# Patient Record
Sex: FEMALE | Race: WHITE | Employment: OTHER | URBAN - METROPOLITAN AREA
[De-identification: names, ages, dates, MRNs, and addresses within clinical notes are randomized per-mention and may not be internally consistent; named-entity substitution may affect disease eponyms.]

---

## 2017-01-24 RX ORDER — LEVOTHYROXINE SODIUM 0.15 MG/1
TABLET ORAL
Qty: 30 TABLET | Refills: 5 | Status: SHIPPED | OUTPATIENT
Start: 2017-01-24 | End: 2018-02-12 | Stop reason: SDUPTHER

## 2017-11-13 RX ORDER — CLOBETASOL PROPIONATE 0.5 MG/G
CREAM TOPICAL
Qty: 1 TUBE | Refills: 0 | Status: SHIPPED | OUTPATIENT
Start: 2017-11-13 | End: 2018-01-19 | Stop reason: ALTCHOICE

## 2018-01-19 ENCOUNTER — OFFICE VISIT (OUTPATIENT)
Dept: FAMILY MEDICINE CLINIC | Age: 68
End: 2018-01-19

## 2018-01-19 VITALS
RESPIRATION RATE: 16 BRPM | WEIGHT: 133 LBS | DIASTOLIC BLOOD PRESSURE: 64 MMHG | TEMPERATURE: 98.1 F | SYSTOLIC BLOOD PRESSURE: 106 MMHG | HEIGHT: 62 IN | HEART RATE: 68 BPM | BODY MASS INDEX: 24.48 KG/M2

## 2018-01-19 DIAGNOSIS — K57.90 DIVERTICULOSIS OF INTESTINE WITHOUT BLEEDING, UNSPECIFIED INTESTINAL TRACT LOCATION: ICD-10-CM

## 2018-01-19 DIAGNOSIS — R73.9 HYPERGLYCEMIA: ICD-10-CM

## 2018-01-19 DIAGNOSIS — E53.8 B12 DEFICIENCY: ICD-10-CM

## 2018-01-19 DIAGNOSIS — R82.998 LEUKOCYTES IN URINE: ICD-10-CM

## 2018-01-19 DIAGNOSIS — E03.9 HYPOTHYROIDISM, UNSPECIFIED TYPE: ICD-10-CM

## 2018-01-19 DIAGNOSIS — R31.9 HEMATURIA, UNSPECIFIED TYPE: ICD-10-CM

## 2018-01-19 DIAGNOSIS — E78.2 MIXED HYPERLIPIDEMIA: ICD-10-CM

## 2018-01-19 DIAGNOSIS — N28.9 RENAL INSUFFICIENCY: ICD-10-CM

## 2018-01-19 DIAGNOSIS — R10.32 LLQ PAIN: ICD-10-CM

## 2018-01-19 DIAGNOSIS — K21.9 GASTROESOPHAGEAL REFLUX DISEASE WITHOUT ESOPHAGITIS: ICD-10-CM

## 2018-01-19 DIAGNOSIS — E78.2 MIXED HYPERLIPIDEMIA: Primary | ICD-10-CM

## 2018-01-19 DIAGNOSIS — Z23 NEED FOR PNEUMOCOCCAL VACCINATION: ICD-10-CM

## 2018-01-19 LAB
ALBUMIN SERPL-MCNC: 4.6 G/DL (ref 3.9–4.9)
ALP BLD-CCNC: 75 U/L (ref 40–130)
ALT SERPL-CCNC: 12 U/L (ref 0–33)
ANION GAP SERPL CALCULATED.3IONS-SCNC: 13 MEQ/L (ref 7–13)
AST SERPL-CCNC: 15 U/L (ref 0–35)
BILIRUB SERPL-MCNC: 0.5 MG/DL (ref 0–1.2)
BILIRUBIN, POC: ABNORMAL
BLOOD URINE, POC: ABNORMAL
BUN BLDV-MCNC: 20 MG/DL (ref 8–23)
CALCIUM SERPL-MCNC: 9.7 MG/DL (ref 8.6–10.2)
CHLORIDE BLD-SCNC: 100 MEQ/L (ref 98–107)
CHOLESTEROL, TOTAL: 235 MG/DL (ref 0–199)
CLARITY, POC: ABNORMAL
CO2: 26 MEQ/L (ref 22–29)
COLOR, POC: ABNORMAL
CREAT SERPL-MCNC: 0.56 MG/DL (ref 0.5–0.9)
GFR AFRICAN AMERICAN: >60
GFR NON-AFRICAN AMERICAN: >60
GLOBULIN: 2 G/DL (ref 2.3–3.5)
GLUCOSE BLD-MCNC: 91 MG/DL (ref 74–109)
GLUCOSE URINE, POC: ABNORMAL
HBA1C MFR BLD: 5.2 % (ref 4.8–5.9)
HCT VFR BLD CALC: 39.7 % (ref 37–47)
HDLC SERPL-MCNC: 70 MG/DL (ref 40–59)
HEMOGLOBIN: 12.7 G/DL (ref 12–16)
KETONES, POC: ABNORMAL
LDL CHOLESTEROL CALCULATED: 149 MG/DL (ref 0–129)
LEUKOCYTE EST, POC: ABNORMAL
MCH RBC QN AUTO: 29.2 PG (ref 27–31.3)
MCHC RBC AUTO-ENTMCNC: 31.9 % (ref 33–37)
MCV RBC AUTO: 91.5 FL (ref 82–100)
NITRITE, POC: ABNORMAL
PDW BLD-RTO: 14.2 % (ref 11.5–14.5)
PH, POC: 6
PLATELET # BLD: 164 K/UL (ref 130–400)
POTASSIUM SERPL-SCNC: 4.6 MEQ/L (ref 3.5–5.1)
PROTEIN, POC: ABNORMAL
RBC # BLD: 4.33 M/UL (ref 4.2–5.4)
SODIUM BLD-SCNC: 139 MEQ/L (ref 132–144)
SPECIFIC GRAVITY, POC: 1.03
T4 FREE: 1.42 NG/DL (ref 0.93–1.7)
TOTAL PROTEIN: 6.6 G/DL (ref 6.4–8.1)
TRIGL SERPL-MCNC: 80 MG/DL (ref 0–200)
TSH SERPL DL<=0.05 MIU/L-ACNC: 6.31 UIU/ML (ref 0.27–4.2)
UROBILINOGEN, POC: ABNORMAL
WBC # BLD: 6.7 K/UL (ref 4.8–10.8)

## 2018-01-19 PROCEDURE — 90732 PPSV23 VACC 2 YRS+ SUBQ/IM: CPT | Performed by: FAMILY MEDICINE

## 2018-01-19 PROCEDURE — 99214 OFFICE O/P EST MOD 30 MIN: CPT | Performed by: FAMILY MEDICINE

## 2018-01-19 PROCEDURE — 81003 URINALYSIS AUTO W/O SCOPE: CPT | Performed by: FAMILY MEDICINE

## 2018-01-19 PROCEDURE — G0009 ADMIN PNEUMOCOCCAL VACCINE: HCPCS | Performed by: FAMILY MEDICINE

## 2018-01-19 RX ORDER — PANTOPRAZOLE SODIUM 40 MG/1
40 TABLET, DELAYED RELEASE ORAL DAILY
Qty: 30 TABLET | Refills: 3 | Status: SHIPPED | OUTPATIENT
Start: 2018-01-19 | End: 2018-05-30 | Stop reason: SDUPTHER

## 2018-01-19 ASSESSMENT — PATIENT HEALTH QUESTIONNAIRE - PHQ9
2. FEELING DOWN, DEPRESSED OR HOPELESS: 1
SUM OF ALL RESPONSES TO PHQ9 QUESTIONS 1 & 2: 2
SUM OF ALL RESPONSES TO PHQ QUESTIONS 1-9: 2
1. LITTLE INTEREST OR PLEASURE IN DOING THINGS: 1

## 2018-01-19 NOTE — PROGRESS NOTES
Subjective  Lizarraga John, 75 y.o. female presents today with:  Chief Complaint   Patient presents with    Flank Pain     LLQ-has been going on x couple years--s/p CT, U/S-pt thinks it may be from when she had colon surgery years ago       Colon ca screening utd  Labs due  Hematuria-should get uro c/s  Also heartburn-had in past-did NTO tolerate omeprazole  Also bms qd but LLQ pain at times jimbo in am        Past Medical History:   Diagnosis Date    Diverticulosis of colon (without mention of hemorrhage) 02/25/2015    DR Zunilda Whitfield    Hyperlipidemia 2/3/2015    Hypothyroidism     Lichen sclerosus     Migraines 2/3/2015    Renal insufficiency 2/3/2015    Spondylosis of lumbar region without myelopathy or radiculopathy 5/24/2016     Past Surgical History:   Procedure Laterality Date    ABDOMINAL ADHESION SURGERY  1/7/16    DR. MERAZ    APPENDECTOMY  2012    CHOLECYSTECTOMY  2012    COLON SURGERY  2005    13\" of colon removed-NO CA    COLONOSCOPY  2011    polyps    COLONOSCOPY  02/25/2015    DR Zunilda Whitfield - DIVERTICULOSIS    KNEE SURGERY  2013    TUBAL LIGATION  1982     Social History     Social History    Marital status: Single     Spouse name: N/A    Number of children: N/A    Years of education: N/A     Occupational History    Not on file.      Social History Main Topics    Smoking status: Never Smoker    Smokeless tobacco: Never Used    Alcohol use No    Drug use: No    Sexual activity: Not Currently     Other Topics Concern    Not on file     Social History Narrative    No narrative on file     Family History   Problem Relation Age of Onset    Heart Disease Father     Stroke Father     Diabetes Father     Stomach Cancer Father     Other Mother      Bowel Obstruction     Allergies   Allergen Reactions    Ciprofloxacin Other (See Comments)     Pt not 100% sure, but thinks cipro is the antibiotic she is allergic to  Anxiety, confusion     Current Outpatient Prescriptions   Medication Final    Cortisol 07/11/2016 6.5  ug/dL Final    Comment: Reference Ranges:  AM Specimen 6.2-19.4 ug/dL                     PM Specimen 2.3-11.9 ug/dL       Health Maintenance   Topic Date Due    Hepatitis C screen  1950    TSH testing  02/16/2017    Flu vaccine (1) 09/01/2017    Zostavax vaccine  04/19/2018 (Originally 5/27/2009)    DTaP/Tdap/Td vaccine (1 - Tdap) 07/19/2018 (Originally 5/27/1968)    Breast cancer screen  02/26/2018    Cervical cancer screen  07/28/2019    Lipid screen  03/25/2020    Colon cancer screen colonoscopy  02/25/2025    DEXA (modify frequency per FRAX score)  Completed    Pneumococcal low/med risk  Completed       Results for POC orders placed in visit on 01/19/18   POCT Urinalysis No Micro (Auto)   Result Value Ref Range    Color, UA      Clarity, UA      Glucose, UA POC neg     Bilirubin, UA neg     Ketones, UA neg     Spec Grav, UA 1.030     Blood, UA POC 1+     pH, UA 6.0     Protein, UA POC +     Urobilinogen, UA neg     Leukocytes, UA 1+     Nitrite, UA neg          Objective    Vitals:    01/19/18 0821   BP: 106/64   Pulse: 68   Resp: 16   Temp: 98.1 °F (36.7 °C)   TempSrc: Oral   Weight: 133 lb (60.3 kg)   Height: 5' 2\" (1.575 m)       PHYSICAL EXAMINATION:        GENERAL:    The patient appears well nourished and well-developed,     Normal affect. Not appearing significantly anxious or depressed. No acute respiratory distress. Alert and oriented times 3. Skin:     No skin rashes. No concerning moles observed. Gait:    Normal gait. No ataxia. HEENT:  Normocephalic, atraumatic. Throat:  Pharynx is clear, no erythema/ edema or exudates   Ears:    TMs normal bilaterally. Canals and ears normal   Eyes:  Extraocular eye motions intact and pain free. Pupils reactive/equal    Sclerae and conjunctivae clear    NECK: No masses or adenopathy palpable. No carotid bruits heard. No asymmetry visible. No thyromegaly.     RESPIRATORY:

## 2018-01-21 LAB — URINE CULTURE, ROUTINE: NORMAL

## 2018-01-23 ENCOUNTER — OFFICE VISIT (OUTPATIENT)
Dept: GASTROENTEROLOGY | Age: 68
End: 2018-01-23
Payer: MEDICARE

## 2018-01-23 VITALS
HEART RATE: 80 BPM | DIASTOLIC BLOOD PRESSURE: 80 MMHG | HEIGHT: 62 IN | WEIGHT: 133 LBS | RESPIRATION RATE: 12 BRPM | BODY MASS INDEX: 24.48 KG/M2 | SYSTOLIC BLOOD PRESSURE: 122 MMHG

## 2018-01-23 DIAGNOSIS — R12 HEARTBURN: ICD-10-CM

## 2018-01-23 DIAGNOSIS — R13.19 OTHER DYSPHAGIA: ICD-10-CM

## 2018-01-23 DIAGNOSIS — R10.32 LLQ ABDOMINAL PAIN: Primary | ICD-10-CM

## 2018-01-23 PROCEDURE — 99213 OFFICE O/P EST LOW 20 MIN: CPT | Performed by: INTERNAL MEDICINE

## 2018-01-23 NOTE — PROGRESS NOTES
Marydis Car  75 y.o. female  Referred by: Ericka Walsh MD    Medicines, social history, past medical history, surgical history, and allergies have been reviewed and updated as needed. Chief Complaint   Patient presents with    Abdominal Pain     consult          HPI:   Patient is a 75year old WF with 2 GI problems. Patient has LLQ pain. I saw the patient for this problem and a referral was made to Dr. Chanel Parrish for possible adhesions. Laparoscopy was performed and there was no help. Patient also has reflux, heartburn and dysphagia for solids. ROS:    CONSTITUTIONAL:  Negative  EYES:  Negative  ENMT:  Negative  MUSCULOSKELETAL:  Negative  NEUROLOGICAL:  Negative  INTEGUMENTARY:  Negative  GASTROINTESTINAL:  See HPI  GENITOURINARY:  Negative  CARDIOVASCULAR:  Negative  RESPIRATORY:  Negative  HEM/LYMPH:  Negative  ENDOCRINE:  Negative  PSYCHIATRIC:  Negative  ALLERGIC/IMMUNO:  Negative  EXTREMITIES:  Negative  BREAST:  Negative        Physical Exam:  /80 (Site: Right Arm, Position: Sitting, Cuff Size: Medium Adult)   Pulse 80   Resp 12   Ht 5' 2\" (1.575 m)   Wt 133 lb (60.3 kg)   LMP  (LMP Unknown)   BMI 24.33 kg/m²   Constitutional:  Patient appears well nourished, well developed, and hydrated. Alert and oriented x3 and appropriate. Non icteric and not pale. Eyes:  Pupils equal and reactive. Oropharynx:  Unremarkable. Neck/Thyroid: Neck is supple. No palpable nodules. No carotid bruits. Thyroid is symmetrical, without thyromegaly, masses, or palpable nodules. Respiratory:  Normal to inspection. Lungs clear to auscultation and percussion. No wheezes rhonchi or rales. Cardiovascular:  Regular rate and rhythm. No murmurs, gallops, or rubs. Abdomen:  Soft, vague tenderness to the left of the umbilicus and non-distended. No organomegaly. No masses. No rebound or guarding. Normal bowel sounds. Integumentary:  The skin is unremarkable. No rashes. No suspicious lesions.  Warm and dry.  Neuro: Grossly intact. Cranial nerves, sensation and reflexes grossly intact. Musculoskeletal:  Unremarkable. Assessment:  1. LLQ abdominal pain     2. Heartburn     3. Other dysphagia         Plan:  Colonoscopy and EGD with dilation 1/26/18      Prep:  Daniella Sierra, transcribed the above note for Dr Jose Mina. Electronically signed by López Webb 1/23/18 3:32 PM        I , Jose Mina, have read and agree with the above documentation.   Electronically signed by Jose Mina M.D. 1/24/18 11:00 AM

## 2018-01-26 ENCOUNTER — HOSPITAL ENCOUNTER (OUTPATIENT)
Age: 68
Setting detail: OUTPATIENT SURGERY
Discharge: HOME | End: 2018-01-26
Attending: INTERNAL MEDICINE | Admitting: INTERNAL MEDICINE
Payer: MEDICARE

## 2018-01-26 ENCOUNTER — ANESTHESIA (OUTPATIENT)
Dept: ENDOSCOPY | Age: 68
End: 2018-01-26
Payer: MEDICARE

## 2018-01-26 ENCOUNTER — ANESTHESIA EVENT (OUTPATIENT)
Dept: ENDOSCOPY | Age: 68
End: 2018-01-26
Payer: MEDICARE

## 2018-01-26 VITALS
RESPIRATION RATE: 16 BRPM | HEIGHT: 62 IN | OXYGEN SATURATION: 95 % | SYSTOLIC BLOOD PRESSURE: 118 MMHG | WEIGHT: 133 LBS | HEART RATE: 98 BPM | BODY MASS INDEX: 24.48 KG/M2 | TEMPERATURE: 98.3 F | DIASTOLIC BLOOD PRESSURE: 77 MMHG

## 2018-01-26 VITALS
DIASTOLIC BLOOD PRESSURE: 58 MMHG | SYSTOLIC BLOOD PRESSURE: 103 MMHG | OXYGEN SATURATION: 99 % | RESPIRATION RATE: 13 BRPM

## 2018-01-26 PROCEDURE — 3700000000 HC ANESTHESIA ATTENDED CARE: Performed by: INTERNAL MEDICINE

## 2018-01-26 PROCEDURE — 6360000002 HC RX W HCPCS: Performed by: NURSE ANESTHETIST, CERTIFIED REGISTERED

## 2018-01-26 PROCEDURE — 3609027000 HC COLONOSCOPY: Performed by: INTERNAL MEDICINE

## 2018-01-26 PROCEDURE — 2500000003 HC RX 250 WO HCPCS: Performed by: NURSE ANESTHETIST, CERTIFIED REGISTERED

## 2018-01-26 PROCEDURE — 88342 IMHCHEM/IMCYTCHM 1ST ANTB: CPT

## 2018-01-26 PROCEDURE — 43239 EGD BIOPSY SINGLE/MULTIPLE: CPT | Performed by: INTERNAL MEDICINE

## 2018-01-26 PROCEDURE — 3609017100 HC EGD: Performed by: INTERNAL MEDICINE

## 2018-01-26 PROCEDURE — 3700000001 HC ADD 15 MINUTES (ANESTHESIA): Performed by: INTERNAL MEDICINE

## 2018-01-26 PROCEDURE — 43248 EGD GUIDE WIRE INSERTION: CPT | Performed by: INTERNAL MEDICINE

## 2018-01-26 PROCEDURE — 88305 TISSUE EXAM BY PATHOLOGIST: CPT

## 2018-01-26 PROCEDURE — 7100000010 HC PHASE II RECOVERY - FIRST 15 MIN: Performed by: INTERNAL MEDICINE

## 2018-01-26 PROCEDURE — 45380 COLONOSCOPY AND BIOPSY: CPT | Performed by: INTERNAL MEDICINE

## 2018-01-26 RX ORDER — SODIUM CHLORIDE 9 MG/ML
INJECTION, SOLUTION INTRAVENOUS CONTINUOUS
Status: DISCONTINUED | OUTPATIENT
Start: 2018-01-26 | End: 2018-01-26 | Stop reason: HOSPADM

## 2018-01-26 RX ORDER — PROPOFOL 10 MG/ML
INJECTION, EMULSION INTRAVENOUS PRN
Status: DISCONTINUED | OUTPATIENT
Start: 2018-01-26 | End: 2018-01-26 | Stop reason: SDUPTHER

## 2018-01-26 RX ORDER — DOCUSATE SODIUM 100 MG/1
100 CAPSULE, LIQUID FILLED ORAL 2 TIMES DAILY
COMMUNITY
End: 2022-10-31

## 2018-01-26 RX ORDER — ONDANSETRON 2 MG/ML
4 INJECTION INTRAMUSCULAR; INTRAVENOUS
Status: DISCONTINUED | OUTPATIENT
Start: 2018-01-26 | End: 2018-01-26 | Stop reason: HOSPADM

## 2018-01-26 RX ORDER — GLYCOPYRROLATE 0.2 MG/ML
INJECTION INTRAMUSCULAR; INTRAVENOUS PRN
Status: DISCONTINUED | OUTPATIENT
Start: 2018-01-26 | End: 2018-01-26 | Stop reason: SDUPTHER

## 2018-01-26 RX ADMIN — GLYCOPYRROLATE 0.4 MG: 0.2 INJECTION INTRAMUSCULAR; INTRAVENOUS at 09:59

## 2018-01-26 RX ADMIN — PROPOFOL 20 MG: 10 INJECTION, EMULSION INTRAVENOUS at 09:49

## 2018-01-26 RX ADMIN — PROPOFOL 20 MG: 10 INJECTION, EMULSION INTRAVENOUS at 09:47

## 2018-01-26 RX ADMIN — PROPOFOL 20 MG: 10 INJECTION, EMULSION INTRAVENOUS at 09:41

## 2018-01-26 RX ADMIN — PROPOFOL 20 MG: 10 INJECTION, EMULSION INTRAVENOUS at 09:55

## 2018-01-26 RX ADMIN — PROPOFOL 20 MG: 10 INJECTION, EMULSION INTRAVENOUS at 09:45

## 2018-01-26 RX ADMIN — PROPOFOL 20 MG: 10 INJECTION, EMULSION INTRAVENOUS at 09:53

## 2018-01-26 RX ADMIN — PROPOFOL 20 MG: 10 INJECTION, EMULSION INTRAVENOUS at 09:57

## 2018-01-26 RX ADMIN — PROPOFOL 20 MG: 10 INJECTION, EMULSION INTRAVENOUS at 09:59

## 2018-01-26 RX ADMIN — PROPOFOL 20 MG: 10 INJECTION, EMULSION INTRAVENOUS at 09:43

## 2018-01-26 RX ADMIN — PROPOFOL 100 MG: 10 INJECTION, EMULSION INTRAVENOUS at 09:39

## 2018-01-26 RX ADMIN — PROPOFOL 30 MG: 10 INJECTION, EMULSION INTRAVENOUS at 09:56

## 2018-01-26 RX ADMIN — PROPOFOL 20 MG: 10 INJECTION, EMULSION INTRAVENOUS at 10:01

## 2018-01-26 RX ADMIN — PROPOFOL 20 MG: 10 INJECTION, EMULSION INTRAVENOUS at 09:52

## 2018-01-26 RX ADMIN — PROPOFOL 20 MG: 10 INJECTION, EMULSION INTRAVENOUS at 09:51

## 2018-01-26 ASSESSMENT — PAIN - FUNCTIONAL ASSESSMENT: PAIN_FUNCTIONAL_ASSESSMENT: 0-10

## 2018-01-26 NOTE — ANESTHESIA PRE PROCEDURE
Department of Anesthesiology  Preprocedure Note       Name:  Kati Thacker   Age:  75 y.o.  :  1950                                          MRN:  [id number]         Date:  2018      Surgeon: Orion Fuller):  Donavon Packer MD    Procedure: Procedure(s):  EGD ESOPHAGOGASTRODUODENOSCOPY WITH DILATION  COLONOSCOPY    Medications prior to admission:   Prior to Admission medications    Medication Sig Start Date End Date Taking? Authorizing Provider   docusate sodium (COLACE) 100 MG capsule Take 100 mg by mouth 2 times daily   Yes Historical Provider, MD   pantoprazole (PROTONIX) 40 MG tablet Take 1 tablet by mouth daily 18  Yes Jaoo Falk MD   levothyroxine (SYNTHROID) 150 MCG tablet Take 1 tablet by mouth Daily. 17  Yes Joao Falk MD   Calcium Carbonate Antacid (TUMS PO) Take by mouth   Yes Historical Provider, MD   conjugated estrogens (PREMARIN) 0.625 MG/GM vaginal cream Place 0.5 g vaginally Twice a Week 16   Eugenio Rossi MD       Current medications:    Current Facility-Administered Medications   Medication Dose Route Frequency Provider Last Rate Last Dose    0.9 % sodium chloride infusion   Intravenous Continuous Donavon Packer MD           Allergies:     Allergies   Allergen Reactions    Ciprofloxacin Other (See Comments)     Pt not 100% sure, but thinks cipro is the antibiotic she is allergic to  Anxiety, confusion       Problem List:    Patient Active Problem List   Diagnosis Code    Hypothyroidism E03.9    Hyperlipidemia E78.5    Renal insufficiency N28.9    Migraines G43.909    Diverticulosis K57.90    Osteoporosis M81.0    Spondylosis of lumbar region without myelopathy or radiculopathy M47.816    Sacroiliac joint dysfunction of left side M53.3       Past Medical History:        Diagnosis Date    Diverticulosis of colon (without mention of hemorrhage) 2015    DR Gomez Back    Hyperlipidemia 2/3/2015    Hypothyroidism     Lichen sclerosus     Migraines 2/3/2015    Renal insufficiency 2/3/2015    Spondylosis of lumbar region without myelopathy or radiculopathy 5/24/2016       Past Surgical History:        Procedure Laterality Date    ABDOMINAL ADHESION SURGERY  1/7/16    DR. MERAZ    APPENDECTOMY  2012    CHOLECYSTECTOMY  2012    COLON SURGERY  2005    13\" of colon removed-NO CA    COLONOSCOPY  2011    polyps    COLONOSCOPY  02/25/2015    DR Aj Espinal - DIVERTICULOSIS    KNEE SURGERY  2013    TUBAL LIGATION  1982       Social History:    Social History   Substance Use Topics    Smoking status: Never Smoker    Smokeless tobacco: Never Used    Alcohol use No                                Counseling given: Not Answered      Vital Signs (Current):   Vitals:    01/26/18 0841   BP: (!) 129/50   Pulse: 71   Resp: 16   Temp: 36.8 °C (98.3 °F)   SpO2: 96%   Weight: 133 lb (60.3 kg)   Height: 5' 2\" (1.575 m)                                              BP Readings from Last 3 Encounters:   01/26/18 (!) 129/50   01/23/18 122/80   01/19/18 106/64       NPO Status: Time of last liquid consumption: 0430                        Time of last solid consumption: 1800                        Date of last liquid consumption: 01/26/18                        Date of last solid food consumption: 01/24/18    BMI:   Wt Readings from Last 3 Encounters:   01/26/18 133 lb (60.3 kg)   01/23/18 133 lb (60.3 kg)   01/19/18 133 lb (60.3 kg)     Body mass index is 24.33 kg/m².     CBC:   Lab Results   Component Value Date    WBC 6.7 01/19/2018    RBC 4.33 01/19/2018    HGB 12.7 01/19/2018    HCT 39.7 01/19/2018    MCV 91.5 01/19/2018    RDW 14.2 01/19/2018     01/19/2018       CMP:   Lab Results   Component Value Date     01/19/2018    K 4.6 01/19/2018     01/19/2018    CO2 26 01/19/2018    BUN 20 01/19/2018    CREATININE 0.56 01/19/2018    GFRAA >60.0 01/19/2018    LABGLOM >60.0 01/19/2018    GLUCOSE 91 01/19/2018    PROT 6.6 01/19/2018    CALCIUM

## 2018-01-26 NOTE — PROGRESS NOTES
Gastric biopsy obtained for point of care h-pylori test  Dilation performed using savory dilator #20

## 2018-02-06 ENCOUNTER — OFFICE VISIT (OUTPATIENT)
Dept: GASTROENTEROLOGY | Age: 68
End: 2018-02-06
Payer: MEDICARE

## 2018-02-06 ENCOUNTER — OFFICE VISIT (OUTPATIENT)
Dept: UROLOGY | Age: 68
End: 2018-02-06
Payer: MEDICARE

## 2018-02-06 VITALS
HEART RATE: 77 BPM | DIASTOLIC BLOOD PRESSURE: 70 MMHG | SYSTOLIC BLOOD PRESSURE: 112 MMHG | BODY MASS INDEX: 24.48 KG/M2 | WEIGHT: 133 LBS | HEIGHT: 62 IN

## 2018-02-06 VITALS
BODY MASS INDEX: 24.48 KG/M2 | RESPIRATION RATE: 12 BRPM | WEIGHT: 133 LBS | HEIGHT: 62 IN | DIASTOLIC BLOOD PRESSURE: 81 MMHG | SYSTOLIC BLOOD PRESSURE: 144 MMHG | HEART RATE: 79 BPM

## 2018-02-06 DIAGNOSIS — R10.32 LEFT LOWER QUADRANT PAIN: ICD-10-CM

## 2018-02-06 DIAGNOSIS — R13.10 DYSPHAGIA, UNSPECIFIED TYPE: Primary | ICD-10-CM

## 2018-02-06 DIAGNOSIS — R10.32 LLQ ABDOMINAL PAIN: ICD-10-CM

## 2018-02-06 DIAGNOSIS — R31.29 MICROSCOPIC HEMATURIA: Primary | ICD-10-CM

## 2018-02-06 LAB
BILIRUBIN, POC: NORMAL
BLOOD URINE, POC: NORMAL
CLARITY, POC: CLEAR
COLOR, POC: YELLOW
GLUCOSE URINE, POC: NORMAL
KETONES, POC: NORMAL
LEUKOCYTE EST, POC: NORMAL
NITRITE, POC: NORMAL
PH, POC: 5.5
PROTEIN, POC: NORMAL
SPECIFIC GRAVITY, POC: 1.03
UROBILINOGEN, POC: 0.2

## 2018-02-06 PROCEDURE — 99213 OFFICE O/P EST LOW 20 MIN: CPT | Performed by: INTERNAL MEDICINE

## 2018-02-06 PROCEDURE — 99204 OFFICE O/P NEW MOD 45 MIN: CPT | Performed by: UROLOGY

## 2018-02-06 PROCEDURE — 81003 URINALYSIS AUTO W/O SCOPE: CPT | Performed by: UROLOGY

## 2018-02-06 RX ORDER — OMEPRAZOLE 40 MG/1
40 CAPSULE, DELAYED RELEASE ORAL DAILY
COMMUNITY
End: 2018-02-06 | Stop reason: CLARIF

## 2018-02-06 ASSESSMENT — ENCOUNTER SYMPTOMS
ALLERGIC/IMMUNOLOGIC NEGATIVE: 1
RESPIRATORY NEGATIVE: 1
CONSTIPATION: 0
DIARRHEA: 0
ABDOMINAL DISTENTION: 0
BACK PAIN: 1
NAUSEA: 0
VOMITING: 0
ABDOMINAL PAIN: 1

## 2018-02-12 RX ORDER — CLOBETASOL PROPIONATE 0.5 MG/G
CREAM TOPICAL
Qty: 15 G | Refills: 3 | Status: SHIPPED | OUTPATIENT
Start: 2018-02-12 | End: 2018-02-27

## 2018-02-12 RX ORDER — LEVOTHYROXINE SODIUM 0.15 MG/1
TABLET ORAL
Qty: 30 TABLET | Refills: 5 | Status: SHIPPED | OUTPATIENT
Start: 2018-02-12 | End: 2018-07-19 | Stop reason: SDUPTHER

## 2018-02-12 RX ORDER — LEVOTHYROXINE SODIUM 0.15 MG/1
TABLET ORAL
Qty: 30 TABLET | Refills: 5 | Status: SHIPPED | OUTPATIENT
Start: 2018-02-12 | End: 2018-11-27 | Stop reason: SDUPTHER

## 2018-02-12 RX ORDER — CLOBETASOL PROPIONATE 0.5 MG/G
OINTMENT TOPICAL
Qty: 60 G | Refills: 3 | Status: SHIPPED | OUTPATIENT
Start: 2018-02-12 | End: 2019-07-17

## 2018-02-13 ENCOUNTER — HOSPITAL ENCOUNTER (OUTPATIENT)
Dept: CT IMAGING | Age: 68
Discharge: HOME | End: 2018-02-15
Payer: MEDICARE

## 2018-02-13 VITALS
SYSTOLIC BLOOD PRESSURE: 123 MMHG | DIASTOLIC BLOOD PRESSURE: 68 MMHG | HEART RATE: 80 BPM | WEIGHT: 133 LBS | RESPIRATION RATE: 16 BRPM | BODY MASS INDEX: 24.48 KG/M2 | HEIGHT: 62 IN

## 2018-02-13 DIAGNOSIS — R31.29 MICROSCOPIC HEMATURIA: ICD-10-CM

## 2018-02-13 PROCEDURE — 6360000004 HC RX CONTRAST MEDICATION: Performed by: UROLOGY

## 2018-02-13 PROCEDURE — 74178 CT ABD&PLV WO CNTR FLWD CNTR: CPT

## 2018-02-13 RX ADMIN — IOPAMIDOL 100 ML: 755 INJECTION, SOLUTION INTRAVENOUS at 14:37

## 2018-02-22 ENCOUNTER — PROCEDURE VISIT (OUTPATIENT)
Dept: UROLOGY | Age: 68
End: 2018-02-22
Payer: MEDICARE

## 2018-02-22 VITALS
WEIGHT: 133 LBS | HEIGHT: 62 IN | DIASTOLIC BLOOD PRESSURE: 64 MMHG | BODY MASS INDEX: 24.48 KG/M2 | SYSTOLIC BLOOD PRESSURE: 116 MMHG | HEART RATE: 84 BPM

## 2018-02-22 DIAGNOSIS — R31.29 MICROSCOPIC HEMATURIA: Primary | ICD-10-CM

## 2018-02-22 LAB
BILIRUBIN, POC: ABNORMAL
BLOOD URINE, POC: ABNORMAL
CLARITY, POC: CLEAR
COLOR, POC: YELLOW
GLUCOSE URINE, POC: ABNORMAL
KETONES, POC: ABNORMAL
LEUKOCYTE EST, POC: ABNORMAL
NITRITE, POC: ABNORMAL
PH, POC: 5.5
PROTEIN, POC: ABNORMAL
SPECIFIC GRAVITY, POC: >1.03
UROBILINOGEN, POC: 0.2

## 2018-02-22 PROCEDURE — 52000 CYSTOURETHROSCOPY: CPT | Performed by: UROLOGY

## 2018-02-22 PROCEDURE — 99212 OFFICE O/P EST SF 10 MIN: CPT | Performed by: UROLOGY

## 2018-02-22 PROCEDURE — 81003 URINALYSIS AUTO W/O SCOPE: CPT | Performed by: UROLOGY

## 2018-02-22 RX ORDER — SULFAMETHOXAZOLE AND TRIMETHOPRIM 800; 160 MG/1; MG/1
1 TABLET ORAL ONCE
Qty: 1 TABLET | Refills: 0 | COMMUNITY
Start: 2018-02-22 | End: 2018-02-22

## 2018-02-22 ASSESSMENT — ENCOUNTER SYMPTOMS: ABDOMINAL DISTENTION: 0

## 2018-02-22 NOTE — PROGRESS NOTES
Father    Omer Keller Father     Other Mother      Bowel Obstruction     Current Outpatient Prescriptions   Medication Sig Dispense Refill    levothyroxine (SYNTHROID) 150 MCG tablet Take 1 tablet by mouth Daily. 30 tablet 5    clobetasol (TEMOVATE) 0.05 % ointment Apply topically 2 times daily. 60 g 3    levothyroxine (SYNTHROID) 150 MCG tablet Take 1 tablet by mouth Daily. 30 tablet 5    clobetasol (TEMOVATE) 0.05 % cream Apply topically 2 times daily for 2 weeks and then PRN 15 g 3    docusate sodium (COLACE) 100 MG capsule Take 100 mg by mouth 2 times daily      pantoprazole (PROTONIX) 40 MG tablet Take 1 tablet by mouth daily 30 tablet 3    conjugated estrogens (PREMARIN) 0.625 MG/GM vaginal cream Place 0.5 g vaginally Twice a Week 3 Tube 3     No current facility-administered medications for this visit. Ciprofloxacin  reviewed      Review of Systems   Constitutional: Negative for fever and unexpected weight change. Gastrointestinal: Negative for abdominal distention. Genitourinary: Negative for dysuria, flank pain and frequency. Objective:   Physical Exam   Constitutional: She appears well-developed and well-nourished. Abdominal: Soft. She exhibits no distension. There is no tenderness. Neurological: She is alert. Psychiatric: She has a normal mood and affect.        2/22/2018  3:00 PM - Delio Montiel CMA (AAMA)     Component Results     Component Collected Lab   Color, UA 02/22/2018  3:00 PM Unknown   yellow    Clarity, UA 02/22/2018  3:00 PM Unknown   clear    Glucose, UA POC 02/22/2018  3:00 PM Unknown   neg    Bilirubin, UA 02/22/2018  3:00 PM Unknown   neg    Ketones, UA 02/22/2018  3:00 PM Unknown   neg    Spec Grav, UA 02/22/2018  3:00 PM Unknown   >1.030    Blood, UA POC 02/22/2018  3:00 PM Unknown   small    pH, UA 02/22/2018  3:00 PM Unknown   5.5    Protein, UA POC 02/22/2018  3:00 PM Unknown   neg    Urobilinogen, UA 02/22/2018  3:00 PM Unknown   0.2 mesenteric lymph nodes. Pelvic: No enlarged pelvic lymph nodes.        Ureters: Normal in course and caliber. No calcifications.        Bladder: No wall thickening.        Reproductive organs: No pelvic masses.        Abdominal Wall:  3 mm periumbilical abdominal wall defect containing fat.       Bones:  No bone lesions. Anterior osteophyte formation L1-L4. Mild anterior wedging of L1 since prior study of 2015. Disc space narrowing lower thoracic spine. No post operative changes.            Impression       Small hiatal hernia.       Fat-containing periumbilical hernia.       Probable hepatic cysts, stable in size and number since prior study.       Cholecystectomy.       Appendectomy.       Other findings discussed.           All CT scans at this facility use dose modulation, iterative reconstruction, and/or weight based dosing when appropriate to reduce radiation dose to as low as reasonably achievable.             Cystoscopy Procedure Note    Pre-operative Diagnosis: Microhematuria    Post-operative Diagnosis: Same plus benign findings    Surgeon: Fatmata Morillo     Assistants: Toni Camarena. HERMANN Murphy    Anesthesia: Local anesthesia topical 2% lidocaine gel    Procedure Details   The risks, benefits, complications, treatment options, and expected outcomes were discussed with the patient. The patient concurred with the proposed plan, giving informed consent. Cystoscopy was performed today under local anesthesia, using sterile technique. The patient was placed in the supine position, prepped and draped in the usual sterile fashion. A flexible cystoscope was used to inspect the entire bladder including retroflexion. Findings:  Urethra:normal  Bladder: Normal mucosa without bladder tumors, stones, clots or FB  Ureteral orifice(s) were normal . Ureteral orifice(s) were in the normal location. Specimens: None                 Complications:  None; patient tolerated the procedure well.

## 2018-02-27 ENCOUNTER — OFFICE VISIT (OUTPATIENT)
Dept: FAMILY MEDICINE CLINIC | Age: 68
End: 2018-02-27
Payer: MEDICARE

## 2018-02-27 VITALS
DIASTOLIC BLOOD PRESSURE: 64 MMHG | HEART RATE: 76 BPM | WEIGHT: 132 LBS | RESPIRATION RATE: 18 BRPM | BODY MASS INDEX: 24.29 KG/M2 | HEIGHT: 62 IN | SYSTOLIC BLOOD PRESSURE: 120 MMHG | TEMPERATURE: 98.7 F | OXYGEN SATURATION: 95 %

## 2018-02-27 DIAGNOSIS — K44.9 HIATAL HERNIA: ICD-10-CM

## 2018-02-27 DIAGNOSIS — J40 BRONCHITIS: ICD-10-CM

## 2018-02-27 DIAGNOSIS — R68.89 FLU-LIKE SYMPTOMS: Primary | ICD-10-CM

## 2018-02-27 LAB
INFLUENZA A ANTIBODY: NEGATIVE
INFLUENZA B ANTIBODY: NEGATIVE

## 2018-02-27 PROCEDURE — 99214 OFFICE O/P EST MOD 30 MIN: CPT | Performed by: FAMILY MEDICINE

## 2018-02-27 PROCEDURE — 87804 INFLUENZA ASSAY W/OPTIC: CPT | Performed by: FAMILY MEDICINE

## 2018-02-27 RX ORDER — DOXYCYCLINE HYCLATE 100 MG
100 TABLET ORAL 2 TIMES DAILY
Qty: 20 TABLET | Refills: 0 | Status: SHIPPED | OUTPATIENT
Start: 2018-02-27 | End: 2019-03-08 | Stop reason: SDUPTHER

## 2018-02-27 RX ORDER — ALBUTEROL SULFATE 90 UG/1
2 AEROSOL, METERED RESPIRATORY (INHALATION) EVERY 6 HOURS PRN
Qty: 1 INHALER | Refills: 1 | Status: SHIPPED | OUTPATIENT
Start: 2018-02-27 | End: 2018-02-28 | Stop reason: CLARIF

## 2018-02-27 NOTE — PROGRESS NOTES
POCT Influenza A/B   Result Value Ref Range    Influenza A Ab negative     Influenza B Ab negative          Objective    Vitals:    02/27/18 1548   BP: 120/64   Pulse: 76   Resp: 18   Temp: 98.7 °F (37.1 °C)   TempSrc: Oral   SpO2: 95%   Weight: 132 lb (59.9 kg)   Height: 5' 2\" (1.575 m)       PHYSICAL EXAMINATION:        GENERAL:    The patient appears well nourished and well-developed,     Normal affect. Not appearing significantly anxious or depressed. No acute respiratory distress. Alert and oriented times 3. Skin:     No skin rashes. No concerning moles observed. Gait:    Normal gait. No ataxia. HEENT:  Normocephalic, atraumatic. Throat:  Pharynx is clear, no erythema/ edema or exudates   Ears:    TMs normal bilaterally. Canals and ears normal   Eyes:  Extraocular eye motions intact and pain free. Pupils reactive/equal    Sclerae and conjunctivae clear    NECK: No masses or adenopathy palpable. No carotid bruits heard. No asymmetry visible. No thyromegaly. RESPIRATORY:   Exp wheezes and coarse bilat mostly exp     HEART: Regular rhythm without murmur, rub or gallop. ABDOMEN:  Soft, non tender. No masses, guarding or rebound. Normo active bowel sounds. EXTREMITIES:  No edema in any extremity. No cyanosis or clubbing. 2+ dorsalis pedis pulses bilaterally          Assessment & Plan   1. Flu-like symptoms  POCT Influenza A/B   2. Hiatal hernia     3. Bronchitis       Orders Placed This Encounter   Procedures    POCT Influenza A/B     No orders of the defined types were placed in this encounter. Medications Discontinued During This Encounter   Medication Reason    clobetasol (TEMOVATE) 0.05 % cream      No Follow-up on file.   When she wants motrin 800mg when having pain bc it helps a lot  duexis given The patient was advised that NSAID-type medications have three important potential side effects: gastrointestinal irritation including hemorrhage, myocardial injury including possible infarction, and kidney injury. She was asked to take the medication with food, avoid any other NSAIDs or steroids, and discontinue for any untowards effects. She should immediately stop the medication and proceed to the emergency department for chest pain, dark urine or difficulty with producing urine, vomiting, abdominal pain or black/bloody stools. The patient expresses understanding of these issues and all questions were answered.      Filipe Helms MD

## 2018-02-28 RX ORDER — ALBUTEROL SULFATE 90 UG/1
2 AEROSOL, METERED RESPIRATORY (INHALATION) EVERY 6 HOURS PRN
Qty: 1 INHALER | Refills: 3 | Status: SHIPPED | OUTPATIENT
Start: 2018-02-28 | End: 2018-08-16

## 2018-05-30 RX ORDER — PANTOPRAZOLE SODIUM 40 MG/1
40 TABLET, DELAYED RELEASE ORAL DAILY
Qty: 30 TABLET | Refills: 5 | Status: SHIPPED | OUTPATIENT
Start: 2018-05-30 | End: 2018-08-16

## 2018-07-19 ENCOUNTER — OFFICE VISIT (OUTPATIENT)
Dept: FAMILY MEDICINE CLINIC | Age: 68
End: 2018-07-19
Payer: MEDICARE

## 2018-07-19 VITALS
DIASTOLIC BLOOD PRESSURE: 82 MMHG | WEIGHT: 130 LBS | TEMPERATURE: 98.2 F | SYSTOLIC BLOOD PRESSURE: 136 MMHG | HEIGHT: 62 IN | BODY MASS INDEX: 23.92 KG/M2 | HEART RATE: 72 BPM | RESPIRATION RATE: 16 BRPM

## 2018-07-19 DIAGNOSIS — E03.9 HYPOTHYROIDISM, UNSPECIFIED TYPE: ICD-10-CM

## 2018-07-19 DIAGNOSIS — R31.29 MICROHEMATURIA: ICD-10-CM

## 2018-07-19 DIAGNOSIS — R10.9 LEFT SIDED ABDOMINAL PAIN: ICD-10-CM

## 2018-07-19 DIAGNOSIS — E78.2 MIXED HYPERLIPIDEMIA: Primary | ICD-10-CM

## 2018-07-19 DIAGNOSIS — Z12.31 ENCOUNTER FOR SCREENING MAMMOGRAM FOR BREAST CANCER: ICD-10-CM

## 2018-07-19 DIAGNOSIS — K57.30 DIVERTICULA OF COLON: ICD-10-CM

## 2018-07-19 LAB
BILIRUBIN, POC: ABNORMAL
BLOOD URINE, POC: ABNORMAL
CLARITY, POC: ABNORMAL
COLOR, POC: ABNORMAL
GLUCOSE URINE, POC: ABNORMAL
KETONES, POC: ABNORMAL
LEUKOCYTE EST, POC: ABNORMAL
NITRITE, POC: ABNORMAL
PH, POC: 6
PROTEIN, POC: ABNORMAL
SPECIFIC GRAVITY, POC: 1.03
UROBILINOGEN, POC: ABNORMAL

## 2018-07-19 PROCEDURE — 99214 OFFICE O/P EST MOD 30 MIN: CPT | Performed by: FAMILY MEDICINE

## 2018-07-19 PROCEDURE — 81003 URINALYSIS AUTO W/O SCOPE: CPT | Performed by: FAMILY MEDICINE

## 2018-07-21 LAB — URINE CULTURE, ROUTINE: NORMAL

## 2018-07-23 DIAGNOSIS — M47.816 SPONDYLOSIS OF LUMBAR REGION WITHOUT MYELOPATHY OR RADICULOPATHY: ICD-10-CM

## 2018-07-23 RX ORDER — TIZANIDINE 2 MG/1
2 TABLET ORAL EVERY EVENING
Qty: 30 TABLET | Refills: 0 | Status: SHIPPED | OUTPATIENT
Start: 2018-07-23 | End: 2018-08-28 | Stop reason: SDUPTHER

## 2018-08-16 ENCOUNTER — OFFICE VISIT (OUTPATIENT)
Dept: FAMILY MEDICINE CLINIC | Age: 68
End: 2018-08-16
Payer: MEDICARE

## 2018-08-16 VITALS
SYSTOLIC BLOOD PRESSURE: 126 MMHG | BODY MASS INDEX: 23.92 KG/M2 | HEART RATE: 76 BPM | TEMPERATURE: 97.9 F | HEIGHT: 62 IN | WEIGHT: 130 LBS | DIASTOLIC BLOOD PRESSURE: 56 MMHG | OXYGEN SATURATION: 97 % | RESPIRATION RATE: 16 BRPM

## 2018-08-16 DIAGNOSIS — R10.32 LEFT LOWER QUADRANT PAIN: ICD-10-CM

## 2018-08-16 DIAGNOSIS — R10.32 LEFT LOWER QUADRANT PAIN: Primary | ICD-10-CM

## 2018-08-16 LAB
BACTERIA: ABNORMAL /HPF
BILIRUBIN URINE: NEGATIVE
BLOOD, URINE: NEGATIVE
CLARITY: CLEAR
COLOR: YELLOW
GLUCOSE URINE: NEGATIVE MG/DL
HCT VFR BLD CALC: 40.7 % (ref 37–47)
HEMOGLOBIN: 13.8 G/DL (ref 12–16)
KETONES, URINE: NEGATIVE MG/DL
LACTIC ACID: 0.7 MMOL/L (ref 0.5–2.2)
LEUKOCYTE ESTERASE, URINE: ABNORMAL
MCH RBC QN AUTO: 30.1 PG (ref 27–31.3)
MCHC RBC AUTO-ENTMCNC: 33.9 % (ref 33–37)
MCV RBC AUTO: 88.7 FL (ref 82–100)
MUCUS: PRESENT
NITRITE, URINE: NEGATIVE
PDW BLD-RTO: 14 % (ref 11.5–14.5)
PH UA: 6.5 (ref 5–9)
PLATELET # BLD: 160 K/UL (ref 130–400)
PROTEIN UA: NEGATIVE MG/DL
RBC # BLD: 4.59 M/UL (ref 4.2–5.4)
RBC UA: ABNORMAL /HPF (ref 0–2)
SPECIFIC GRAVITY UA: 1.02 (ref 1–1.03)
UROBILINOGEN, URINE: 0.2 E.U./DL
WBC # BLD: 5.3 K/UL (ref 4.8–10.8)
WBC UA: ABNORMAL /HPF (ref 0–5)

## 2018-08-16 PROCEDURE — 99214 OFFICE O/P EST MOD 30 MIN: CPT | Performed by: INTERNAL MEDICINE

## 2018-08-16 RX ORDER — AMOXICILLIN AND CLAVULANATE POTASSIUM 875; 125 MG/1; MG/1
1 TABLET, FILM COATED ORAL 3 TIMES DAILY
Qty: 21 TABLET | Refills: 0 | Status: SHIPPED | OUTPATIENT
Start: 2018-08-16 | End: 2018-08-23

## 2018-08-16 ASSESSMENT — ENCOUNTER SYMPTOMS
BACK PAIN: 0
ABDOMINAL PAIN: 1
SHORTNESS OF BREATH: 0
EYE PAIN: 0

## 2018-08-16 NOTE — PROGRESS NOTES
Subjective:      Patient ID: Michel Coburn is a 70 y.o. female who presents today with:  Chief Complaint   Patient presents with    Second Opinion     abdominal pain - has an abdominal US scheduled for tomorrow    Abdominal Cramping     lower left abdominal pain/cramping (tizanidine helps) [id number]; worse first thing in the morning - pain causes her to double over    Nausea     dry heaves with foamy saliva       HPI  Here for second opinion regarding abdominal pain. She has had EGD and Colonoscopy in 2018. She was found to have a ulcer in January was it was so small it was though to be insignificant she was taken off the pantoprazole in July. Off of it she mentions she has more belching it, but the pain is not different. This morning she had the pain today but it hasn't been this bad for a month ago. Tizanidine helped this morning. Colonoscopy in January was suspicious for diverticulitis. Pain is llq, doesn't radiate. Past Medical History:   Diagnosis Date    Diverticulosis of colon (without mention of hemorrhage) 02/25/2015    DR Konrad Kruger    Hyperlipidemia 2/3/2015    Hypothyroidism     Lichen sclerosus     Migraines 2/3/2015    Renal insufficiency 2/3/2015    Spondylosis of lumbar region without myelopathy or radiculopathy 5/24/2016     Past Surgical History:   Procedure Laterality Date    ABDOMINAL ADHESION SURGERY  1/7/16    DR. MERAZ    APPENDECTOMY  2012    CHOLECYSTECTOMY  2012    COLON SURGERY  2005    13\" of colon removed-NO CA    COLONOSCOPY  2011    polyps    COLONOSCOPY  02/25/2015    DR Konrad Kruger - DIVERTICULOSIS    KNEE SURGERY  2013    GA COLONOSCOPY FLX DX W/COLLJ SPEC WHEN PFRMD N/A 1/26/2018    COLONOSCOPY performed by Sneha Art MD at [address] [address] ESOPHAGOGASTRODUODENOSCOPY TRANSORAL DIAGNOSTIC N/A 1/26/2018    EGD ESOPHAGOGASTRODUODENOSCOPY WITH DILATION performed by Sneha Art MD at [address] [place] [address]

## 2018-08-16 NOTE — PATIENT INSTRUCTIONS
Patient Education        Abdominal Pain: Care Instructions  Your Care Instructions    Abdominal pain has many possible causes. Some aren't serious and get better on their own in a few days. Others need more testing and treatment. If your pain continues or gets worse, you need to be rechecked and may need more tests to find out what is wrong. You may need surgery to correct the problem. Don't ignore new symptoms, such as fever, nausea and vomiting, urination problems, pain that gets worse, and dizziness. These may be signs of a more serious problem. Your doctor may have recommended a follow-up visit in the next 8 to 12 hours. If you are not getting better, you may need more tests or treatment. The doctor has checked you carefully, but problems can develop later. If you notice any problems or new symptoms, get medical treatment right away. Follow-up care is a key part of your treatment and safety. Be sure to make and go to all appointments, and call your doctor if you are having problems. It's also a good idea to know your test results and keep a list of the medicines you take. How can you care for yourself at home? · Rest until you feel better. · To prevent dehydration, drink plenty of fluids, enough so that your urine is light yellow or clear like water. Choose water and other caffeine-free clear liquids until you feel better. If you have kidney, heart, or liver disease and have to limit fluids, talk with your doctor before you increase the amount of fluids you drink. · If your stomach is upset, eat mild foods, such as rice, dry toast or crackers, bananas, and applesauce. Try eating several small meals instead of two or three large ones. · Wait until 48 hours after all symptoms have gone away before you have spicy foods, alcohol, and drinks that contain caffeine. · Do not eat foods that are high in fat. · Avoid anti-inflammatory medicines such as aspirin, ibuprofen (Advil, Motrin), and naproxen (Aleve). These can cause stomach upset. Talk to your doctor if you take daily aspirin for another health problem. When should you call for help? Call 911 anytime you think you may need emergency care. For example, call if:    · You passed out (lost consciousness).     · You pass maroon or very bloody stools.     · You vomit blood or what looks like coffee grounds.     · You have new, severe belly pain.    Call your doctor now or seek immediate medical care if:    · Your pain gets worse, especially if it becomes focused in one area of your belly.     · You have a new or higher fever.     · Your stools are black and look like tar, or they have streaks of blood.     · You have unexpected vaginal bleeding.     · You have symptoms of a urinary tract infection. These may include:  ¨ Pain when you urinate. ¨ Urinating more often than usual.  ¨ Blood in your urine.     · You are dizzy or lightheaded, or you feel like you may faint.    Watch closely for changes in your health, and be sure to contact your doctor if:    · You are not getting better after 1 day (24 hours). Where can you learn more? Go to https://[email or web address]. org and sign in to your [employer] account. Enter N428 in the Mobee box to learn more about \"Abdominal Pain: Care Instructions. \"     If you do not have an account, please click on the \"Sign Up Now\" link. Current as of: November 20, 2017  Content Version: 11.7  © 0059-4049 [employer]. Care instructions adapted under license by [facility] ([facility]). If you have questions about a medical condition or this instruction, always ask your healthcare professional. [place] [address] any warranty or liability for your use of this information.

## 2018-08-17 ENCOUNTER — HOSPITAL ENCOUNTER (OUTPATIENT)
Dept: ULTRASOUND IMAGING | Age: 68
Discharge: HOME | End: 2018-08-19
Payer: MEDICARE

## 2018-08-17 ENCOUNTER — HOSPITAL ENCOUNTER (OUTPATIENT)
Dept: WOMENS IMAGING | Age: 68
Discharge: HOME | End: 2018-08-19
Payer: MEDICARE

## 2018-08-17 DIAGNOSIS — R10.9 LEFT SIDED ABDOMINAL PAIN: ICD-10-CM

## 2018-08-17 DIAGNOSIS — Z12.31 ENCOUNTER FOR SCREENING MAMMOGRAM FOR BREAST CANCER: ICD-10-CM

## 2018-08-17 PROCEDURE — 77067 SCR MAMMO BI INCL CAD: CPT

## 2018-08-17 PROCEDURE — 76830 TRANSVAGINAL US NON-OB: CPT

## 2018-08-17 PROCEDURE — 76856 US EXAM PELVIC COMPLETE: CPT

## 2018-08-18 LAB — PROCALCITONIN: <0.07 NG/ML

## 2018-08-28 ENCOUNTER — TELEPHONE (OUTPATIENT)
Dept: FAMILY MEDICINE CLINIC | Age: 68
End: 2018-08-28

## 2018-08-28 DIAGNOSIS — M47.816 SPONDYLOSIS OF LUMBAR REGION WITHOUT MYELOPATHY OR RADICULOPATHY: ICD-10-CM

## 2018-08-28 RX ORDER — TIZANIDINE 2 MG/1
2 TABLET ORAL EVERY EVENING
Qty: 30 TABLET | Refills: 0 | Status: SHIPPED | OUTPATIENT
Start: 2018-08-28 | End: 2018-09-24 | Stop reason: SDUPTHER

## 2018-08-28 NOTE — TELEPHONE ENCOUNTER
Patient called wanting to know if you could prescribe Zanaflex again for pain on left side of stomach that radiates to back causing nausea and weakness. Only thing that helps patient. Please approve or deny.     Pharmacy Drug [facility].

## 2018-11-21 ENCOUNTER — OFFICE VISIT (OUTPATIENT)
Dept: FAMILY MEDICINE CLINIC | Age: 68
End: 2018-11-21
Payer: MEDICARE

## 2018-11-21 VITALS
WEIGHT: 128 LBS | RESPIRATION RATE: 16 BRPM | TEMPERATURE: 98 F | DIASTOLIC BLOOD PRESSURE: 62 MMHG | OXYGEN SATURATION: 99 % | SYSTOLIC BLOOD PRESSURE: 128 MMHG | HEIGHT: 62 IN | HEART RATE: 74 BPM | BODY MASS INDEX: 23.55 KG/M2

## 2018-11-21 DIAGNOSIS — E78.2 MIXED HYPERLIPIDEMIA: Primary | ICD-10-CM

## 2018-11-21 DIAGNOSIS — Z23 NEED FOR INFLUENZA VACCINATION: ICD-10-CM

## 2018-11-21 DIAGNOSIS — E78.2 MIXED HYPERLIPIDEMIA: ICD-10-CM

## 2018-11-21 DIAGNOSIS — K57.30 DIVERTICULA OF COLON: ICD-10-CM

## 2018-11-21 DIAGNOSIS — N28.9 RENAL INSUFFICIENCY: ICD-10-CM

## 2018-11-21 DIAGNOSIS — M47.816 SPONDYLOSIS OF LUMBAR REGION WITHOUT MYELOPATHY OR RADICULOPATHY: ICD-10-CM

## 2018-11-21 DIAGNOSIS — E03.9 HYPOTHYROIDISM, UNSPECIFIED TYPE: ICD-10-CM

## 2018-11-21 LAB
ALBUMIN SERPL-MCNC: 4.7 G/DL (ref 3.9–4.9)
ALP BLD-CCNC: 82 U/L (ref 40–130)
ALT SERPL-CCNC: 12 U/L (ref 0–33)
ANION GAP SERPL CALCULATED.3IONS-SCNC: 13 MEQ/L (ref 7–13)
AST SERPL-CCNC: 18 U/L (ref 0–35)
BILIRUB SERPL-MCNC: 0.6 MG/DL (ref 0–1.2)
BUN BLDV-MCNC: 20 MG/DL (ref 8–23)
CALCIUM SERPL-MCNC: 10.2 MG/DL (ref 8.6–10.2)
CHLORIDE BLD-SCNC: 101 MEQ/L (ref 98–107)
CHOLESTEROL, TOTAL: 262 MG/DL (ref 0–199)
CO2: 26 MEQ/L (ref 22–29)
CREAT SERPL-MCNC: 0.6 MG/DL (ref 0.5–0.9)
GFR AFRICAN AMERICAN: >60
GFR NON-AFRICAN AMERICAN: >60
GLOBULIN: 2.4 G/DL (ref 2.3–3.5)
GLUCOSE BLD-MCNC: 87 MG/DL (ref 74–109)
HCT VFR BLD CALC: 40.9 % (ref 37–47)
HDLC SERPL-MCNC: 76 MG/DL (ref 40–59)
HEMOGLOBIN: 14.1 G/DL (ref 12–16)
LDL CHOLESTEROL CALCULATED: 154 MG/DL (ref 0–129)
MCH RBC QN AUTO: 30.5 PG (ref 27–31.3)
MCHC RBC AUTO-ENTMCNC: 34.4 % (ref 33–37)
MCV RBC AUTO: 88.6 FL (ref 82–100)
PDW BLD-RTO: 12.8 % (ref 11.5–14.5)
PLATELET # BLD: 215 K/UL (ref 130–400)
POTASSIUM SERPL-SCNC: 3.8 MEQ/L (ref 3.5–5.1)
RBC # BLD: 4.61 M/UL (ref 4.2–5.4)
SODIUM BLD-SCNC: 140 MEQ/L (ref 132–144)
T4 FREE: 0.52 NG/DL (ref 0.93–1.7)
TOTAL PROTEIN: 7.1 G/DL (ref 6.4–8.1)
TRIGL SERPL-MCNC: 160 MG/DL (ref 0–200)
TSH SERPL DL<=0.05 MIU/L-ACNC: 54.93 UIU/ML (ref 0.27–4.2)
WBC # BLD: 8.7 K/UL (ref 4.8–10.8)

## 2018-11-21 PROCEDURE — 99214 OFFICE O/P EST MOD 30 MIN: CPT | Performed by: FAMILY MEDICINE

## 2018-11-21 PROCEDURE — G0008 ADMIN INFLUENZA VIRUS VAC: HCPCS | Performed by: FAMILY MEDICINE

## 2018-11-21 PROCEDURE — 90662 IIV NO PRSV INCREASED AG IM: CPT | Performed by: FAMILY MEDICINE

## 2018-11-21 RX ORDER — PAROXETINE 10 MG/1
10 TABLET, FILM COATED ORAL DAILY
Qty: 30 TABLET | Refills: 3 | Status: SHIPPED | OUTPATIENT
Start: 2018-11-21 | End: 2019-03-21 | Stop reason: SDUPTHER

## 2018-11-21 NOTE — PROGRESS NOTES
Ciprofloxacin Other (See Comments)     Pt not 100% sure, but thinks cipro is the antibiotic she is allergic to  Anxiety, confusion     Current Outpatient Prescriptions   Medication Sig Dispense Refill    levothyroxine (SYNTHROID) 150 MCG tablet Take 1 tablet by mouth Daily. 30 tablet 5    clobetasol (TEMOVATE) 0.05 % ointment Apply topically 2 times daily. 60 g 3    docusate sodium (COLACE) 100 MG capsule Take 100 mg by mouth 2 times daily       No current facility-administered medications for this visit. The patient denies any history of      seizures,             heart attack or KNOWN CAD        or stroke. No chest pain, shortness of breath, paroxysmal nocturnal dyspnea. No nausea, vomiting, diarrhea, hematochezia or melena. No paresthesias or headaches. No dysuria, frequency or hematuria. Last labs  No visits with results within 3 Month(s) from this visit. Latest known visit with results is:   Orders Only on 08/16/2018   Component Date Value Ref Range Status    Color, UA 08/16/2018 Yellow  Straw/Yellow Final    Clarity, UA 08/16/2018 Clear  Clear Final    Glucose, Ur 08/16/2018 Negative  Negative mg/dL Final    Bilirubin Urine 08/16/2018 Negative  Negative Final    Ketones, Urine 08/16/2018 Negative  Negative mg/dL Final    Specific Gravity, UA 08/16/2018 1.021  1.005 - 1.030 Final    Blood, Urine 08/16/2018 Negative  Negative Final    pH, UA 08/16/2018 6.5  5.0 - 9.0 Final    Protein, UA 08/16/2018 Negative  Negative mg/dL Final    Urobilinogen, Urine 08/16/2018 0.2  <2.0 E.U./dL Final    Nitrite, Urine 08/16/2018 Negative  Negative Final    Leukocyte Esterase, Urine 08/16/2018 TRACE* Negative Final    Lactic Acid 08/16/2018 0.7  0.5 - 2.2 mmol/L Final    Procalcitonin 08/16/2018 <0.07  <=0.07 ng/mL Final    Comment: INTERPRETIVE INFORMATION: Procalcitonin  Effective July 2, 2018, this test is performed by the [employer]  Procalcitonin assay.   A correction has been applied to optimize cutoff's established for the  [facility]  PCT sensitive Kryptor assay. Procalcitonin > 2.00 ng/mL:    Procalcitonin levels above 2.00 ng/mL on the first day    of ICU admission represent a high risk for progression    to severe sepsis and/or septic shock. Procalcitonin < 0.50 ng/mL:    Procalcitonin levels below 0.50 ng/mL on the first day    of ICU admission represent a low risk for progression    to severe sepsis and/or septic shock. If the procalcitonin measurement is performed shortly after the  systemic  infection process has started (usually less than 6 hours), these values  may  still be low. As various non-infectious conditions are known to induce  procalcitonin as well, procalcitonin levels between 0.5 ng/mL and 2.00  ng/mL  should be reviewed carefully to take into account the specific clinical                             background and condition(s) of the individual patient. Performed at: [facility] [address] [address]      WBC 08/16/2018 5.3  4.8 - 10.8 K/uL Final    RBC 08/16/2018 4.59  4.20 - 5.40 M/uL Final    Hemoglobin 08/16/2018 13.8  12.0 - 16.0 g/dL Final    Hematocrit 08/16/2018 40.7  37.0 - 47.0 % Final    MCV 08/16/2018 88.7  82.0 - 100.0 fL Final    MCH 08/16/2018 30.1  27.0 - 31.3 pg Final    MCHC 08/16/2018 33.9  33.0 - 37.0 % Final    RDW 08/16/2018 14.0  11.5 - 14.5 % Final    Platelets 24/32/9618 160  130 - 400 K/uL Final    Mucus, UA 08/16/2018 Present   Final    WBC, UA 08/16/2018 6-10* 0 - 5 /HPF Final    RBC, UA 08/16/2018 None seen  0 - 2 /HPF Final    Bacteria, UA 08/16/2018 Moderate  /HPF Final     Health Maintenance   Topic Date Due    Hepatitis C screen  1950    DTaP/Tdap/Td vaccine (1 - Tdap) 05/27/1968    Shingles Vaccine (1 of 2 - 2 Dose Series) 05/27/1999    Flu vaccine (1) 09/01/2018    TSH testing  01/19/2019    Cervical cancer screen  07/28/2019    Breast

## 2018-11-21 NOTE — PROGRESS NOTES
Vaccine Information Sheet, \"Influenza - Inactivated\"  given to Luke Both, or parent/legal guardian of  Luke Delgado and verbalized understanding. Patient responses:    Have you ever had a reaction to a flu vaccine? No  Are you able to eat eggs without adverse effects? Yes  Do you have any current illness? No  Have you ever had Guillian Aline Syndrome? No    Flu vaccine given per order. Please see immunization tab.

## 2018-11-27 RX ORDER — ATORVASTATIN CALCIUM 20 MG/1
20 TABLET, FILM COATED ORAL DAILY
Qty: 90 TABLET | Refills: 1 | Status: SHIPPED | OUTPATIENT
Start: 2018-11-27 | End: 2019-03-01 | Stop reason: ALTCHOICE

## 2018-11-27 RX ORDER — LEVOTHYROXINE SODIUM 175 UG/1
TABLET ORAL
Qty: 90 TABLET | Refills: 1 | Status: SHIPPED | OUTPATIENT
Start: 2018-11-27 | End: 2019-02-19

## 2019-02-04 ENCOUNTER — OFFICE VISIT (OUTPATIENT)
Dept: FAMILY MEDICINE CLINIC | Age: 69
End: 2019-02-04
Payer: MEDICARE

## 2019-02-04 VITALS
TEMPERATURE: 97.9 F | DIASTOLIC BLOOD PRESSURE: 76 MMHG | SYSTOLIC BLOOD PRESSURE: 134 MMHG | BODY MASS INDEX: 22.82 KG/M2 | WEIGHT: 124 LBS | HEART RATE: 84 BPM | RESPIRATION RATE: 16 BRPM | HEIGHT: 62 IN

## 2019-02-04 DIAGNOSIS — E78.2 MIXED HYPERLIPIDEMIA: ICD-10-CM

## 2019-02-04 DIAGNOSIS — N30.00 ACUTE CYSTITIS WITHOUT HEMATURIA: ICD-10-CM

## 2019-02-04 DIAGNOSIS — G89.29 CHRONIC ABDOMINAL PAIN: ICD-10-CM

## 2019-02-04 DIAGNOSIS — R10.9 CHRONIC ABDOMINAL PAIN: ICD-10-CM

## 2019-02-04 DIAGNOSIS — R10.32 LLQ ABDOMINAL PAIN: ICD-10-CM

## 2019-02-04 DIAGNOSIS — E03.9 HYPOTHYROIDISM, UNSPECIFIED TYPE: ICD-10-CM

## 2019-02-04 DIAGNOSIS — N30.00 ACUTE CYSTITIS WITHOUT HEMATURIA: Primary | ICD-10-CM

## 2019-02-04 DIAGNOSIS — N28.9 RENAL INSUFFICIENCY: ICD-10-CM

## 2019-02-04 PROCEDURE — 99214 OFFICE O/P EST MOD 30 MIN: CPT | Performed by: FAMILY MEDICINE

## 2019-02-04 PROCEDURE — 81003 URINALYSIS AUTO W/O SCOPE: CPT | Performed by: FAMILY MEDICINE

## 2019-02-04 PROCEDURE — 96372 THER/PROPH/DIAG INJ SC/IM: CPT | Performed by: FAMILY MEDICINE

## 2019-02-04 RX ORDER — CEFTRIAXONE 500 MG/1
500 INJECTION, POWDER, FOR SOLUTION INTRAMUSCULAR; INTRAVENOUS ONCE
Qty: 500 MG | Refills: 0
Start: 2019-02-04 | End: 2019-02-04 | Stop reason: CLARIF

## 2019-02-04 RX ORDER — CEFTRIAXONE 500 MG/1
500 INJECTION, POWDER, FOR SOLUTION INTRAMUSCULAR; INTRAVENOUS ONCE
Status: COMPLETED | OUTPATIENT
Start: 2019-02-04 | End: 2019-02-04

## 2019-02-04 RX ORDER — SULFAMETHOXAZOLE AND TRIMETHOPRIM 800; 160 MG/1; MG/1
1 TABLET ORAL 2 TIMES DAILY
Qty: 20 TABLET | Refills: 0 | Status: SHIPPED | OUTPATIENT
Start: 2019-02-04 | End: 2019-02-14

## 2019-02-04 RX ADMIN — CEFTRIAXONE 500 MG: 500 INJECTION, POWDER, FOR SOLUTION INTRAMUSCULAR; INTRAVENOUS at 17:29

## 2019-02-04 ASSESSMENT — PATIENT HEALTH QUESTIONNAIRE - PHQ9
SUM OF ALL RESPONSES TO PHQ QUESTIONS 1-9: 2
1. LITTLE INTEREST OR PLEASURE IN DOING THINGS: 0
SUM OF ALL RESPONSES TO PHQ9 QUESTIONS 1 & 2: 2
SUM OF ALL RESPONSES TO PHQ QUESTIONS 1-9: 2
2. FEELING DOWN, DEPRESSED OR HOPELESS: 2

## 2019-02-05 DIAGNOSIS — R10.9 CHRONIC ABDOMINAL PAIN: ICD-10-CM

## 2019-02-05 DIAGNOSIS — G89.29 CHRONIC ABDOMINAL PAIN: ICD-10-CM

## 2019-02-05 DIAGNOSIS — R10.32 LLQ ABDOMINAL PAIN: Primary | ICD-10-CM

## 2019-02-07 LAB
ORGANISM: ABNORMAL
URINE CULTURE, ROUTINE: ABNORMAL
URINE CULTURE, ROUTINE: ABNORMAL

## 2019-02-11 ENCOUNTER — HOSPITAL ENCOUNTER (OUTPATIENT)
Dept: CT IMAGING | Age: 69
Discharge: HOME | End: 2019-02-13
Payer: MEDICARE

## 2019-02-11 VITALS — RESPIRATION RATE: 16 BRPM

## 2019-02-11 DIAGNOSIS — R10.32 LLQ ABDOMINAL PAIN: ICD-10-CM

## 2019-02-11 DIAGNOSIS — R10.9 CHRONIC ABDOMINAL PAIN: ICD-10-CM

## 2019-02-11 DIAGNOSIS — G89.29 CHRONIC ABDOMINAL PAIN: ICD-10-CM

## 2019-02-11 PROCEDURE — 74176 CT ABD & PELVIS W/O CONTRAST: CPT

## 2019-02-11 PROCEDURE — 2500000003 HC RX 250 WO HCPCS: Performed by: FAMILY MEDICINE

## 2019-02-11 RX ADMIN — BARIUM SULFATE 450 ML: 20 SUSPENSION ORAL at 16:05

## 2019-02-18 ENCOUNTER — OFFICE VISIT (OUTPATIENT)
Dept: FAMILY MEDICINE CLINIC | Age: 69
End: 2019-02-18
Payer: MEDICARE

## 2019-02-18 VITALS — BODY MASS INDEX: 22.68 KG/M2 | HEIGHT: 62 IN

## 2019-02-18 DIAGNOSIS — E03.9 HYPOTHYROIDISM, UNSPECIFIED TYPE: ICD-10-CM

## 2019-02-18 DIAGNOSIS — K57.30 DIVERTICULOSIS OF LARGE INTESTINE WITHOUT DIVERTICULITIS: ICD-10-CM

## 2019-02-18 DIAGNOSIS — R10.32 ABDOMINAL PAIN, LEFT LOWER QUADRANT: ICD-10-CM

## 2019-02-18 DIAGNOSIS — N30.00 ACUTE CYSTITIS WITHOUT HEMATURIA: Primary | ICD-10-CM

## 2019-02-18 DIAGNOSIS — E03.9 HYPOTHYROIDISM, UNSPECIFIED TYPE: Primary | ICD-10-CM

## 2019-02-18 LAB
BILIRUBIN, POC: ABNORMAL
BLOOD URINE, POC: ABNORMAL
CLARITY, POC: ABNORMAL
COLOR, POC: ABNORMAL
GLUCOSE URINE, POC: ABNORMAL
KETONES, POC: ABNORMAL
LEUKOCYTE EST, POC: ABNORMAL
NITRITE, POC: ABNORMAL
PH, POC: 6
PROTEIN, POC: ABNORMAL
SPECIFIC GRAVITY, POC: 1.03
T4 FREE: 0.35 NG/DL (ref 0.84–1.68)
TSH SERPL DL<=0.05 MIU/L-ACNC: 82.74 UIU/ML (ref 0.44–3.86)
UROBILINOGEN, POC: ABNORMAL

## 2019-02-18 PROCEDURE — 99213 OFFICE O/P EST LOW 20 MIN: CPT | Performed by: FAMILY MEDICINE

## 2019-02-18 PROCEDURE — 81003 URINALYSIS AUTO W/O SCOPE: CPT | Performed by: FAMILY MEDICINE

## 2019-02-19 DIAGNOSIS — E03.9 HYPOTHYROIDISM, UNSPECIFIED TYPE: ICD-10-CM

## 2019-02-19 RX ORDER — LEVOTHYROXINE SODIUM 0.2 MG/1
200 TABLET ORAL DAILY
Qty: 90 TABLET | Refills: 1 | Status: SHIPPED | OUTPATIENT
Start: 2019-02-19 | End: 2019-05-10 | Stop reason: DRUGHIGH

## 2019-03-01 ENCOUNTER — OFFICE VISIT (OUTPATIENT)
Dept: CARDIOLOGY CLINIC | Age: 69
End: 2019-03-01
Payer: MEDICARE

## 2019-03-01 VITALS
BODY MASS INDEX: 23.52 KG/M2 | OXYGEN SATURATION: 98 % | HEART RATE: 68 BPM | HEIGHT: 62 IN | DIASTOLIC BLOOD PRESSURE: 82 MMHG | SYSTOLIC BLOOD PRESSURE: 124 MMHG | RESPIRATION RATE: 16 BRPM | WEIGHT: 127.8 LBS

## 2019-03-01 DIAGNOSIS — R06.09 DOE (DYSPNEA ON EXERTION): ICD-10-CM

## 2019-03-01 DIAGNOSIS — R10.32 ABDOMINAL PAIN, LEFT LOWER QUADRANT: Primary | ICD-10-CM

## 2019-03-01 DIAGNOSIS — R07.9 CHEST PAIN, UNSPECIFIED TYPE: ICD-10-CM

## 2019-03-01 PROCEDURE — 93000 ELECTROCARDIOGRAM COMPLETE: CPT | Performed by: INTERNAL MEDICINE

## 2019-03-01 PROCEDURE — 99204 OFFICE O/P NEW MOD 45 MIN: CPT | Performed by: INTERNAL MEDICINE

## 2019-03-01 ASSESSMENT — ENCOUNTER SYMPTOMS
SHORTNESS OF BREATH: 1
EYES NEGATIVE: 1
COUGH: 0
STRIDOR: 0
NAUSEA: 0
BLOOD IN STOOL: 0
CHEST TIGHTNESS: 0
WHEEZING: 0
ABDOMINAL PAIN: 1

## 2019-03-06 ENCOUNTER — OFFICE VISIT (OUTPATIENT)
Dept: UROLOGY | Age: 69
End: 2019-03-06
Payer: MEDICARE

## 2019-03-06 VITALS
HEIGHT: 62 IN | BODY MASS INDEX: 23.37 KG/M2 | DIASTOLIC BLOOD PRESSURE: 70 MMHG | SYSTOLIC BLOOD PRESSURE: 118 MMHG | HEART RATE: 81 BPM | WEIGHT: 127 LBS

## 2019-03-06 DIAGNOSIS — R31.29 MICROSCOPIC HEMATURIA: Primary | ICD-10-CM

## 2019-03-06 LAB
BILIRUBIN, POC: NORMAL
BLOOD URINE, POC: NORMAL
CLARITY, POC: CLEAR
COLOR, POC: YELLOW
GLUCOSE URINE, POC: NORMAL
KETONES, POC: NORMAL
LEUKOCYTE EST, POC: NORMAL
NITRITE, POC: NORMAL
PH, POC: 6.5
PROTEIN, POC: NORMAL
SPECIFIC GRAVITY, POC: 1.01
UROBILINOGEN, POC: 0.2

## 2019-03-06 PROCEDURE — 81003 URINALYSIS AUTO W/O SCOPE: CPT | Performed by: UROLOGY

## 2019-03-06 PROCEDURE — 99212 OFFICE O/P EST SF 10 MIN: CPT | Performed by: UROLOGY

## 2019-03-06 ASSESSMENT — ENCOUNTER SYMPTOMS: ABDOMINAL PAIN: 1

## 2019-03-08 ENCOUNTER — OFFICE VISIT (OUTPATIENT)
Dept: FAMILY MEDICINE CLINIC | Age: 69
End: 2019-03-08
Payer: MEDICARE

## 2019-03-08 VITALS
WEIGHT: 127.2 LBS | DIASTOLIC BLOOD PRESSURE: 72 MMHG | HEART RATE: 87 BPM | SYSTOLIC BLOOD PRESSURE: 128 MMHG | BODY MASS INDEX: 23.41 KG/M2 | RESPIRATION RATE: 16 BRPM | HEIGHT: 62 IN | TEMPERATURE: 98.6 F | OXYGEN SATURATION: 98 %

## 2019-03-08 DIAGNOSIS — R68.89 FLU-LIKE SYMPTOMS: ICD-10-CM

## 2019-03-08 DIAGNOSIS — J40 BRONCHITIS: ICD-10-CM

## 2019-03-08 DIAGNOSIS — J10.1 INFLUENZA A: Primary | ICD-10-CM

## 2019-03-08 LAB
INFLUENZA A ANTIBODY: ABNORMAL
INFLUENZA B ANTIBODY: ABNORMAL
S PYO AG THROAT QL: NORMAL

## 2019-03-08 PROCEDURE — 99213 OFFICE O/P EST LOW 20 MIN: CPT | Performed by: NURSE PRACTITIONER

## 2019-03-08 PROCEDURE — 87880 STREP A ASSAY W/OPTIC: CPT | Performed by: NURSE PRACTITIONER

## 2019-03-08 PROCEDURE — 87804 INFLUENZA ASSAY W/OPTIC: CPT | Performed by: NURSE PRACTITIONER

## 2019-03-08 RX ORDER — DOXYCYCLINE HYCLATE 100 MG
100 TABLET ORAL 2 TIMES DAILY
Qty: 20 TABLET | Refills: 0 | Status: SHIPPED | OUTPATIENT
Start: 2019-03-08 | End: 2019-03-18

## 2019-03-08 ASSESSMENT — ENCOUNTER SYMPTOMS
SHORTNESS OF BREATH: 0
SORE THROAT: 1
SINUS PRESSURE: 0
WHEEZING: 0
RHINORRHEA: 1
SINUS PAIN: 0
COUGH: 1

## 2019-03-09 DIAGNOSIS — R68.89 FLU-LIKE SYMPTOMS: ICD-10-CM

## 2019-03-11 LAB — THROAT CULTURE: NORMAL

## 2019-03-14 ENCOUNTER — OFFICE VISIT (OUTPATIENT)
Dept: GASTROENTEROLOGY | Age: 69
End: 2019-03-14
Payer: MEDICARE

## 2019-03-14 VITALS
DIASTOLIC BLOOD PRESSURE: 68 MMHG | HEART RATE: 79 BPM | SYSTOLIC BLOOD PRESSURE: 120 MMHG | TEMPERATURE: 97.8 F | OXYGEN SATURATION: 92 % | WEIGHT: 125 LBS | BODY MASS INDEX: 23 KG/M2 | HEIGHT: 62 IN

## 2019-03-14 DIAGNOSIS — R11.2 NAUSEA AND VOMITING, INTRACTABILITY OF VOMITING NOT SPECIFIED, UNSPECIFIED VOMITING TYPE: ICD-10-CM

## 2019-03-14 DIAGNOSIS — R10.32 LLQ ABDOMINAL PAIN: Primary | ICD-10-CM

## 2019-03-14 DIAGNOSIS — K59.00 CONSTIPATION, UNSPECIFIED CONSTIPATION TYPE: ICD-10-CM

## 2019-03-14 DIAGNOSIS — K66.0 INTRA-ABDOMINAL ADHESIONS: ICD-10-CM

## 2019-03-14 DIAGNOSIS — R12 HEARTBURN: ICD-10-CM

## 2019-03-14 PROCEDURE — 99204 OFFICE O/P NEW MOD 45 MIN: CPT | Performed by: INTERNAL MEDICINE

## 2019-03-14 RX ORDER — POLYETHYLENE GLYCOL 3350 17 G/17G
17 POWDER, FOR SOLUTION ORAL DAILY
Qty: 510 G | Refills: 3 | Status: SHIPPED | OUTPATIENT
Start: 2019-03-14 | End: 2019-04-13

## 2019-03-14 RX ORDER — OMEPRAZOLE 20 MG/1
20 CAPSULE, DELAYED RELEASE ORAL DAILY
Qty: 30 CAPSULE | Refills: 3 | Status: SHIPPED | OUTPATIENT
Start: 2019-03-14 | End: 2019-07-15 | Stop reason: SDUPTHER

## 2019-03-14 RX ORDER — DICYCLOMINE HYDROCHLORIDE 10 MG/1
10 CAPSULE ORAL
Qty: 120 CAPSULE | Refills: 3 | Status: SHIPPED | OUTPATIENT
Start: 2019-03-14 | End: 2019-05-14

## 2019-03-15 ENCOUNTER — TELEPHONE (OUTPATIENT)
Dept: GASTROENTEROLOGY | Age: 69
End: 2019-03-15

## 2019-03-19 ENCOUNTER — HOSPITAL ENCOUNTER (OUTPATIENT)
Dept: NON INVASIVE DIAGNOSTICS | Age: 69
Discharge: HOME | End: 2019-03-19
Payer: MEDICARE

## 2019-03-19 ENCOUNTER — HOSPITAL ENCOUNTER (OUTPATIENT)
Dept: NUCLEAR MEDICINE | Age: 69
Discharge: HOME | End: 2019-03-21
Payer: MEDICARE

## 2019-03-19 DIAGNOSIS — R06.09 DOE (DYSPNEA ON EXERTION): ICD-10-CM

## 2019-03-19 DIAGNOSIS — R07.9 CHEST PAIN, UNSPECIFIED TYPE: ICD-10-CM

## 2019-03-19 LAB
LV EF: 60 %
LVEF MODALITY: NORMAL

## 2019-03-19 PROCEDURE — 3430000000 HC RX DIAGNOSTIC RADIOPHARMACEUTICAL: Performed by: INTERNAL MEDICINE

## 2019-03-19 PROCEDURE — 93306 TTE W/DOPPLER COMPLETE: CPT

## 2019-03-19 PROCEDURE — 93017 CV STRESS TEST TRACING ONLY: CPT

## 2019-03-19 PROCEDURE — 93018 CV STRESS TEST I&R ONLY: CPT | Performed by: INTERNAL MEDICINE

## 2019-03-19 PROCEDURE — 2580000003 HC RX 258: Performed by: INTERNAL MEDICINE

## 2019-03-19 PROCEDURE — A9502 TC99M TETROFOSMIN: HCPCS | Performed by: INTERNAL MEDICINE

## 2019-03-19 PROCEDURE — 78452 HT MUSCLE IMAGE SPECT MULT: CPT

## 2019-03-19 RX ORDER — SODIUM CHLORIDE 0.9 % (FLUSH) 0.9 %
10 SYRINGE (ML) INJECTION PRN
Status: DISCONTINUED | OUTPATIENT
Start: 2019-03-19 | End: 2019-03-22 | Stop reason: HOSPADM

## 2019-03-19 RX ADMIN — Medication 10 ML: at 11:11

## 2019-03-19 RX ADMIN — TETROFOSMIN 11.3 MILLICURIE: 1.38 INJECTION, POWDER, LYOPHILIZED, FOR SOLUTION INTRAVENOUS at 09:25

## 2019-03-19 RX ADMIN — TETROFOSMIN 31.6 MILLICURIE: 1.38 INJECTION, POWDER, LYOPHILIZED, FOR SOLUTION INTRAVENOUS at 11:06

## 2019-03-19 RX ADMIN — Medication 10 ML: at 09:19

## 2019-03-21 RX ORDER — PAROXETINE 10 MG/1
10 TABLET, FILM COATED ORAL DAILY
Qty: 30 TABLET | Refills: 5 | Status: SHIPPED | OUTPATIENT
Start: 2019-03-21 | End: 2019-05-02 | Stop reason: SDUPTHER

## 2019-03-25 ENCOUNTER — TELEPHONE (OUTPATIENT)
Dept: FAMILY MEDICINE CLINIC | Age: 69
End: 2019-03-25

## 2019-03-28 ENCOUNTER — OFFICE VISIT (OUTPATIENT)
Dept: CARDIOLOGY CLINIC | Age: 69
End: 2019-03-28
Payer: MEDICARE

## 2019-03-28 VITALS
SYSTOLIC BLOOD PRESSURE: 118 MMHG | RESPIRATION RATE: 16 BRPM | OXYGEN SATURATION: 98 % | DIASTOLIC BLOOD PRESSURE: 80 MMHG | HEIGHT: 62 IN | BODY MASS INDEX: 23.34 KG/M2 | WEIGHT: 126.8 LBS | HEART RATE: 80 BPM

## 2019-03-28 DIAGNOSIS — K21.9 GASTROESOPHAGEAL REFLUX DISEASE WITHOUT ESOPHAGITIS: ICD-10-CM

## 2019-03-28 DIAGNOSIS — E78.2 MIXED HYPERLIPIDEMIA: Primary | ICD-10-CM

## 2019-03-28 PROCEDURE — 99213 OFFICE O/P EST LOW 20 MIN: CPT | Performed by: INTERNAL MEDICINE

## 2019-03-28 ASSESSMENT — ENCOUNTER SYMPTOMS
SHORTNESS OF BREATH: 0
WHEEZING: 0
STRIDOR: 0
BLOOD IN STOOL: 0
RESPIRATORY NEGATIVE: 1
CHEST TIGHTNESS: 0
NAUSEA: 0
COUGH: 0
GASTROINTESTINAL NEGATIVE: 1
EYES NEGATIVE: 1

## 2019-05-02 ENCOUNTER — OFFICE VISIT (OUTPATIENT)
Dept: FAMILY MEDICINE CLINIC | Age: 69
End: 2019-05-02
Payer: MEDICARE

## 2019-05-02 VITALS
HEIGHT: 62 IN | WEIGHT: 124 LBS | HEART RATE: 75 BPM | DIASTOLIC BLOOD PRESSURE: 64 MMHG | TEMPERATURE: 98.3 F | RESPIRATION RATE: 16 BRPM | OXYGEN SATURATION: 97 % | BODY MASS INDEX: 22.82 KG/M2 | SYSTOLIC BLOOD PRESSURE: 106 MMHG

## 2019-05-02 DIAGNOSIS — F41.9 ANXIETY AND DEPRESSION: Chronic | ICD-10-CM

## 2019-05-02 DIAGNOSIS — F32.A ANXIETY AND DEPRESSION: Chronic | ICD-10-CM

## 2019-05-02 DIAGNOSIS — E78.2 MIXED HYPERLIPIDEMIA: ICD-10-CM

## 2019-05-02 DIAGNOSIS — E03.9 HYPOTHYROIDISM, UNSPECIFIED TYPE: Primary | ICD-10-CM

## 2019-05-02 PROCEDURE — 99214 OFFICE O/P EST MOD 30 MIN: CPT | Performed by: FAMILY MEDICINE

## 2019-05-02 RX ORDER — PAROXETINE 10 MG/1
10 TABLET, FILM COATED ORAL DAILY
Qty: 90 TABLET | Refills: 1 | Status: SHIPPED | OUTPATIENT
Start: 2019-05-02 | End: 2019-08-19 | Stop reason: DRUGHIGH

## 2019-05-02 NOTE — PROGRESS NOTES
Subjective  Karlee Prasad, 70 y.o. female presents today with:  Chief Complaint   Patient presents with   Mark Westfall Doctor     Former patient of Dr. Bertrand Wan. Patient is here for a cholesterol check. Patient needs a refill of her Paxil has not been taking it since the end of March. HPI  This is a new patient to me. I have reviewed the past medical and surgical history, social history and family history provided. I have reviewed  medication, previous testing and working diagnoses. I have reviewed the allergies and health maintenance information and correlated it into my decision making for this patient for care, diagnostics, consultations and treatment for today's visit. Hypothyroidism - last TSH increased at 82.740. Had been 54.930 three months prior. States that she has profound nausea with levothyroxine when her LLQ pain is flaring. Has diverticulosis and has required partial colectomy for diverticulitis. Is under eval by Dr. Sam Zelaya who is attending to her LLQ and epigastric pain and nausea. Anxiety and depression - significant h/o anxiety and depression in the past; has been on Paxil since son's accident in 2013. Was not refilled by last doctor for unclear reasons. No thoughts of suicide. No ETOH or drugs. Sleep is interrupted.     No other questions and or concerns for today's visit      Review of Systems  +cold intolerance, low energy, constipation, dry skin; no fevers; no rashes; no chest pain or shortness of breath; recent normal stress test    Past Medical History:   Diagnosis Date    Anxiety and depression 5/2/2019    Diverticulosis of colon (without mention of hemorrhage) 02/25/2015    DR Vinny Griffin    Hyperlipidemia 2/3/2015    Hypothyroidism     Lichen sclerosus     Migraines 2/3/2015    Renal insufficiency 2/3/2015    Spondylosis of lumbar region without myelopathy or radiculopathy 5/24/2016     Past Surgical History:   Procedure Laterality Date    ABDOMINAL ADHESION SURGERY  1/7/16    DR. MERAZ    APPENDECTOMY  2012    CHOLECYSTECTOMY  2012    COLON SURGERY  2005    13\" of colon removed-NO CA    COLONOSCOPY  2011    polyps    COLONOSCOPY  02/25/2015    DR Beni Cerna - DIVERTICULOSIS    KNEE SURGERY  2013    [place] COLONOSCOPY FLX DX W/COLLJ SPEC WHEN PFRMD N/A 1/26/2018    COLONOSCOPY performed by Tian Mix MD at [facility] 61 ESOPHAGOGASTRODUODENOSCOPY TRANSORAL DIAGNOSTIC N/A 1/26/2018    EGD ESOPHAGOGASTRODUODENOSCOPY WITH DILATION performed by Tian Mix MD at [facility] 115     Social History     Socioeconomic History    Marital status: Single     Spouse name: Not on file    Number of children: Not on file    Years of education: Not on file    Highest education level: Not on file   Occupational History    Not on file   Social Needs    Financial resource strain: Not on file    Food insecurity:     Worry: Not on file     Inability: Not on file    Transportation needs:     Medical: Not on file     Non-medical: Not on file   Tobacco Use    Smoking status: Never Smoker    Smokeless tobacco: Never Used   Substance and Sexual Activity    Alcohol use: No     Alcohol/week: 0.0 oz    Drug use: No    Sexual activity: Not Currently   Lifestyle    Physical activity:     Days per week: Not on file     Minutes per session: Not on file    Stress: Not on file   Relationships    Social connections:     Talks on phone: Not on file     Gets together: Not on file     Attends [employer] service: Not on file     Active member of club or organization: Not on file     Attends meetings of clubs or organizations: Not on file     Relationship status: Not on file    Intimate partner violence:     Fear of current or ex partner: Not on file     Emotionally abused: Not on file     Physically abused: Not on file     Forced sexual activity: Not on file   Other Topics Concern    Not on file   Social History Narrative    Not on file     Family History   Problem Relation Age of Onset    Heart Disease Father     Stroke Father     Diabetes Father     Stomach Cancer Father     Other Mother         Bowel Obstruction     Allergies   Allergen Reactions    Bactrim [Sulfamethoxazole-Trimethoprim]      hallucinations    Ciprofloxacin Other (See Comments)     Pt not 100% sure, but thinks cipro is the antibiotic she is allergic to  Anxiety, confusion     Current Outpatient Medications   Medication Sig Dispense Refill    PARoxetine (PAXIL) 10 MG tablet Take 1 tablet by mouth daily 90 tablet 1    dicyclomine (BENTYL) 10 MG capsule Take 1 capsule by mouth 4 times daily (before meals and nightly) 120 capsule 3    omeprazole (PRILOSEC) 20 MG delayed release capsule Take 1 capsule by mouth Daily 30 capsule 3    aspirin 81 MG tablet Take 1 tablet by mouth daily With Food 30 tablet 3    levothyroxine (SYNTHROID) 200 MCG tablet Take 1 tablet by mouth daily 90 tablet 1    clobetasol (TEMOVATE) 0.05 % ointment Apply topically 2 times daily. 60 g 3    docusate sodium (COLACE) 100 MG capsule Take 100 mg by mouth 2 times daily       No current facility-administered medications for this visit. PMH, Surgical Hx, Family Hx, and Social Hxreviewed and updated. Health Maintenance reviewed. Objective    Vitals:    05/02/19 1307   BP: 106/64   Site: Left Upper Arm   Position: Sitting   Cuff Size: Medium Adult   Pulse: 75   Resp: 16   Temp: 98.3 °F (36.8 °C)   TempSrc: Temporal   SpO2: 97%   Weight: 124 lb (56.2 kg)   Height: 5' 2\" (1.575 m)        Physical Exam   Constitutional: She is oriented to person, place, and time. She appears well-developed and well-nourished. HENT:   Head: Normocephalic and atraumatic. Eyes: Conjunctivae are normal. No scleral icterus. Neck: Neck supple. Carotid bruit is not present. No thyromegaly present. Cardiovascular: Normal rate, regular rhythm, S1 normal, S2 normal and normal heart sounds.

## 2019-05-03 DIAGNOSIS — E03.9 HYPOTHYROIDISM, UNSPECIFIED TYPE: ICD-10-CM

## 2019-05-03 DIAGNOSIS — E78.2 MIXED HYPERLIPIDEMIA: ICD-10-CM

## 2019-05-03 LAB
CHOLESTEROL, FASTING: 172 MG/DL (ref 0–199)
HDLC SERPL-MCNC: 54 MG/DL (ref 40–59)
LDL CHOLESTEROL CALCULATED: 102 MG/DL (ref 0–129)
TRIGLYCERIDE, FASTING: 78 MG/DL (ref 0–150)
TSH SERPL DL<=0.05 MIU/L-ACNC: 0.01 UIU/ML (ref 0.44–3.86)

## 2019-05-10 DIAGNOSIS — E03.9 HYPOTHYROIDISM, UNSPECIFIED TYPE: Primary | ICD-10-CM

## 2019-05-10 RX ORDER — LEVOTHYROXINE SODIUM 175 UG/1
175 TABLET ORAL DAILY
Qty: 90 TABLET | Refills: 1 | Status: SHIPPED | OUTPATIENT
Start: 2019-05-10 | End: 2019-07-03 | Stop reason: DRUGHIGH

## 2019-05-14 ENCOUNTER — TELEPHONE (OUTPATIENT)
Dept: SURGERY | Age: 69
End: 2019-05-14

## 2019-05-14 ENCOUNTER — OFFICE VISIT (OUTPATIENT)
Dept: GASTROENTEROLOGY | Age: 69
End: 2019-05-14
Payer: MEDICARE

## 2019-05-14 VITALS
OXYGEN SATURATION: 97 % | HEIGHT: 63 IN | BODY MASS INDEX: 21.79 KG/M2 | WEIGHT: 123 LBS | HEART RATE: 71 BPM | TEMPERATURE: 97.9 F

## 2019-05-14 DIAGNOSIS — K57.30 DIVERTICULOSIS OF LARGE INTESTINE WITHOUT HEMORRHAGE: ICD-10-CM

## 2019-05-14 DIAGNOSIS — K21.9 GASTROESOPHAGEAL REFLUX DISEASE WITHOUT ESOPHAGITIS: ICD-10-CM

## 2019-05-14 DIAGNOSIS — R10.9 LEFT SIDED ABDOMINAL PAIN: Primary | ICD-10-CM

## 2019-05-14 DIAGNOSIS — K59.04 CHRONIC IDIOPATHIC CONSTIPATION: ICD-10-CM

## 2019-05-14 PROCEDURE — 99213 OFFICE O/P EST LOW 20 MIN: CPT | Performed by: NURSE PRACTITIONER

## 2019-05-14 RX ORDER — HYOSCYAMINE SULFATE 0.12 MG/1
125 TABLET SUBLINGUAL EVERY 4 HOURS PRN
Qty: 120 EACH | Refills: 3 | Status: SHIPPED
Start: 2019-05-14 | End: 2019-07-15

## 2019-05-14 NOTE — PROGRESS NOTES
Gastroenterology Clinic Follow up Visit    Cristo Abbott  [id number]  Chief Complaint   Patient presents with    Follow-up     Background:68 y.o. female last seen in GI clinic on 3/14/19 with chronic LLQ pain. Nausea associated with the pain. multiple bowel surgeries in the past. Patient given Bentyl for LLQ pain. She was prescribed PPI along with anti-reflux lifestyle to help with nausea. MiraLAX added to help with constipation. Interval change: Patient presents to the GI clinic with ongoing complaints of left side abdominal pain. The pain is intermittent and cramping in nature. Patient reports she has tried Bentyl, but it makes her nauseous. Patient reports she takes Benefiber daily, but does not drink much fluids throughout the day. She reports Colace daily and MiraLAX on occasion. Patient reports she does not make time to eat or drink much. She reports she is busy with yard work and caring for her son. Patient reports a daily bowel movement, but she strains. She denies diarrhea or bleeding. She reports since taking omeprazole her nausea and belching have subsided. She denies dysphagia, unintentional weight loss, or loss of appetite. She denies CP, SOB, or palpitations. Review of Systems   All other systems reviewed and are negative. Past medical history, past surgical history, medication list, social and familyhistory reviewed    Pulse 71, temperature 97.9 °F (36.6 °C), height 5' 3\" (1.6 m), weight 123 lb (55.8 kg), SpO2 97 %, not currently breastfeeding. Physical Exam   Constitutional: She is oriented to person, place, and time. She appears well-developed and well-nourished. No distress. HENT:   Head: Normocephalic and atraumatic. Eyes: Pupils are equal, round, and reactive to light. Conjunctivae and EOM are normal. No scleral icterus. Neck: Normal range of motion. Neck supple. Cardiovascular: Normal rate, regular rhythm and normal heart sounds. No murmur heard.   Pulmonary/Chest: lifestyle reiterated, multiple examples provided. Daily PPI 15-30 minutes before breakfast.  Daily bowel Regimen:  = Lifestyle modification including importance of adequate fluid intake through the day, regular exercise, good toilet hygiene discussed in detail  = Advised patient to increase fiber supplementation, aim for 15 -25 gms of fiber a day, OTC fiber supplementation may be considered if unable to meet requirements with diet, keep stools soft and regular, avoid straining . Patient advised to watch out for excessive bloating.  = Colace 1 to 2 tabs qhs  = ADD Miralax 17 gm/d to above regimen and titrate as needed. Return in about 8 weeks (around 7/9/2019). Doc Coma, APRN - CNP   [facility]    Please note this report has been partially produced using speech recognitionsoftware  and may cause contain errors related to that system including grammar, punctuation and spelling as well as words andphrases that may seem inappropriate. If there are questions or concerns please feel free to contact me to clarify.

## 2019-05-14 NOTE — PATIENT INSTRUCTIONS
DAILY BOWEL REGIMEN:  MiraLAX: daily 1 cap. If stool becomes too loose take half the dose. Fiber: 2-3 tsp daily with adequate fluid intake.     Colace: 1-2 caps daily

## 2019-06-05 ENCOUNTER — OFFICE VISIT (OUTPATIENT)
Dept: FAMILY MEDICINE CLINIC | Age: 69
End: 2019-06-05
Payer: MEDICARE

## 2019-06-05 VITALS
RESPIRATION RATE: 16 BRPM | TEMPERATURE: 98.1 F | HEIGHT: 63 IN | HEART RATE: 64 BPM | SYSTOLIC BLOOD PRESSURE: 122 MMHG | WEIGHT: 121 LBS | DIASTOLIC BLOOD PRESSURE: 78 MMHG | OXYGEN SATURATION: 98 % | BODY MASS INDEX: 21.44 KG/M2

## 2019-06-05 DIAGNOSIS — F32.A ANXIETY AND DEPRESSION: Primary | Chronic | ICD-10-CM

## 2019-06-05 DIAGNOSIS — F41.9 ANXIETY AND DEPRESSION: Primary | Chronic | ICD-10-CM

## 2019-06-05 DIAGNOSIS — R42 DIZZINESS: ICD-10-CM

## 2019-06-05 PROCEDURE — 99214 OFFICE O/P EST MOD 30 MIN: CPT | Performed by: FAMILY MEDICINE

## 2019-06-05 NOTE — PROGRESS NOTES
DIVERTICULOSIS    KNEE SURGERY  2013    [place] COLONOSCOPY FLX DX W/COLLJ SPEC WHEN PFRMD N/A 1/26/2018    COLONOSCOPY performed by Sim Jarquin MD at [facility] 61 ESOPHAGOGASTRODUODENOSCOPY TRANSORAL DIAGNOSTIC N/A 1/26/2018    EGD ESOPHAGOGASTRODUODENOSCOPY WITH DILATION performed by Sim Jarquin MD at [place] [address]     Social History     Socioeconomic History    Marital status: Single     Spouse name: Not on file    Number of children: Not on file    Years of education: Not on file    Highest education level: Not on file   Occupational History    Not on file   Social Needs    Financial resource strain: Not on file    Food insecurity:     Worry: Not on file     Inability: Not on file    Transportation needs:     Medical: Not on file     Non-medical: Not on file   Tobacco Use    Smoking status: Never Smoker    Smokeless tobacco: Never Used   Substance and Sexual Activity    Alcohol use: No     Alcohol/week: 0.0 oz    Drug use: No    Sexual activity: Not Currently   Lifestyle    Physical activity:     Days per week: Not on file     Minutes per session: Not on file    Stress: Not on file   Relationships    Social connections:     Talks on phone: Not on file     Gets together: Not on file     Attends Druze service: Not on file     Active member of club or organization: Not on file     Attends meetings of clubs or organizations: Not on file     Relationship status: Not on file    Intimate partner violence:     Fear of current or ex partner: Not on file     Emotionally abused: Not on file     Physically abused: Not on file     Forced sexual activity: Not on file   Other Topics Concern    Not on file   Social History Narrative    Not on file     Family History   Problem Relation Age of Onset    Heart Disease Father     Stroke Father     Diabetes Father     Stomach Cancer Father     Other Mother         Bowel Obstruction     Allergies Anxiety and depression    -  Primary    Continue Paxil and follow closely         Dizziness        Suspect related to Betyl. Patient will decrease use. Resent labs normal. Not orthostatic. Reviewed with the patient: all disease processes, current clinical status, medications, activities and diet.      Side effects, adverse effects of the medication prescribed today, as well as treatment plan/ rationale and result expectations have been discussed with the patient who expresses understanding and desires to proceed.     Close follow up to evaluate treatment results and for coordination of care. I have reviewed the patient's medical history in detail and updated the computerized patient record. More than 50% of the appointment was spent in face-to-face counseling, education and care coordination. No orders of the defined types were placed in this encounter. No orders of the defined types were placed in this encounter. There are no discontinued medications. Return in about 3 months (around 9/5/2019) for Mood Disorder, Thyroid. Controlled Substance Monitoring:    Acute and Chronic Pain Monitoring:   RX Monitoring 10/30/2018   Attestation The Prescription Monitoring Report for this patient was reviewed today.            Jovani Granados MD

## 2019-07-01 DIAGNOSIS — E03.9 HYPOTHYROIDISM, UNSPECIFIED TYPE: ICD-10-CM

## 2019-07-01 LAB — TSH SERPL DL<=0.05 MIU/L-ACNC: <0.01 UIU/ML (ref 0.44–3.86)

## 2019-07-01 NOTE — PROGRESS NOTES
Subjective  Amy Cisneros, 70 y.o. female presents today with:  Chief Complaint   Patient presents with    Hypertension    Hyperlipidemia    Flank Pain     left side x 2 years--pt states she's had labs, CT, scope and referral to uro--pt states it is affecting her mentally     Frustrated by pain IN LLQ   Present for years  Has seen many specialists no cause found  2 years of pain  Paroxysmal pain  sometimes severe      ? Diverticular  DJD/ddd  Offered GI eval again or GYN      Past Medical History:   Diagnosis Date    Diverticulosis of colon (without mention of hemorrhage) 02/25/2015    DR Daisy Albrecht    Hyperlipidemia 2/3/2015    Hypothyroidism     Lichen sclerosus     Migraines 2/3/2015    Renal insufficiency 2/3/2015    Spondylosis of lumbar region without myelopathy or radiculopathy 5/24/2016     Past Surgical History:   Procedure Laterality Date    ABDOMINAL ADHESION SURGERY  1/7/16    DR. MERAZ    APPENDECTOMY  2012    CHOLECYSTECTOMY  2012    COLON SURGERY  2005    13\" of colon removed-NO CA    COLONOSCOPY  2011    polyps    COLONOSCOPY  02/25/2015    DR Daisy Albrecht - DIVERTICULOSIS    KNEE SURGERY  2013    [place] COLONOSCOPY FLX DX W/COLLJ SPEC WHEN PFRMD N/A 1/26/2018    COLONOSCOPY performed by Saida Eaton MD at [facility] 61 ESOPHAGOGASTRODUODENOSCOPY TRANSORAL DIAGNOSTIC N/A 1/26/2018    EGD ESOPHAGOGASTRODUODENOSCOPY WITH DILATION performed by Saida Eaton MD at [address] [place] [address] History     Social History    Marital status: Single     Spouse name: N/A    Number of children: N/A    Years of education: N/A     Occupational History    Not on file.      Social History Main Topics    Smoking status: Never Smoker    Smokeless tobacco: Never Used    Alcohol use No    Drug use: No    Sexual activity: Not Currently     Other Topics Concern    Not on file     Social History Narrative    No narrative on file     Family History Problem Relation Age of Onset    Heart Disease Father     Stroke Father     Diabetes Father     Stomach Cancer Father     Other Mother         Bowel Obstruction     Allergies   Allergen Reactions    Ciprofloxacin Other (See Comments)     Pt not 100% sure, but thinks cipro is the antibiotic she is allergic to  Anxiety, confusion     Current Outpatient Prescriptions   Medication Sig Dispense Refill    pantoprazole (PROTONIX) 40 MG tablet Take 1 tablet by mouth daily 30 tablet 5    albuterol sulfate  (90 Base) MCG/ACT inhaler Inhale 2 puffs into the lungs every 6 hours as needed for Wheezing 1 Inhaler 3    levothyroxine (SYNTHROID) 150 MCG tablet Take 1 tablet by mouth Daily. 30 tablet 5    clobetasol (TEMOVATE) 0.05 % ointment Apply topically 2 times daily. 60 g 3    docusate sodium (COLACE) 100 MG capsule Take 100 mg by mouth 2 times daily      conjugated estrogens (PREMARIN) 0.625 MG/GM vaginal cream Place 0.5 g vaginally Twice a Week 3 Tube 3     No current facility-administered medications for this visit. The patient denies any history of      seizures,             heart attack or KNOWN CAD        or stroke. No chest pain, shortness of breath, paroxysmal nocturnal dyspnea. No nausea, vomiting, diarrhea, hematochezia or melena. No paresthesias or headaches. No dysuria, frequency or hematuria. Last labs  No visits with results within 3 Month(s) from this visit.    Latest known visit with results is:   Office Visit on 02/27/2018   Component Date Value Ref Range Status    Influenza A Ab 02/27/2018 negative   Final    Influenza B Ab 02/27/2018 negative   Final     Health Maintenance   Topic Date Due    Hepatitis C screen  1950    Shingles Vaccine (1 of 2 - 2 Dose Series) 05/27/1999    Breast cancer screen  02/26/2018    DTaP/Tdap/Td vaccine (1 - Tdap) 07/19/2018 (Originally 5/27/1968)    Flu vaccine (1) 09/01/2018    TSH testing  01/19/2019    Cervical 2.13

## 2019-07-03 DIAGNOSIS — E03.9 HYPOTHYROIDISM, UNSPECIFIED TYPE: Primary | ICD-10-CM

## 2019-07-03 RX ORDER — LEVOTHYROXINE SODIUM 137 UG/1
137 TABLET ORAL DAILY
Qty: 90 TABLET | Refills: 1 | Status: SHIPPED | OUTPATIENT
Start: 2019-07-03 | End: 2019-08-15

## 2019-07-15 ENCOUNTER — OFFICE VISIT (OUTPATIENT)
Dept: GASTROENTEROLOGY | Age: 69
End: 2019-07-15
Payer: MEDICARE

## 2019-07-15 VITALS
BODY MASS INDEX: 22.26 KG/M2 | TEMPERATURE: 97 F | WEIGHT: 121 LBS | OXYGEN SATURATION: 97 % | HEART RATE: 67 BPM | HEIGHT: 62 IN

## 2019-07-15 DIAGNOSIS — K57.30 DIVERTICULOSIS OF COLON: ICD-10-CM

## 2019-07-15 DIAGNOSIS — K59.04 CHRONIC IDIOPATHIC CONSTIPATION: ICD-10-CM

## 2019-07-15 DIAGNOSIS — K21.9 GASTROESOPHAGEAL REFLUX DISEASE WITHOUT ESOPHAGITIS: Primary | ICD-10-CM

## 2019-07-15 PROCEDURE — 99212 OFFICE O/P EST SF 10 MIN: CPT | Performed by: NURSE PRACTITIONER

## 2019-07-15 RX ORDER — OMEPRAZOLE 20 MG/1
20 CAPSULE, DELAYED RELEASE ORAL DAILY
Qty: 90 CAPSULE | Refills: 2 | Status: SHIPPED | OUTPATIENT
Start: 2019-07-15 | End: 2020-02-20 | Stop reason: ALTCHOICE

## 2019-07-15 RX ORDER — DICYCLOMINE HYDROCHLORIDE 10 MG/1
10 CAPSULE ORAL 4 TIMES DAILY
Refills: 3 | COMMUNITY
Start: 2019-06-21 | End: 2019-07-15 | Stop reason: SDUPTHER

## 2019-07-15 RX ORDER — DICYCLOMINE HYDROCHLORIDE 10 MG/1
10 CAPSULE ORAL
Qty: 120 CAPSULE | Refills: 3 | Status: SHIPPED | OUTPATIENT
Start: 2019-07-15 | End: 2020-02-20 | Stop reason: ALTCHOICE

## 2019-07-17 ENCOUNTER — OFFICE VISIT (OUTPATIENT)
Dept: FAMILY MEDICINE CLINIC | Age: 69
End: 2019-07-17
Payer: MEDICARE

## 2019-07-17 VITALS
OXYGEN SATURATION: 98 % | DIASTOLIC BLOOD PRESSURE: 70 MMHG | WEIGHT: 121 LBS | HEIGHT: 62 IN | RESPIRATION RATE: 16 BRPM | TEMPERATURE: 98.1 F | HEART RATE: 76 BPM | BODY MASS INDEX: 22.26 KG/M2 | SYSTOLIC BLOOD PRESSURE: 136 MMHG

## 2019-07-17 DIAGNOSIS — L24.7 IRRITANT CONTACT DERMATITIS DUE TO PLANTS, EXCEPT FOOD: Primary | ICD-10-CM

## 2019-07-17 PROCEDURE — 96372 THER/PROPH/DIAG INJ SC/IM: CPT | Performed by: FAMILY MEDICINE

## 2019-07-17 PROCEDURE — 99213 OFFICE O/P EST LOW 20 MIN: CPT | Performed by: FAMILY MEDICINE

## 2019-07-17 RX ORDER — FLUOCINONIDE 0.5 MG/G
OINTMENT TOPICAL
Qty: 60 G | Refills: 1 | Status: SHIPPED | OUTPATIENT
Start: 2019-07-17 | End: 2019-07-24

## 2019-07-17 RX ORDER — METHYLPREDNISOLONE ACETATE 80 MG/ML
80 INJECTION, SUSPENSION INTRA-ARTICULAR; INTRALESIONAL; INTRAMUSCULAR; SOFT TISSUE ONCE
Status: COMPLETED | OUTPATIENT
Start: 2019-07-17 | End: 2019-07-17

## 2019-07-17 RX ADMIN — METHYLPREDNISOLONE ACETATE 80 MG: 80 INJECTION, SUSPENSION INTRA-ARTICULAR; INTRALESIONAL; INTRAMUSCULAR; SOFT TISSUE at 12:07

## 2019-07-17 NOTE — PROGRESS NOTES
on file     Inability: Not on file    Transportation needs:     Medical: Not on file     Non-medical: Not on file   Tobacco Use    Smoking status: Never Smoker    Smokeless tobacco: Never Used   Substance and Sexual Activity    Alcohol use: No     Alcohol/week: 0.0 standard drinks    Drug use: No    Sexual activity: Not Currently   Lifestyle    Physical activity:     Days per week: Not on file     Minutes per session: Not on file    Stress: Not on file   Relationships    Social connections:     Talks on phone: Not on file     Gets together: Not on file     Attends Druze service: Not on file     Active member of club or organization: Not on file     Attends meetings of clubs or organizations: Not on file     Relationship status: Not on file    Intimate partner violence:     Fear of current or ex partner: Not on file     Emotionally abused: Not on file     Physically abused: Not on file     Forced sexual activity: Not on file   Other Topics Concern    Not on file   Social History Narrative    Not on file     Family History   Problem Relation Age of Onset    Heart Disease Father     Stroke Father     Diabetes Father     Stomach Cancer Father     Other Mother         Bowel Obstruction     Allergies   Allergen Reactions    Bactrim [Sulfamethoxazole-Trimethoprim]      hallucinations    Ciprofloxacin Other (See Comments)     Pt not 100% sure, but thinks cipro is the antibiotic she is allergic to  Anxiety, confusion     Current Outpatient Medications   Medication Sig Dispense Refill    fluocinonide (LIDEX) 0.05 % ointment Apply topically 2 times daily.  60 g 1    omeprazole (PRILOSEC) 20 MG delayed release capsule Take 1 capsule by mouth Daily 90 capsule 2    dicyclomine (BENTYL) 10 MG capsule Take 1 capsule by mouth 4 times daily (before meals and nightly) 120 capsule 3    levothyroxine (SYNTHROID) 137 MCG tablet Take 1 tablet by mouth daily 90 tablet 1    PARoxetine (PAXIL) 10 MG tablet Take 1 processes, current clinical status, medications, activities and diet.      Side effects, adverse effects of the medication prescribed today, as well as treatment plan/ rationale and result expectations have been discussed with the patient who expresses understanding and desires to proceed.     Close follow up to evaluate treatment results and for coordination of care. I have reviewed the patient's medical history in detail and updated the computerized patient record. More than 50% of the appointment was spent in face-to-face counseling, education and care coordination. No orders of the defined types were placed in this encounter. Orders Placed This Encounter   Medications    methylPREDNISolone acetate (DEPO-MEDROL) injection 80 mg    fluocinonide (LIDEX) 0.05 % ointment     Sig: Apply topically 2 times daily. Dispense:  60 g     Refill:  1     Medications Discontinued During This Encounter   Medication Reason    clobetasol (TEMOVATE) 0.05 % ointment LIST CLEANUP     Return for annual wellness. Controlled Substance Monitoring:    Acute and Chronic Pain Monitoring:   RX Monitoring 10/30/2018   Attestation The Prescription Monitoring Report for this patient was reviewed today.            Karol Gorman MD

## 2019-08-15 DIAGNOSIS — E03.9 HYPOTHYROIDISM, UNSPECIFIED TYPE: ICD-10-CM

## 2019-08-15 LAB — TSH SERPL DL<=0.05 MIU/L-ACNC: <0.01 UIU/ML (ref 0.44–3.86)

## 2019-08-15 RX ORDER — LEVOTHYROXINE SODIUM 0.1 MG/1
137 TABLET ORAL DAILY
Qty: 90 TABLET | Refills: 3 | Status: SHIPPED | OUTPATIENT
Start: 2019-08-15 | End: 2020-02-20

## 2019-08-19 ENCOUNTER — OFFICE VISIT (OUTPATIENT)
Dept: FAMILY MEDICINE CLINIC | Age: 69
End: 2019-08-19
Payer: MEDICARE

## 2019-08-19 VITALS
WEIGHT: 115.6 LBS | HEART RATE: 62 BPM | RESPIRATION RATE: 16 BRPM | TEMPERATURE: 97 F | SYSTOLIC BLOOD PRESSURE: 108 MMHG | HEIGHT: 62 IN | OXYGEN SATURATION: 99 % | BODY MASS INDEX: 21.27 KG/M2 | DIASTOLIC BLOOD PRESSURE: 62 MMHG

## 2019-08-19 DIAGNOSIS — F32.A ANXIETY AND DEPRESSION: Primary | Chronic | ICD-10-CM

## 2019-08-19 DIAGNOSIS — R01.1 HEART MURMUR: Chronic | ICD-10-CM

## 2019-08-19 DIAGNOSIS — Z12.39 SCREENING FOR BREAST CANCER: ICD-10-CM

## 2019-08-19 DIAGNOSIS — Z00.00 ROUTINE GENERAL MEDICAL EXAMINATION AT A HEALTH CARE FACILITY: ICD-10-CM

## 2019-08-19 DIAGNOSIS — F41.9 ANXIETY AND DEPRESSION: Primary | Chronic | ICD-10-CM

## 2019-08-19 DIAGNOSIS — N90.4 LICHEN SCLEROSUS ET ATROPHICUS OF THE VULVA: Chronic | ICD-10-CM

## 2019-08-19 PROCEDURE — G0438 PPPS, INITIAL VISIT: HCPCS | Performed by: FAMILY MEDICINE

## 2019-08-19 RX ORDER — CLOBETASOL PROPIONATE 0.5 MG/G
CREAM TOPICAL 2 TIMES DAILY
COMMUNITY
End: 2019-08-19 | Stop reason: SDUPTHER

## 2019-08-19 RX ORDER — CLOBETASOL PROPIONATE 0.5 MG/G
CREAM TOPICAL 2 TIMES DAILY
Qty: 60 G | Refills: 2 | Status: SHIPPED | OUTPATIENT
Start: 2019-08-19 | End: 2020-12-04 | Stop reason: SDUPTHER

## 2019-08-19 RX ORDER — PAROXETINE HYDROCHLORIDE 20 MG/1
20 TABLET, FILM COATED ORAL DAILY
Qty: 90 TABLET | Refills: 1 | Status: SHIPPED | OUTPATIENT
Start: 2019-08-19 | End: 2020-02-20

## 2019-08-19 ASSESSMENT — PATIENT HEALTH QUESTIONNAIRE - PHQ9
1. LITTLE INTEREST OR PLEASURE IN DOING THINGS: 0
2. FEELING DOWN, DEPRESSED OR HOPELESS: 0
SUM OF ALL RESPONSES TO PHQ QUESTIONS 1-9: 0
SUM OF ALL RESPONSES TO PHQ9 QUESTIONS 1 & 2: 0
SUM OF ALL RESPONSES TO PHQ QUESTIONS 1-9: 0

## 2019-08-19 NOTE — PROGRESS NOTES
[Sulfamethoxazole-Trimethoprim]      hallucinations    Ciprofloxacin Other (See Comments)     Pt not 100% sure, but thinks cipro is the antibiotic she is allergic to  Anxiety, confusion     Prior to Visit Medications    Medication Sig Taking? Authorizing Provider   clobetasol (TEMOVATE) 0.05 % cream Apply topically 2 times daily Apply topically 2 times daily. Yes Av Whitley MD   PARoxetine (PAXIL) 20 MG tablet Take 1 tablet by mouth daily Yes Av Whitley MD   levothyroxine (SYNTHROID) 100 MCG tablet Take 1.5 tablets by mouth daily Yes Av Whitley MD   omeprazole (PRILOSEC) 20 MG delayed release capsule Take 1 capsule by mouth Daily Yes Hermann Friday, APRN - CNP   dicyclomine (BENTYL) 10 MG capsule Take 1 capsule by mouth 4 times daily (before meals and nightly) Yes [place] Friday, APRN - CNP   aspirin 81 MG tablet Take 1 tablet by mouth daily With Food Yes Leslee Thrasher MD   docusate sodium (COLACE) 100 MG capsule Take 100 mg by mouth 2 times daily Yes Historical Provider, MD     Past Medical History:   Diagnosis Date    Anxiety and depression 5/2/2019    Diverticulosis of colon (without mention of hemorrhage) 02/25/2015    DR Tim Gonzales    Hyperlipidemia 2/3/2015    Hypothyroidism     Lichen sclerosus     Migraines 2/3/2015    Renal insufficiency 2/3/2015    Spondylosis of lumbar region without myelopathy or radiculopathy 5/24/2016     Past Surgical History:   Procedure Laterality Date    ABDOMINAL ADHESION SURGERY  1/7/16    DR. MERAZ    APPENDECTOMY  2012    CHOLECYSTECTOMY  2012    COLON SURGERY  2005    13\" of colon removed-NO CA    COLONOSCOPY  2011    polyps    COLONOSCOPY  02/25/2015    DR Tim Gonzales - DIVERTICULOSIS    KNEE SURGERY  2013    [place] COLONOSCOPY FLX DX W/COLLJ SPEC WHEN PFRMD N/A 1/26/2018    COLONOSCOPY performed by Fuad Laurent MD at . [facility] ESOPHAGOGASTRODUODENOSCOPY TRANSORAL DIAGNOSTIC Visit (AWV)  05/27/2012    Cervical cancer screen  07/28/2019    DTaP/Tdap/Td vaccine (1 - Tdap) 05/02/2020 (Originally 5/27/1969)    Shingles Vaccine (1 of 2) 05/02/2020 (Originally 5/27/1999)    Hepatitis C screen  06/05/2020 (Originally 1950)    Flu vaccine (1) 09/01/2019    TSH testing  08/15/2020    Breast cancer screen  08/17/2020    Colon cancer screen colonoscopy  01/26/2023    Lipid screen  05/03/2024    DEXA (modify frequency per FRAX score)  Completed    Pneumococcal 65+ years Vaccine  Completed     Recommendations for Preventive Services Due: see orders and patient instructions/AVS.  . Recommended screening schedule for the next 5-10 years is provided to the patient in written form: see Patient Gopal Goldsmith was seen today for medicare awv. Diagnoses and all orders for this visit:    Anxiety and depression  -     PARoxetine (PAXIL) 20 MG tablet; Take 1 tablet by mouth daily    Lichen sclerosus et atrophicus of the vulva    Screening for breast cancer  -     JANUARY DIGITAL SCREEN W OR WO CAD BILATERAL; Future    Heart murmur    Routine general medical examination at a health care facility    Other orders  -     clobetasol (TEMOVATE) 0.05 % cream; Apply topically 2 times daily Apply topically 2 times daily.

## 2019-10-28 ENCOUNTER — HOSPITAL ENCOUNTER (OUTPATIENT)
Dept: WOMENS IMAGING | Age: 69
Discharge: HOME | End: 2019-10-30
Payer: MEDICARE

## 2019-10-28 DIAGNOSIS — Z12.39 SCREENING FOR BREAST CANCER: ICD-10-CM

## 2019-10-28 PROCEDURE — 77063 BREAST TOMOSYNTHESIS BI: CPT

## 2019-11-05 ENCOUNTER — TELEPHONE (OUTPATIENT)
Dept: FAMILY MEDICINE CLINIC | Age: 69
End: 2019-11-05

## 2019-11-19 ENCOUNTER — OFFICE VISIT (OUTPATIENT)
Dept: FAMILY MEDICINE CLINIC | Age: 69
End: 2019-11-19
Payer: MEDICARE

## 2019-11-19 VITALS
WEIGHT: 120 LBS | BODY MASS INDEX: 22.66 KG/M2 | OXYGEN SATURATION: 99 % | HEIGHT: 61 IN | TEMPERATURE: 98 F | HEART RATE: 68 BPM | RESPIRATION RATE: 16 BRPM

## 2019-11-19 DIAGNOSIS — K64.5 THROMBOSED EXTERNAL HEMORRHOID: Primary | ICD-10-CM

## 2019-11-19 DIAGNOSIS — Z23 NEED FOR VACCINATION: ICD-10-CM

## 2019-11-19 PROCEDURE — 46320 REMOVAL OF HEMORRHOID CLOT: CPT | Performed by: FAMILY MEDICINE

## 2019-11-19 PROCEDURE — 90653 IIV ADJUVANT VACCINE IM: CPT | Performed by: FAMILY MEDICINE

## 2019-11-19 PROCEDURE — G0008 ADMIN INFLUENZA VIRUS VAC: HCPCS | Performed by: FAMILY MEDICINE

## 2020-02-11 DIAGNOSIS — E03.9 HYPOTHYROIDISM, UNSPECIFIED TYPE: ICD-10-CM

## 2020-02-11 LAB — TSH SERPL DL<=0.05 MIU/L-ACNC: 0.08 UIU/ML (ref 0.44–3.86)

## 2020-02-20 ENCOUNTER — OFFICE VISIT (OUTPATIENT)
Dept: FAMILY MEDICINE CLINIC | Age: 70
End: 2020-02-20
Payer: MEDICARE

## 2020-02-20 VITALS
TEMPERATURE: 97.5 F | HEART RATE: 64 BPM | BODY MASS INDEX: 23.22 KG/M2 | RESPIRATION RATE: 16 BRPM | HEIGHT: 61 IN | OXYGEN SATURATION: 97 % | DIASTOLIC BLOOD PRESSURE: 66 MMHG | WEIGHT: 123 LBS | SYSTOLIC BLOOD PRESSURE: 122 MMHG

## 2020-02-20 PROCEDURE — 99214 OFFICE O/P EST MOD 30 MIN: CPT | Performed by: FAMILY MEDICINE

## 2020-02-20 RX ORDER — LEVOTHYROXINE SODIUM 0.07 MG/1
75 TABLET ORAL DAILY
Qty: 90 TABLET | Refills: 4 | Status: SHIPPED | OUTPATIENT
Start: 2020-02-20 | End: 2021-02-25 | Stop reason: DRUGHIGH

## 2020-02-20 RX ORDER — PAROXETINE 10 MG/1
10 TABLET, FILM COATED ORAL DAILY
Qty: 90 TABLET | Refills: 2 | Status: SHIPPED | OUTPATIENT
Start: 2020-02-20 | End: 2021-02-25 | Stop reason: SDUPTHER

## 2020-02-20 NOTE — PROGRESS NOTES
Subjective  Jenn Sandoval, 78 y.o. female presents today with:  Chief Complaint   Patient presents with    6 Month Follow-Up     Patient present for her 6 months follow up for anxiety and depression.  Results     Asking to discuss the results of her last labs.  Nausea     Sunday and monday with nausea and headache, head fog and fatigue. By Tuesday she was fine. This is the second time since January.  Mass     on righe upper back, now has burning and itching. HPI    Had a headache Sunday into Monday. Her whole head, neck and shoulders hurt and she felt like she was in a fog. The night before she awakened feeling nauseated and vomited once a small amount of acid. This is the third time this has happened. The HA lasted all day and she was extremely fatigued. Has bumped her forehead a few times - occurred a few months ago. No syncope. Every now and then has light-headedness for a few months. Occurs when she is walking. Lasts for seconds. Vision feels foggy and gray during headaches. No vision changes outside of HAs. Has h/o migraines and this is not the same. None in 4-5 years. No incontinence of urine. Patient is being treated for depression and anxiety  and has been compliant with meds which do not cause side effects. Mood is improved. No suicidal ideation. Sleep is normal.    Hypothyroidism. TSH is low and patient has been taking 100 mcg not 150 mcg as was documented. No excessive perspiration, weight loss, heart racing or palpitations. No other questions and or concerns for today's visit      Review of Systems No fevers, chills, sweats. Has cold intolerance. No unintended weight loss. No abdominal pain, nausea, vomiting,  constipation, bloody stools, black tarry stools. Occasional dark stools. No rashes. No swollen glands. Had diarrhea for 3 days, none today.       Past Medical History:   Diagnosis Date    Anxiety and depression 5/2/2019    Diverticulosis of colon (without mention of hemorrhage) 02/25/2015    DR Tori Eddy    Hyperlipidemia 2/3/2015    Hypothyroidism     Lichen sclerosus     Migraines 2/3/2015    Renal insufficiency 2/3/2015    Spondylosis of lumbar region without myelopathy or radiculopathy 5/24/2016     Past Surgical History:   Procedure Laterality Date    ABDOMINAL ADHESION SURGERY  1/7/16    DR. MERAZ    APPENDECTOMY  2012    CHOLECYSTECTOMY  2012    COLON SURGERY  2005    13\" of colon removed-NO CA    COLONOSCOPY  2011    polyps    COLONOSCOPY  02/25/2015    DR Tori Eddy - DIVERTICULOSIS    KNEE SURGERY  2013    [place] COLONOSCOPY FLX DX W/COLLJ SPEC WHEN PFRMD N/A 1/26/2018    COLONOSCOPY performed by Gracie Lopez MD at [place] [id number] ESOPHAGOGASTRODUODENOSCOPY TRANSORAL DIAGNOSTIC N/A 1/26/2018    EGD ESOPHAGOGASTRODUODENOSCOPY WITH DILATION performed by Gracie Lopez MD at [place] [id number]     Social History     Socioeconomic History    Marital status: Single     Spouse name: Not on file    Number of children: Not on file    Years of education: Not on file    Highest education level: Not on file   Occupational History    Not on file   Social Needs    Financial resource strain: Not on file    Food insecurity:     Worry: Not on file     Inability: Not on file    Transportation needs:     Medical: Not on file     Non-medical: Not on file   Tobacco Use    Smoking status: Never Smoker    Smokeless tobacco: Never Used   Substance and Sexual Activity    Alcohol use: No     Alcohol/week: 0.0 standard drinks    Drug use: No    Sexual activity: Not Currently   Lifestyle    Physical activity:     Days per week: Not on file     Minutes per session: Not on file    Stress: Not on file   Relationships    Social connections:     Talks on phone: Not on file     Gets together: Not on file     Attends Adventist service: Not on file     Active member of club or organization: Not on file     Attends meetings of clubs or organizations: Not on file     Relationship status: Not on file    Intimate partner violence:     Fear of current or ex partner: Not on file     Emotionally abused: Not on file     Physically abused: Not on file     Forced sexual activity: Not on file   Other Topics Concern    Not on file   Social History Narrative    Not on file     Family History   Problem Relation Age of Onset    Heart Disease Father     Stroke Father     Diabetes Father     Stomach Cancer Father     Other Mother         Bowel Obstruction     Allergies   Allergen Reactions    Bactrim [Sulfamethoxazole-Trimethoprim]      hallucinations    Ciprofloxacin Other (See Comments)     Pt not 100% sure, but thinks cipro is the antibiotic she is allergic to  Anxiety, confusion     Current Outpatient Medications   Medication Sig Dispense Refill    PARoxetine (PAXIL) 10 MG tablet Take 1 tablet by mouth daily 90 tablet 2    levothyroxine (SYNTHROID) 75 MCG tablet Take 1 tablet by mouth daily 90 tablet 4    docusate sodium (COLACE) 100 MG capsule Take 100 mg by mouth 2 times daily      clobetasol (TEMOVATE) 0.05 % cream Apply topically 2 times daily Apply topically 2 times daily. 60 g 2     No current facility-administered medications for this visit. PMH, Surgical Hx, Family Hx, and Social Hxreviewed and updated. Health Maintenance reviewed. Objective    Vitals:    02/20/20 1303   BP: 122/66   Site: Right Upper Arm   Position: Sitting   Cuff Size: Medium Adult   Pulse: 64   Resp: 16   Temp: 97.5 °F (36.4 °C)   TempSrc: Temporal   SpO2: 97%   Weight: 123 lb (55.8 kg)   Height: 5' 1\" (1.549 m)        Physical Exam  Constitutional:       Appearance: She is well-developed. HENT:      Head: Normocephalic and atraumatic. Eyes:      Extraocular Movements:      Right eye: Normal extraocular motion and no nystagmus. Left eye: Normal extraocular motion and no nystagmus.       Conjunctiva/sclera: Conjunctivae normal. Future Order    MRA HEAD WO CONTRAST [14276 Custom]   - Future Order         Other dysphagia        Stable and well-controlled without PPI. Reviewed with the patient: all disease processes, current clinical status, medications, activities and diet.      Side effects, adverse effects of the medication prescribed today, as well as treatment plan/ rationale and result expectations have been discussed with the patient who expresses understanding and desires to proceed.     Close follow up to evaluate treatment results and for coordination of care. I have reviewed the patient's medical history in detail and updated the computerized patient record. More than 50% of the appointment was spent in face-to-face counseling, education and care coordination. Please note this report has been partially produced using speech recognition software and may contain mistakes related to that system including errors in grammar, punctuation and spelling as well as words and phrases that may seem inappropriate. If there are questions or concerns, please feel free to contact me to clarify.     Orders Placed This Encounter   Procedures    MRI BRAIN W WO CONTRAST     Standing Status:   Future     Standing Expiration Date:   2/20/2021     Order Specific Question:   Reason for exam:     Answer:   HA, N/V, dizziness, vision changes    MRA HEAD WO CONTRAST     Standing Status:   Future     Standing Expiration Date:   2/20/2021     Order Specific Question:   Reason for exam:     Answer:   headache, nausea, vomiting, dizziness, vision changes    TSH Without Reflex     Standing Status:   Future     Standing Expiration Date:   2/20/2021    T4, Free     Standing Status:   Future     Standing Expiration Date:   2/20/2021     Orders Placed This Encounter   Medications    PARoxetine (PAXIL) 10 MG tablet     Sig: Take 1 tablet by mouth daily     Dispense:  90 tablet     Refill:  2    levothyroxine (SYNTHROID) 75 MCG tablet Sig: Take 1 tablet by mouth daily     Dispense:  90 tablet     Refill:  4     Medications Discontinued During This Encounter   Medication Reason    omeprazole (PRILOSEC) 20 MG delayed release capsule Therapy completed    aspirin 81 MG tablet Therapy completed    dicyclomine (BENTYL) 10 MG capsule Therapy completed    PARoxetine (PAXIL) 20 MG tablet     levothyroxine (SYNTHROID) 100 MCG tablet      Return in about 2 weeks (around 3/5/2020) for headaches. Controlled Substance Monitoring:    Acute and Chronic Pain Monitoring:   RX Monitoring 10/30/2018   Attestation The Prescription Monitoring Report for this patient was reviewed today.            Byron Zaldivar MD

## 2020-03-04 ENCOUNTER — HOSPITAL ENCOUNTER (OUTPATIENT)
Dept: MRI IMAGING | Age: 70
Discharge: HOME | End: 2020-03-06
Payer: MEDICARE

## 2020-03-04 PROCEDURE — 70551 MRI BRAIN STEM W/O DYE: CPT

## 2020-03-04 PROCEDURE — 70544 MR ANGIOGRAPHY HEAD W/O DYE: CPT

## 2020-03-09 ENCOUNTER — OFFICE VISIT (OUTPATIENT)
Dept: FAMILY MEDICINE CLINIC | Age: 70
End: 2020-03-09
Payer: MEDICARE

## 2020-03-09 VITALS
WEIGHT: 132 LBS | SYSTOLIC BLOOD PRESSURE: 112 MMHG | HEIGHT: 61 IN | HEART RATE: 62 BPM | DIASTOLIC BLOOD PRESSURE: 70 MMHG | BODY MASS INDEX: 24.92 KG/M2 | RESPIRATION RATE: 16 BRPM | OXYGEN SATURATION: 98 % | TEMPERATURE: 97.9 F

## 2020-03-09 PROBLEM — I67.9 SMALL VESSEL DISEASE, CEREBROVASCULAR: Chronic | Status: ACTIVE | Noted: 2020-03-09

## 2020-03-09 PROBLEM — R51.9 NEW ONSET HEADACHE: Status: ACTIVE | Noted: 2020-03-09

## 2020-03-09 PROCEDURE — 99214 OFFICE O/P EST MOD 30 MIN: CPT | Performed by: FAMILY MEDICINE

## 2020-03-09 RX ORDER — ATORVASTATIN CALCIUM 20 MG/1
20 TABLET, FILM COATED ORAL DAILY
Qty: 90 TABLET | Refills: 4 | Status: SHIPPED | OUTPATIENT
Start: 2020-03-09 | End: 2021-02-25 | Stop reason: SDUPTHER

## 2020-03-09 RX ORDER — ASPIRIN 81 MG/1
81 TABLET ORAL DAILY
Qty: 90 TABLET | Refills: 4 | Status: SHIPPED | OUTPATIENT
Start: 2020-03-09 | End: 2021-02-25 | Stop reason: SDUPTHER

## 2020-03-09 NOTE — PROGRESS NOTES
Subjective  Luly Lara, 78 y.o. female presents today with:  Chief Complaint   Patient presents with    Headache     Patient is here for the results of her MRI of head. HPI    02/20/2020: Had a headache Sunday into Monday. Her whole head, neck and shoulders hurt and she felt like she was in a fog. The night before she awakened feeling nauseated and vomited once a small amount of acid. This is the third time this has happened. The HA lasted all day and she was extremely fatigued. Has bumped her forehead a few times - occurred a few months ago. No syncope. Every now and then has light-headedness for a few months. Occurs when she is walking. Lasts for seconds. Vision feels foggy and gray during headaches. No vision changes outside of HAs. Has h/o migraines and this is not the same. None in 4-5 years. No incontinence of urine.      Patient is being treated for depression and anxiety  and has been compliant with meds which do not cause side effects. Mood is improved. No suicidal ideation. Sleep is normal.     Hypothyroidism. TSH is low and patient has been taking 100 mcg not 150 mcg as was documented. No excessive perspiration, weight loss, heart racing or palpitations. 03/09/2020: Had another episode over the weekend. It incapacitated her. When she has the symptoms she has blurry vision, dizziness and is unsure of her footing. Also having bad dreams about dead people. These headaches do not feel like her migraines. Has h/o  hospitalization and has been on meds for depression, anxiety and BPD. But she feels like those problems are relatively well-controlled. Strokes to run in the family. Feels that anxiety and depression are significantly improved on paroxetine 10 mg daily. She continues to have very disrupted sleep with 3-5 awakenings over 6 hours. Awakenings are due to disrupted sleep of disabled son.     No other questions and or concerns for today's visit      Review of Systems No fevers, chills, sweats. No unintended weight loss. No abdominal pain, nausea, vomiting, diarrhea, constipation, stools. No rashes. No swollen glands. 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 MERAZ    APPENDECTOMY  2012    CHOLECYSTECTOMY  2012    COLON SURGERY  2005    13\" of colon removed-NO CA    COLONOSCOPY  2011    polyps    COLONOSCOPY  02/25/2015    DR Clements Prior - DIVERTICULOSIS    KNEE SURGERY  2013    [place] COLONOSCOPY FLX DX W/COLLJ SPEC WHEN PFRMD N/A 1/26/2018    COLONOSCOPY performed by Kay Newton MD at [facility] ESOPHAGOGASTRODUODENOSCOPY TRANSORAL DIAGNOSTIC N/A 1/26/2018    EGD ESOPHAGOGASTRODUODENOSCOPY WITH DILATION performed by Kay Newton MD at [facility]     Social History     Socioeconomic History    Marital status: Single     Spouse name: Not on file    Number of children: Not on file    Years of education: Not on file    Highest education level: Not on file   Occupational History    Not on file   Social Needs    Financial resource strain: Not on file    Food insecurity     Worry: Not on file     Inability: Not on file    Transportation needs     Medical: Not on file     Non-medical: Not on file   Tobacco Use    Smoking status: Never Smoker    Smokeless tobacco: Never Used   Substance and Sexual Activity    Alcohol use: No     Alcohol/week: 0.0 standard drinks    Drug use: No    Sexual activity: Not Currently   Lifestyle    Physical activity     Days per the lateral ventricles on susceptibility weighted images with no pathologic mineralization. Minimal diffuse cerebral atrophy predominantly over the convexities. No ventricular dilatation. No mass effect or extra-axial collections. Orbits, pituitary gland and internal auditory canals are unremarkable for the noncontrast technique. No Chiari malformation. Mild bilateral predominantly anterior ethmoid sinus mucosal thickening. No paranasal sinus air-fluid levels to indicate acute sinusitis. Mastoid air cells are unremarkable. Impression: Essentially unremarkable MRI of the brain except for several nonspecific white matter hyperintensities most likely degenerative, or alternatively small vessel ischemic or demyelinating. MAGNETIC RESONANCE ANGIOGRAM OF THE [place] OF [place]. HISTORY: See above    COMPARISON: No prior magnetic resonance angiography available for correlation. TECHNIQUE: Time-of-flight magnetic resonance angiography of the [place] of [place] is obtained. Source images are viewed in multiple planes. Maximum intensity projection magnetic resonance angiogram is created. FINDINGS:    Distal vertebral and basilar arteries are patent. The distal left vertebral artery is smaller than the right on a congenital basis. No significant stenosis. Superior cerebellar arteries are patent. Petrous internal carotid arteries and carotid siphons are unremarkable with no sign of significant narrowing. Anterior, middle and posterior cerebral artery portions included on the exam are unremarkable. The right posterior cerebral artery has a predominantly fetal origin with a small communication to the basilar. The left posterior communicating arises from   the basilar with a very tiny communication to the carotid. No significant narrowing in these vessels. No sign of aneurysms. IMPRESSION:    Unremarkable magnetic resonance angiogram of the [place].   MRA HEAD WO CONTRAST  Narrative: MRI of are viewed in multiple planes. Maximum intensity projection magnetic resonance angiogram is created. FINDINGS:    Distal vertebral and basilar arteries are patent. The distal left vertebral artery is smaller than the right on a congenital basis. No significant stenosis. Superior cerebellar arteries are patent. Petrous internal carotid arteries and carotid siphons are unremarkable with no sign of significant narrowing. Anterior, middle and posterior cerebral artery portions included on the exam are unremarkable. The right posterior cerebral artery has a predominantly fetal origin with a small communication to the basilar. The left posterior communicating arises from   the basilar with a very tiny communication to the carotid. No significant narrowing in these vessels. No sign of aneurysms. IMPRESSION:    Unremarkable magnetic resonance angiogram of the [place] of [place]. Assessment & Plan   Visit Diagnoses and Associated Orders     Small vessel disease, cerebrovascular    -  Primary    Possible cause of headaches. Start Lipitor (patient has been told she needs to be on it previously). Aspirin 81 mg daily. atorvastatin (LIPITOR) 20 MG tablet [63052]      aspirin EC 81 MG EC tablet [20014]      Radha Moon MD, Neurology, [facility] Custom]      Hepatic Function Panel [65215 Custom]   - Future Order         New onset headache        Unclear etiology. Refer to Dr. Kiah Muñiz for further evaluation.     Rafael Moon MD, Neurology, [place] [YDS09 Custom]           Anxiety and depression        Stable and fairly well-controlled on Paxil                  Reviewed with the patient: all disease processes, current clinical status, medications, activities and diet.      Side effects, adverse effects of the medication prescribed today, as well as treatment plan/ rationale and result expectations have been discussed with the patient who expresses understanding and desires to proceed.     Close follow up to evaluate treatment results and for coordination of care. I have reviewed the patient's medical history in detail and updated the computerized patient record. More than 50% of the appointment was spent in face-to-face counseling, education and care coordination. Please note this report has been partially produced using speech recognition software and may contain mistakes related to that system including errors in grammar, punctuation and spelling as well as words and phrases that may seem inappropriate. If there are questions or concerns, please feel free to contact me to clarify. Orders Placed This Encounter   Procedures    Hepatic Function Panel     Standing Status:   Future     Standing Expiration Date:   3/9/2021   [facility] - Stefanie Varela MD, Neurology, [place]     Referral Priority:   Routine     Referral Type:   Eval and Treat     Referral Reason:   Specialty Services Required     Referred to Provider:   Corin Guillen MD     Requested Specialty:   Neurology     Number of Visits Requested:   1     Orders Placed This Encounter   Medications    atorvastatin (LIPITOR) 20 MG tablet     Sig: Take 1 tablet by mouth daily     Dispense:  90 tablet     Refill:  4    aspirin EC 81 MG EC tablet     Sig: Take 1 tablet by mouth daily     Dispense:  90 tablet     Refill:  4     There are no discontinued medications. Return in about 6 weeks (around 4/20/2020) for headaches, Mood Disorder. Controlled Substance Monitoring:    Acute and Chronic Pain Monitoring:   RX Monitoring 10/30/2018   Attestation The Prescription Monitoring Report for this patient was reviewed today.            America Simon MD

## 2020-03-11 DIAGNOSIS — E03.9 HYPOTHYROIDISM, UNSPECIFIED TYPE: ICD-10-CM

## 2020-03-11 DIAGNOSIS — I67.9 SMALL VESSEL DISEASE, CEREBROVASCULAR: Chronic | ICD-10-CM

## 2020-03-11 LAB
ALBUMIN SERPL-MCNC: 4.2 G/DL (ref 3.5–4.6)
ALP BLD-CCNC: 72 U/L (ref 40–130)
ALT SERPL-CCNC: 12 U/L (ref 0–33)
AST SERPL-CCNC: 16 U/L (ref 0–35)
BILIRUB SERPL-MCNC: 0.5 MG/DL (ref 0.2–0.7)
BILIRUBIN DIRECT: <0.2 MG/DL (ref 0–0.4)
BILIRUBIN, INDIRECT: NORMAL MG/DL (ref 0–0.6)
T4 FREE: 1.34 NG/DL (ref 0.84–1.68)
TOTAL PROTEIN: 6.7 G/DL (ref 6.3–8)
TSH SERPL DL<=0.05 MIU/L-ACNC: 2.69 UIU/ML (ref 0.44–3.86)

## 2020-04-20 ENCOUNTER — VIRTUAL VISIT (OUTPATIENT)
Dept: FAMILY MEDICINE CLINIC | Age: 70
End: 2020-04-20
Payer: MEDICARE

## 2020-04-20 PROCEDURE — 99441 PR PHYS/QHP TELEPHONE EVALUATION 5-10 MIN: CPT | Performed by: FAMILY MEDICINE

## 2020-04-20 NOTE — PROGRESS NOTES
this office for worsening conditions or problems, and seek emergency medical treatment and/or call 911 if deemed necessary. Services were provided through a telephonic synchronous discussion virtually to substitute for in-person clinic visit. Patient and provider were located at their individual homes. --Gaby Thomas MD on 4/20/2020 at 2:20 PM    An electronic signature was used to authenticate this note.

## 2020-05-29 ENCOUNTER — VIRTUAL VISIT (OUTPATIENT)
Dept: CARDIOLOGY CLINIC | Age: 70
End: 2020-05-29
Payer: MEDICARE

## 2020-05-29 PROBLEM — R06.02 SOB (SHORTNESS OF BREATH): Status: ACTIVE | Noted: 2019-03-01

## 2020-05-29 PROCEDURE — 99442 PR PHYS/QHP TELEPHONE EVALUATION 11-20 MIN: CPT | Performed by: INTERNAL MEDICINE

## 2020-05-29 ASSESSMENT — ENCOUNTER SYMPTOMS
BLOOD IN STOOL: 0
STRIDOR: 0
NAUSEA: 0
EYES NEGATIVE: 1
RESPIRATORY NEGATIVE: 1
SHORTNESS OF BREATH: 0
CHEST TIGHTNESS: 0
WHEEZING: 0
COUGH: 0
GASTROINTESTINAL NEGATIVE: 1

## 2020-05-29 NOTE — PROGRESS NOTES
Stroke Father     Diabetes Father     Stomach Cancer Father     Other Mother         Bowel Obstruction       Social History     Socioeconomic History    Marital status: Single     Spouse name: Not on file    Number of children: Not on file    Years of education: Not on file    Highest education level: Not on file   Occupational History    Not on file   Social Needs    Financial resource strain: Not on file    Food insecurity     Worry: Not on file     Inability: Not on file    Transportation needs     Medical: Not on file     Non-medical: Not on file   Tobacco Use    Smoking status: Never Smoker    Smokeless tobacco: Never Used   Substance and Sexual Activity    Alcohol use: No     Alcohol/week: 0.0 standard drinks    Drug use: No    Sexual activity: Not Currently   Lifestyle    Physical activity     Days per week: Not on file     Minutes per session: Not on file    Stress: Not on file   Relationships    Social connections     Talks on phone: Not on file     Gets together: Not on file     Attends Hindu service: Not on file     Active member of club or organization: Not on file     Attends meetings of clubs or organizations: Not on file     Relationship status: Not on file    Intimate partner violence     Fear of current or ex partner: Not on file     Emotionally abused: Not on file     Physically abused: Not on file     Forced sexual activity: Not on file   Other Topics Concern    Not on file   Social History Narrative    Not on file       Allergies   Allergen Reactions    Bactrim [Sulfamethoxazole-Trimethoprim]      hallucinations    Ciprofloxacin Other (See Comments)     Pt not 100% sure, but thinks cipro is the antibiotic she is allergic to  Anxiety, confusion       Current Outpatient Medications   Medication Sig Dispense Refill    atorvastatin (LIPITOR) 20 MG tablet Take 1 tablet by mouth daily 90 tablet 4    aspirin EC 81 MG EC tablet Take 1 tablet by mouth daily 90 tablet 4    Exercise and Walk Daily    Return in about 1 month (around 6/29/2020) for Cardiovascular care. .      Electronically signed by Roselia Moreira MD on 5/29/2020 at 12:45 PM

## 2020-06-01 ENCOUNTER — OFFICE VISIT (OUTPATIENT)
Dept: NEUROLOGY | Age: 70
End: 2020-06-01
Payer: MEDICARE

## 2020-06-01 VITALS — DIASTOLIC BLOOD PRESSURE: 78 MMHG | HEART RATE: 61 BPM | SYSTOLIC BLOOD PRESSURE: 138 MMHG

## 2020-06-01 PROBLEM — M54.2 CERVICALGIA: Status: ACTIVE | Noted: 2020-06-01

## 2020-06-01 PROCEDURE — 99214 OFFICE O/P EST MOD 30 MIN: CPT | Performed by: PSYCHIATRY & NEUROLOGY

## 2020-06-01 RX ORDER — OMEPRAZOLE 20 MG/1
20 CAPSULE, DELAYED RELEASE ORAL DAILY
COMMUNITY
End: 2020-12-04 | Stop reason: SDUPTHER

## 2020-06-01 RX ORDER — SUMATRIPTAN 50 MG/1
50 TABLET, FILM COATED ORAL
Qty: 9 TABLET | Refills: 3 | Status: SHIPPED | OUTPATIENT
Start: 2020-06-01 | End: 2021-08-09 | Stop reason: SDUPTHER

## 2020-06-01 RX ORDER — DICYCLOMINE HYDROCHLORIDE 10 MG/1
10 CAPSULE ORAL
COMMUNITY
End: 2021-05-10 | Stop reason: SDUPTHER

## 2020-06-01 ASSESSMENT — ENCOUNTER SYMPTOMS
VOMITING: 0
COLOR CHANGE: 0
NAUSEA: 0
PHOTOPHOBIA: 0
CHOKING: 0
SHORTNESS OF BREATH: 0
BACK PAIN: 0
TROUBLE SWALLOWING: 0

## 2020-06-01 NOTE — PROGRESS NOTES
Subjective:      Patient ID: Sujey Osborne is a 69 y.o. female who presents today for:  Chief Complaint   Patient presents with    New Patient     Patients PCP reffered her. Patient stated that she can get them about 1-2 times and then she states that there are times that she can go a few weeks without them. At times she will wake up during the night with them as well. She states that she does get nausea at times but not often. She states taht she did get migraines in the past but what she is getting now are not like what she had in the past she feels like they are caused from stress. HPI 69-year-old right-handed L was referred here for an abnormal MRI and headaches. Had migraine headaches after birth of her child and then she was doing better. She has cluster migraines which come for 2-3 a week for a few weeks and then she does not have any. She actually does not get a bad headache but she gets very tired and has nausea but no photophobia phonophobia. She is under considerable stress as well. MRI of the brain and MRI reviewed personally and she does not have any findings on this. Next further patient does not have cardiac disease no history of chest pain or shortness of breath. She has some minor degree of hypercholesterolemia and she is not on any blood pressure medications either. She is cognitively intact. She has no history of head injury or trauma. The headaches appear to be mostly posterior in the occipital area when they occur.   This does not awaken her in the middle of the night    Past Medical History:   Diagnosis Date    Anxiety and depression 5/2/2019    Anxiety and depression     Diverticulosis of colon (without mention of hemorrhage) 02/25/2015    DR Mariaelena Abbott    Hyperlipidemia 2/3/2015    Hypothyroidism     Lichen sclerosus     Migraines 2/3/2015    Renal insufficiency 2/3/2015    Spondylosis of lumbar region without myelopathy or radiculopathy 5/24/2016     Past Surgical History:   Procedure Laterality Date    ABDOMINAL ADHESION SURGERY  1/7/16    DR. MERAZ    APPENDECTOMY  2012    CHOLECYSTECTOMY  2012    COLON SURGERY  2005    13\" of colon removed-NO CA    COLONOSCOPY  2011    polyps    COLONOSCOPY  02/25/2015    DR Tiffanie Desai - DIVERTICULOSIS    KNEE SURGERY  2013    LA COLONOSCOPY FLX DX W/COLLJ SPEC WHEN PFRMD N/A 1/26/2018    COLONOSCOPY performed by Rakel Conti MD at . [facility] 61 ESOPHAGOGASTRODUODENOSCOPY TRANSORAL DIAGNOSTIC N/A 1/26/2018    EGD ESOPHAGOGASTRODUODENOSCOPY WITH DILATION performed by Rakel Conti MD at [address] History     Socioeconomic History    Marital status: Single     Spouse name: Not on file    Number of children: Not on file    Years of education: Not on file    Highest education level: Not on file   Occupational History    Not on file   Social Needs    Financial resource strain: Not on file    Food insecurity     Worry: Not on file     Inability: Not on file    Transportation needs     Medical: Not on file     Non-medical: Not on file   Tobacco Use    Smoking status: Never Smoker    Smokeless tobacco: Never Used   Substance and Sexual Activity    Alcohol use: No     Alcohol/week: 0.0 standard drinks    Drug use: No    Sexual activity: Not Currently   Lifestyle    Physical activity     Days per week: Not on file     Minutes per session: Not on file    Stress: Not on file   Relationships    Social connections     Talks on phone: Not on file     Gets together: Not on file     Attends U[employer] service: Not on file     Active member of club or organization: Not on file     Attends meetings of clubs or organizations: Not on file     Relationship status: Not on file    Intimate partner violence     Fear of current or ex partner: Not on file     Emotionally abused: Not on file     Physically abused: Not on file     Forced sexual activity: Not on file Skin: Negative for color change. Allergic/Immunologic: Negative for food allergies. Neurological: Negative for dizziness, tremors, seizures, syncope, facial asymmetry, speech difficulty, weakness, light-headedness, numbness and headaches. Psychiatric/Behavioral: Negative for behavioral problems, confusion, hallucinations and sleep disturbance. Objective:   /78 (Site: Right Upper Arm, Position: Sitting, Cuff Size: Medium Adult)   Pulse 61   LMP  (LMP Unknown)     Physical Exam  Vitals signs reviewed. Eyes:      Pupils: Pupils are equal, round, and reactive to light. Neck:      Musculoskeletal: Normal range of motion. Cardiovascular:      Rate and Rhythm: Normal rate and regular rhythm. Heart sounds: No murmur. Pulmonary:      Effort: Pulmonary effort is normal.      Breath sounds: Normal breath sounds. Abdominal:      General: Bowel sounds are normal.   Musculoskeletal: Normal range of motion. Skin:     General: Skin is warm. Neurological:      Mental Status: She is alert and oriented to person, place, and time. Cranial Nerves: No cranial nerve deficit. Sensory: No sensory deficit. Motor: No abnormal muscle tone. Coordination: Coordination normal.      Deep Tendon Reflexes: Reflexes are normal and symmetric. Babinski sign absent on the right side. Babinski sign absent on the left side. Psychiatric:         Mood and Affect: Mood normal.         Mra Head Wo Contrast    Result Date: 3/4/2020  MRI of the brain without contrast. HISTORY: Headaches with blurred vision and dizziness, and occasional nausea and vomiting COMPARISON: No prior imaging of the brain available for correlation. TECHNIQUE: Sagittal T1 and FLAIR. Axial T1, FLAIR, T2, susceptibility weighted, and diffusion-weighted images. Coronal FLAIR and T2. FINDINGS: There are bilateral occasional tiny foci of predominantly juxtacortical white matter hyperintensity on the long TR sequences.  These are nonspecific and are likely degenerative, versus small vessel ischemic or demyelinating. They do not meet the criteria of primary demyelinating disease. Specifically there is no significant periventricular white matter component parallel to the long axis of the ventricles or involvement of the callosal septal interface. No infratentorial hyperintensities. No restricted diffusion to indicate acute infarct. Physiologic calcifications in the choroid of the lateral ventricles on susceptibility weighted images with no pathologic mineralization. Minimal diffuse cerebral atrophy predominantly over the convexities. No ventricular dilatation. No mass effect or extra-axial collections. Orbits, pituitary gland and internal auditory canals are unremarkable for the noncontrast technique. No Chiari malformation. Mild bilateral predominantly anterior ethmoid sinus mucosal thickening. No paranasal sinus air-fluid levels to indicate acute sinusitis. Mastoid air cells are unremarkable. Essentially unremarkable MRI of the brain except for several nonspecific white matter hyperintensities most likely degenerative, or alternatively small vessel ischemic or demyelinating. MAGNETIC RESONANCE ANGIOGRAM OF THE [place]. HISTORY: See above COMPARISON: No prior magnetic resonance angiography available for correlation. TECHNIQUE: Time-of-flight magnetic resonance angiography of the [place] is obtained. Source images are viewed in multiple planes. Maximum intensity projection magnetic resonance angiogram is created. FINDINGS: Distal vertebral and basilar arteries are patent. The distal left vertebral artery is smaller than the right on a congenital basis. No significant stenosis. Superior cerebellar arteries are patent. Petrous internal carotid arteries and carotid siphons are unremarkable with no sign of significant narrowing. Anterior, middle and posterior cerebral artery portions included on the exam are unremarkable.  The matter hyperintensities most likely degenerative, or alternatively small vessel ischemic or demyelinating. MAGNETIC RESONANCE ANGIOGRAM OF THE [place]. HISTORY: See above COMPARISON: No prior magnetic resonance angiography available for correlation. TECHNIQUE: Time-of-flight magnetic resonance angiography of the [place] is obtained. Source images are viewed in multiple planes. Maximum intensity projection magnetic resonance angiogram is created. FINDINGS: Distal vertebral and basilar arteries are patent. The distal left vertebral artery is smaller than the right on a congenital basis. No significant stenosis. Superior cerebellar arteries are patent. [place] internal carotid arteries and carotid siphons are unremarkable with no sign of significant narrowing. Anterior, middle and posterior cerebral artery portions included on the exam are unremarkable. The right posterior cerebral artery has a predominantly fetal origin with a small communication to the basilar. The left posterior communicating arises from the basilar with a very tiny communication to the carotid. No significant narrowing in these vessels. No sign of aneurysms. IMPRESSION: Unremarkable magnetic resonance angiogram of the [place].        Lab Results   Component Value Date    WBC 8.7 11/21/2018    RBC 4.61 11/21/2018    HGB 14.1 11/21/2018    HCT 40.9 11/21/2018    MCV 88.6 11/21/2018    MCH 30.5 11/21/2018    MCHC 34.4 11/21/2018    RDW 12.8 11/21/2018     11/21/2018     Lab Results   Component Value Date     11/21/2018    K 3.8 11/21/2018     11/21/2018    CO2 26 11/21/2018    BUN 20 11/21/2018    CREATININE 0.60 11/21/2018    GFRAA >60.0 11/21/2018    LABGLOM >60.0 11/21/2018    GLUCOSE 87 11/21/2018    PROT 6.7 03/11/2020    LABALBU 4.2 03/11/2020    CALCIUM 10.2 11/21/2018    BILITOT 0.5 03/11/2020    ALKPHOS 72 03/11/2020    AST 16 03/11/2020    ALT 12 03/11/2020     No results found for: PROTIME,

## 2020-08-25 ENCOUNTER — OFFICE VISIT (OUTPATIENT)
Dept: FAMILY MEDICINE CLINIC | Age: 70
End: 2020-08-25
Payer: MEDICARE

## 2020-08-25 VITALS
TEMPERATURE: 98.3 F | WEIGHT: 134 LBS | DIASTOLIC BLOOD PRESSURE: 68 MMHG | BODY MASS INDEX: 25.3 KG/M2 | SYSTOLIC BLOOD PRESSURE: 122 MMHG | HEIGHT: 61 IN | OXYGEN SATURATION: 98 % | RESPIRATION RATE: 16 BRPM | HEART RATE: 58 BPM

## 2020-08-25 PROCEDURE — G0439 PPPS, SUBSEQ VISIT: HCPCS | Performed by: FAMILY MEDICINE

## 2020-08-25 PROCEDURE — 99214 OFFICE O/P EST MOD 30 MIN: CPT | Performed by: FAMILY MEDICINE

## 2020-08-25 RX ORDER — ZOSTER VACCINE RECOMBINANT, ADJUVANTED 50 MCG/0.5
0.5 KIT INTRAMUSCULAR SEE ADMIN INSTRUCTIONS
Qty: 0.5 ML | Refills: 0 | Status: SHIPPED | OUTPATIENT
Start: 2020-08-25 | End: 2021-02-21

## 2020-08-25 ASSESSMENT — PATIENT HEALTH QUESTIONNAIRE - PHQ9
SUM OF ALL RESPONSES TO PHQ QUESTIONS 1-9: 0
SUM OF ALL RESPONSES TO PHQ QUESTIONS 1-9: 0

## 2020-08-25 ASSESSMENT — LIFESTYLE VARIABLES: HOW OFTEN DO YOU HAVE A DRINK CONTAINING ALCOHOL: 0

## 2020-08-30 NOTE — PROGRESS NOTES
Subjective  Erinn Huffman, 69 y.o. female presents today with:  Chief Complaint   Patient presents with    Thyroid Problem     follow up. no concerns today           HPI  Hypothyroidism. Compliant with levothyroxine which is taken correctly. No diarrhea, constipation, palpitations, dry skin, depression, difficulty sleeping or fatigue. No weight gain or loss. Patient is here for hyperlipidemia. Is compliant with medications and has no apparent side effects from them. GERD. Well-controlled with PPI, caffeine restriction, diet restriction. No weight loss. No abdominal pain. No bloody or black tarry stools. Patient is being treated for depression andanxiety and has been compliant with meds which do not cause side effects. Mood is improved. No suicidal ideation. Sleep is always frequently interrupted due to care requirements of disabled adult son. No other questions and or concerns for today's visit      Review of Systems      Past Medical History:   Diagnosis Date    Anxiety and depression 5/2/2019    Anxiety and depression     Diverticulosis of colon (without mention of hemorrhage) 02/25/2015    DR Shanel Laguerre    Hyperlipidemia 2/3/2015    Hypothyroidism     Lichen sclerosus     Migraines 2/3/2015    Renal insufficiency 2/3/2015    Spondylosis of lumbar region without myelopathy or radiculopathy 5/24/2016     Past Surgical History:   Procedure Laterality Date    ABDOMINAL ADHESION SURGERY  1/7/16    DR. MERAZ    APPENDECTOMY  2012    CATARACT REMOVAL Right     CHOLECYSTECTOMY  2012    COLON SURGERY  2005    13\" of colon removed-NO CA    COLONOSCOPY  2011    polyps    COLONOSCOPY  02/25/2015    DR Shanel Laguerre - DIVERTICULOSIS    KNEE SURGERY  2013    DC COLONOSCOPY FLX DX W/COLLJ SPEC WHEN PFRMD N/A 1/26/2018    COLONOSCOPY performed by Zaria Perkins MD at . [facility] ESOPHAGOGASTRODUODENOSCOPY TRANSORAL DIAGNOSTIC N/A 1/26/2018    EGD ESOPHAGOGASTRODUODENOSCOPY WITH DILATION performed by Flor Hilton MD at [place] [id number]     Social History     Socioeconomic History    Marital status: Single     Spouse name: Not on file    Number of children: Not on file    Years of education: Not on file    Highest education level: Not on file   Occupational History    Not on file   Social Needs    Financial resource strain: Not on file    Food insecurity     Worry: Not on file     Inability: Not on file    Transportation needs     Medical: Not on file     Non-medical: Not on file   Tobacco Use    Smoking status: Never Smoker    Smokeless tobacco: Never Used   Substance and Sexual Activity    Alcohol use: No     Alcohol/week: 0.0 standard drinks    Drug use: No    Sexual activity: Not Currently   Lifestyle    Physical activity     Days per week: Not on file     Minutes per session: Not on file    Stress: Not on file   Relationships    Social connections     Talks on phone: Not on file     Gets together: Not on file     Attends Rastafari service: Not on file     Active member of club or organization: Not on file     Attends meetings of clubs or organizations: Not on file     Relationship status: Not on file    Intimate partner violence     Fear of current or ex partner: Not on file     Emotionally abused: Not on file     Physically abused: Not on file     Forced sexual activity: Not on file   Other Topics Concern    Not on file   Social History Narrative    Not on file     Family History   Problem Relation Age of Onset    Heart Disease Father     Stroke Father     Diabetes Father     Stomach Cancer Father     Other Mother         Bowel Obstruction     Allergies   Allergen Reactions    Bactrim [Sulfamethoxazole-Trimethoprim]      hallucinations    Ciprofloxacin Other (See Comments)     Pt not 100% sure, but thinks cipro is the antibiotic she is allergic to  Anxiety, confusion     Current Outpatient Medications   Medication Sig Dispense Refill    zoster recombinant adjuvanted vaccine (SHINGRIX) 50 MCG/0.5ML SUSR injection Inject 0.5 mLs into the muscle See Admin Instructions 1 dose now and repeat in 2-6 months 0.5 mL 0    dicyclomine (BENTYL) 10 MG capsule Take 10 mg by mouth 4 times daily (before meals and nightly)      omeprazole (PRILOSEC) 20 MG delayed release capsule Take 20 mg by mouth daily      SUMAtriptan (IMITREX) 50 MG tablet Take 1 tablet by mouth once as needed for Migraine 9 tablet 3    atorvastatin (LIPITOR) 20 MG tablet Take 1 tablet by mouth daily 90 tablet 4    aspirin EC 81 MG EC tablet Take 1 tablet by mouth daily 90 tablet 4    PARoxetine (PAXIL) 10 MG tablet Take 1 tablet by mouth daily 90 tablet 2    levothyroxine (SYNTHROID) 75 MCG tablet Take 1 tablet by mouth daily 90 tablet 4    clobetasol (TEMOVATE) 0.05 % cream Apply topically 2 times daily Apply topically 2 times daily. (Patient not taking: Reported on 6/1/2020) 60 g 2    docusate sodium (COLACE) 100 MG capsule Take 100 mg by mouth 2 times daily       No current facility-administered medications for this visit. PMH, Surgical Hx, Family Hx, and Social Hxreviewed and updated. Health Maintenance reviewed. Objective    Vitals:    08/25/20 1150   BP: 122/68   Site: Right Upper Arm   Position: Sitting   Cuff Size: Medium Adult   Pulse: 58   Resp: 16   Temp: 98.3 °F (36.8 °C)   TempSrc: Tympanic   SpO2: 98%   Weight: 134 lb (60.8 kg)   Height: 5' 1\" (1.549 m)        Physical Exam  Constitutional:       Appearance: She is well-developed. HENT:      Head: Normocephalic and atraumatic. Eyes:      General: No scleral icterus. Conjunctiva/sclera: Conjunctivae normal.   Neck:      Musculoskeletal: Neck supple. Thyroid: No thyromegaly. Vascular: No carotid bruit. Cardiovascular:      Rate and Rhythm: Normal rate and regular rhythm.       Heart sounds: Normal heart sounds, S1 normal and S2 normal.   Pulmonary:      Effort: Pulmonary effort is normal.      Breath sounds: Normal breath sounds. No wheezing or rales. Abdominal:      General: Bowel sounds are normal. There is no distension. Palpations: Abdomen is soft. There is no mass. Tenderness: There is no abdominal tenderness. Musculoskeletal:      Right lower leg: No edema. Left lower leg: No edema. Lymphadenopathy:      Cervical: No cervical adenopathy. Skin:     General: Skin is warm and dry. Neurological:      Mental Status: She is alert and oriented to person, place, and time. Psychiatric:         Mood and Affect: Mood normal.         Behavior: Behavior normal.         Thought Content: Thought content normal.         Judgment: Judgment normal.           Lab Results   Component Value Date    LABA1C 5.2 01/19/2018    LABA1C 5.3 11/23/2015    LABA1C 5.5 10/07/2014     Lab Results   Component Value Date    CREATININE 0.60 11/21/2018     Lab Results   Component Value Date    ALT 12 03/11/2020    AST 16 03/11/2020     Lab Results   Component Value Date    CHOL 262 (H) 11/21/2018    TRIG 160 11/21/2018    HDL 54 05/03/2019    LDLCALC 102 05/03/2019        Assessment & Plan   Visit Diagnoses and Associated Orders     Hypothyroidism, unspecified type    -  Primary    Stable and well-controlled on current meds. TSH Without Reflex [[id number] Custom]   - Future Order         Mixed hyperlipidemia        Stable and well-controlled on current meds. Heartburn        Stable and well-controlled on current meds. Anxiety and depression        Stable and well-controlled on current meds.          Need for shingles vaccine        zoster recombinant adjuvanted vaccine [facility]) 50 MCG/0.5ML SUSR injection [[id number]]                   Reviewed with the patient: all disease processes, current clinical status, medications, activities and diet.      Side effects, adverse effects of the medication prescribed today, as well as treatment plan/ rationale and result expectations have been discussed with the patient who expresses understanding and desires to proceed.     Close follow up to evaluate treatment results and for coordination of care. I have reviewed the patient's medical history in detail and updated the computerized patient record. More than 50% of the appointment was spent in face-to-face counseling, education and care coordination. Orders Placed This Encounter   Procedures    TSH Without Reflex     Standing Status:   Future     Standing Expiration Date:   8/25/2021     Orders Placed This Encounter   Medications    zoster recombinant adjuvanted vaccine [facility]) 50 MCG/0.5ML SUSR injection     Sig: Inject 0.5 mLs into the muscle See Admin Instructions 1 dose now and repeat in 2-6 months     Dispense:  0.5 mL     Refill:  0     There are no discontinued medications. Return in about 6 months (around 2/25/2021) for cc. Controlled Substance Monitoring:    Acute and Chronic Pain Monitoring:   RX Monitoring 10/30/2018   Attestation The Prescription Monitoring Report for this patient was reviewed today.            Abdirahman Campoverde MD

## 2020-08-30 NOTE — PROGRESS NOTES
100 MG capsule Take 100 mg by mouth 2 times daily  Historical Provider, MD       Past Medical History:   Diagnosis Date    Anxiety and depression 5/2/2019    Anxiety and depression     Diverticulosis of colon (without mention of hemorrhage) 02/25/2015    DR Chris Sandoval    Hyperlipidemia 2/3/2015    Hypothyroidism     Lichen sclerosus     Migraines 2/3/2015    Renal insufficiency 2/3/2015    Spondylosis of lumbar region without myelopathy or radiculopathy 5/24/2016       Past Surgical History:   Procedure Laterality Date    ABDOMINAL ADHESION SURGERY  1/7/16    DR. MERAZ    APPENDECTOMY  2012    CATARACT REMOVAL Right     CHOLECYSTECTOMY  2012    COLON SURGERY  2005    13\" of colon removed-NO CA    COLONOSCOPY  2011    polyps    COLONOSCOPY  02/25/2015    DR Chris Sandoval - DIVERTICULOSIS    KNEE SURGERY  2013    DE COLONOSCOPY FLX DX W/COLLJ SPEC WHEN PFRMD N/A 1/26/2018    COLONOSCOPY performed by Lisa Mars MD at [facility] 61 ESOPHAGOGASTRODUODENOSCOPY TRANSORAL DIAGNOSTIC N/A 1/26/2018    EGD ESOPHAGOGASTRODUODENOSCOPY WITH DILATION performed by Lisa Mars MD at [facility] 115       Family History   Problem Relation Age of Onset    Heart Disease Father     Stroke Father     Diabetes Father     Stomach Cancer Father     Other Mother         Bowel Obstruction       CareTeam (Including outside providers/suppliers regularly involved in providing care):   Patient Care Team:  Keri Gonzalez MD as PCP - General (Family Medicine)  Keri Gonzalez MD as PCP - REHABILITATION [facility] Empaneled Provider  Joey Quan MD (Gastroenterology)    Wt Readings from Last 3 Encounters:   08/25/20 134 lb (60.8 kg)   03/09/20 132 lb (59.9 kg)   03/05/20 136 lb (61.7 kg)     There were no vitals filed for this visit. There is no height or weight on file to calculate BMI.     Based upon direct observation of the patient, evaluation of cognition reveals recent and remote memory intact. Patient's complete Health Risk Assessment and screening values have been reviewed and are found in Flowsheets. The following problems were reviewed today and where indicated follow up appointments were made and/or referrals ordered. Positive Risk Factor Screenings with Interventions:     General Health:  General  In general, how would you say your health is?: Good  In the past 7 days, have you experienced any of the following? New or Increased Pain, New or Increased Fatigue, Loneliness, Social Isolation, Stress or Anger?: (!) New or Increased Fatigue  Do you get the social and emotional support that you need?: Yes  Do you have a Living Will?: Yes  General Health Risk Interventions:  · Pain issues: patient declines any further evaluation/treatment for this issue    Health Habits/Nutrition:  Health Habits/Nutrition  Do you exercise for at least 20 minutes 2-3 times per week?: Yes  Have you lost any weight without trying in the past 3 months?: No  Do you eat fewer than 2 meals per day?: No  Have you seen a dentist within the past year?: (!) No  There is no height or weight on file to calculate BMI.   Health Habits/Nutrition Interventions:  · Dental exam overdue:  patient encouraged to make appointment with his/her dentist    Hearing/Vision:  No exam data present  Hearing/Vision  Do you or your family notice any trouble with your hearing?: No  Do you have difficulty driving, watching TV, or doing any of your daily activities because of your eyesight?: (!) Yes  Have you had an eye exam within the past year?: Yes  Hearing/Vision Interventions:  · Vision concerns:  patient encouraged to make appointment with his/her eye specialist    Personalized Preventive Plan   Current Health Maintenance Status  Immunization History   Administered Date(s) Administered    Influenza Virus Vaccine 10/06/2015    Influenza, High Dose (Fluzone 65 yrs and older) 11/21/2018    Influenza, Triv,

## 2020-12-04 RX ORDER — OMEPRAZOLE 20 MG/1
20 CAPSULE, DELAYED RELEASE ORAL DAILY
Qty: 90 CAPSULE | Refills: 1 | Status: SHIPPED | OUTPATIENT
Start: 2020-12-04 | End: 2021-02-25 | Stop reason: SDUPTHER

## 2020-12-04 RX ORDER — CLOBETASOL PROPIONATE 0.5 MG/G
CREAM TOPICAL 2 TIMES DAILY
Qty: 60 G | Refills: 2 | Status: SHIPPED | OUTPATIENT
Start: 2020-12-04

## 2020-12-04 NOTE — TELEPHONE ENCOUNTER
Rx request   Requested Prescriptions     Pending Prescriptions Disp Refills    omeprazole (PRILOSEC) 20 MG delayed release capsule 30 capsule      Sig: Take 1 capsule by mouth daily    clobetasol (TEMOVATE) 0.05 % cream 60 g 2     Sig: Apply topically 2 times daily Apply topically 2 times daily.      LOV 8/25/2020  Next Visit Date:  Future Appointments   Date Time Provider Tony Motley   2/25/2021 11:30 AM Lisseth Correa MD [address] [place] [address]

## 2021-02-22 DIAGNOSIS — E03.9 HYPOTHYROIDISM, UNSPECIFIED TYPE: ICD-10-CM

## 2021-02-22 LAB — TSH SERPL DL<=0.05 MIU/L-ACNC: 4.76 UIU/ML (ref 0.44–3.86)

## 2021-02-25 ENCOUNTER — OFFICE VISIT (OUTPATIENT)
Dept: FAMILY MEDICINE CLINIC | Age: 71
End: 2021-02-25
Payer: MEDICARE

## 2021-02-25 VITALS
TEMPERATURE: 98.7 F | WEIGHT: 142.8 LBS | RESPIRATION RATE: 20 BRPM | SYSTOLIC BLOOD PRESSURE: 122 MMHG | DIASTOLIC BLOOD PRESSURE: 70 MMHG | OXYGEN SATURATION: 99 % | BODY MASS INDEX: 26.98 KG/M2 | HEART RATE: 64 BPM

## 2021-02-25 DIAGNOSIS — R12 HEARTBURN: ICD-10-CM

## 2021-02-25 DIAGNOSIS — F32.A ANXIETY AND DEPRESSION: Chronic | ICD-10-CM

## 2021-02-25 DIAGNOSIS — E78.2 MIXED HYPERLIPIDEMIA: ICD-10-CM

## 2021-02-25 DIAGNOSIS — E03.9 HYPOTHYROIDISM, UNSPECIFIED TYPE: Primary | ICD-10-CM

## 2021-02-25 DIAGNOSIS — F41.9 ANXIETY AND DEPRESSION: Chronic | ICD-10-CM

## 2021-02-25 DIAGNOSIS — K44.9 HIATAL HERNIA: ICD-10-CM

## 2021-02-25 DIAGNOSIS — I67.9 SMALL VESSEL DISEASE, CEREBROVASCULAR: Chronic | ICD-10-CM

## 2021-02-25 DIAGNOSIS — E03.9 HYPOTHYROIDISM, UNSPECIFIED TYPE: ICD-10-CM

## 2021-02-25 PROCEDURE — 99214 OFFICE O/P EST MOD 30 MIN: CPT | Performed by: FAMILY MEDICINE

## 2021-02-25 RX ORDER — ATORVASTATIN CALCIUM 20 MG/1
20 TABLET, FILM COATED ORAL DAILY
Qty: 90 TABLET | Refills: 4 | Status: CANCELLED | OUTPATIENT
Start: 2021-02-25

## 2021-02-25 RX ORDER — PAROXETINE 10 MG/1
10 TABLET, FILM COATED ORAL DAILY
Qty: 90 TABLET | Refills: 4 | Status: SHIPPED | OUTPATIENT
Start: 2021-02-25 | End: 2022-04-01 | Stop reason: SDUPTHER

## 2021-02-25 RX ORDER — OMEPRAZOLE 20 MG/1
20 CAPSULE, DELAYED RELEASE ORAL DAILY
Qty: 90 CAPSULE | Refills: 1 | Status: SHIPPED | OUTPATIENT
Start: 2021-02-25 | End: 2021-05-10 | Stop reason: SDUPTHER

## 2021-02-25 RX ORDER — ASPIRIN 81 MG/1
81 TABLET ORAL DAILY
Qty: 90 TABLET | Refills: 4 | Status: SHIPPED | OUTPATIENT
Start: 2021-02-25 | End: 2022-05-19 | Stop reason: SDUPTHER

## 2021-02-25 RX ORDER — LEVOTHYROXINE SODIUM 112 UG/1
112 TABLET ORAL DAILY
Qty: 90 TABLET | Refills: 4 | Status: SHIPPED | OUTPATIENT
Start: 2021-02-25 | End: 2021-04-26 | Stop reason: DRUGHIGH

## 2021-02-25 RX ORDER — CHOLECALCIFEROL (VITAMIN D3) 125 MCG
100 CAPSULE ORAL DAILY
Qty: 90 CAPSULE | Refills: 4 | Status: SHIPPED | OUTPATIENT
Start: 2021-02-25 | End: 2022-05-19 | Stop reason: SDUPTHER

## 2021-02-25 RX ORDER — LEVOTHYROXINE SODIUM 0.07 MG/1
75 TABLET ORAL DAILY
Qty: 90 TABLET | Refills: 4 | Status: CANCELLED | OUTPATIENT
Start: 2021-02-25

## 2021-02-25 RX ORDER — ATORVASTATIN CALCIUM 20 MG/1
20 TABLET, FILM COATED ORAL DAILY
Qty: 90 TABLET | Refills: 4 | Status: SHIPPED | OUTPATIENT
Start: 2021-02-25 | End: 2022-05-19 | Stop reason: SDUPTHER

## 2021-02-25 NOTE — PROGRESS NOTES
Subjective  Scott Machado, 69 y.o. female presents today with:  Chief Complaint   Patient presents with    Anxiety     anxiety/depression follow-up           HPI    Patient is being treated for depression/anxiety and has been compliant with meds which do not cause side effects. Mood is stable. No suicidal ideation. Sleep is normal.    CVA/microvascular cerebrovascular disease. No new deficits. No facial droop. No speech impairment. No unilateral weakness. Patient is here for hyperlipidemia. Is nonadherent with medications because she feels like she reacts to it but is unable to describe symptoms/side effects. When explained rationale for use the heart attack prevention, she agreed to retry. Hypothyroidism. Taking meds correctly. .  TSH elevated. GERD. Well-controlled with PPI, caffeine restriction, diet restriction. No weight loss. No abdominal pain. No bloody or black tarry stools. No other questions and or concerns for today's visit      Past Medical History:   Diagnosis Date    Anxiety and depression 5/2/2019    Anxiety and depression     Diverticulosis of colon (without mention of hemorrhage) 02/25/2015    DR Adi Mejia    Hyperlipidemia 2/3/2015    Hypothyroidism     Lichen sclerosus     Migraines 2/3/2015    Renal insufficiency 2/3/2015    Spondylosis of lumbar region without myelopathy or radiculopathy 5/24/2016     Past Surgical History:   Procedure Laterality Date    ABDOMINAL ADHESION SURGERY  1/7/16    DR. MERAZ    APPENDECTOMY  2012    CATARACT REMOVAL Right     CHOLECYSTECTOMY  2012    COLON SURGERY  2005    13\" of colon removed-NO CA    COLONOSCOPY  2011    polyps    COLONOSCOPY  02/25/2015    DR Adi Mejia - DIVERTICULOSIS    KNEE SURGERY  2013    TX COLONOSCOPY FLX DX W/COLLJ SPEC WHEN PFRMD N/A 1/26/2018    COLONOSCOPY performed by Lilia Valdivia MD at . [facility] [address] ESOPHAGOGASTRODUODENOSCOPY TRANSORAL DIAGNOSTIC N/A 1/26/2018 EGD ESOPHAGOGASTRODUODENOSCOPY WITH DILATION performed by Jonah Hernandez MD at [place] [id number]     Social History     Socioeconomic History    Marital status: Single     Spouse name: Not on file    Number of children: Not on file    Years of education: Not on file    Highest education level: Not on file   Occupational History    Not on file   Social Needs    Financial resource strain: Not on file    Food insecurity     Worry: Not on file     Inability: Not on file    Transportation needs     Medical: Not on file     Non-medical: Not on file   Tobacco Use    Smoking status: Never Smoker    Smokeless tobacco: Never Used   Substance and Sexual Activity    Alcohol use: No     Alcohol/week: 0.0 standard drinks    Drug use: No    Sexual activity: Not Currently   Lifestyle    Physical activity     Days per week: Not on file     Minutes per session: Not on file    Stress: Not on file   Relationships    Social connections     Talks on phone: Not on file     Gets together: Not on file     Attends Hinduism service: Not on file     Active member of club or organization: Not on file     Attends meetings of clubs or organizations: Not on file     Relationship status: Not on file    Intimate partner violence     Fear of current or ex partner: Not on file     Emotionally abused: Not on file     Physically abused: Not on file     Forced sexual activity: Not on file   Other Topics Concern    Not on file   Social History Narrative    Not on file     Family History   Problem Relation Age of Onset    Heart Disease Father     Stroke Father     Diabetes Father     Stomach Cancer Father     Other Mother         Bowel Obstruction     Allergies   Allergen Reactions    Bactrim [Sulfamethoxazole-Trimethoprim]      hallucinations    Ciprofloxacin Other (See Comments)     Pt not 100% sure, but thinks cipro is the antibiotic she is allergic to  Anxiety, confusion LABA1C 5.5 10/07/2014     Lab Results   Component Value Date    CREATININE 0.60 11/21/2018     Lab Results   Component Value Date    ALT 12 03/11/2020    AST 16 03/11/2020     Lab Results   Component Value Date    CHOL 262 (H) 11/21/2018    TRIG 160 11/21/2018    HDL 54 05/03/2019    LDLCALC 102 05/03/2019        Assessment & Plan   Visit Diagnoses and Associated Orders     Hypothyroidism, unspecified type    -  Primary    Increase levothyroxine to 112 mcg reassess in 2 months    TSH Without Reflex [[id number] Custom]   - Future Order    levothyroxine (SYNTHROID) 112 MCG tablet [[id number]]           Anxiety and depression        Stable and well-controlled on Paxil which she will take indefinitely    PARoxetine (PAXIL) 10 MG tablet [[id number]]           Mixed hyperlipidemia        Restart atorvastatin and add coenzyme Q 10 4 side effects medication. Reassess in 2 months    Lipid, Fasting [[id number] Custom]   - Future Order    Comprehensive Metabolic Panel [[id number] Custom]   - Future Order    atorvastatin (LIPITOR) 20 MG tablet [[id number]]      coenzyme Q-10 100 MG capsule [[id number]]           Small vessel disease, cerebrovascular        Re-start Lipitor (patient has been told she needs to be on it previously). Aspirin 81 mg daily. aspirin EC 81 MG EC tablet [[id number]]      atorvastatin (LIPITOR) 20 MG tablet [[id number]]      coenzyme Q-10 100 MG capsule [[id number]]           Anxiety and depression        Stable and well-controlled on Paxil 10 mg daily. PARoxetine (PAXIL) 10 MG tablet [[id number]]           Hypothyroidism, unspecified type        Change dose of levothyroxine to 75 mcg daily and recheck levels in 2 months. TSH Without Reflex [[id number] Custom]   - Future Order    levothyroxine (SYNTHROID) 112 MCG tablet [[id number]]           Heartburn        Continue omeprazole as needed. Given PPI permanently. omeprazole (PRILOSEC) 20 MG delayed release capsule [[id number]]           Hiatal hernia        See above. omeprazole (PRILOSEC) 20 MG delayed release capsule [[id number]]                   Reviewed with the patient: all disease processes, current clinical status, medications, activities and diet.      Side effects, adverse effects of the medication prescribed today, as well as treatment plan/ rationale and result expectations have been discussed with the patient who expresses understanding and desires to proceed.     Close follow up to evaluate treatment results and for coordination of care. I have reviewed the patient's medical history in detail and updated the computerized patient record. More than 50% of the appointment was spent in face-to-face counseling, education and care coordination. Please note this report has been partially produced using speech recognition software and may contain mistakes related to that system including errors in grammar, punctuation and spelling as well as words and phrases that may seem inappropriate. If there are questions or concerns, please feel free to contact me to clarify.     Orders Placed This Encounter   Procedures    Lipid, Fasting     Standing Status:   Future     Standing Expiration Date:   5/25/2021    Comprehensive Metabolic Panel     Standing Status:   Future     Standing Expiration Date:   5/25/2021    TSH Without Reflex     Standing Status:   Future     Standing Expiration Date:   6/25/2021     Orders Placed This Encounter   Medications    omeprazole (PRILOSEC) 20 MG delayed release capsule     Sig: Take 1 capsule by mouth daily     Dispense:  90 capsule     Refill:  1    aspirin EC 81 MG EC tablet     Sig: Take 1 tablet by mouth daily     Dispense:  90 tablet     Refill:  4    PARoxetine (PAXIL) 10 MG tablet     Sig: Take 1 tablet by mouth daily     Dispense:  90 tablet     Refill:  4    levothyroxine (SYNTHROID) 112 MCG tablet     Sig: Take 1 tablet by mouth daily     Dispense:  90 tablet     Refill:  4    atorvastatin (LIPITOR) 20 MG tablet Sig: Take 1 tablet by mouth daily     Dispense:  90 tablet     Refill:  4    coenzyme Q-10 100 MG capsule     Sig: Take 1 capsule by mouth daily     Dispense:  90 capsule     Refill:  4     Medications Discontinued During This Encounter   Medication Reason    levothyroxine (SYNTHROID) 75 MCG tablet DOSE ADJUSTMENT    PARoxetine (PAXIL) 10 MG tablet REORDER    atorvastatin (LIPITOR) 20 MG tablet REORDER    aspirin EC 81 MG EC tablet REORDER    omeprazole (PRILOSEC) 20 MG delayed release capsule REORDER     Return in about 2 months (around 4/25/2021) for HTN, HLD - VV. Controlled Substance Monitoring:    Acute and Chronic Pain Monitoring:   RX Monitoring 10/30/2018   Attestation The Prescription Monitoring Report for this patient was reviewed today.            Taty Noguera MD

## 2021-04-23 DIAGNOSIS — E03.9 HYPOTHYROIDISM, UNSPECIFIED TYPE: ICD-10-CM

## 2021-04-23 DIAGNOSIS — E78.2 MIXED HYPERLIPIDEMIA: ICD-10-CM

## 2021-04-23 LAB
ALBUMIN SERPL-MCNC: 4.5 G/DL (ref 3.5–4.6)
ALP BLD-CCNC: 77 U/L (ref 40–130)
ALT SERPL-CCNC: 10 U/L (ref 0–33)
ANION GAP SERPL CALCULATED.3IONS-SCNC: 8 MEQ/L (ref 9–15)
AST SERPL-CCNC: 13 U/L (ref 0–35)
BILIRUB SERPL-MCNC: 0.3 MG/DL (ref 0.2–0.7)
BUN BLDV-MCNC: 19 MG/DL (ref 8–23)
CALCIUM SERPL-MCNC: 9.3 MG/DL (ref 8.5–9.9)
CHLORIDE BLD-SCNC: 104 MEQ/L (ref 95–107)
CHOLESTEROL, FASTING: 210 MG/DL (ref 0–199)
CO2: 26 MEQ/L (ref 20–31)
CREAT SERPL-MCNC: 0.65 MG/DL (ref 0.5–0.9)
GFR AFRICAN AMERICAN: >60
GFR NON-AFRICAN AMERICAN: >60
GLOBULIN: 2.3 G/DL (ref 2.3–3.5)
GLUCOSE BLD-MCNC: 80 MG/DL (ref 70–99)
HDLC SERPL-MCNC: 59 MG/DL (ref 40–59)
LDL CHOLESTEROL CALCULATED: 123 MG/DL (ref 0–129)
POTASSIUM SERPL-SCNC: 4.5 MEQ/L (ref 3.4–4.9)
SODIUM BLD-SCNC: 138 MEQ/L (ref 135–144)
TOTAL PROTEIN: 6.8 G/DL (ref 6.3–8)
TRIGLYCERIDE, FASTING: 141 MG/DL (ref 0–150)
TSH SERPL DL<=0.05 MIU/L-ACNC: 0.38 UIU/ML (ref 0.44–3.86)

## 2021-04-26 ENCOUNTER — VIRTUAL VISIT (OUTPATIENT)
Dept: FAMILY MEDICINE CLINIC | Age: 71
End: 2021-04-26
Payer: MEDICARE

## 2021-04-26 DIAGNOSIS — K22.10 ULCER OF ESOPHAGUS WITHOUT BLEEDING: ICD-10-CM

## 2021-04-26 DIAGNOSIS — E53.8 B12 DEFICIENCY: ICD-10-CM

## 2021-04-26 DIAGNOSIS — R13.19 OTHER DYSPHAGIA: ICD-10-CM

## 2021-04-26 DIAGNOSIS — F41.9 ANXIETY AND DEPRESSION: Chronic | ICD-10-CM

## 2021-04-26 DIAGNOSIS — K92.1 BLACK STOOL: ICD-10-CM

## 2021-04-26 DIAGNOSIS — R26.89 BALANCE PROBLEM: ICD-10-CM

## 2021-04-26 DIAGNOSIS — E78.2 MIXED HYPERLIPIDEMIA: ICD-10-CM

## 2021-04-26 DIAGNOSIS — E03.9 ACQUIRED HYPOTHYROIDISM: Primary | ICD-10-CM

## 2021-04-26 DIAGNOSIS — F32.A ANXIETY AND DEPRESSION: Chronic | ICD-10-CM

## 2021-04-26 PROCEDURE — 99214 OFFICE O/P EST MOD 30 MIN: CPT | Performed by: FAMILY MEDICINE

## 2021-04-26 RX ORDER — LEVOTHYROXINE SODIUM 0.1 MG/1
100 TABLET ORAL DAILY
Qty: 90 TABLET | Refills: 4 | Status: SHIPPED | OUTPATIENT
Start: 2021-04-26 | End: 2021-12-16 | Stop reason: DRUGHIGH

## 2021-04-26 RX ORDER — ACETAMINOPHEN 160 MG
TABLET,DISINTEGRATING ORAL
Qty: 90 CAPSULE | Refills: 4 | Status: SHIPPED | OUTPATIENT
Start: 2021-04-26 | End: 2022-05-19 | Stop reason: SDUPTHER

## 2021-04-26 ASSESSMENT — PATIENT HEALTH QUESTIONNAIRE - PHQ9
2. FEELING DOWN, DEPRESSED OR HOPELESS: 0
SUM OF ALL RESPONSES TO PHQ QUESTIONS 1-9: 0
SUM OF ALL RESPONSES TO PHQ QUESTIONS 1-9: 0

## 2021-04-26 NOTE — PROGRESS NOTES
Trinity Griffith is a 69 y.o. female evaluated via telephone on 2021. Consent:  She and/or health care decision maker is aware that that she may receive a bill for this telephone service, depending on her insurance coverage, and has provided verbal consent to proceed: Yes      I affirm this is a Patient Initiated Episode with an Established Patient who has not had a related appointment within my department in the past 7 days or scheduled within the next 24 hours. Total Time:     Note: not billable if this call serves to triage the patient into an appointment for the relevant concern      Charlotte LAWLER     2021    TELEHEALTH EVALUATION -- Audio (During formerly [facility]-12 public health emergency)    Chief Complaint   Patient presents with    Hypothyroidism    Gastroesophageal Reflux       HPI     Trinity Griffith (:  1950) has requested an audio evaluation for the following concern(s):    Patient is here for hyperlipidemia. Is compliant with medications and has no apparent side effects from them. Hypothyroidism. Compliant with levothyroxine which is taken correctly. No diarrhea, constipation, palpitations, dry skin, depression, difficulty sleeping or fatigue. No weight gain or loss. TSH is low. Patient is being treated for depression and has been compliant with meds which do not cause side effects. Mood is improved. No suicidal ideation. Sleep is normal.    GERD/gastritis/esophagitis. Well-controlled with PPI, caffeine restriction, diet restriction. No weight loss. No abdominal pain. No bloody stools. However, when she was taking coenzyme Q 10 she noted several black stools. After stopping that supplement, the black stools resolved. She has no nausea, vomiting, diarrhea, constipation. Balance problem. She has been noticing for many months that she loses her balance fairly frequently. She has not fallen at all.   She feels like she loses balance when she is moving fast. She does not take vitamin D. She has been told she has vitamin B-12 deficiency in the past.  She never took medication for it because she did not want injections. Prior to Visit Medications    Medication Sig Taking? Authorizing Provider   levothyroxine (SYNTHROID) 100 MCG tablet Take 1 tablet by mouth daily Yes Tasha Tapia MD   Cholecalciferol (VITAMIN D3) 50 MCG (2000 UT) CAPS 1 po daily with meal Yes Tasha Tapia MD   cyanocobalamin ([employer] VITAMIN B12) 1000 MCG tablet Take 1 tablet by mouth daily Yes Tasha Tapia MD   omeprazole (PRILOSEC) 20 MG delayed release capsule Take 1 capsule by mouth daily  Tasha Tapia MD   aspirin EC 81 MG EC tablet Take 1 tablet by mouth daily  Tasha Tapia MD   PARoxetine (PAXIL) 10 MG tablet Take 1 tablet by mouth daily  Tasha Tapia MD   atorvastatin (LIPITOR) 20 MG tablet Take 1 tablet by mouth daily  Tasha Tapia MD   coenzyme Q-10 100 MG capsule Take 1 capsule by mouth daily  Tasha Tapia MD   clobetasol (TEMOVATE) 0.05 % cream Apply topically 2 times daily Apply topically 2 times daily. Tasha Tapia MD   dicyclomine (BENTYL) 10 MG capsule Take 10 mg by mouth 4 times daily (before meals and nightly)  Historical Provider, MD   SUMAtriptan (IMITREX) 50 MG tablet Take 1 tablet by mouth once as needed for Migraine  Елена Saleh MD   docusate sodium (COLACE) 100 MG capsule Take 100 mg by mouth 2 times daily  Historical Provider, MD             PHYSICAL EXAMINATION:      Patient appears to be alert and oriented to person, place, time, situation and is in no acute distress.   Respiratory effort appears normal. Mood appears stable and speech and thought are grossly normal.    Lab Results   Component Value Date    TSH 0.378 (L) 04/23/2021     Lipid Profile: No components found for: CHLPL  Lab Results   Component Value Date    TRIG 160 11/21/2018    TRIG 80 01/19/2018    TRIG 125 03/25/2015     Lab Results   Component Value Date    HDL 59 04/23/2021    HDL 54 05/03/2019    HDL 76 (H) 11/21/2018     Lab Results   Component Value Date    LDLCALC 123 04/23/2021    LDLCALC 102 05/03/2019    LDLCALC 154 (H) 11/21/2018     No results found for: LABVLDL  Hemoglobin A1C:   Lab Results   Component Value Date    LABA1C 5.2 01/19/2018       No results found. ASSESSMENT/PLAN:  Karlee Sawyer was seen today for hypothyroidism and gastroesophageal reflux. Diagnoses and all orders for this visit:    Acquired hypothyroidism  Comments:  Change levothyroxine to 100 mcg and recheck in 2 months. Orders:  -     levothyroxine (SYNTHROID) 100 MCG tablet; Take 1 tablet by mouth daily    B12 deficiency  Comments:  Check labs. Supplement with B12 1000 mcg daily. Orders:  -     Vitamin B12; Future  -     CBC With Auto Differential; Future  -     cyanocobalamin ([employer] VITAMIN B12) 1000 MCG tablet; Take 1 tablet by mouth daily    Balance problem  Comments:  Check vitamin D levels and supplement with vitamin D 2000 IU daily. Take with food. Orders:  -     Cholecalciferol (VITAMIN D3) 50 MCG (2000 UT) CAPS; 1 po daily with meal    Black stool  Comments:  Restart coenzyme Q 10. Reassess in 2 weeks. To ER for dizziness, abdominal pain, bloody stools. Orders:  -     cyanocobalamin ([employer] VITAMIN B12) 1000 MCG tablet; Take 1 tablet by mouth daily    Other dysphagia  Comments:  Resolved with PPI. Concerning, howlailae,r given black stools. Ulcer of esophagus without bleeding  Comments:  Pain is resolved. However black stools are concerning. Will reassess in 2 weeks. Consider endoscopy. Mixed hyperlipidemia  Comments:  Improving, continue statin. Restart co-Q10. Anxiety and depression  Comments:  Stable and well-controlled on paroxetine which she will continue lifelong.           Return in about 2 weeks (around 5/10/2021) for black stool Vv 2 weeks may OB on VV day; 6 months routine f/u OV please. Demetrius Calvin is a 69 y.o. female being evaluated by a Virtual Visit (telephonic visit) encounter to address concerns as mentioned above. A caregiver was present when appropriate. Due to this being a TeleHealth encounter (During MXPXI-63 public health emergency), evaluation of the following organ systems was limited: Vitals/Constitutional/EENT/Resp/CV/GI//MS/Neuro/Skin/Heme-Lymph-Imm. Pursuant to the emergency declaration under the [address] [place] [address], [address] [place] [address] authority and the [employer] and [employer] Act, this Virtual Visit was conducted with patient's (and/or legal guardian's) consent, to reduce the patient's risk of exposure to COVID-19 and provide necessary medical care. The patient (and/or legal guardian) has also been advised to contact this office for worsening conditions or problems, and seek emergency medical treatment and/or call 911 if deemed necessary. Services were provided through a telephonic synchronous discussion virtually to substitute for in-person clinic visit. Patient and provider were located at their individual homes. --Carlie Moser MD on 4/26/2021 at 3:18 PM    An electronic signature was used to authenticate this note.

## 2021-05-10 ENCOUNTER — VIRTUAL VISIT (OUTPATIENT)
Dept: FAMILY MEDICINE CLINIC | Age: 71
End: 2021-05-10
Payer: MEDICARE

## 2021-05-10 DIAGNOSIS — K92.1 BLACK STOOL: ICD-10-CM

## 2021-05-10 DIAGNOSIS — K44.9 HIATAL HERNIA: ICD-10-CM

## 2021-05-10 DIAGNOSIS — R12 HEARTBURN: ICD-10-CM

## 2021-05-10 DIAGNOSIS — R10.84 GENERALIZED ABDOMINAL PAIN: Primary | ICD-10-CM

## 2021-05-10 PROCEDURE — 99213 OFFICE O/P EST LOW 20 MIN: CPT | Performed by: FAMILY MEDICINE

## 2021-05-10 RX ORDER — DICYCLOMINE HYDROCHLORIDE 10 MG/1
10 CAPSULE ORAL
Qty: 120 CAPSULE | Refills: 5 | Status: SHIPPED | OUTPATIENT
Start: 2021-05-10

## 2021-05-10 RX ORDER — OMEPRAZOLE 20 MG/1
20 CAPSULE, DELAYED RELEASE ORAL DAILY
Qty: 90 CAPSULE | Refills: 1 | Status: SHIPPED | OUTPATIENT
Start: 2021-05-10 | End: 2022-01-05

## 2021-05-10 NOTE — PROGRESS NOTES
Piedad Almanza MD   coenzyme Q-10 100 MG capsule Take 1 capsule by mouth daily  Piedad Almanza MD   clobetasol (TEMOVATE) 0.05 % cream Apply topically 2 times daily Apply topically 2 times daily. Piedad Almanza MD   SUMAtriptan (IMITREX) 50 MG tablet Take 1 tablet by mouth once as needed for Migraine  Doll MD Mrecy   docusate sodium (COLACE) 100 MG capsule Take 100 mg by mouth 2 times daily  Historical Provider, MD             PHYSICAL EXAMINATION:    No flowsheet data found. Patient appears to be alert and oriented to person, place, time, situation and is in no acute distress. Respiratory effort appears normal. Mood appears stable and speech and thought are grossly normal.    Lab Results   Component Value Date    TSH 0.378 (L) 04/23/2021     Lipid Profile: No components found for: CHLPL  Lab Results   Component Value Date    TRIG 160 11/21/2018    TRIG 80 01/19/2018    TRIG 125 03/25/2015     Lab Results   Component Value Date    HDL 59 04/23/2021    HDL 54 05/03/2019    HDL 76 (H) 11/21/2018     Lab Results   Component Value Date    LDLCALC 123 04/23/2021    LDLCALC 102 05/03/2019    LDLCALC 154 (H) 11/21/2018     No results found for: LABVLDL  Hemoglobin A1C:   Lab Results   Component Value Date    LABA1C 5.2 01/19/2018       No results found. ASSESSMENT/PLAN:  Angel Boyer was seen today for melena. Diagnoses and all orders for this visit:    Generalized abdominal pain  Comments:  Resolved with Bentyl. Orders:  -     dicyclomine (BENTYL) 10 MG capsule; Take 1 capsule by mouth 4 times daily (before meals and nightly)    Heartburn  Comments:  Continue omeprazole as needed. Given PPI permanently. Orders:  -     omeprazole (PRILOSEC) 20 MG delayed release capsule; Take 1 capsule by mouth daily    Hiatal hernia  Comments:  See above. Orders:  -     omeprazole (PRILOSEC) 20 MG delayed release capsule;  Take 1 capsule by mouth daily    Black stool  Comments:  Has had no further black stools. Defers further work-up. Will notify our office if recurs. Return for Previously scheduled follow-up in October. Christian Bajwa is a 69 y.o. female being evaluated by a Virtual Visit (telephonic visit) encounter to address concerns as mentioned above. A caregiver was present when appropriate. Due to this being a TeleHealth encounter (During DABNF-60 public health emergency), evaluation of the following organ systems was limited: Vitals/Constitutional/EENT/Resp/CV/GI//MS/Neuro/Skin/Heme-Lymph-Imm. Pursuant to the emergency declaration under the [address] [place] [address], [address] [place] [address] authority and the [employer] and [employer] Act, this Virtual Visit was conducted with patient's (and/or legal guardian's) consent, to reduce the patient's risk of exposure to COVID-19 and provide necessary medical care. The patient (and/or legal guardian) has also been advised to contact this office for worsening conditions or problems, and seek emergency medical treatment and/or call 911 if deemed necessary. Services were provided through a telephonic synchronous discussion virtually to substitute for in-person clinic visit. Patient and provider were located at their individual homes. --Rhonda Mc MD on 5/10/2021 at 3:31 PM    An electronic signature was used to authenticate this note.

## 2021-08-09 NOTE — TELEPHONE ENCOUNTER
Patient requesting medication refill.  Please approve or deny this request.    Rx requested:  Requested Prescriptions     Pending Prescriptions Disp Refills    SUMAtriptan (IMITREX) 50 MG tablet 9 tablet 0     Sig: Take 1 tablet by mouth once as needed for Migraine         Last Office Visit:   6/1/2020      Next Visit Date:  Future Appointments   Date Time Provider Tony Motley   8/19/2021  3:00 PM Marc Lugo MD [place]   10/28/2021 11:30 AM Amber Giraldo MD [address] [place] [address]

## 2021-08-10 RX ORDER — SUMATRIPTAN 50 MG/1
50 TABLET, FILM COATED ORAL
Qty: 9 TABLET | Refills: 0 | Status: SHIPPED | OUTPATIENT
Start: 2021-08-10 | End: 2021-08-23 | Stop reason: SDUPTHER

## 2021-08-18 PROBLEM — G43.719 INTRACTABLE CHRONIC MIGRAINE WITHOUT AURA AND WITHOUT STATUS MIGRAINOSUS: Status: ACTIVE | Noted: 2021-08-18

## 2021-08-23 ENCOUNTER — OFFICE VISIT (OUTPATIENT)
Dept: NEUROLOGY | Age: 71
End: 2021-08-23
Payer: MEDICARE

## 2021-08-23 VITALS
BODY MASS INDEX: 26.06 KG/M2 | SYSTOLIC BLOOD PRESSURE: 122 MMHG | HEART RATE: 68 BPM | OXYGEN SATURATION: 95 % | DIASTOLIC BLOOD PRESSURE: 60 MMHG | WEIGHT: 137.9 LBS

## 2021-08-23 DIAGNOSIS — R51.9 NEW ONSET HEADACHE: ICD-10-CM

## 2021-08-23 DIAGNOSIS — G43.719 INTRACTABLE CHRONIC MIGRAINE WITHOUT AURA AND WITHOUT STATUS MIGRAINOSUS: Primary | ICD-10-CM

## 2021-08-23 PROCEDURE — 99213 OFFICE O/P EST LOW 20 MIN: CPT | Performed by: PSYCHIATRY & NEUROLOGY

## 2021-08-23 RX ORDER — SUMATRIPTAN 50 MG/1
50 TABLET, FILM COATED ORAL
Qty: 9 TABLET | Refills: 0 | Status: SHIPPED | OUTPATIENT
Start: 2021-08-23 | End: 2022-01-03 | Stop reason: SDUPTHER

## 2021-08-23 ASSESSMENT — ENCOUNTER SYMPTOMS
TROUBLE SWALLOWING: 0
COLOR CHANGE: 0
SHORTNESS OF BREATH: 0
PHOTOPHOBIA: 0
CHOKING: 0
BACK PAIN: 0
NAUSEA: 0
VOMITING: 0

## 2021-08-23 NOTE — PROGRESS NOTES
Subjective:      Patient ID: Brian Monroy is a 66 y.o. female who presents today for:  Chief Complaint   Patient presents with    Follow-up     Pt states that her migraines have been okay and that sumatriptan helps she states their brought on by stress but they been a lot better with the medication. She states that with the medication within 15-20 minutes their gone. HPI 66 a right-handed female with a history of intractable migraine headaches. We have not seen her for some time but she takes Imitrex for migraines. She did continue to suffer with this for many years still we saw her and she occasionally uses sumatriptan. She has no cardiac disease of concern for using triptan's. 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 Past Medical History:   Diagnosis Date    Anxiety and depression 5/2/2019    Anxiety and depression     Diverticulosis of colon (without mention of hemorrhage) 02/25/2015    DR Milady Lutz    Hyperlipidemia 2/3/2015    Hypothyroidism     Lichen sclerosus     Migraines 2/3/2015    Renal insufficiency 2/3/2015    Spondylosis of lumbar region without myelopathy or radiculopathy 5/24/2016     Past Surgical History:   Procedure Laterality Date    ABDOMINAL ADHESION SURGERY  1/7/16    DR. MERAZ    APPENDECTOMY  2012    CATARACT REMOVAL Right     CHOLECYSTECTOMY  2012    COLON SURGERY  2005    13\" of colon removed-NO CA    COLONOSCOPY  2011    polyps    COLONOSCOPY  02/25/2015    DR Milady Lutz - DIVERTICULOSIS    KNEE SURGERY  2013    [place] COLONOSCOPY FLX DX W/COLLJ SPEC WHEN PFRMD N/A 1/26/2018    COLONOSCOPY performed by Rico Sexton MD at . [address] ESOPHAGOGASTRODUODENOSCOPY TRANSORAL DIAGNOSTIC N/A 1/26/2018    EGD ESOPHAGOGASTRODUODENOSCOPY WITH DILATION performed by Rico Sexton MD at [address] [place] [address] History     Socioeconomic History    Marital status: Single     Spouse name: Not on file    Number of children: Not on file    Years of education: Not on file    Highest education level: Not on file   Occupational History    Not on file   Tobacco Use    Smoking status: Never Smoker    Smokeless tobacco: Never Used   Substance and Sexual Activity    Alcohol use: No     Alcohol/week: 0.0 standard drinks    Drug use: No    Sexual activity: Not Currently   Other Topics Concern    Not on file   Social History Narrative    Not on file     Social Determinants of Health     Financial Resource Strain:     Difficulty of Paying Living Expenses:    Food Insecurity:     Worried About Running Out of Food in the Last Year:     [address] in the Last Year:    Transportation Needs:     Lack of Transportation (Medical):      Lack of Transportation (Non-Medical):    Physical Activity:     Days of Exercise per Week:     Minutes of Exercise per Session:    Stress:     Feeling of Stress :    Social Connections:     Frequency of Communication with Friends and Family:     Frequency of Social Gatherings with Friends and Family:     Attends [employer] Services:     Active Member of Clubs or Organizations:     Attends Club or Organization Meetings:     Marital Status:    Intimate Partner Violence:     Fear of Current or Ex-Partner:     Emotionally Abused:     Physically Abused:     Sexually Abused:      Family History   Problem Relation Age of Onset    Heart Disease Father     Stroke Father     Diabetes Father     Stomach Cancer Father     Other Mother         Bowel Obstruction     Allergies   Allergen Reactions    Bactrim [Sulfamethoxazole-Trimethoprim]      hallucinations    Ciprofloxacin Other (See Comments)     Pt not 100% sure, but thinks cipro is the antibiotic she is allergic to  Anxiety, confusion       Current Outpatient Medications   Medication Sig Dispense Refill    dicyclomine (BENTYL) 10 MG capsule Take 1 capsule by mouth 4 times daily (before meals and nightly) 120 capsule 5    omeprazole (PRILOSEC) 20 MG delayed release capsule Take 1 capsule by mouth daily 90 capsule 1    levothyroxine (SYNTHROID) 100 MCG tablet Take 1 tablet by mouth daily 90 tablet 4    Cholecalciferol (VITAMIN D3) 50 MCG (2000 UT) CAPS 1 po daily with meal 90 capsule 4    cyanocobalamin ([employer]S VITAMIN B12) 1000 MCG tablet Take 1 tablet by mouth daily 30 tablet 3    aspirin EC 81 MG EC tablet Take 1 tablet by mouth daily 90 tablet 4    PARoxetine (PAXIL) 10 MG tablet Take 1 tablet by mouth daily 90 tablet 4    coenzyme Q-10 100 MG capsule Take 1 capsule by mouth daily 90 capsule 4    clobetasol (TEMOVATE) 0.05 % cream Apply topically 2 times daily Apply topically 2 times daily. 60 g 2    docusate sodium (COLACE) 100 MG capsule Take 100 mg by mouth 2 times daily      SUMAtriptan (IMITREX) 50 MG tablet Take 1 tablet by mouth once as needed for Migraine 9 tablet 0    atorvastatin (LIPITOR) 20 MG tablet Take 1 tablet by mouth daily (Patient not taking: Reported on 8/23/2021) 90 tablet 4     No current facility-administered medications for this visit. Review of Systems   Constitutional: Negative for fever. HENT: Negative for ear pain, tinnitus and trouble swallowing. Eyes: Negative for photophobia and visual disturbance. Respiratory: Negative for choking and shortness of breath. Cardiovascular: Negative for chest pain and palpitations. Gastrointestinal: Negative for nausea and vomiting. Musculoskeletal: Negative for back pain, gait problem, joint swelling, myalgias, neck pain and neck stiffness. Skin: Negative for color change. Allergic/Immunologic: Negative for food allergies. Neurological: Negative for dizziness, tremors, seizures, syncope, facial asymmetry, speech difficulty, weakness, light-headedness, numbness and headaches. Psychiatric/Behavioral: Negative for behavioral problems, confusion, hallucinations and sleep disturbance.        Objective:   /60 (Site: Left Upper Arm, Position: Sitting, Cuff Size: Medium Adult)   Pulse 68   Wt 137 lb 14.4 oz (62.6 kg)   LMP  (LMP Unknown)   SpO2 95%   BMI 26.06 kg/m²     Physical Exam  Vitals reviewed. Eyes:      Pupils: Pupils are equal, round, and reactive to light. Cardiovascular:      Rate and Rhythm: Normal rate and regular rhythm. Heart sounds: No murmur heard. Pulmonary:      Effort: Pulmonary effort is normal.      Breath sounds: Normal breath sounds. Abdominal:      General: Bowel sounds are normal.   Musculoskeletal:         General: Normal range of motion. Cervical back: Normal range of motion. Skin:     General: Skin is warm. Neurological:      Mental Status: She is alert and oriented to person, place, and time. Cranial Nerves: No cranial nerve deficit. Sensory: No sensory deficit. Motor: No abnormal muscle tone. Coordination: Coordination normal.      Deep Tendon Reflexes: Reflexes are normal and symmetric. Babinski sign absent on the right side. Babinski sign absent on the left side. Psychiatric:         Mood and Affect: Mood normal.         MRA HEAD WO CONTRAST    Result Date: 3/4/2020  MRI of the brain without contrast. HISTORY: Headaches with blurred vision and dizziness, and occasional nausea and vomiting COMPARISON: No prior imaging of the brain available for correlation. TECHNIQUE: Sagittal T1 and FLAIR. Axial T1, FLAIR, T2, susceptibility weighted, and diffusion-weighted images. Coronal FLAIR and T2. FINDINGS: There are bilateral occasional tiny foci of predominantly juxtacortical white matter hyperintensity on the long TR sequences. These are nonspecific and are likely degenerative, versus small vessel ischemic or demyelinating. They do not meet the criteria of primary demyelinating disease. Specifically there is no significant periventricular white matter component parallel to the long axis of the ventricles or involvement of the callosal septal interface. No infratentorial hyperintensities. No restricted diffusion to indicate acute infarct. Physiologic calcifications in the choroid of the lateral ventricles on susceptibility weighted images with no pathologic mineralization. Minimal diffuse cerebral atrophy predominantly over the convexities. No ventricular dilatation. No mass effect or extra-axial collections. Orbits, pituitary gland and internal auditory canals are unremarkable for the noncontrast technique. No Chiari malformation. Mild bilateral predominantly anterior ethmoid sinus mucosal thickening. No paranasal sinus air-fluid levels to indicate acute sinusitis. Mastoid air cells are unremarkable. Essentially unremarkable MRI of the brain except for several nonspecific white matter hyperintensities most likely degenerative, or alternatively small vessel ischemic or demyelinating. MAGNETIC RESONANCE ANGIOGRAM OF THE [place]. HISTORY: See above COMPARISON: No prior magnetic resonance angiography available for correlation. TECHNIQUE: Time-of-flight magnetic resonance angiography of the [place] of [place] is obtained. Source images are viewed in multiple planes. Maximum intensity projection magnetic resonance angiogram is created. FINDINGS: Distal vertebral and basilar arteries are patent. The distal left vertebral artery is smaller than the right on a congenital basis. No significant stenosis. Superior cerebellar arteries are patent. Petrous internal carotid arteries and carotid siphons are unremarkable with no sign of significant narrowing. Anterior, middle and posterior cerebral artery portions included on the exam are unremarkable. The right posterior cerebral artery has a predominantly fetal origin with a small communication to the basilar. The left posterior communicating arises from the basilar with a very tiny communication to the carotid. No significant narrowing in these vessels. No sign of aneurysms.  IMPRESSION: Unremarkable magnetic resonance angiogram of the [place] of [place]. MRI BRAIN WO CONTRAST    Result Date: 3/4/2020  MRI of the brain without contrast. HISTORY: Headaches with blurred vision and dizziness, and occasional nausea and vomiting COMPARISON: No prior imaging of the brain available for correlation. TECHNIQUE: Sagittal T1 and FLAIR. Axial T1, FLAIR, T2, susceptibility weighted, and diffusion-weighted images. Coronal FLAIR and T2. FINDINGS: There are bilateral occasional tiny foci of predominantly juxtacortical white matter hyperintensity on the long TR sequences. These are nonspecific and are likely degenerative, versus small vessel ischemic or demyelinating. They do not meet the criteria of primary demyelinating disease. Specifically there is no significant periventricular white matter component parallel to the long axis of the ventricles or involvement of the callosal septal interface. No infratentorial hyperintensities. No restricted diffusion to indicate acute infarct. Physiologic calcifications in the choroid of the lateral ventricles on susceptibility weighted images with no pathologic mineralization. Minimal diffuse cerebral atrophy predominantly over the convexities. No ventricular dilatation. No mass effect or extra-axial collections. Orbits, pituitary gland and internal auditory canals are unremarkable for the noncontrast technique. No Chiari malformation. Mild bilateral predominantly anterior ethmoid sinus mucosal thickening. No paranasal sinus air-fluid levels to indicate acute sinusitis. Mastoid air cells are unremarkable. Essentially unremarkable MRI of the brain except for several nonspecific white matter hyperintensities most likely degenerative, or alternatively small vessel ischemic or demyelinating. MAGNETIC RESONANCE ANGIOGRAM OF THE [place]. HISTORY: See above COMPARISON: No prior magnetic resonance angiography available for correlation.  TECHNIQUE: Time-of-flight magnetic resonance angiography of the [place] of [place] is obtained. Source images are viewed in multiple planes. Maximum intensity projection magnetic resonance angiogram is created. FINDINGS: Distal vertebral and basilar arteries are patent. The distal left vertebral artery is smaller than the right on a congenital basis. No significant stenosis. Superior cerebellar arteries are patent. Petrous internal carotid arteries and carotid siphons are unremarkable with no sign of significant narrowing. Anterior, middle and posterior cerebral artery portions included on the exam are unremarkable. The right posterior cerebral artery has a predominantly fetal origin with a small communication to the basilar. The left posterior communicating arises from the basilar with a very tiny communication to the carotid. No significant narrowing in these vessels. No sign of aneurysms. IMPRESSION: Unremarkable magnetic resonance angiogram of the [place].        Lab Results   Component Value Date    WBC 8.7 11/21/2018    RBC 4.61 11/21/2018    HGB 14.1 11/21/2018    HCT 40.9 11/21/2018    MCV 88.6 11/21/2018    MCH 30.5 11/21/2018    MCHC 34.4 11/21/2018    RDW 12.8 11/21/2018     11/21/2018     Lab Results   Component Value Date     04/23/2021    K 4.5 04/23/2021     04/23/2021    CO2 26 04/23/2021    BUN 19 04/23/2021    CREATININE 0.65 04/23/2021    GFRAA >60.0 04/23/2021    LABGLOM >60.0 04/23/2021    GLUCOSE 80 04/23/2021    PROT 6.8 04/23/2021    LABALBU 4.5 04/23/2021    CALCIUM 9.3 04/23/2021    BILITOT 0.3 04/23/2021    ALKPHOS 77 04/23/2021    AST 13 04/23/2021    ALT 10 04/23/2021     No results found for: PROTIME, INR  Lab Results   Component Value Date    TSH 0.378 04/23/2021    RLTODMFJ64 182 07/11/2016    FOLATE 14.1 07/11/2016     Lab Results   Component Value Date    TRIG 160 11/21/2018    HDL 59 04/23/2021    LDLCALC 123 04/23/2021     No results found for: Michele Hanson, LABBENZ, [address] LABMETH, OPIATESCREENURINE, [place], PPXUR, ETOH  No results found for: LITHIUM, DILFRTOT, VALPROATE    Assessment:       Diagnosis Orders   1. Intractable chronic migraine without aura and without status migrainosus     2. New onset headache       Migraine headaches which are responsive to sumatriptan. She has occasional migraine headaches which respond to this as are only right-sided. This have not change intensity. We see her on a yearly basis for follow-up of her medication as the pharmacy otherwise would not fill up her medication is not any cardiac disease or contraindications on sumatriptan for now she will keep an eye and if she has more headaches and we may require a preventive therapy. Plan:      No orders of the defined types were placed in this encounter. No orders of the defined types were placed in this encounter. No follow-ups on file.       Kai Lao MD

## 2021-09-24 ENCOUNTER — APPOINTMENT (OUTPATIENT)
Dept: GENERAL RADIOLOGY | Age: 71
End: 2021-09-24
Payer: MEDICARE

## 2021-09-24 ENCOUNTER — HOSPITAL ENCOUNTER (EMERGENCY)
Age: 71
Discharge: HOME | End: 2021-09-24
Attending: EMERGENCY MEDICINE
Payer: MEDICARE

## 2021-09-24 ENCOUNTER — OFFICE VISIT (OUTPATIENT)
Dept: FAMILY MEDICINE CLINIC | Age: 71
End: 2021-09-24
Payer: MEDICARE

## 2021-09-24 ENCOUNTER — APPOINTMENT (OUTPATIENT)
Dept: CT IMAGING | Age: 71
End: 2021-09-24
Payer: MEDICARE

## 2021-09-24 VITALS
OXYGEN SATURATION: 97 % | WEIGHT: 133 LBS | TEMPERATURE: 96.5 F | BODY MASS INDEX: 25.11 KG/M2 | SYSTOLIC BLOOD PRESSURE: 128 MMHG | DIASTOLIC BLOOD PRESSURE: 82 MMHG | RESPIRATION RATE: 17 BRPM | HEART RATE: 64 BPM | HEIGHT: 61 IN

## 2021-09-24 VITALS
OXYGEN SATURATION: 98 % | WEIGHT: 133 LBS | HEIGHT: 62 IN | RESPIRATION RATE: 16 BRPM | HEART RATE: 68 BPM | DIASTOLIC BLOOD PRESSURE: 59 MMHG | SYSTOLIC BLOOD PRESSURE: 137 MMHG | BODY MASS INDEX: 24.48 KG/M2 | TEMPERATURE: 97 F

## 2021-09-24 DIAGNOSIS — R10.32 LLQ PAIN: Primary | ICD-10-CM

## 2021-09-24 DIAGNOSIS — N10 ACUTE PYELONEPHRITIS: Primary | ICD-10-CM

## 2021-09-24 LAB
ALBUMIN SERPL-MCNC: 4.8 G/DL (ref 3.5–4.6)
ALP BLD-CCNC: 95 U/L (ref 40–130)
ALT SERPL-CCNC: 9 U/L (ref 0–33)
ANION GAP SERPL CALCULATED.3IONS-SCNC: 10 MEQ/L (ref 9–15)
AST SERPL-CCNC: 14 U/L (ref 0–35)
BACTERIA: NEGATIVE /HPF
BASOPHILS ABSOLUTE: 0.1 K/UL (ref 0–0.2)
BASOPHILS RELATIVE PERCENT: 0.7 %
BILIRUB SERPL-MCNC: 0.6 MG/DL (ref 0.2–0.7)
BILIRUBIN URINE: NEGATIVE
BLOOD, URINE: ABNORMAL
BUN BLDV-MCNC: 14 MG/DL (ref 8–23)
CALCIUM SERPL-MCNC: 9.7 MG/DL (ref 8.5–9.9)
CHLORIDE BLD-SCNC: 101 MEQ/L (ref 95–107)
CLARITY: ABNORMAL
CO2: 26 MEQ/L (ref 20–31)
COLOR: ABNORMAL
CREAT SERPL-MCNC: 0.53 MG/DL (ref 0.5–0.9)
EKG ATRIAL RATE: 53 BPM
EKG P AXIS: 30 DEGREES
EKG P-R INTERVAL: 134 MS
EKG Q-T INTERVAL: 404 MS
EKG QRS DURATION: 78 MS
EKG QTC CALCULATION (BAZETT): 379 MS
EKG R AXIS: 0 DEGREES
EKG T AXIS: 36 DEGREES
EKG VENTRICULAR RATE: 53 BPM
EOSINOPHILS ABSOLUTE: 0.1 K/UL (ref 0–0.7)
EOSINOPHILS RELATIVE PERCENT: 0.7 %
EPITHELIAL CELLS, UA: ABNORMAL /HPF (ref 0–5)
GFR AFRICAN AMERICAN: >60
GFR NON-AFRICAN AMERICAN: >60
GLOBULIN: 2.1 G/DL (ref 2.3–3.5)
GLUCOSE BLD-MCNC: 111 MG/DL (ref 70–99)
GLUCOSE URINE: NEGATIVE MG/DL
HCT VFR BLD CALC: 40.4 % (ref 37–47)
HEMOGLOBIN: 13.6 G/DL (ref 12–16)
HYALINE CASTS: ABNORMAL /HPF (ref 0–5)
KETONES, URINE: ABNORMAL MG/DL
LEUKOCYTE ESTERASE, URINE: ABNORMAL
LIPASE: 21 U/L (ref 12–95)
LYMPHOCYTES ABSOLUTE: 1.6 K/UL (ref 1–4.8)
LYMPHOCYTES RELATIVE PERCENT: 18.8 %
MCH RBC QN AUTO: 28.8 PG (ref 27–31.3)
MCHC RBC AUTO-ENTMCNC: 33.6 % (ref 33–37)
MCV RBC AUTO: 85.6 FL (ref 82–100)
MONOCYTES ABSOLUTE: 0.6 K/UL (ref 0.2–0.8)
MONOCYTES RELATIVE PERCENT: 7.6 %
NEUTROPHILS ABSOLUTE: 6 K/UL (ref 1.4–6.5)
NEUTROPHILS RELATIVE PERCENT: 72.2 %
NITRITE, URINE: NEGATIVE
PDW BLD-RTO: 14.5 % (ref 11.5–14.5)
PH UA: 5 (ref 5–9)
PLATELET # BLD: 190 K/UL (ref 130–400)
POTASSIUM SERPL-SCNC: 3.5 MEQ/L (ref 3.4–4.9)
PROTEIN UA: 30 MG/DL
RBC # BLD: 4.71 M/UL (ref 4.2–5.4)
RBC UA: ABNORMAL /HPF (ref 0–5)
SODIUM BLD-SCNC: 137 MEQ/L (ref 135–144)
SPECIFIC GRAVITY UA: 1.02 (ref 1–1.03)
TOTAL PROTEIN: 6.9 G/DL (ref 6.3–8)
TROPONIN: <0.01 NG/ML (ref 0–0.01)
URINE REFLEX TO CULTURE: YES
UROBILINOGEN, URINE: 0.2 E.U./DL
WBC # BLD: 8.4 K/UL (ref 4.8–10.8)
WBC UA: ABNORMAL /HPF (ref 0–5)

## 2021-09-24 PROCEDURE — 81001 URINALYSIS AUTO W/SCOPE: CPT

## 2021-09-24 PROCEDURE — 87086 URINE CULTURE/COLONY COUNT: CPT

## 2021-09-24 PROCEDURE — 84484 ASSAY OF TROPONIN QUANT: CPT

## 2021-09-24 PROCEDURE — 93010 ELECTROCARDIOGRAM REPORT: CPT | Performed by: INTERNAL MEDICINE

## 2021-09-24 PROCEDURE — 36415 COLL VENOUS BLD VENIPUNCTURE: CPT

## 2021-09-24 PROCEDURE — 6360000002 HC RX W HCPCS: Performed by: EMERGENCY MEDICINE

## 2021-09-24 PROCEDURE — 99284 EMERGENCY DEPT VISIT MOD MDM: CPT

## 2021-09-24 PROCEDURE — 99215 OFFICE O/P EST HI 40 MIN: CPT | Performed by: FAMILY MEDICINE

## 2021-09-24 PROCEDURE — 71045 X-RAY EXAM CHEST 1 VIEW: CPT

## 2021-09-24 PROCEDURE — 80053 COMPREHEN METABOLIC PANEL: CPT

## 2021-09-24 PROCEDURE — 83690 ASSAY OF LIPASE: CPT

## 2021-09-24 PROCEDURE — 74176 CT ABD & PELVIS W/O CONTRAST: CPT

## 2021-09-24 PROCEDURE — 85025 COMPLETE CBC W/AUTO DIFF WBC: CPT

## 2021-09-24 PROCEDURE — 2580000003 HC RX 258: Performed by: EMERGENCY MEDICINE

## 2021-09-24 PROCEDURE — 96365 THER/PROPH/DIAG IV INF INIT: CPT

## 2021-09-24 PROCEDURE — 96375 TX/PRO/DX INJ NEW DRUG ADDON: CPT

## 2021-09-24 PROCEDURE — 93005 ELECTROCARDIOGRAM TRACING: CPT | Performed by: EMERGENCY MEDICINE

## 2021-09-24 RX ORDER — IBUPROFEN 600 MG/1
600 TABLET ORAL EVERY 6 HOURS PRN
Qty: 120 TABLET | Refills: 0 | Status: SHIPPED | OUTPATIENT
Start: 2021-09-24

## 2021-09-24 RX ORDER — 0.9 % SODIUM CHLORIDE 0.9 %
1000 INTRAVENOUS SOLUTION INTRAVENOUS ONCE
Status: COMPLETED | OUTPATIENT
Start: 2021-09-24 | End: 2021-09-24

## 2021-09-24 RX ORDER — KETOROLAC TROMETHAMINE 30 MG/ML
30 INJECTION, SOLUTION INTRAMUSCULAR; INTRAVENOUS ONCE
Status: COMPLETED | OUTPATIENT
Start: 2021-09-24 | End: 2021-09-24

## 2021-09-24 RX ORDER — ONDANSETRON 2 MG/ML
4 INJECTION INTRAMUSCULAR; INTRAVENOUS ONCE
Status: COMPLETED | OUTPATIENT
Start: 2021-09-24 | End: 2021-09-24

## 2021-09-24 RX ORDER — ONDANSETRON 4 MG/1
4 TABLET, ORALLY DISINTEGRATING ORAL 3 TIMES DAILY PRN
Qty: 21 TABLET | Refills: 0 | Status: SHIPPED | OUTPATIENT
Start: 2021-09-24 | End: 2022-05-19

## 2021-09-24 RX ORDER — CEPHALEXIN 500 MG/1
500 CAPSULE ORAL 4 TIMES DAILY
Qty: 40 CAPSULE | Refills: 0 | Status: SHIPPED | OUTPATIENT
Start: 2021-09-24 | End: 2021-10-04

## 2021-09-24 RX ADMIN — ONDANSETRON 4 MG: 2 INJECTION INTRAMUSCULAR; INTRAVENOUS at 11:59

## 2021-09-24 RX ADMIN — KETOROLAC TROMETHAMINE 30 MG: 30 INJECTION, SOLUTION INTRAMUSCULAR at 11:59

## 2021-09-24 RX ADMIN — CEFTRIAXONE SODIUM 1000 MG: 1 INJECTION, POWDER, FOR SOLUTION INTRAMUSCULAR; INTRAVENOUS at 13:14

## 2021-09-24 RX ADMIN — SODIUM CHLORIDE 1000 ML: 9 INJECTION, SOLUTION INTRAVENOUS at 11:59

## 2021-09-24 SDOH — ECONOMIC STABILITY: TRANSPORTATION INSECURITY
IN THE PAST 12 MONTHS, HAS THE LACK OF TRANSPORTATION KEPT YOU FROM MEDICAL APPOINTMENTS OR FROM GETTING MEDICATIONS?: NO

## 2021-09-24 SDOH — ECONOMIC STABILITY: TRANSPORTATION INSECURITY
IN THE PAST 12 MONTHS, HAS LACK OF TRANSPORTATION KEPT YOU FROM MEETINGS, WORK, OR FROM GETTING THINGS NEEDED FOR DAILY LIVING?: NO

## 2021-09-24 SDOH — ECONOMIC STABILITY: FOOD INSECURITY: WITHIN THE PAST 12 MONTHS, THE FOOD YOU BOUGHT JUST DIDN'T LAST AND YOU DIDN'T HAVE MONEY TO GET MORE.: NEVER TRUE

## 2021-09-24 SDOH — ECONOMIC STABILITY: FOOD INSECURITY: WITHIN THE PAST 12 MONTHS, YOU WORRIED THAT YOUR FOOD WOULD RUN OUT BEFORE YOU GOT MONEY TO BUY MORE.: NEVER TRUE

## 2021-09-24 ASSESSMENT — PAIN DESCRIPTION - LOCATION: LOCATION: FLANK

## 2021-09-24 ASSESSMENT — SOCIAL DETERMINANTS OF HEALTH (SDOH): HOW HARD IS IT FOR YOU TO PAY FOR THE VERY BASICS LIKE FOOD, HOUSING, MEDICAL CARE, AND HEATING?: NOT HARD AT ALL

## 2021-09-24 ASSESSMENT — PATIENT HEALTH QUESTIONNAIRE - PHQ9
SUM OF ALL RESPONSES TO PHQ QUESTIONS 1-9: 0
1. LITTLE INTEREST OR PLEASURE IN DOING THINGS: 0
2. FEELING DOWN, DEPRESSED OR HOPELESS: 0
SUM OF ALL RESPONSES TO PHQ QUESTIONS 1-9: 0
SUM OF ALL RESPONSES TO PHQ QUESTIONS 1-9: 0
SUM OF ALL RESPONSES TO PHQ9 QUESTIONS 1 & 2: 0

## 2021-09-24 ASSESSMENT — ENCOUNTER SYMPTOMS
ABDOMINAL PAIN: 1
VOMITING: 1
NAUSEA: 1

## 2021-09-24 ASSESSMENT — PAIN SCALES - GENERAL
PAINLEVEL_OUTOF10: 8
PAINLEVEL_OUTOF10: 8

## 2021-09-24 ASSESSMENT — PAIN DESCRIPTION - ORIENTATION: ORIENTATION: LEFT

## 2021-09-24 ASSESSMENT — PAIN DESCRIPTION - PAIN TYPE: TYPE: ACUTE PAIN

## 2021-09-24 ASSESSMENT — PAIN DESCRIPTION - DESCRIPTORS: DESCRIPTORS: SPASM;CONSTANT

## 2021-09-24 NOTE — ED NOTES
Dr. June Davidson [facility] to assess patient.       Yair Dobson RN  09/24/21 [id number]

## 2021-09-24 NOTE — PROGRESS NOTES
Subjective  Chio Leak, 66 y.o. female presents today with:  Chief Complaint   Patient presents with    Abdominal Pain     leftside pain x 3 days ; constant pain     Emesis     can't keep anything down; possible dehyrdation            HPI    LLQ pain x 3 days. Constant, severe, aching pain radiating to back and significantly worse with movement and riding in care. No appetite, no po in days. Has emesis of all PO. Last BM was yesterday, green and narrow caliber. Now with icontinence of diarrhea. No blood in stools. Stools have been black intermittently. Feels cold and has a headache. No dizziness. Feels disoriented/can't focus/ has to process things longer. Has h/o diverticulitis. No other questions and or concerns for today's visit      Review of Systems  See above. Past Medical History:   Diagnosis Date    Anxiety and depression 5/2/2019    Anxiety and depression     Diverticulosis of colon (without mention of hemorrhage) 02/25/2015    DR Edson Ruffin    Hyperlipidemia 2/3/2015    Hypothyroidism     Lichen sclerosus     Migraines 2/3/2015    Renal insufficiency 2/3/2015    Spondylosis of lumbar region without myelopathy or radiculopathy 5/24/2016     Past Surgical History:   Procedure Laterality Date    ABDOMINAL ADHESION SURGERY  1/7/16    DR. MERAZ    APPENDECTOMY  2012    CATARACT REMOVAL Right     CHOLECYSTECTOMY  2012    COLON SURGERY  2005    13\" of colon removed-NO CA    COLONOSCOPY  2011    polyps    COLONOSCOPY  02/25/2015    DR Edson Ruffin - DIVERTICULOSIS    KNEE SURGERY  2013    [place] COLONOSCOPY FLX DX W/COLLJ SPEC WHEN PFRMD N/A 1/26/2018    [facility]ODENOSCOPY TRANSORAL DIAGNOSTIC N/A 1/26/2018    EGD ESOPHAGOGASTRODUODENOSCOPY WITH DILATION performed by Mansi Cheung MD at [address] History     Socioeconomic History    Marital status: Single     Spouse name: Not on file    Number of children: Not on file    Years of education: Not on file    Highest education level: Not on file   Occupational History    Not on file   Tobacco Use    Smoking status: Never Smoker    Smokeless tobacco: Never Used   Substance and Sexual Activity    Alcohol use: No     Alcohol/week: 0.0 standard drinks    Drug use: No    Sexual activity: Not Currently   Other Topics Concern    Not on file   Social History Narrative    Not on file     Social Determinants of Health     Financial Resource Strain: Low Risk     Difficulty of Paying Living Expenses: Not hard at all   Food Insecurity: No Food Insecurity    Worried About [address] [place] [address] in the Last Year: Never true    Madi of Food in the Last Year: Never true   Transportation Needs: No Transportation Needs    Lack of Transportation (Medical): No    Lack of Transportation (Non-Medical):  No   Physical Activity:     Days of Exercise per Week:     Minutes of Exercise per Session:    Stress:     Feeling of Stress :    Social Connections:     Frequency of Communication with Friends and Family:     Frequency of Social Gatherings with Friends and Family:     Attends Yazidi Services:     Active Member of Clubs or Organizations:     Attends Club or Organization Meetings:     Marital Status:    Intimate Partner Violence:     Fear of Current or Ex-Partner:     Emotionally Abused:     Physically Abused:     Sexually Abused:      Family History   Problem Relation Age of Onset    Heart Disease Father     Stroke Father     Diabetes Father     Stomach Cancer Father     Other Mother         Bowel Obstruction     Allergies   Allergen Reactions    Bactrim [Sulfamethoxazole-Trimethoprim]      hallucinations    Ciprofloxacin Other (See Comments)     Pt not 100% sure, but thinks cipro is the antibiotic she is allergic to  Anxiety, confusion     Current Outpatient Medications   Medication Sig Dispense Refill    SUMAtriptan (IMITREX) 50 MG tablet Take 1 tablet by mouth once as needed for Migraine 9 tablet 0    dicyclomine (BENTYL) 10 MG capsule Take 1 capsule by mouth 4 times daily (before meals and nightly) 120 capsule 5    omeprazole (PRILOSEC) 20 MG delayed release capsule Take 1 capsule by mouth daily 90 capsule 1    levothyroxine (SYNTHROID) 100 MCG tablet Take 1 tablet by mouth daily 90 tablet 4    Cholecalciferol (VITAMIN D3) 50 MCG (2000 UT) CAPS 1 po daily with meal 90 capsule 4    cyanocobalamin ([employer]S VITAMIN B12) 1000 MCG tablet Take 1 tablet by mouth daily 30 tablet 3    aspirin EC 81 MG EC tablet Take 1 tablet by mouth daily 90 tablet 4    PARoxetine (PAXIL) 10 MG tablet Take 1 tablet by mouth daily 90 tablet 4    coenzyme Q-10 100 MG capsule Take 1 capsule by mouth daily 90 capsule 4    clobetasol (TEMOVATE) 0.05 % cream Apply topically 2 times daily Apply topically 2 times daily. 60 g 2    docusate sodium (COLACE) 100 MG capsule Take 100 mg by mouth 2 times daily      atorvastatin (LIPITOR) 20 MG tablet Take 1 tablet by mouth daily (Patient not taking: Reported on 8/23/2021) 90 tablet 4     No current facility-administered medications for this visit. PMH, Surgical Hx, Family Hx, and Social Hxreviewed and updated. Health Maintenance reviewed. Objective    Vitals:    09/24/21 1051   BP: 128/82   Pulse: 64   Resp: 17   Temp: 96.5 °F (35.8 °C)   TempSrc: Temporal   SpO2: 97%   Weight: 133 lb (60.3 kg)   Height: 5' 1\" (1.549 m)        Physical Exam  Constitutional:       General: She is in acute distress (severe, doubled over). Appearance: She is well-developed. HENT:      Head: Normocephalic and atraumatic. Eyes:      General: No scleral icterus. Conjunctiva/sclera: Conjunctivae normal.   Cardiovascular:      Rate and Rhythm: Normal rate and regular rhythm. Heart sounds: Normal heart sounds.    Pulmonary:      Effort: Pulmonary effort is normal.      Breath sounds: Normal breath sounds. Abdominal:      General: Bowel sounds are normal. There is no distension. Palpations: Abdomen is soft. There is no mass. Tenderness: There is no abdominal tenderness. Skin:     General: Skin is warm and dry. Neurological:      Mental Status: She is alert and oriented to person, place, and time. Lab Results   Component Value Date    LABA1C 5.2 01/19/2018    LABA1C 5.3 11/23/2015    LABA1C 5.5 10/07/2014     Lab Results   Component Value Date    CREATININE 0.65 04/23/2021     Lab Results   Component Value Date    ALT 10 04/23/2021    AST 13 04/23/2021     Lab Results   Component Value Date    CHOL 262 (H) 11/21/2018    TRIG 160 11/21/2018    HDL 59 04/23/2021    LDLCALC 123 04/23/2021        Assessment & Plan   Visit Diagnoses and Associated Orders     LLQ pain    -  Primary    Suspect diverticulitis v. bowel obstruction. Disabled son who is confined to [address] is with her b/c no one could take care of him. Will take both to ER for E&M. Reviewed with the patient: all disease processes, current clinical status, medications, activities and diet.      Side effects, adverse effects of the medication prescribed today, as well as treatment plan/ rationale and result expectations have been discussed with the patient who expresses understanding and desires to proceed.     Close follow up to evaluate treatment results and for coordination of care. I have reviewed the patient's medical history in detail and updated the computerized patient record. More than 50% of the appointment was spent in face-to-face counseling, education and care coordination. No orders of the defined types were placed in this encounter. No orders of the defined types were placed in this encounter. There are no discontinued medications. Return in about 1 week (around 10/1/2021) for LLQ pain - OV.         Controlled Substance Monitoring:    Acute and Chronic Pain Monitoring:   RX Monitoring 10/30/2018   Attestation The Prescription Monitoring Report for this patient was reviewed today.            Esha Valente MD

## 2021-09-24 NOTE — ED PROVIDER NOTES
[address] [facility] ED  EMERGENCY MEDICINE     Pt Name: Asif Lockhart  MRN: [id number]  Floresitagfmalena 1950  Date of evaluation: 9/24/2021  PCP:    Maria Isabel Juárez MD  Provider: Keli Sinha, [facility]       Chief Complaint   Patient presents with    Flank Pain     x3 days       HISTORY OF PRESENT ILLNESS    HPI     69-year-old female with history of hypothyroidism, hyperlipidemia, anxiety and depression, presents to the emergency department with complaint of left flank pain that started about 3 days ago. Patient states that she has been moving and lifting heavy boxes. Denies any trauma to the area. Patient states that she has not been making as much urine as she normally has been over the past few days. Denies hematuria or dysuria. Denies any diarrhea or constipation. States that her stool does appear to be green and full of mucus. Denies any melena or hematochezia. States that she has been nauseous and vomited twice last night. Denies any chest pain or shortness of breath. Denies any fevers or chills. Has not been around any sick contacts at home. No history of kidney stones. Has not taken any analgesics before coming in today. Triage notes and Nursing notes were reviewed by myself. Any discrepancies are addressed above. PAST MEDICAL HISTORY     Past Medical History:   Diagnosis Date    Anxiety and depression 5/2/2019    Anxiety and depression     Diverticulosis of colon (without mention of hemorrhage) 02/25/2015    DR Beauchamp Marking    Hyperlipidemia 2/3/2015    Hypothyroidism     Lichen sclerosus     Migraines 2/3/2015    Renal insufficiency 2/3/2015    Spondylosis of lumbar region without myelopathy or radiculopathy 5/24/2016       SURGICAL HISTORY       Past Surgical History:   Procedure Laterality Date    ABDOMINAL ADHESION SURGERY  1/7/16    DR. MERAZ    APPENDECTOMY  2012    CATARACT REMOVAL Right     CHOLECYSTECTOMY  2012    COLON SURGERY 2005    13\" of colon removed-NO CA    COLONOSCOPY  2011    polyps    COLONOSCOPY  02/25/2015    DR Tory Cotto - DIVERTICULOSIS    KNEE SURGERY  2013    [place] COLONOSCOPY FLX DX W/COLLJ SPEC WHEN PFRMD N/A 1/26/2018    COLONOSCOPY performed by Phil Waldron MD at . [address] ESOPHAGOGASTRODUODENOSCOPY TRANSORAL DIAGNOSTIC N/A 1/26/2018    EGD ESOPHAGOGASTRODUODENOSCOPY WITH DILATION performed by Phil Waldron MD at [address] [place] [address]       Discharge Medication List as of 9/24/2021  1:14 PM      CONTINUE these medications which have NOT CHANGED    Details   SUMAtriptan (IMITREX) 50 MG tablet Take 1 tablet by mouth once as needed for Migraine, Disp-9 tablet, R-0Normal      dicyclomine (BENTYL) 10 MG capsule Take 1 capsule by mouth 4 times daily (before meals and nightly), Disp-120 capsule, R-5Normal      omeprazole (PRILOSEC) 20 MG delayed release capsule Take 1 capsule by mouth daily, Disp-90 capsule, R-1Normal      levothyroxine (SYNTHROID) 100 MCG tablet Take 1 tablet by mouth daily, Disp-90 tablet, R-4Normal      Cholecalciferol (VITAMIN D3) 50 MCG (2000 UT) CAPS 1 po daily with meal, Disp-90 capsule, R-4Normal      cyanocobalamin ([employer] VITAMIN B12) 1000 MCG tablet Take 1 tablet by mouth daily, Disp-30 tablet, R-3Normal      aspirin EC 81 MG EC tablet Take 1 tablet by mouth daily, Disp-90 tablet, R-4Normal      PARoxetine (PAXIL) 10 MG tablet Take 1 tablet by mouth daily, Disp-90 tablet, R-4Normal      atorvastatin (LIPITOR) 20 MG tablet Take 1 tablet by mouth daily, Disp-90 tablet, R-4Normal      coenzyme Q-10 100 MG capsule Take 1 capsule by mouth daily, Disp-90 capsule, R-4Normal      clobetasol (TEMOVATE) 0.05 % cream Apply topically 2 times daily Apply topically 2 times daily. , Topical, 2 TIMES DAILY Starting Fri 12/4/2020, Disp-60 g, R-2, Normal      docusate sodium (COLACE) 100 MG capsule Take 100 mg by mouth 2 times dailyHistorical Med ALLERGIES       Allergies   Allergen Reactions    Bactrim [Sulfamethoxazole-Trimethoprim]      hallucinations    Ciprofloxacin Other (See Comments)     Pt not 100% sure, but thinks cipro is the antibiotic she is allergic to  Anxiety, confusion       FAMILY HISTORY       Family History   Problem Relation Age of Onset    Heart Disease Father     Stroke Father     Diabetes Father     Stomach Cancer Father     Other Mother         Bowel Obstruction        SOCIAL HISTORY       Social History     Socioeconomic History    Marital status: Single     Spouse name: None    Number of children: None    Years of education: None    Highest education level: None   Occupational History    None   Tobacco Use    Smoking status: Never Smoker    Smokeless tobacco: Never Used   Substance and Sexual Activity    Alcohol use: No     Alcohol/week: 0.0 standard drinks    Drug use: No    Sexual activity: Not Currently   Other Topics Concern    None   Social History Narrative    None     Social Determinants of Health     Financial Resource Strain: Low Risk     Difficulty of Paying Living Expenses: Not hard at all   Food Insecurity: No Food Insecurity    Worried About Running Out of Food in the Last Year: Never true    Madi of Food in the Last Year: Never true   Transportation Needs: No Transportation Needs    Lack of Transportation (Medical): No    Lack of Transportation (Non-Medical):  No   Physical Activity:     Days of Exercise per Week:     Minutes of Exercise per Session:    Stress:     Feeling of Stress :    Social Connections:     Frequency of Communication with Friends and Family:     Frequency of Social Gatherings with Friends and Family:     Attends Gnosticism Services:     Active Member of Clubs or Organizations:     Attends Club or Organization Meetings:     Marital Status:    Intimate Partner Violence:     Fear of Current or Ex-Partner:     Emotionally Abused:     Physically Abused:  Sexually Abused:        REVIEW OF SYSTEMS     Review of Systems   Gastrointestinal: Positive for abdominal pain, nausea and vomiting. Genitourinary: Positive for flank pain. Except as noted above the remainder of the review of systems was reviewed and is negative. SCREENINGS                        PHYSICAL EXAM    (up to 7 for level 4, 8 or more for level 5)     ED Triage Vitals [09/24/21 1128]   BP Temp Temp Source Pulse Resp SpO2 Height Weight   132/66 97 °F (36.1 °C) Temporal 71 20 98 % 5' 2\" (1.575 m) 133 lb (60.3 kg)       Physical Exam  Constitutional:       General: She is not in acute distress. Appearance: Normal appearance. She is normal weight. She is not ill-appearing. HENT:      Head: Normocephalic and atraumatic. Right Ear: External ear normal.      Left Ear: External ear normal.      Nose: Nose normal. No congestion or rhinorrhea. Mouth/Throat:      Mouth: Mucous membranes are moist.      Pharynx: No oropharyngeal exudate or posterior oropharyngeal erythema. Eyes:      General:         Right eye: No discharge. Left eye: No discharge. Extraocular Movements: Extraocular movements intact. Pupils: Pupils are equal, round, and reactive to light. Cardiovascular:      Rate and Rhythm: Normal rate and regular rhythm. Pulses: Normal pulses. Pulmonary:      Effort: Pulmonary effort is normal.      Breath sounds: Normal breath sounds. Abdominal:      General: Abdomen is flat. Bowel sounds are normal. There is no distension. Tenderness: There is abdominal tenderness (Epigastric and right lower quadrant). There is no right CVA tenderness, left CVA tenderness, guarding or rebound. Musculoskeletal:         General: No swelling or tenderness. Normal range of motion. Cervical back: Normal range of motion and neck supple. Right lower leg: No edema. Left lower leg: No edema. Skin:     General: Skin is warm and dry.       Capillary Refill: Capillary refill takes less than 2 seconds. Neurological:      General: No focal deficit present. Mental Status: She is alert. Psychiatric:         Mood and Affect: Mood normal.           DIAGNOSTIC RESULTS     EKG:(none if blank)  All EKGs are interpreted by the Emergency Department Physician who either signs or Co-signs this chart in the absence of a cardiologist.    EKG performed at 11:53 AM shows sinus bradycardia with a heart of 53 bpm QTC of 379 ms. No significant ST-T wave abnormalities noted. RADIOLOGY: (none if blank)   I directly visualized the following images and reviewed the radiologist interpretations. Interpretation per the Radiologist below, if available at the time of this note:  XR CHEST PORTABLE   Final Result      NO EVIDENCE OF ACTIVE CARDIOPULMONARY DISEASE, BY PORTABLE CHEST RADIOGRAPHY. CT ABDOMEN PELVIS WO CONTRAST Additional Contrast? None   Final Result      No acute process in the abdomen/pelvis.            ==========                   LABS:  Labs Reviewed   COMPREHENSIVE METABOLIC PANEL - Abnormal; Notable for the following components:       Result Value    Glucose 111 (*)     Albumin 4.8 (*)     Globulin 2.1 (*)     All other components within normal limits   URINE RT REFLEX TO CULTURE - Abnormal; Notable for the following components:    Color, UA DARK YELLOW (*)     Clarity, UA CLOUDY (*)     Ketones, Urine TRACE (*)     Blood, Urine TRACE (*)     Protein, UA 30 (*)     Leukocyte Esterase, Urine MODERATE (*)     All other components within normal limits   MICROSCOPIC URINALYSIS - Abnormal; Notable for the following components:    WBC, UA  (*)     RBC, UA 10-20 (*)     All other components within normal limits   CULTURE, URINE   CBC WITH AUTO DIFFERENTIAL   LIPASE   TROPONIN       All other labs were within normal range or not returned as of this dictation.   Please note, any cultures that may have been sent were not resulted at the time of this patient visit.    EMERGENCY DEPARTMENT COURSE and Medical Decision Making:     Vitals:    Vitals:    09/24/21 1128 09/24/21 1200 09/24/21 1315   BP: 132/66 (!) 175/62 (!) 137/59   Pulse: 71 66 68   Resp: 20 16 16   Temp: 97 °F (36.1 °C)     TempSrc: Temporal     SpO2: 98% 98% 98%   Weight: 133 lb (60.3 kg)     Height: 5' 2\" (1.575 m)         PROCEDURES: (None if blank)  Procedures       MDM     Patient was found to have a urinary tract infection. She did have flank pain so clinically she does have pyelonephritis. Was given a dose of Rocephin while in the department. Will be discharged home with antibiotics to take home. Patient's lab work was otherwise unremarkable. Vitals stable throughout her stay. Will be discharged. Strict return precautions and follow up instructions were discussed with the patient with which the patient agrees    ED Medications administered this visit:    Medications   0.9 % sodium chloride bolus (0 mLs IntraVENous Stopped 9/24/21 1315)   ondansetron (ZOFRAN) injection 4 mg (4 mg IntraVENous Given 9/24/21 1159)   ketorolac (TORADOL) injection 30 mg (30 mg IntraVENous Given 9/24/21 1159)   cefTRIAXone (ROCEPHIN) 1000 mg IVPB in 50 mL D5W minibag (0 mg IntraVENous Stopped 9/24/21 1346)         FINAL IMPRESSION      1.  Acute pyelonephritis          DISPOSITION/PLAN   DISPOSITION Decision To Discharge 09/24/2021 01:46:57 PM      PATIENT REFERRED TO:  Jay Tolliver MD  [address] [facility], [address]  [address] [facility]  [phone]            DISCHARGE MEDICATIONS:  Discharge Medication List as of 9/24/2021  1:14 PM      START taking these medications    Details   cephALEXin (KEFLEX) 500 MG capsule Take 1 capsule by mouth 4 times daily for 10 days, Disp-40 capsule, R-0Print      ibuprofen (IBU) 600 MG tablet Take 1 tablet by mouth every 6 hours as needed for Pain, Disp-120 tablet, R-0Print      ondansetron (ZOFRAN-ODT) 4 MG disintegrating tablet Take 1 tablet by mouth 3 times daily as needed for Nausea or Vomiting, Disp-21 tablet, R-0Print                    Yajaira Buckley DO (electronically signed)  Attending Physician, Emergency Department         Yajaira Buckley,   09/24/21 4343

## 2021-09-26 LAB — URINE CULTURE, ROUTINE: NORMAL

## 2021-12-13 DIAGNOSIS — E53.8 B12 DEFICIENCY: ICD-10-CM

## 2021-12-13 LAB
BASOPHILS ABSOLUTE: 0.1 K/UL (ref 0–0.2)
BASOPHILS RELATIVE PERCENT: 1 %
EOSINOPHILS ABSOLUTE: 0.2 K/UL (ref 0–0.7)
EOSINOPHILS RELATIVE PERCENT: 3.5 %
HCT VFR BLD CALC: 40.8 % (ref 37–47)
HEMOGLOBIN: 13.4 G/DL (ref 12–16)
LYMPHOCYTES ABSOLUTE: 1.9 K/UL (ref 1–4.8)
LYMPHOCYTES RELATIVE PERCENT: 32.1 %
MCH RBC QN AUTO: 28.7 PG (ref 27–31.3)
MCHC RBC AUTO-ENTMCNC: 32.8 % (ref 33–37)
MCV RBC AUTO: 87.6 FL (ref 82–100)
MONOCYTES ABSOLUTE: 0.5 K/UL (ref 0.2–0.8)
MONOCYTES RELATIVE PERCENT: 7.8 %
NEUTROPHILS ABSOLUTE: 3.3 K/UL (ref 1.4–6.5)
NEUTROPHILS RELATIVE PERCENT: 55.6 %
PDW BLD-RTO: 13.4 % (ref 11.5–14.5)
PLATELET # BLD: 182 K/UL (ref 130–400)
RBC # BLD: 4.66 M/UL (ref 4.2–5.4)
WBC # BLD: 5.9 K/UL (ref 4.8–10.8)

## 2021-12-14 LAB — VITAMIN B-12: 976 PG/ML (ref 232–1245)

## 2021-12-16 ENCOUNTER — OFFICE VISIT (OUTPATIENT)
Dept: FAMILY MEDICINE CLINIC | Age: 71
End: 2021-12-16
Payer: MEDICARE

## 2021-12-16 VITALS
BODY MASS INDEX: 25.58 KG/M2 | HEART RATE: 68 BPM | RESPIRATION RATE: 16 BRPM | TEMPERATURE: 96.3 F | HEIGHT: 62 IN | SYSTOLIC BLOOD PRESSURE: 128 MMHG | WEIGHT: 139 LBS | OXYGEN SATURATION: 98 % | DIASTOLIC BLOOD PRESSURE: 78 MMHG

## 2021-12-16 DIAGNOSIS — Z12.31 ENCOUNTER FOR SCREENING MAMMOGRAM FOR BREAST CANCER: ICD-10-CM

## 2021-12-16 DIAGNOSIS — F41.9 ANXIETY AND DEPRESSION: ICD-10-CM

## 2021-12-16 DIAGNOSIS — E03.9 ACQUIRED HYPOTHYROIDISM: ICD-10-CM

## 2021-12-16 DIAGNOSIS — E03.9 ACQUIRED HYPOTHYROIDISM: Primary | ICD-10-CM

## 2021-12-16 DIAGNOSIS — F32.A ANXIETY AND DEPRESSION: ICD-10-CM

## 2021-12-16 DIAGNOSIS — E78.2 MIXED HYPERLIPIDEMIA: ICD-10-CM

## 2021-12-16 LAB — TSH SERPL DL<=0.05 MIU/L-ACNC: 0.35 UIU/ML (ref 0.44–3.86)

## 2021-12-16 PROCEDURE — 99214 OFFICE O/P EST MOD 30 MIN: CPT | Performed by: FAMILY MEDICINE

## 2021-12-16 RX ORDER — LEVOTHYROXINE SODIUM 0.07 MG/1
75 TABLET ORAL DAILY
Qty: 90 TABLET | Refills: 4 | Status: SHIPPED | OUTPATIENT
Start: 2021-12-16

## 2021-12-16 NOTE — PROGRESS NOTES
Subjective  Irvin Hallmark, 66 y.o. female presents today with:  Chief Complaint   Patient presents with    6 Month Follow-Up    Discuss Labs           HPI    Hypothyroidism. Compliant with levothyroxine which is taken correctly. No diarrhea, constipation, palpitations, dry skin, depression, difficulty sleeping or fatigue. No weight gain or loss. Patient is being treated for depression and has been compliant with meds which do not cause side effects. Mood is improved. No suicidal ideation. Sleep is normal.    Patient is here for hyperlipidemia. Is compliant with medications and has no apparent side effects from them. No other questions and or concerns for today's visit      Review of Systems      Past Medical History:   Diagnosis Date    Anxiety and depression 5/2/2019    Anxiety and depression     Diverticulosis of colon (without mention of hemorrhage) 02/25/2015    DR Guerline Arauz    Hyperlipidemia 2/3/2015    Hypothyroidism     Lichen sclerosus     Migraines 2/3/2015    Renal insufficiency 2/3/2015    Spondylosis of lumbar region without myelopathy or radiculopathy 5/24/2016     Past Surgical History:   Procedure Laterality Date    ABDOMINAL ADHESION SURGERY  1/7/16    DR. MERAZ    APPENDECTOMY  2012    CATARACT REMOVAL Right     CHOLECYSTECTOMY  2012    COLON SURGERY  2005    13\" of colon removed-NO CA    COLONOSCOPY  2011    polyps    COLONOSCOPY  02/25/2015    DR Guerline Arauz - DIVERTICULOSIS    KNEE SURGERY  2013    [place] COLONOSCOPY FLX DX W/COLLJ SPEC WHEN PFRMD N/A 1/26/2018    COLONOSCOPY performed by Awa Elizondo MD at . [facility] ESOPHAGOGASTRODUODENOSCOPY TRANSORAL DIAGNOSTIC N/A 1/26/2018    EGD ESOPHAGOGASTRODUODENOSCOPY WITH DILATION performed by Awa Elizondo MD at [address] [place] [address] History     Socioeconomic History    Marital status: Single     Spouse name: Not on file    Number of children: Not on file    Years of education: Not on file    Highest education level: Not on file   Occupational History    Not on file   Tobacco Use    Smoking status: Never Smoker    Smokeless tobacco: Never Used   Substance and Sexual Activity    Alcohol use: No     Alcohol/week: 0.0 standard drinks    Drug use: No    Sexual activity: Not Currently   Other Topics Concern    Not on file   Social History Narrative    Not on file     Social Determinants of Health     Financial Resource Strain: Low Risk     Difficulty of Paying Living Expenses: Not hard at all   Food Insecurity: No Food Insecurity    Worried About [facility] in the Last Year: Never true    Madi of Food in the Last Year: Never true   Transportation Needs: No Transportation Needs    Lack of Transportation (Medical): No    Lack of Transportation (Non-Medical):  No   Physical Activity:     Days of Exercise per Week: Not on file    Minutes of Exercise per Session: Not on file   Stress:     Feeling of Stress : Not on file   Social Connections:     Frequency of Communication with Friends and Family: Not on file    Frequency of Social Gatherings with Friends and Family: Not on file    Attends Zoroastrian Services: Not on file    Active Member of [address] [place] [address] or Organizations: Not on file    Attends Club or Organization Meetings: Not on file    Marital Status: Not on file   Intimate Partner Violence:     Fear of Current or Ex-Partner: Not on file    Emotionally Abused: Not on file    Physically Abused: Not on file    Sexually Abused: Not on file   Housing Stability:     Unable to Pay for Housing in the Last Year: Not on file    Number of [place] in the Last Year: Not on file    Unstable Housing in the Last Year: Not on file     Family History   Problem Relation Age of Onset    Heart Disease Father     Stroke Father     Diabetes Father     Stomach Cancer Father     Other Mother         Bowel Obstruction     Allergies   Allergen Reactions    Resp: 16   Temp: 96.3 °F (35.7 °C)   TempSrc: Temporal   SpO2: 98%   Weight: 139 lb (63 kg)   Height: 5' 2\" (1.575 m)        Physical Exam  Constitutional:       Appearance: She is well-developed. HENT:      Head: Normocephalic. Eyes:      Conjunctiva/sclera: Conjunctivae normal.   Pulmonary:      Effort: Pulmonary effort is normal.   Neurological:      Mental Status: She is alert and oriented to person, place, and time. Psychiatric:         Behavior: Behavior normal.         Thought Content: Thought content normal.         Judgment: Judgment normal.           Lab Results   Component Value Date    LABA1C 5.2 01/19/2018    LABA1C 5.3 11/23/2015    LABA1C 5.5 10/07/2014     Lab Results   Component Value Date    CREATININE 0.53 09/24/2021     Lab Results   Component Value Date    ALT 9 09/24/2021    AST 14 09/24/2021     Lab Results   Component Value Date    CHOL 262 (H) 11/21/2018    TRIG 160 11/21/2018    HDL 59 04/23/2021    [address] 123 04/23/2021        Assessment & Plan   Visit Diagnoses and Associated Orders     Acquired hypothyroidism    -  Primary         Mixed hyperlipidemia             Anxiety and depression             Encounter for screening mammogram for breast cancer        JANUARY DIGITAL SCREEN W OR WO CAD BILATERAL [[id number] Custom]   - Future Order         ORDERS WITHOUT AN ASSOCIATED DIAGNOSIS    TSH Without Reflex [[id number] Custom]   - Future Order          Acquired hypothyroidism, hyperlipidemia, anxiety and depression have been well controlled. Continue current medications. Continue routine monitoring.       Reviewed with the patient: all disease processes, current clinical status, medications, activities and diet.      Side effects, adverse effects of the medication prescribed today, as well as treatment plan/ rationale and result expectations have been discussed with the patient who expresses understanding and desires to proceed.     Close follow up to evaluate treatment results and for coordination of care. I have reviewed the patient's medical history in detail and updated the computerized patient record. More than 50% of the appointment was spent in face-to-face counseling, education and care coordination. Please note this report has been partially produced using speech recognition software and may contain mistakes related to that system including errors in grammar, punctuation and spelling as well as words and phrases that may seem inappropriate. If there are questions or concerns, please feel free to contact me to clarify. Orders Placed This Encounter   Procedures    JANUARY DIGITAL SCREEN W OR WO CAD BILATERAL     Standing Status:   Future     Standing Expiration Date:   2/16/2023     Order Specific Question:   Reason for exam:     Answer:   screen    TSH Without Reflex     Standing Status:   Future     Number of Occurrences:   1     Standing Expiration Date:   12/16/2022     No orders of the defined types were placed in this encounter. There are no discontinued medications. Return in about 6 months (around 6/16/2022) for f/u - OV. Controlled Substance Monitoring:    Acute and Chronic Pain Monitoring:   RX Monitoring 10/30/2018   Attestation The Prescription Monitoring Report for this patient was reviewed today.            Jorge Hodgson MD

## 2022-01-03 DIAGNOSIS — R12 HEARTBURN: ICD-10-CM

## 2022-01-03 DIAGNOSIS — K44.9 HIATAL HERNIA: ICD-10-CM

## 2022-01-03 RX ORDER — SUMATRIPTAN 50 MG/1
50 TABLET, FILM COATED ORAL
Qty: 9 TABLET | Refills: 0 | Status: SHIPPED | OUTPATIENT
Start: 2022-01-03 | End: 2022-10-31

## 2022-01-05 ENCOUNTER — HOSPITAL ENCOUNTER (OUTPATIENT)
Dept: WOMENS IMAGING | Age: 72
Discharge: HOME | End: 2022-01-07
Payer: MEDICARE

## 2022-01-05 DIAGNOSIS — Z12.31 ENCOUNTER FOR SCREENING MAMMOGRAM FOR BREAST CANCER: ICD-10-CM

## 2022-01-05 PROCEDURE — 77063 BREAST TOMOSYNTHESIS BI: CPT

## 2022-01-05 RX ORDER — OMEPRAZOLE 20 MG/1
20 CAPSULE, DELAYED RELEASE ORAL DAILY
Qty: 90 CAPSULE | Refills: 1 | Status: SHIPPED | OUTPATIENT
Start: 2022-01-05 | End: 2022-07-11

## 2022-01-05 NOTE — TELEPHONE ENCOUNTER
Pharmacy is  requesting medication refill.  Please approve or deny this request.    Rx requested:  Requested Prescriptions     Pending Prescriptions Disp Refills    omeprazole (PRILOSEC) 20 MG delayed release capsule [Pharmacy Med Name: omeprazole 20 mg capsule,delayed release] 90 capsule 1     Sig: Take 1 capsule by mouth daily         Last Office Visit:   12/16/2021      Next Visit Date:  Future Appointments   Date Time Provider Tony Motley   1/5/2022  1:40 PM CHARO MAMMOGRAM [facility]   6/16/2022 12:00 PM MD Charo Henning [facility] Charo   8/22/2022  1:15 PM Margarita Lindsey MD [facility]

## 2022-01-10 ENCOUNTER — OFFICE VISIT (OUTPATIENT)
Dept: FAMILY MEDICINE CLINIC | Age: 72
End: 2022-01-10
Payer: MEDICARE

## 2022-01-10 VITALS
TEMPERATURE: 96.7 F | HEART RATE: 66 BPM | BODY MASS INDEX: 25.76 KG/M2 | HEIGHT: 62 IN | OXYGEN SATURATION: 97 % | RESPIRATION RATE: 17 BRPM | DIASTOLIC BLOOD PRESSURE: 70 MMHG | WEIGHT: 140 LBS | SYSTOLIC BLOOD PRESSURE: 136 MMHG

## 2022-01-10 DIAGNOSIS — R10.32 ACUTE LEFT LOWER QUADRANT PAIN: Primary | ICD-10-CM

## 2022-01-10 DIAGNOSIS — K57.92 DIVERTICULITIS: ICD-10-CM

## 2022-01-10 PROCEDURE — 99214 OFFICE O/P EST MOD 30 MIN: CPT | Performed by: INTERNAL MEDICINE

## 2022-01-10 PROCEDURE — 81003 URINALYSIS AUTO W/O SCOPE: CPT | Performed by: INTERNAL MEDICINE

## 2022-01-10 RX ORDER — AMOXICILLIN AND CLAVULANATE POTASSIUM 875; 125 MG/1; MG/1
1 TABLET, FILM COATED ORAL 2 TIMES DAILY
Qty: 20 TABLET | Refills: 0 | Status: SHIPPED | OUTPATIENT
Start: 2022-01-10 | End: 2022-01-20

## 2022-01-10 ASSESSMENT — ENCOUNTER SYMPTOMS
NAUSEA: 1
ABDOMINAL PAIN: 1
VOMITING: 0
SHORTNESS OF BREATH: 0
DIARRHEA: 1
WHEEZING: 0
CHEST TIGHTNESS: 0
BLOOD IN STOOL: 0
COUGH: 0

## 2022-01-10 NOTE — PROGRESS NOTES
Subjective:      Patient ID: Berny Keyes is a 66 y.o. female who presents today for:  Chief Complaint   Patient presents with    Abdominal Pain     pain in left side, x 1 week     Diarrhea     started yesterday        HPI   Patient presenting with severe left lower abdominal pain for 1 week. Pain described as constant, non-radiating. Associated passage of loose stools over the last two days. No blood noted in stool. Admits to chills but denies fever or night sweats. History notable for recurrent diverticulitis s/p partial colectomy. Patient also reports epigastric pain with radiation to the back over the last few days. Pain sharp in nature, not related to activity. History pertinent for GERD but not PUD. Reports associated heartburn symptoms and nausea. No SOB, weakness or palpitations endorsed. Recurrent pattern. Past Medical History:   Diagnosis Date    Anxiety and depression 5/2/2019    Anxiety and depression     Diverticulosis of colon (without mention of hemorrhage) 02/25/2015    DR Munir Wallace    Hyperlipidemia 2/3/2015    Hypothyroidism     Lichen sclerosus     Migraines 2/3/2015    Renal insufficiency 2/3/2015    Spondylosis of lumbar region without myelopathy or radiculopathy 5/24/2016     Past Surgical History:   Procedure Laterality Date    ABDOMINAL ADHESION SURGERY  1/7/16    DR. MERAZ    APPENDECTOMY  2012    CATARACT REMOVAL Right     CHOLECYSTECTOMY  2012    COLON SURGERY  2005    13\" of colon removed-NO CA    COLONOSCOPY  2011    polyps    COLONOSCOPY  02/25/2015    DR Munir Wallace - DIVERTICULOSIS    KNEE SURGERY  2013    CA COLONOSCOPY FLX DX W/COLLJ SPEC WHEN PFRMD N/A 1/26/2018    COLONOSCOPY performed by Bryson Martines MD at [address] ESOPHAGOGASTRODUODENOSCOPY TRANSORAL DIAGNOSTIC N/A 1/26/2018    EGD ESOPHAGOGASTRODUODENOSCOPY WITH DILATION performed by Bryson Martines MD at [place] [id number]     Family History Problem Relation Age of Onset    Heart Disease Father     Stroke Father     Diabetes Father     Stomach Cancer Father     Other Mother         Bowel Obstruction     Allergies   Allergen Reactions    Bactrim [Sulfamethoxazole-Trimethoprim]      hallucinations    Ciprofloxacin Other (See Comments)     Pt not 100% sure, but thinks cipro is the antibiotic she is allergic to  Anxiety, confusion     Current Outpatient Medications on File Prior to Visit   Medication Sig Dispense Refill    omeprazole (PRILOSEC) 20 MG delayed release capsule Take 1 capsule by mouth daily 90 capsule 1    levothyroxine (SYNTHROID) 75 MCG tablet Take 1 tablet by mouth daily 90 tablet 4    ondansetron (ZOFRAN-ODT) 4 MG disintegrating tablet Take 1 tablet by mouth 3 times daily as needed for Nausea or Vomiting 21 tablet 0    dicyclomine (BENTYL) 10 MG capsule Take 1 capsule by mouth 4 times daily (before meals and nightly) 120 capsule 5    Cholecalciferol (VITAMIN D3) 50 MCG (2000 UT) CAPS 1 po daily with meal 90 capsule 4    cyanocobalamin (CVS VITAMIN B12) 1000 MCG tablet Take 1 tablet by mouth daily 30 tablet 3    aspirin EC 81 MG EC tablet Take 1 tablet by mouth daily 90 tablet 4    PARoxetine (PAXIL) 10 MG tablet Take 1 tablet by mouth daily 90 tablet 4    coenzyme Q-10 100 MG capsule Take 1 capsule by mouth daily 90 capsule 4    clobetasol (TEMOVATE) 0.05 % cream Apply topically 2 times daily Apply topically 2 times daily.  60 g 2    SUMAtriptan (IMITREX) 50 MG tablet Take 1 tablet by mouth once as needed for Migraine (Patient not taking: Reported on 1/10/2022) 9 tablet 0    ibuprofen (IBU) 600 MG tablet Take 1 tablet by mouth every 6 hours as needed for Pain (Patient not taking: Reported on 12/16/2021) 120 tablet 0    atorvastatin (LIPITOR) 20 MG tablet Take 1 tablet by mouth daily (Patient not taking: Reported on 8/23/2021) 90 tablet 4    docusate sodium (COLACE) 100 MG capsule Take 100 mg by mouth 2 times daily No current facility-administered medications on file prior to visit. Review of Systems   Constitutional: Negative for activity change, chills, diaphoresis, fatigue and fever. Respiratory: Negative for cough, chest tightness, shortness of breath and wheezing. Cardiovascular: Negative for chest pain, palpitations and leg swelling. Gastrointestinal: Positive for abdominal pain, diarrhea and nausea. Negative for blood in stool and vomiting. Genitourinary: Negative for dysuria, frequency and hematuria. Neurological: Negative for dizziness, syncope, light-headedness and headaches. Objective:   /70   Pulse 66   Temp 96.7 °F (35.9 °C) (Temporal)   Resp 17   Ht 5' 2\" (1.575 m)   Wt 140 lb (63.5 kg)   LMP  (LMP Unknown)   SpO2 97%   BMI 25.61 kg/m²     Physical Exam  Constitutional:       General: She is not in acute distress. Appearance: Normal appearance. She is normal weight. She is not toxic-appearing or diaphoretic. HENT:      Head: Normocephalic and atraumatic. Eyes:      Conjunctiva/sclera: Conjunctivae normal.   Cardiovascular:      Rate and Rhythm: Normal rate and regular rhythm. Pulses: Normal pulses. Heart sounds: Normal heart sounds. No murmur heard. No friction rub. Pulmonary:      Effort: Pulmonary effort is normal. No respiratory distress. Breath sounds: Normal breath sounds. No wheezing or rhonchi. Chest:      Chest wall: No tenderness. Abdominal:      General: Abdomen is flat. Bowel sounds are normal. There is no distension. Palpations: Abdomen is soft. Tenderness: There is no abdominal tenderness. Comments: Abdomen full but not distended. Tenderness palpated over LLQ; no guarding/rebound. No appreciable epigastric tenderness. Musculoskeletal:         General: No tenderness. Normal range of motion. Right lower leg: No edema. Left lower leg: No edema.    Neurological:      Mental Status: She is alert and oriented to person, place, and time. Mental status is at baseline. Assessment:      Sherine Mina was seen today for abdominal pain and diarrhea. Diagnoses and all orders for this visit:    Acute left lower quadrant pain  Diverticulitis        -     Symptoms highly suggestive given h/o severe LLQ pain with associated diarrhea, chills and malaise. -     POCT UA negative, no reported vaginal symptoms. Plan:  -     CT ABDOMEN PELVIS W IV CONTRAST Additional oral Contrast- evaluation for diverticulitis, associated complications. Ordered stat. -     CBC with diff, CRP         -     Preemptive treatment with amoxicillin-clavulanate (AUGMENTIN) 875-125 MG per tablet; Take 1 tablet by mouth 2 times daily for 10 days. Recommended alternative to fluoroquinolone or bactrim-based regimen as patient is allergic to both medications. -      F/u in 2 weeks. Advised to present to the ED with worsening or development of severe symptoms; verbalizes understanding. Epigastric pain        -    Likely due to underlying GERD, associated acid brash        -    Consideration for acute pancreatitis given nature and radiation of pain to the back, associated nausea        Plan:       -     Increase dose of Omeprazole ER from 20mg to 40mg daily. Monitor for response.  on supportive measures. -     Continue Zofran 4mg q 8 prn for nausea       -     Serum lipase.       Health Maintenance   Topic Date Due    DTaP/Tdap/Td vaccine (1 - Tdap) Never done    Shingles Vaccine (1 of 2) Never done    Cervical cancer screen  07/28/2019    Annual Wellness Visit (AWV)  08/26/2021    Lipid screen  04/23/2022    Depression Monitoring  09/24/2022    TSH testing  12/16/2022    Colon cancer screen colonoscopy  01/26/2023    Breast cancer screen  01/05/2024    DEXA (modify frequency per FRAX score)  Completed    Flu vaccine  Completed    Pneumococcal 65+ years Vaccine  Completed    COVID-19 Vaccine  Completed    Hepatitis A vaccine  Aged Out    Hepatitis B vaccine  Aged Out    Hib vaccine  Aged Out    Meningococcal (ACWY) vaccine  Aged Out    Hepatitis C screen  Discontinued            Plan:    As above. Orders Placed This Encounter   Procedures    CT ABDOMEN PELVIS W IV CONTRAST Additional Contrast? Oral     Standing Status:   Future     Standing Expiration Date:   2/10/2022     Order Specific Question:   Additional Contrast?     Answer:   Oral     Order Specific Question:   Reason for exam:     Answer:   Evaluation for Diverticulitis, associated complications.  POCT Urinalysis No Micro (Auto)     Orders Placed This Encounter   Medications    amoxicillin-clavulanate (AUGMENTIN) 875-125 MG per tablet     Sig: Take 1 tablet by mouth 2 times daily for 10 days     Dispense:  20 tablet     Refill:  0       Return for F/u in 2 weeks.       Rick Madrid MD

## 2022-01-11 ENCOUNTER — HOSPITAL ENCOUNTER (OUTPATIENT)
Dept: CT IMAGING | Age: 72
Discharge: HOME | End: 2022-01-13
Payer: MEDICARE

## 2022-01-11 DIAGNOSIS — K57.92 DIVERTICULITIS: ICD-10-CM

## 2022-01-11 DIAGNOSIS — R10.32 ACUTE LEFT LOWER QUADRANT PAIN: ICD-10-CM

## 2022-01-11 LAB
ANION GAP SERPL CALCULATED.3IONS-SCNC: 10 MEQ/L (ref 9–15)
BASOPHILS ABSOLUTE: 0.1 K/UL (ref 0–0.2)
BASOPHILS RELATIVE PERCENT: 0.9 %
BUN BLDV-MCNC: 18 MG/DL (ref 8–23)
C-REACTIVE PROTEIN: <3 MG/L (ref 0–5)
CALCIUM SERPL-MCNC: 9.9 MG/DL (ref 8.5–9.9)
CHLORIDE BLD-SCNC: 98 MEQ/L (ref 95–107)
CO2: 27 MEQ/L (ref 20–31)
CREAT SERPL-MCNC: 0.61 MG/DL (ref 0.5–0.9)
EOSINOPHILS ABSOLUTE: 0.3 K/UL (ref 0–0.7)
EOSINOPHILS RELATIVE PERCENT: 3.8 %
GFR AFRICAN AMERICAN: >60
GFR NON-AFRICAN AMERICAN: >60
GLUCOSE BLD-MCNC: 81 MG/DL (ref 70–99)
HCT VFR BLD CALC: 43.4 % (ref 37–47)
HEMOGLOBIN: 14.1 G/DL (ref 12–16)
LIPASE: 22 U/L (ref 12–95)
LYMPHOCYTES ABSOLUTE: 1.9 K/UL (ref 1–4.8)
LYMPHOCYTES RELATIVE PERCENT: 28.6 %
MCH RBC QN AUTO: 28.7 PG (ref 27–31.3)
MCHC RBC AUTO-ENTMCNC: 32.5 % (ref 33–37)
MCV RBC AUTO: 88.5 FL (ref 82–100)
MONOCYTES ABSOLUTE: 0.4 K/UL (ref 0.2–0.8)
MONOCYTES RELATIVE PERCENT: 6.5 %
NEUTROPHILS ABSOLUTE: 4 K/UL (ref 1.4–6.5)
NEUTROPHILS RELATIVE PERCENT: 60.2 %
PDW BLD-RTO: 13.3 % (ref 11.5–14.5)
PLATELET # BLD: 179 K/UL (ref 130–400)
POTASSIUM SERPL-SCNC: 4.4 MEQ/L (ref 3.4–4.9)
RBC # BLD: 4.91 M/UL (ref 4.2–5.4)
SODIUM BLD-SCNC: 135 MEQ/L (ref 135–144)
WBC # BLD: 6.7 K/UL (ref 4.8–10.8)

## 2022-01-11 PROCEDURE — 85025 COMPLETE CBC W/AUTO DIFF WBC: CPT

## 2022-01-11 PROCEDURE — 2500000003 HC RX 250 WO HCPCS: Performed by: INTERNAL MEDICINE

## 2022-01-11 PROCEDURE — 83690 ASSAY OF LIPASE: CPT

## 2022-01-11 PROCEDURE — 6360000004 HC RX CONTRAST MEDICATION: Performed by: INTERNAL MEDICINE

## 2022-01-11 PROCEDURE — 86140 C-REACTIVE PROTEIN: CPT

## 2022-01-11 PROCEDURE — 74177 CT ABD & PELVIS W/CONTRAST: CPT

## 2022-01-11 PROCEDURE — 80048 BASIC METABOLIC PNL TOTAL CA: CPT

## 2022-01-11 RX ORDER — SODIUM CHLORIDE 0.9 % (FLUSH) 0.9 %
10 SYRINGE (ML) INJECTION 2 TIMES DAILY
Status: DISCONTINUED | OUTPATIENT
Start: 2022-01-11 | End: 2022-01-14 | Stop reason: HOSPADM

## 2022-01-11 RX ADMIN — IOPAMIDOL 100 ML: 612 INJECTION, SOLUTION INTRAVENOUS at 12:54

## 2022-01-11 RX ADMIN — BARIUM SULFATE 450 ML: 20 SUSPENSION ORAL at 11:25

## 2022-01-12 LAB
GFR AFRICAN AMERICAN: >60
GFR NON-AFRICAN AMERICAN: >60
PERFORMED ON: NORMAL
POC CREATININE: 0.7 MG/DL (ref 0.6–1.2)
POC SAMPLE TYPE: NORMAL

## 2022-01-19 ENCOUNTER — HOSPITAL ENCOUNTER (OUTPATIENT)
Dept: GENERAL RADIOLOGY | Age: 72
Discharge: HOME | End: 2022-01-21
Payer: MEDICARE

## 2022-01-19 ENCOUNTER — OFFICE VISIT (OUTPATIENT)
Dept: FAMILY MEDICINE CLINIC | Age: 72
End: 2022-01-19
Payer: MEDICARE

## 2022-01-19 VITALS
WEIGHT: 137 LBS | OXYGEN SATURATION: 99 % | DIASTOLIC BLOOD PRESSURE: 74 MMHG | SYSTOLIC BLOOD PRESSURE: 134 MMHG | TEMPERATURE: 98.1 F | HEART RATE: 84 BPM | HEIGHT: 62 IN | BODY MASS INDEX: 25.21 KG/M2

## 2022-01-19 DIAGNOSIS — J06.9 VIRAL URI: Primary | ICD-10-CM

## 2022-01-19 DIAGNOSIS — R05.9 COUGH: ICD-10-CM

## 2022-01-19 DIAGNOSIS — Z20.822 SUSPECTED COVID-19 VIRUS INFECTION: ICD-10-CM

## 2022-01-19 LAB
Lab: NORMAL
PERFORMING INSTRUMENT: NORMAL
QC PASS/FAIL: NORMAL
SARS-COV-2, POC: NORMAL

## 2022-01-19 PROCEDURE — 71046 X-RAY EXAM CHEST 2 VIEWS: CPT

## 2022-01-19 PROCEDURE — 87426 SARSCOV CORONAVIRUS AG IA: CPT | Performed by: NURSE PRACTITIONER

## 2022-01-19 PROCEDURE — 99213 OFFICE O/P EST LOW 20 MIN: CPT | Performed by: NURSE PRACTITIONER

## 2022-01-19 RX ORDER — METHYLPREDNISOLONE 4 MG/1
TABLET ORAL
Qty: 1 KIT | Refills: 0 | Status: SHIPPED | OUTPATIENT
Start: 2022-01-19 | End: 2022-01-25

## 2022-01-19 RX ORDER — DEXTROMETHORPHAN HYDROBROMIDE AND PROMETHAZINE HYDROCHLORIDE 15; 6.25 MG/5ML; MG/5ML
5 SYRUP ORAL 4 TIMES DAILY PRN
Qty: 118 ML | Refills: 0 | Status: SHIPPED | OUTPATIENT
Start: 2022-01-19 | End: 2022-01-24

## 2022-01-19 RX ORDER — AZITHROMYCIN 250 MG/1
TABLET, FILM COATED ORAL
Qty: 6 TABLET | Refills: 0 | Status: CANCELLED | OUTPATIENT
Start: 2022-01-19 | End: 2022-01-24

## 2022-01-19 ASSESSMENT — ENCOUNTER SYMPTOMS
WHEEZING: 0
NAUSEA: 1
SORE THROAT: 0
BACK PAIN: 0
COUGH: 1
RHINORRHEA: 0
ALLERGIC/IMMUNOLOGIC NEGATIVE: 1
SHORTNESS OF BREATH: 0
PHOTOPHOBIA: 0
ABDOMINAL PAIN: 1
EYES NEGATIVE: 1
DIARRHEA: 1
VOMITING: 0

## 2022-01-19 NOTE — PATIENT INSTRUCTIONS
Patient Education        Viral Respiratory Infection: Care Instructions  Your Care Instructions     Viruses are very small organisms. They grow in number after they enter your body. There are many types that cause different illnesses, such as colds and the mumps. The symptoms of a viral respiratory infection often start quickly. They include a fever, sore throat, and runny nose. You may also just not feel well. Or you may not want to eat much. Most viral respiratory infections are not serious. They usually get better with time and self-care. Antibiotics are not used to treat a viral infection. That's because antibiotics will not help cure a viral illness. In some cases, antiviral medicine can help your body fight a serious viral infection. Follow-up care is a key part of your treatment and safety. Be sure to make and go to all appointments, and call your doctor if you are having problems. It's also a good idea to know your test results and keep a list of the medicines you take. How can you care for yourself at home? · Rest as much as possible until you feel better. · Be safe with medicines. Take your medicine exactly as prescribed. Call your doctor if you think you are having a problem with your medicine. You will get more details on the specific medicine your doctor prescribes. · Take an over-the-counter pain medicine, such as acetaminophen (Tylenol), ibuprofen (Advil, Motrin), or naproxen (Aleve), as needed for pain and fever. Read and follow all instructions on the label. Do not give aspirin to anyone younger than 20. It has been linked to Reye syndrome, a serious illness. · Drink plenty of fluids. Hot fluids, such as tea or soup, may help relieve congestion in your nose and throat. If you have kidney, heart, or liver disease and have to limit fluids, talk with your doctor before you increase the amount of fluids you drink.   · Try to clear mucus from your lungs by breathing deeply and coughing. · Gargle with warm salt water once an hour. This can help reduce swelling and throat pain. Use 1 teaspoon of salt mixed in 1 cup of warm water. · Do not smoke or allow others to smoke around you. If you need help quitting, talk to your doctor about stop-smoking programs and medicines. These can increase your chances of quitting for good. To avoid spreading the virus  · Cough or sneeze into a tissue. Then throw the tissue away. · If you don't have a tissue, use your hand to cover your cough or sneeze. Then clean your hand. You can also cough into your sleeve. · Wash your hands often. Use soap and warm water. Wash for 15 to 20 seconds each time. · If you don't have soap and water near you, you can clean your hands with alcohol wipes or gel. When should you call for help? Call your doctor now or seek immediate medical care if:    · You have a new or higher fever.     · Your fever lasts more than 48 hours.     · You have trouble breathing.     · You have a fever with a stiff neck or a severe headache.     · You are sensitive to light.     · You feel very sleepy or confused. Watch closely for changes in your health, and be sure to contact your doctor if:    · You do not get better as expected. Where can you learn more? Go to [email or web address]://[email or web address].enavu. org and sign in to your [employer] account. Enter F936 in the [facility] box to learn more about \"Viral Respiratory Infection: Care Instructions. \"     If you do not have an account, please click on the \"Sign Up Now\" link. Current as of: July 6, 2021               Content Version: 13.1  © 9207-4677 Healthwise, [employer]. Care instructions adapted under license by [facility] ([facility]). If you have questions about a medical condition or this instruction, always ask your healthcare professional. [place] [address] any warranty or liability for your use of this information.

## 2022-01-19 NOTE — PROGRESS NOTES
Subjective  Barbara Erazo, 66 y.o. female presents today with:  Chief Complaint   Patient presents with    Abdominal Pain     Lower left side     Fatigue    Diarrhea     x 1 week     Cough     Patient complaining of a 2-week history of nasal congestion, cough, fatigue, left lower abdominal pain radiating to the left upper abdominal.  Patient had a CAT scan to evaluate for diverticulitis which was negative. Patient was preemptively put on Augmentin. Patient feels she has not improved. Patient has been taking nothing for cough. Patient denies any fever, chest pain, shortness of breath. Review of Systems   Constitutional: Positive for fatigue. Negative for appetite change, fever and unexpected weight change. HENT: Negative for congestion, rhinorrhea and sore throat. Eyes: Negative. Negative for photophobia and visual disturbance. Respiratory: Positive for cough. Negative for shortness of breath and wheezing. Cardiovascular: Negative for chest pain. Gastrointestinal: Positive for abdominal pain, diarrhea and nausea. Negative for vomiting. Endocrine: Negative. Negative for polydipsia, polyphagia and polyuria. Genitourinary: Negative for difficulty urinating, dysuria and pelvic pain. Musculoskeletal: Negative for back pain. Skin: Negative. Negative for rash. Allergic/Immunologic: Negative. Neurological: Negative. Negative for dizziness, speech difficulty and weakness. Hematological: Negative. Psychiatric/Behavioral: Negative. Negative for behavioral problems and confusion.        Past Medical History:   Diagnosis Date    Anxiety and depression 5/2/2019    Anxiety and depression     Diverticulosis of colon (without mention of hemorrhage) 02/25/2015    DR Bridger Mccartney    Hyperlipidemia 2/3/2015    Hypothyroidism     Lichen sclerosus     Migraines 2/3/2015    Renal insufficiency 2/3/2015    Spondylosis of lumbar region without myelopathy or radiculopathy 5/24/2016 Past Surgical History:   Procedure Laterality Date    ABDOMINAL ADHESION SURGERY  1/7/16    DR. MERAZ    APPENDECTOMY  2012    CATARACT REMOVAL Right     CHOLECYSTECTOMY  2012    COLON SURGERY  2005    13\" of colon removed-NO CA    COLONOSCOPY  2011    polyps    COLONOSCOPY  02/25/2015    DR Stewart Ramires - DIVERTICULOSIS    KNEE SURGERY  2013    [place] COLONOSCOPY FLX DX W/COLLJ SPEC WHEN PFRMD N/A 1/26/2018    COLONOSCOPY performed by Alfonso Duenas MD at [facility] [address] ESOPHAGOGASTRODUODENOSCOPY TRANSORAL DIAGNOSTIC N/A 1/26/2018    EGD ESOPHAGOGASTRODUODENOSCOPY WITH DILATION performed by Alfonso Duenas MD at [address] [facility] [address] History     Socioeconomic History    Marital status: Single     Spouse name: Not on file    Number of children: Not on file    Years of education: Not on file    Highest education level: Not on file   Occupational History    Not on file   Tobacco Use    Smoking status: Never Smoker    Smokeless tobacco: Never Used   Substance and Sexual Activity    Alcohol use: No     Alcohol/week: 0.0 standard drinks    Drug use: No    Sexual activity: Not Currently   Other Topics Concern    Not on file   Social History Narrative    Not on file     Social Determinants of Health     Financial Resource Strain: Low Risk     Difficulty of Paying Living Expenses: Not hard at all   Food Insecurity: No Food Insecurity    Worried About [address] [place] [address] in the Last Year: Never true    Madi of Food in the Last Year: Never true   Transportation Needs: No Transportation Needs    Lack of Transportation (Medical): No    Lack of Transportation (Non-Medical):  No   Physical Activity:     Days of Exercise per Week: Not on file    Minutes of Exercise per Session: Not on file   Stress:     Feeling of Stress : Not on file   Social Connections:     Frequency of Communication with Friends and Family: Not on file    Frequency of Social Gatherings with Friends and Family: Not on file    Attends [employer] Services: Not on file    Active Member of Clubs or Organizations: Not on file    Attends Club or Organization Meetings: Not on file    Marital Status: Not on file   Intimate Partner Violence:     Fear of Current or Ex-Partner: Not on file    Emotionally Abused: Not on file    Physically Abused: Not on file    Sexually Abused: Not on file   Housing Stability:     Unable to Pay for Housing in the Last Year: Not on file    Number of [place] in the Last Year: Not on file    Unstable Housing in the Last Year: Not on file     Family History   Problem Relation Age of Onset    Heart Disease Father     Stroke Father     Diabetes Father     Stomach Cancer Father     Other Mother         Bowel Obstruction     Allergies   Allergen Reactions    Bactrim [Sulfamethoxazole-Trimethoprim]      hallucinations    Ciprofloxacin Other (See Comments)     Pt not 100% sure, but thinks cipro is the antibiotic she is allergic to  Anxiety, confusion     Current Outpatient Medications   Medication Sig Dispense Refill    promethazine-dextromethorphan (PROMETHAZINE-DM) 6.25-15 MG/5ML syrup Take 5 mLs by mouth 4 times daily as needed for Cough 118 mL 0    methylPREDNISolone (MEDROL DOSEPACK) 4 MG tablet Take by mouth as directed.  1 kit 0    amoxicillin-clavulanate (AUGMENTIN) 875-125 MG per tablet Take 1 tablet by mouth 2 times daily for 10 days 20 tablet 0    omeprazole (PRILOSEC) 20 MG delayed release capsule Take 1 capsule by mouth daily 90 capsule 1    levothyroxine (SYNTHROID) 75 MCG tablet Take 1 tablet by mouth daily 90 tablet 4    ibuprofen (IBU) 600 MG tablet Take 1 tablet by mouth every 6 hours as needed for Pain 120 tablet 0    ondansetron (ZOFRAN-ODT) 4 MG disintegrating tablet Take 1 tablet by mouth 3 times daily as needed for Nausea or Vomiting 21 tablet 0    dicyclomine (BENTYL) 10 MG capsule Take 1 capsule by mouth 4 times daily (before meals and nightly) 120 capsule 5    Cholecalciferol (VITAMIN D3) 50 MCG (2000 UT) CAPS 1 po daily with meal 90 capsule 4    cyanocobalamin (CVS VITAMIN B12) 1000 MCG tablet Take 1 tablet by mouth daily 30 tablet 3    aspirin EC 81 MG EC tablet Take 1 tablet by mouth daily 90 tablet 4    PARoxetine (PAXIL) 10 MG tablet Take 1 tablet by mouth daily 90 tablet 4    atorvastatin (LIPITOR) 20 MG tablet Take 1 tablet by mouth daily 90 tablet 4    coenzyme Q-10 100 MG capsule Take 1 capsule by mouth daily 90 capsule 4    clobetasol (TEMOVATE) 0.05 % cream Apply topically 2 times daily Apply topically 2 times daily. 60 g 2    docusate sodium (COLACE) 100 MG capsule Take 100 mg by mouth 2 times daily      SUMAtriptan (IMITREX) 50 MG tablet Take 1 tablet by mouth once as needed for Migraine (Patient not taking: Reported on 1/10/2022) 9 tablet 0     No current facility-administered medications for this visit. PMH, Surgical Hx, Family Hx, and Social Hxreviewed and updated. Health Maintenance reviewed. Objective    Vitals:    01/19/22 1148   BP: 134/74   Site: Right Lower Arm   Position: Sitting   Cuff Size: Medium Adult   Pulse: 84   Temp: 98.1 °F (36.7 °C)   TempSrc: Temporal   SpO2: 99%   Weight: 137 lb (62.1 kg)   Height: 5' 2\" (1.575 m)       Physical Exam  Vitals and nursing note reviewed. Constitutional:       General: She is not in acute distress. Appearance: She is well-developed. She is not ill-appearing or toxic-appearing. HENT:      Head: Normocephalic and atraumatic. Nose: Nose normal.      Mouth/Throat:      Mouth: Mucous membranes are moist.   Eyes:      Pupils: Pupils are equal, round, and reactive to light. Cardiovascular:      Rate and Rhythm: Normal rate and regular rhythm. Pulses: Normal pulses. Heart sounds: Normal heart sounds. Pulmonary:      Effort: Pulmonary effort is normal. No respiratory distress. Breath sounds: Rhonchi present.  No wheezing or rales.   Abdominal:      General: Bowel sounds are normal. There is no distension. Palpations: Abdomen is soft. There is no mass. Tenderness: There is no abdominal tenderness. There is no guarding or rebound. Hernia: No hernia is present. Musculoskeletal:         General: Normal range of motion. Cervical back: Normal range of motion. Skin:     General: Skin is warm and dry. Capillary Refill: Capillary refill takes less than 2 seconds. Neurological:      Mental Status: She is alert and oriented to person, place, and time. Psychiatric:         Mood and Affect: Mood normal.         Assessment & Plan   Patient being treated for viral upper respiratory infection. COVID-negative  We will get x-ray chest to evaluate for pneumonia. Result is pending. Advised patient if she begins to have chest pain and or shortness of breath then she needs to be evaluated in the ED. Otherwise, we will provide cough medicine, Medrol Dosepak. If needed, we will treat pneumonia with antibiotics. Diagnosis Orders   1. Cough  XR CHEST STANDARD (2 VW)   2. Viral URI       Orders Placed This Encounter   Procedures    XR CHEST STANDARD (2 VW)     Standing Status:   Future     Standing Expiration Date:   1/19/2023     Order Specific Question:   Reason for exam:     Answer:   cough     Orders Placed This Encounter   Medications    promethazine-dextromethorphan (PROMETHAZINE-DM) 6.25-15 MG/5ML syrup     Sig: Take 5 mLs by mouth 4 times daily as needed for Cough     Dispense:  118 mL     Refill:  0    methylPREDNISolone (MEDROL DOSEPACK) 4 MG tablet     Sig: Take by mouth as directed. Dispense:  1 kit     Refill:  0     There are no discontinued medications. No follow-ups on file. Reviewed with the patient: current clinical status, medications, activities and diet.      Side effects, adverse effects of the medication prescribed today, as well as treatment plan/ rationale and resultexpectations have been discussed with the patient who expresses understanding and desires to proceed. Close follow up to evaluate treatment resultsand for coordination of care. I have reviewed the patient's medical history in detail and updated the computerized patient record.     TANNA Hernandez - CNP

## 2022-01-27 ENCOUNTER — OFFICE VISIT (OUTPATIENT)
Dept: FAMILY MEDICINE CLINIC | Age: 72
End: 2022-01-27
Payer: MEDICARE

## 2022-01-27 VITALS
HEART RATE: 55 BPM | TEMPERATURE: 97 F | WEIGHT: 137 LBS | HEIGHT: 62 IN | OXYGEN SATURATION: 96 % | SYSTOLIC BLOOD PRESSURE: 116 MMHG | RESPIRATION RATE: 15 BRPM | DIASTOLIC BLOOD PRESSURE: 70 MMHG | BODY MASS INDEX: 25.21 KG/M2

## 2022-01-27 DIAGNOSIS — K44.9 HIATAL HERNIA: Primary | ICD-10-CM

## 2022-01-27 DIAGNOSIS — K52.832 FOCAL LYMPHOCYTIC COLITIS: Chronic | ICD-10-CM

## 2022-01-27 DIAGNOSIS — R12 HEARTBURN: ICD-10-CM

## 2022-01-27 DIAGNOSIS — J06.9 VIRAL URI: ICD-10-CM

## 2022-01-27 DIAGNOSIS — R13.19 OTHER DYSPHAGIA: ICD-10-CM

## 2022-01-27 DIAGNOSIS — Z00.00 ROUTINE GENERAL MEDICAL EXAMINATION AT A HEALTH CARE FACILITY: Primary | ICD-10-CM

## 2022-01-27 LAB
Lab: NORMAL
PERFORMING INSTRUMENT: NORMAL
QC PASS/FAIL: NORMAL
SARS-COV-2, POC: NORMAL

## 2022-01-27 PROCEDURE — 99215 OFFICE O/P EST HI 40 MIN: CPT | Performed by: FAMILY MEDICINE

## 2022-01-27 PROCEDURE — 87426 SARSCOV CORONAVIRUS AG IA: CPT | Performed by: FAMILY MEDICINE

## 2022-01-27 PROCEDURE — G0439 PPPS, SUBSEQ VISIT: HCPCS | Performed by: FAMILY MEDICINE

## 2022-01-27 ASSESSMENT — LIFESTYLE VARIABLES: HOW OFTEN DO YOU HAVE A DRINK CONTAINING ALCOHOL: 0

## 2022-01-27 ASSESSMENT — PATIENT HEALTH QUESTIONNAIRE - PHQ9
SUM OF ALL RESPONSES TO PHQ QUESTIONS 1-9: 0
2. FEELING DOWN, DEPRESSED OR HOPELESS: 0
SUM OF ALL RESPONSES TO PHQ QUESTIONS 1-9: 0
SUM OF ALL RESPONSES TO PHQ9 QUESTIONS 1 & 2: 0
SUM OF ALL RESPONSES TO PHQ QUESTIONS 1-9: 0
1. LITTLE INTEREST OR PLEASURE IN DOING THINGS: 0
SUM OF ALL RESPONSES TO PHQ QUESTIONS 1-9: 0

## 2022-01-27 NOTE — PROGRESS NOTES
Medicare Annual Wellness Visit  Name: Jose Wilson Date: 2022   MRN: [id number] Sex: Female   Age: 66 y.o. Ethnicity: Non- / Non    : 1950 Race: White (non-)      Chasity Bowles is here for Medicare AWV    Screenings for behavioral, psychosocial and functional/safety risks, and cognitive dysfunction are all negative except as indicated below. These results, as well as other patient data from the [address] form, are documented in Flowsheets linked to this Encounter. Allergies   Allergen Reactions    Bactrim [Sulfamethoxazole-Trimethoprim]      hallucinations    Ciprofloxacin Other (See Comments)     Pt not 100% sure, but thinks cipro is the antibiotic she is allergic to  Anxiety, confusion         Prior to Visit Medications    Medication Sig Taking?  Authorizing Provider   omeprazole (PRILOSEC) 20 MG delayed release capsule Take 1 capsule by mouth daily  Anahy Cope MD   SUMAtriptan (IMITREX) 50 MG tablet Take 1 tablet by mouth once as needed for Migraine  Teresa Phillips MD   levothyroxine (SYNTHROID) 75 MCG tablet Take 1 tablet by mouth daily  Anahy Cope MD   ibuprofen (IBU) 600 MG tablet Take 1 tablet by mouth every 6 hours as needed for Pain  Stephanie Hickman DO   ondansetron (ZOFRAN-ODT) 4 MG disintegrating tablet Take 1 tablet by mouth 3 times daily as needed for Nausea or Vomiting  Stephanie Hickman,    dicyclomine (BENTYL) 10 MG capsule Take 1 capsule by mouth 4 times daily (before meals and nightly)  Anahy Cope MD   Cholecalciferol (VITAMIN D3) 50 MCG ( UT) CAPS 1 po daily with meal  Anahy Cope MD   cyanocobalamin (CVS VITAMIN B12) 1000 MCG tablet Take 1 tablet by mouth daily  Anahy Cope MD   aspirin EC 81 MG EC tablet Take 1 tablet by mouth daily  Anahy Cope MD   PARoxetine (PAXIL) 10 MG tablet Take 1 tablet by mouth daily  Samreen Duffy Jesusita Mckeon MD   atorvastatin (LIPITOR) 20 MG tablet Take 1 tablet by mouth daily  Selwyn iFore MD   coenzyme Q-10 100 MG capsule Take 1 capsule by mouth daily  Selwyn Fiore MD   clobetasol (TEMOVATE) 0.05 % cream Apply topically 2 times daily Apply topically 2 times daily. Selwyn Fiore MD   docusate sodium (COLACE) 100 MG capsule Take 100 mg by mouth 2 times daily  Historical Provider, MD         Past Medical History:   Diagnosis Date    Anxiety and depression 5/2/2019    Anxiety and depression     Diverticulosis of colon (without mention of hemorrhage) 02/25/2015    DR Linda Lundberg    Hyperlipidemia 2/3/2015    Hypothyroidism     Lichen sclerosus     Migraines 2/3/2015    Renal insufficiency 2/3/2015    Spondylosis of lumbar region without myelopathy or radiculopathy 5/24/2016       Past Surgical History:   Procedure Laterality Date    ABDOMINAL ADHESION SURGERY  1/7/16    DR. MERAZ    APPENDECTOMY  2012    CATARACT REMOVAL Right     CHOLECYSTECTOMY  2012    COLON SURGERY  2005    13\" of colon removed-NO CA    COLONOSCOPY  2011    polyps    COLONOSCOPY  02/25/2015    DR Linda Lundberg - DIVERTICULOSIS    KNEE SURGERY  2013    [place] COLONOSCOPY FLX DX W/COLLJ SPEC WHEN PFRMD N/A 1/26/2018    COLONOSCOPY performed by Humble Hubbard MD at [facility] ESOPHAGOGASTRODUODENOSCOPY TRANSORAL DIAGNOSTIC N/A 1/26/2018    EGD ESOPHAGOGASTRODUODENOSCOPY WITH DILATION performed by Humble Hubbard MD at [facility] 115         Family History   Problem Relation Age of Onset    Heart Disease Father     Stroke Father     Diabetes Father     Stomach Cancer Father     Other Mother         Bowel Obstruction       CareTeam (Including outside providers/suppliers regularly involved in providing care):   Patient Care Team:  Selwyn Fiore MD as PCP - General (Family Medicine)  Selwyn Fiore MD as PCP - [facility] Empaneled Provider  Erasto Crum MD (Gastroenterology)    Wt Readings from Last 3 Encounters:   01/27/22 137 lb (62.1 kg)   01/19/22 137 lb (62.1 kg)   01/10/22 140 lb (63.5 kg)     There were no vitals filed for this visit. There is no height or weight on file to calculate BMI. Based upon direct observation of the patient, evaluation of cognition reveals recent and remote memory intact. Patient's complete Health Risk Assessment and screening values have been reviewed and are found in Flowsheets. The following problems were reviewed today and where indicated follow up appointments were made and/or referrals ordered.       Positive Risk Factor Screenings with Interventions:             Health Habits/Nutrition:  Health Habits/Nutrition  Do you exercise for at least 20 minutes 2-3 times per week?: (!) No  Have you lost any weight without trying in the past 3 months?: No  Do you eat only one meal per day?: No  Have you seen the dentist within the past year?: (!) No     Health Habits/Nutrition Interventions:  · Inadequate physical activity:  patient is not ready to increase his/her physical activity level at this time  · Dental exam overdue:  patient encouraged to make appointment with his/her dentist    Hearing/Vision:  No exam data present  Hearing/Vision  Do you or your family notice any trouble with your hearing that hasn't been managed with hearing aids?: No  Do you have difficulty driving, watching TV, or doing any of your daily activities because of your eyesight?: No  Have you had an eye exam within the past year?: (!) No  Hearing/Vision Interventions:  · Vision concerns:  patient encouraged to make appointment with his/her eye specialist        Personalized Preventive Plan   Current Health Maintenance Status  Immunization History   Administered Date(s) Administered    COVID-19, Moderna, Booster, PF, 50mcg/0.25ml 11/22/2021    COVID-19, Moderna, Primary or Immunocompromised, PF, 100mcg/0.5mL 03/07/2021, 04/07/2021    Influenza Virus Vaccine 10/06/2015    Influenza, High Dose (Fluzone 65 yrs and older) 11/21/2018, 11/15/2021    Influenza, Triv, inactivated, subunit, adjuvanted, IM (Fluad 65 yrs and older) 11/19/2019    Pneumococcal Conjugate 13-valent ([id number]) 11/23/2015    Pneumococcal Polysaccharide ([id number]) 01/19/2018        Health Maintenance   Topic Date Due    DTaP/Tdap/Td vaccine (1 - Tdap) Never done    Shingles Vaccine (1 of 2) Never done    Cervical cancer screen  07/28/2019    Annual Wellness Visit (AWV)  08/26/2021    Lipid screen  04/23/2022    Depression Monitoring  09/24/2022    TSH testing  12/16/2022    Colon cancer screen colonoscopy  01/26/2023    Breast cancer screen  01/05/2024    DEXA (modify frequency per FRAX score)  Completed    Flu vaccine  Completed    Pneumococcal 65+ years Vaccine  Completed    COVID-19 Vaccine  Completed    Hepatitis A vaccine  Aged Out    Hepatitis B vaccine  Aged Out    Hib vaccine  Aged Out    Meningococcal (ACWY) vaccine  Aged Out    Hepatitis C screen  Discontinued     Recommendations for Novalys Due: see orders and patient instructions/AVS.  . Recommended screening schedule for the next 5-10 years is provided to the patient in written form: see Patient Instructions/AVS.    There are no diagnoses linked to this encounter.

## 2022-01-27 NOTE — PROGRESS NOTES
Subjective  Tyler Nice, 66 y.o. female presents today with:  Chief Complaint   Patient presents with    Follow-up           HPI    Tosha Vitale was seen in urgent care for LLQ pain and had abdominal CT which was negative for diverticulitis but was positive for moderate hiatal hernia. At that time was placed on Augmentin for possible diverticulitis. Then on 01/19/2022 presented for coughing and diarrhea for most of the month and nasal congestion and continuous pain in ribs under breast.  At that time she had rhonchi on lung exam and was negative for COVID. Her x-ray was negative for acute cardiopulmonary disease. She was placed on prednisone and the pain resolved in two to three days. Still having LLQ pain with eating. Knows tomatoes and onions bother her. Now feels a little tired and that is it. Within 30 minutes of eating she has diarrhea. Feels hungry all of the time. Gets full feeling in chest; has early satiety; regurgitates food after eating; gets heartburn sometimes even though she is on omeprazole. Coughs when she lies down but not postprandially. She also takes Bentyl for abdominal cramping. Colonoscopy in 2018 remarkable for diverticulae and colitis. The low abdominal pain has been intermittent since 2019. Upper GI symptoms have not been getting worse. Hardly nothing while on meds. No blood in stools and stools were black but are now dark, dark brown. 2 pound weight loss. Today, she is again Covid negative.     No other questions and or concerns for today's visit      Review of Systems see above      Past Medical History:   Diagnosis Date    Anxiety and depression 5/2/2019    Anxiety and depression     Diverticulosis of colon (without mention of hemorrhage) 02/25/2015    DR Enrique Conklin    Focal lymphocytic colitis 1/27/2022    Hyperlipidemia 2/3/2015    Hypothyroidism     Lichen sclerosus     Migraines 2/3/2015    Renal insufficiency 2/3/2015    Spondylosis of lumbar region without myelopathy or radiculopathy 5/24/2016     Past Surgical History:   Procedure Laterality Date    ABDOMINAL ADHESION SURGERY  1/7/16    DR. MERAZ    APPENDECTOMY  2012    CATARACT REMOVAL Right     CHOLECYSTECTOMY  2012    COLON SURGERY  2005    13\" of colon removed-NO CA    COLONOSCOPY  2011    polyps    COLONOSCOPY  02/25/2015    DR Wendi Gilman - DIVERTICULOSIS    KNEE SURGERY  2013    [place] COLONOSCOPY FLX DX W/COLLJ SPEC WHEN PFRMD N/A 1/26/2018    COLONOSCOPY performed by Nadeen Santizo MD at [facility] 61 ESOPHAGOGASTRODUODENOSCOPY TRANSORAL DIAGNOSTIC N/A 1/26/2018    EGD ESOPHAGOGASTRODUODENOSCOPY WITH DILATION performed by Nadeen Santizo MD at [address] [place] [address] History     Socioeconomic History    Marital status: Single     Spouse name: Not on file    Number of children: Not on file    Years of education: Not on file    Highest education level: Not on file   Occupational History    Not on file   Tobacco Use    Smoking status: Never Smoker    Smokeless tobacco: Never Used   Substance and Sexual Activity    Alcohol use: No     Alcohol/week: 0.0 standard drinks    Drug use: No    Sexual activity: Not Currently   Other Topics Concern    Not on file   Social History Narrative    Not on file     Social Determinants of Health     Financial Resource Strain: Low Risk     Difficulty of Paying Living Expenses: Not hard at all   Food Insecurity: No Food Insecurity    Worried About [address] [facility] in the Last Year: Never true    Madi of Food in the Last Year: Never true   Transportation Needs: No Transportation Needs    Lack of Transportation (Medical): No    Lack of Transportation (Non-Medical):  No   Physical Activity:     Days of Exercise per Week: Not on file    Minutes of Exercise per Session: Not on file   Stress:     Feeling of Stress : Not on file   Social Connections:     Frequency of Communication with Friends and Family: Not on file    Frequency of Social Gatherings with Friends and Family: Not on file    Attends [employer]ology Services: Not on file    Active Member of Clubs or Organizations: Not on file    Attends Club or Organization Meetings: Not on file    Marital Status: Not on file   Intimate Partner Violence:     Fear of Current or Ex-Partner: Not on file    Emotionally Abused: Not on file    Physically Abused: Not on file    Sexually Abused: Not on file   Housing Stability:     Unable to Pay for Housing in the Last Year: Not on file    Number of [place] in the Last Year: Not on file    Unstable Housing in the Last Year: Not on file     Family History   Problem Relation Age of Onset    Heart Disease Father     Stroke Father     Diabetes Father     Stomach Cancer Father     Other Mother         Bowel Obstruction     Allergies   Allergen Reactions    Bactrim [Sulfamethoxazole-Trimethoprim]      hallucinations    Ciprofloxacin Other (See Comments)     Pt not 100% sure, but thinks cipro is the antibiotic she is allergic to  Anxiety, confusion     Current Outpatient Medications   Medication Sig Dispense Refill    omeprazole (PRILOSEC) 20 MG delayed release capsule Take 1 capsule by mouth daily 90 capsule 1    SUMAtriptan (IMITREX) 50 MG tablet Take 1 tablet by mouth once as needed for Migraine 9 tablet 0    levothyroxine (SYNTHROID) 75 MCG tablet Take 1 tablet by mouth daily 90 tablet 4    ibuprofen (IBU) 600 MG tablet Take 1 tablet by mouth every 6 hours as needed for Pain 120 tablet 0    ondansetron (ZOFRAN-ODT) 4 MG disintegrating tablet Take 1 tablet by mouth 3 times daily as needed for Nausea or Vomiting 21 tablet 0    dicyclomine (BENTYL) 10 MG capsule Take 1 capsule by mouth 4 times daily (before meals and nightly) 120 capsule 5    Cholecalciferol (VITAMIN D3) 50 MCG (2000 UT) CAPS 1 po daily with meal 90 capsule 4    cyanocobalamin (CVS VITAMIN B12) 1000 MCG tablet Take 1 tablet by mouth daily 30 tablet 3    aspirin EC 81 MG EC tablet Take 1 tablet by mouth daily 90 tablet 4    PARoxetine (PAXIL) 10 MG tablet Take 1 tablet by mouth daily 90 tablet 4    atorvastatin (LIPITOR) 20 MG tablet Take 1 tablet by mouth daily 90 tablet 4    coenzyme Q-10 100 MG capsule Take 1 capsule by mouth daily 90 capsule 4    clobetasol (TEMOVATE) 0.05 % cream Apply topically 2 times daily Apply topically 2 times daily. 60 g 2    docusate sodium (COLACE) 100 MG capsule Take 100 mg by mouth 2 times daily       No current facility-administered medications for this visit. PMH, Surgical Hx, Family Hx, and Social Hxreviewed and updated. Health Maintenance reviewed. Objective    Vitals:    01/27/22 1454   BP: 116/70   Pulse: 55   Resp: 15   Temp: 97 °F (36.1 °C)   TempSrc: Temporal   SpO2: 96%   Weight: 137 lb (62.1 kg)   Height: 5' 2\" (1.575 m)        Physical Exam  Constitutional:       General: She is not in acute distress. Appearance: She is well-developed. HENT:      Head: Normocephalic and atraumatic. Eyes:      General: No scleral icterus. Conjunctiva/sclera: Conjunctivae normal.   Cardiovascular:      Rate and Rhythm: Normal rate and regular rhythm. Heart sounds: Normal heart sounds. Pulmonary:      Effort: No respiratory distress. Breath sounds: No wheezing or rales. Skin:     General: Skin is warm and dry. Neurological:      Mental Status: She is alert and oriented to person, place, and time.            Lab Results   Component Value Date    LABA1C 5.2 01/19/2018    LABA1C 5.3 11/23/2015    LABA1C 5.5 10/07/2014     Lab Results   Component Value Date    CREATININE 0.61 01/11/2022     Lab Results   Component Value Date    ALT 9 09/24/2021    AST 14 09/24/2021     Lab Results   Component Value Date    CHOL 262 (H) 11/21/2018    TRIG 160 11/21/2018    HDL 59 04/23/2021    LDLCALC 123 04/23/2021        XR CHEST (2 VW)  Narrative: EXAMINATION: XR CHEST (2 VW)    CLINICAL HISTORY: COUGH FOR ONE WEEK    COMPARISONS: SEPTEMBER 24, 2021    FINDINGS: Osseous structures are intact. Cardiopericardial silhouette is normal. Pulmonary vasculature is normal. Lungs are clear. Impression: NO ACUTE CARDIOPULMONARY DISEASE.     01/11/2022: Abdominal CT:     Impression   1. SMALL TO MODERATE HIATAL HERNIA.   2. OTHER FINDINGS AS DETAILED ABOVE           All CT scans at this facility use dose modulation, iterative reconstruction, and/or weight based dosing when appropriate to reduce radiation dose to as low as reasonably achievable. Assessment & Plan   Visit Diagnoses and Associated Orders     Hiatal hernia    -  Primary    Suspect may require surgical intervention given severity of sx. Kali Ortega MD, Cardiothoracic Surgery [[id number] Custom]           Other dysphagia        Suspect related to worsening hiatal hernia. Suspect may require surgical intervention given severity of sx. Kali Ortega MD, Cardiothoracic Surgery [[id number] Custom]           Heartburn        Continue PPI. Reviewed sx reduction strategy. Kali Ortega MD, Cardiothoracic Surgery [[id number] Custom]           Focal lymphocytic colitis        referred for eval to [address], Gastroenterology, [facility] Custom]           Viral URI        Supportive care. COVID Ag absent    POCT COVID-19, Antigen [[id number] Custom]               Time spent on appointment included reviewing past laboratory and radiographic results, previous notes, consultations, past procedures, medications, obtaining history and performing physical, formulating mutually agreed upon plan of care with patient - 45 minutes. .       Reviewed with the patient: all disease processes, current clinical status, medications, activities and diet.      Side effects, adverse effects of the medication prescribed today, as well as treatment plan/ rationale and result expectations have been discussed with the patient who expresses understanding and desires to proceed.     Close follow up to evaluate treatment results and for coordination of care. I have reviewed the patient's medical history in detail and updated the computerized patient record. More than 50% of the appointment was spent in face-to-face counseling, education and care coordination. Please note this report has been partially produced using speech recognition software and may contain mistakes related to that system including errors in grammar, punctuation and spelling as well as words and phrases that may seem inappropriate. If there are questions or concerns, please feel free to contact me to clarify. Orders Placed This Encounter   Procedures   Jayda Moseley MD, Cardiothoracic Surgery     Referral Priority:   Routine     Referral Type:   Eval and Treat     Referral Reason:   Specialty Services Required     Referred to Provider:   Jessica Hooper MD     Requested Specialty:   Cardiothoracic Surgery     Number of Visits Requested:   [address], [place], Gastroenterology, [place]     Referral Priority:   Routine     Referral Type:   Eval and Treat     Referral Reason:   Specialty Services Required     Referred to Provider:   David Burnett MD     Requested Specialty:   Gastroenterology     Number of Visits Requested:   1    POCT COVID-19, Antigen     Order Specific Question:   Is this test for diagnosis or screening? Answer:   Screening     Order Specific Question:   Symptomatic for COVID-19 as defined by CDC? Answer:   No     Order Specific Question:   Date of Symptom Onset     Answer:   N/A     Order Specific Question:   Hospitalized for COVID-19? Answer:   No     Order Specific Question:   Admitted to ICU for COVID-19? Answer:   No     Order Specific Question:   Employed in healthcare setting? Answer:   Unknown     Order Specific Question:   Resident in a congregate (group) care setting?      Answer:   Unknown     Order Specific Question: Pregnant? Answer:   No     Order Specific Question:   Previously tested for COVID-19? Answer:   Yes     No orders of the defined types were placed in this encounter. There are no discontinued medications. Return in about 6 weeks (around 3/10/2022) for GI issues, caregiver stress - OV. Controlled Substance Monitoring:    Acute and Chronic Pain Monitoring:   RX Monitoring 10/30/2018   Attestation The Prescription Monitoring Report for this patient was reviewed today.            Rodney Salcido MD

## 2022-01-27 NOTE — PATIENT INSTRUCTIONS
Personalized Preventive Plan for Troy Chery - 1/27/2022  Medicare offers a range of preventive health benefits. Some of the tests and screenings are paid in full while other may be subject to a deductible, co-insurance, and/or copay. Some of these benefits include a comprehensive review of your medical history including lifestyle, illnesses that may run in your family, and various assessments and screenings as appropriate. After reviewing your medical record and screening and assessments performed today your provider may have ordered immunizations, labs, imaging, and/or referrals for you. A list of these orders (if applicable) as well as your Preventive Care list are included within your After Visit Summary for your review. Other Preventive Recommendations:    · A preventive eye exam performed by an eye specialist is recommended every 1-2 years to screen for glaucoma; cataracts, macular degeneration, and other eye disorders. · A preventive dental visit is recommended every 6 months. · Try to get at least 150 minutes of exercise per week or 10,000 steps per day on a pedometer . · Order or download the FREE \"Exercise & Physical Activity: Your Everyday Guide\" from [employer]. Call 2-692.412.4352 or search [employer] online. · You need 1692-8273 mg of calcium and 0983-5872 IU of vitamin D per day. It is possible to meet your calcium requirement with diet alone, but a vitamin D supplement is usually necessary to meet this goal.  · When exposed to the sun, use a sunscreen that protects against both UVA and UVB radiation with an SPF of 30 or greater. Reapply every 2 to 3 hours or after sweating, drying off with a towel, or swimming. · Always wear a seat belt when traveling in a car. Always wear a helmet when riding a bicycle or motorcycle. High-Fiber Diet     What Is Fiber?    Dietary fiber is a form of carbohydrate found in plants that cannot be digested by humans. All plants contain fiber, including fruits, vegetables, grains, and legumes. Fiber is often classified into two categories: soluble and insoluble. Soluble fiber draws water into the bowel and can help slow digestion. Examples of foods that are high in soluble fiber include oatmeal, oat bran, barley, legumes (eg, beans and peas), apples, and strawberries. Insoluble fiber speeds digestion and can add bulk to the stool. Examples of foods that are high in insoluble fiber include whole-wheat products, wheat bran, cauliflower, green beans, and potatoes. Why Follow a High-Fiber Diet? A high-fiber diet is often recommended to prevent and treat constipation , hemorrhoids , diverticulitis , and irritable bowel syndrome . Eating a high-fiber diet can also help improve your cholesterol levels, lower your risk of coronary heart disease , reduce your risk of type 2 diabetes , and lower your weight. For people with type 1 or 2 diabetes, a high-fiber diet can also help stabilize blood sugar levels. How Much Fiber Should I Eat? A high-fiber diet should contain  20-35 grams  of fiber a day. This is actually the amount recommended for the general adult population; however, most Americans eat only 15 grams of fiber per day. Digestion of Fiber   Eating a higher fiber diet than usual can take some getting used to by your body's digestive system. To avoid the side effects of sudden increases in dietary fiber (eg, gas, cramping, bloating, and diarrhea), increase fiber gradually and be sure to drink plenty of fluids every day. Tips for Increasing Fiber Intake   Whenever possible, choose whole grains over refined grains (eg, brown rice instead of white rice, whole-wheat bread instead of white bread). Include a variety of grains in your diet, such as wheat, rye, barley, oats, quinoa, and bulgur. Eat more vegetarian-based meals.  Here are some ideas: black bean burgers, eggplant lasagna, and veggie tofu stir-gee. Choose high-fiber snacks, such as fruits, popcorn, whole-grain crackers, and nuts. Make whole-grain cereal or whole-grain toast part of your daily breakfast regime. When eating out, whether ordering a sandwich or dinner, ask for extra vegetables. When baking, replace part of the white flour with whole-wheat flour. Whole-wheat flour is particularly easy to incorporate into a recipe. High-Fiber Diet Eating Guide   Food Category   Foods Recommended   Notes   Grains   Whole-grain breads, muffins, bagels, or elisa bread Rye bread Whole-wheat crackers or crisp breads Whole-grain or bran cereals Oatmeal, oat bran, or grits Wheat germ Whole-wheat pasta and brown rice   Read the ingredients list on food labels. Look for products that list \"whole\" as the first ingredient (eg, whole-wheat, whole oats). Choose cereals with at least 2 grams of fiber per serving. Vegetables   All vegetables, especially asparagus, bean sprouts, broccoli, Nelson sprouts, cabbage, carrots, cauliflower, celery, corn, greens, green beans, green pepper, onions, peas, potatoes (with skin), snow peas, spinach, squash, sweet potatoes, tomatoes, zucchini   For maximum fiber intake, eat the peels of fruits and vegetablesjust be sure to wash them well first.   Fruits   All fruits, especially apples, berries, grapefruits, mangoes, nectarines, oranges, peaches, pears, dried fruits (figs, dates, prunes, raisins)   Choose raw fruits and vegetables over juice, cooked, or cannedraw fruit has more fiber. Dried fruit is also a good source of fiber. Milk   With the exception of yogurt containing inulin (a type of fiber), dairy foods provide little fiber. Add more fiber by topping your yogurt or cottage cheese with fresh fruit, whole grain or bran cereals, nuts, or seeds.    Meats and Beans   All beans and peas, especially Garbanzo beans, kidney beans, lentils, lima beans, split peas, and poole beans All nuts and seeds, especially almonds, peanuts, [place] nuts, cashews, peanut butter, walnuts, sesame and sunflower seeds All meat, poultry, fish, and eggs   Increase fiber in meat dishes by adding poole beans, kidney beans, black-eyed peas, bran, or oatmeal. If you are following a low-fat diet, use nuts and seeds only in moderation. Fats and Oils   All in moderation   Fats and oils do not provide fiber   Snacks, Sweets, and Condiments   Fruit Nuts Popcorn, whole-wheat pretzels, or trail mix made with dried fruits, nuts, and seeds Cakes, breads, and cookies made with oatmeal or whole-wheat flour   Most snack foods do not provide much fiber. Choose snacks with at least 2 grams of fiber per serving. Last Reviewed: March 2011 Amaris Matias MS, MPH, RD   Updated: 3/29/2011   ·     Keep Your Memory Marcela Bares       Many factors can affect your ability to remembera hectic lifestyle, aging, stress, chronic disease, and certain medicines. But, there are steps you can take to sharpen your mind and help preserve your memory. Challenge Your Brain   Regularly challenging your mind may help keeps it in top shape. Good mental exercises include:   Crossword puzzlesUse a dictionary if you need it; you will learn more that way. Brainteasers Try some! Crafts, such as wood working and sewing   Hobbies, such as gardening and building model airplanes   SocializingVisit old friends or join groups to meet new ones. Reading   Learning a new language   Taking a class, whether it be art history or daren chi   TravelingExperience the food, history, and culture of your destination   Learning to use a computer   Going to museums, the theater, or thought-provoking movies   Changing things in your daily life, such as reversing your pattern in the grocery store or brushing your teeth using your nondominant hand   Use Memory Aids   There is no need to remember every detail on your own.  These memory aids can help:   Calendars and day planners   Electronic organizers to store all sorts of helpful informationThese devices can \"beep\" to remind you of appointments. A book of days to record birthdays, anniversaries, and other occasions that occur on the same date every year   Detailed \"to-do\" lists and strategically placed sticky notes   Quick \"study\" sessionsBefore a gathering, review who will be there so their names will be fresh in your mind. Establish routinesFor example, keep your keys, wallet, and umbrella in the same place all the time or take medicine with your 8:00 AM glass of juice   Live a Healthy Life   Many actions that will keep your body strong will do the same for your mind. For example:   Talk to Your Doctor About Herbs and Supplements    Malnutrition and vitamin deficiencies can impair your mental function. For example, vitamin B12 deficiency can cause a range of symptoms, including confusion. But, what if your nutritional needs are being met? Can herbs and supplements still offer a benefit? Researchers have investigated a range of natural remedies, such as ginkgo , ginseng , and the supplement phosphatidylserine (PS). So far, though, the evidence is inconsistent as to whether these products can improve memory or thinking. If you are interested in taking herbs and supplements, talk to your doctor first because they may interact with other medicines that you are taking. Exercise Regularly    Among the many benefits of regular exercise are increased blood flow to the brain and decreased risk of certain diseases that can interfere with memory function. One study found that even moderate exercise has a beneficial effect. Examples of \"moderate\" exercise include:   Playing 18 holes of golf once a week, without a cart   Playing tennis twice a week   Walking one mile per day   Manage Stress    It can be tough to remember what is important when your mind is cluttered. Make time for relaxation. Choose activities that calm you down, and make it routine.    Manage Chronic Conditions    Side effects of high blood pressure , diabetes, and heart disease can interfere with mental function. Many of the lifestyle steps discussed here can help manage these conditions. Strive to eat a healthy diet, exercise regularly, get stress under control, and follow your doctor's advice for your condition. Minimize Medications    Talk to your doctor about the medicines that you take. Some may be unnecessary. Also, healthy lifestyle habits may lower the need for certain drugs. Last Reviewed: April 2010 Kailyn Cruz MD   Updated: 4/13/2010   ·     [address]       As we get older, changes in balance, gait, strength, vision, hearing, and cognition make even the most youthful senior more prone to accidents. Falls are one of the leading health risks for older people. This increased risk of falling is related to:   Aging process (eg, decreased muscle strength, slowed reflexes)   Higher incidence of chronic health problems (eg, arthritis, diabetes) that may limit mobility, agility or sensory awareness   Side effects of medicine (eg, dizziness, blurred vision)especially medicines like prescription pain medicines and drugs used to treat mental health conditions   Depending on the brittleness of your bones, the consequences of a fall can be serious and long lasting. Home Life   Research by the Association of Aging [facility]) shows that some home accidents among older adults can be prevented by making simple lifestyle changes and basic modifications and repairs to the home environment. Here are some lifestyle changes that experts recommend:   Have your hearing and vision checked regularly. Be sure to wear prescription glasses that are right for you. Speak to your doctor or pharmacist about the possible side effects of your medicines. A number of medicines can cause dizziness. If you have problems with sleep, talk to your doctor. Limit your intake of alcohol.    If necessary, use a cane or walker to help maintain your balance. Wear supportive, rubber-soled shoes, even at home. If you live in a region that gets wintry weather, you may want to put special cleats on your shoes to prevent you from slipping on the snow and ice. Exercise regularly to help maintain muscle tone, agility, and balance. Always hold the banister when going up or down stairs. Also, use  bars when getting in or out of the bath or shower, or using the toilet. To avoid dizziness, get up slowly from a lying down position. Sit up first, dangling your legs for a minute or two before rising to a standing position. Overall Home Safety Check   According to the Consumer [employer] Safety Commision's \"Older Consumer Home Safety Checklist,\" it is important to check for potential hazards in each room. And remember, proper lighting is an essential factor in home safety. If you cannot see clearly, you are more likely to fall. Important questions to ask yourself include:   Are lamp, electric, extension, and telephone cords placed out of the flow of traffic and maintained in good condition? Have frayed cords been replaced? Are all small rugs and runners slip resistant? If not, you can secure them to the floor with a special double-sided carpet tape. Are smoke detectors properly locatedone on every floor of your home and one outside of every sleeping area? Are they in good working order? Are batteries replaced at least once a year? Do you have a well-maintained carbon monoxide detector outside every sleeping are in your home? Does your furniture layout leave plenty of space to maneuver between and around chairs, tables, beds, and sofas? Are hallways, stairs and passages between rooms well lit? Can you reach a lamp without getting out of bed? Are floor surfaces well maintained? Shag rugs, high-pile carpeting, tile floors, and polished wood floors can be particularly slippery.  Stairs should always have handrails and be carpeted or fitted with a non-skid tread. Is your telephone easily reachable. Is the cord safely tucked away? Room by Room   According to the Association of Aging, bathrooms and hilary are the two most potentially hazardous rooms in your home. In the Kitchen    Be sure your stove is in proper working order and always make sure burners and the oven are off before you go out or go to sleep. Keep pots on the back burners, turn handles away from the front of the stove, and keep stove clean and free of grease build-up. Kitchen ventilation systems and range exhausts should be working properly. Keep flammable objects such as towels and pot holders away from the cooking area except when in use. Make sure kitchen curtains are tied back. Move cords and appliances away from the sink and hot surfaces. If extension cords are needed, install wiring guides so they do not hang over the sink, range, or working areas. Look for coffee pots, kettles and toaster ovens with automatic shut-offs. Keep a mop handy in the kitchen so you can wipe up spills instantly. You should also have a small fire extinguisher. Arrange your kitchen with frequently used items on lower shelves to avoid the need to stand on a stepstool to reach them. Make sure countertops are well-lit to avoid injuries while cutting and preparing food. In the Bathroom    Use a non-slip mat or decals in the tub and shower, since wet, soapy tile or porcelain surfaces are extremely slippery. Make sure bathroom rugs are non-skid or tape them firmly to the floor. Bathtubs should have at least one, preferably two, grab bars, firmly attached to structural supports in the wall. (Do not use built-in soap holders or glass shower doors as grab bars.)    Tub seats fitted with non-slip material on the legs allow you to wash sitting down. For people with limited mobility, bathtub transfer benches allow you to slide safely into the tub.     Raised toilet seats and toilet safety rails are helpful for those with knee or hip problems. In the [facility]    Make sure you use a nightlight and that the area around your bed is clear of potential obstacles. Be careful with electric blankets and never go to sleep with a heating pad, which can cause serious burns even if on a low setting. Use fire-resistant mattress covers and pillows, and NEVER smoke in bed. Keep a phone next to the bed that is programmed to dial 911 at the push of a button. If you have a chronic condition, you may want to sign on with an automatic call-in service. Typically the system includes a small pendant that connects directly to an emergency medical voice-response system. You should also make arrangements to stay in contact with someonefriend, neighbor, family memberon a regular schedule. Fire Prevention   According to the [employer]. (Smoke Alarms for Every) [address] [place] [address], senior citizens are one of the two highest risk groups for death and serious injuries due to residential fires. When cooking, wear short-sleeved items, never a bulky long-sleeved robe. The [facility]'s Safety Checklist for Older Consumers emphasizes the importance of checking basements, garages, workshops and storage areas for fire hazards, such as volatile liquids, piles of old rags or clothing and overloaded circuits. Never smoke in bed or when lying down on a couch or recliner chair. Small portable electric or kerosene heaters are responsible for many home fires and should be used cautiously if at all. If you do use one, be sure to keep them away from flammable materials. In case of fire, make sure you have a pre-established emergency exit plan. Have a professional check your fireplace and other fuel-burning appliances yearly.     Helping Hands   Baby boomers entering the bob years will continue to see the development of new products to help older adults live safely and independently in spite of age-related changes. Making Life More Livable  , by Chelsea Cohen, lists over 1,000 products for \"living well in the mature years,\" such as bathing and mobility aids, household security devices, ergonomically designed knives and peelers, and faucet valves and knobs for temperature control. Medical supply stores and organizations are good sources of information about products that improve your quality of life and insure your safety.      Last Reviewed: November 2009 Yusuf Cavanaugh MD   Updated: 3/7/2011     ·

## 2022-02-09 ENCOUNTER — OFFICE VISIT (OUTPATIENT)
Dept: FAMILY MEDICINE CLINIC | Age: 72
End: 2022-02-09
Payer: MEDICARE

## 2022-02-09 ENCOUNTER — OFFICE VISIT (OUTPATIENT)
Dept: CARDIOTHORACIC SURGERY | Age: 72
End: 2022-02-09
Payer: MEDICARE

## 2022-02-09 VITALS
HEART RATE: 76 BPM | TEMPERATURE: 95.1 F | WEIGHT: 137 LBS | SYSTOLIC BLOOD PRESSURE: 122 MMHG | BODY MASS INDEX: 25.21 KG/M2 | OXYGEN SATURATION: 96 % | HEIGHT: 62 IN | DIASTOLIC BLOOD PRESSURE: 78 MMHG

## 2022-02-09 VITALS — WEIGHT: 137 LBS | HEIGHT: 62 IN | HEART RATE: 68 BPM | OXYGEN SATURATION: 97 % | BODY MASS INDEX: 25.21 KG/M2

## 2022-02-09 DIAGNOSIS — K44.9 HIATAL HERNIA: Primary | ICD-10-CM

## 2022-02-09 DIAGNOSIS — M54.32 SCIATICA OF LEFT SIDE: Primary | ICD-10-CM

## 2022-02-09 DIAGNOSIS — R13.19 OTHER DYSPHAGIA: ICD-10-CM

## 2022-02-09 PROCEDURE — 96372 THER/PROPH/DIAG INJ SC/IM: CPT

## 2022-02-09 PROCEDURE — 99204 OFFICE O/P NEW MOD 45 MIN: CPT | Performed by: THORACIC SURGERY (CARDIOTHORACIC VASCULAR SURGERY)

## 2022-02-09 PROCEDURE — 99213 OFFICE O/P EST LOW 20 MIN: CPT

## 2022-02-09 RX ORDER — IBUPROFEN 600 MG/1
600 TABLET ORAL 2 TIMES DAILY
Qty: 14 TABLET | Refills: 0 | Status: SHIPPED | OUTPATIENT
Start: 2022-02-09 | End: 2022-04-01

## 2022-02-09 RX ORDER — KETOROLAC TROMETHAMINE 30 MG/ML
15 INJECTION, SOLUTION INTRAMUSCULAR; INTRAVENOUS ONCE
Status: COMPLETED | OUTPATIENT
Start: 2022-02-09 | End: 2022-02-09

## 2022-02-09 RX ORDER — PREDNISONE 20 MG/1
TABLET ORAL
Qty: 14 TABLET | Refills: 0 | Status: SHIPPED | OUTPATIENT
Start: 2022-02-09 | End: 2022-02-21

## 2022-02-09 RX ADMIN — KETOROLAC TROMETHAMINE 15 MG: 30 INJECTION, SOLUTION INTRAMUSCULAR; INTRAVENOUS at 12:27

## 2022-02-09 ASSESSMENT — ENCOUNTER SYMPTOMS
STRIDOR: 0
ABDOMINAL PAIN: 1
TROUBLE SWALLOWING: 0
SORE THROAT: 0
COUGH: 0
CHEST TIGHTNESS: 0
COUGH: 0
CHEST TIGHTNESS: 0
GASTROINTESTINAL NEGATIVE: 1
ABDOMINAL DISTENTION: 0
CHOKING: 0
BACK PAIN: 1
NAUSEA: 0
SHORTNESS OF BREATH: 0
VOICE CHANGE: 0
WHEEZING: 0
VOMITING: 1
DIARRHEA: 0
SHORTNESS OF BREATH: 0

## 2022-02-09 NOTE — PATIENT INSTRUCTIONS
Patient understands surgery to repair the paraesophageal hernia as well as the postoperative restrictions. 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

## 2022-02-09 NOTE — PROGRESS NOTES
Subjective:      Patient ID: Demetrius Calvin is a 66 y.o. female who presents today for:  Chief Complaint   Patient presents with    Hiatal Hernia    Dysphagia       HPI  Patient is known for several years that she has a hiatal hernia. Is becoming more symptomatic. She will occasionally have substernal chest pain feeling like a heart attack. She will occasionally have food stick to where she will have to regurgitate it back up. She will occasionally wake up at night coughing and choking. Most the time she is able to swallow even solid foods and her main complaint is from her left lower quadrant discomfort. She will be following up with GI medicine tomorrow for colon evaluation. Past Medical History:   Diagnosis Date    Anxiety and depression 5/2/2019    Anxiety and depression     Diverticulosis of colon (without mention of hemorrhage) 02/25/2015    DR Linh Carpio    Focal lymphocytic colitis 1/27/2022    Hyperlipidemia 2/3/2015    Hypothyroidism     Lichen sclerosus     Migraines 2/3/2015    Renal insufficiency 2/3/2015    Spondylosis of lumbar region without myelopathy or radiculopathy 5/24/2016      Past Surgical History:   Procedure Laterality Date    ABDOMINAL ADHESION SURGERY  1/7/16    DR. MERAZ    APPENDECTOMY  2012    CATARACT REMOVAL Right     CHOLECYSTECTOMY  2012    COLON SURGERY  2005    13\" of colon removed-NO CA    COLONOSCOPY  2011    polyps    COLONOSCOPY  02/25/2015    DR Linh Carpio - DIVERTICULOSIS    KNEE SURGERY  2013    DE COLONOSCOPY FLX DX W/COLLJ SPEC WHEN PFRMD N/A 1/26/2018    COLONOSCOPY performed by Jaspal Gutierrez MD at [address] [facility] [address] ESOPHAGOGASTRODUODENOSCOPY TRANSORAL DIAGNOSTIC N/A 1/26/2018    EGD ESOPHAGOGASTRODUODENOSCOPY WITH DILATION performed by Jaspal Gutierrez MD at [address] [place] [address] Pkwy History     Socioeconomic History    Marital status: Single     Spouse name: Not on file    Number of children: Not on file    Years of education: Not on file    Highest education level: Not on file   Occupational History    Not on file   Tobacco Use    Smoking status: Never Smoker    Smokeless tobacco: Never Used   Substance and Sexual Activity    Alcohol use: No     Alcohol/week: 0.0 standard drinks    Drug use: No    Sexual activity: Not Currently   Other Topics Concern    Not on file   Social History Narrative    Not on file     Social Determinants of Health     Financial Resource Strain: Low Risk     Difficulty of Paying Living Expenses: Not hard at all   Food Insecurity: No Food Insecurity    Worried About [address] [place] [address] in the Last Year: Never true    Madi of Food in the Last Year: Never true   Transportation Needs: No Transportation Needs    Lack of Transportation (Medical): No    Lack of Transportation (Non-Medical):  No   Physical Activity:     Days of Exercise per Week: Not on file    Minutes of Exercise per Session: Not on file   Stress:     Feeling of Stress : Not on file   Social Connections:     Frequency of Communication with Friends and Family: Not on file    Frequency of Social Gatherings with Friends and Family: Not on file    Attends Buddhism Services: Not on file    Active Member of [address] [place] [address] or Organizations: Not on file    Attends Club or Organization Meetings: Not on file    Marital Status: Not on file   Intimate Partner Violence:     Fear of Current or Ex-Partner: Not on file    Emotionally Abused: Not on file    Physically Abused: Not on file    Sexually Abused: Not on file   Housing Stability:     Unable to Pay for Housing in the Last Year: Not on file    Number of [place] in the Last Year: Not on file    Unstable Housing in the Last Year: Not on file     Family History   Problem Relation Age of Onset    Heart Disease Father     Stroke Father     Diabetes Father     Stomach Cancer Father     Other Mother         Bowel Obstruction     Allergies Allergen Reactions    Bactrim [Sulfamethoxazole-Trimethoprim]      hallucinations    Ciprofloxacin Other (See Comments)     Pt not 100% sure, but thinks cipro is the antibiotic she is allergic to  Anxiety, confusion     Current Outpatient Medications on File Prior to Visit   Medication Sig Dispense Refill    omeprazole (PRILOSEC) 20 MG delayed release capsule Take 1 capsule by mouth daily 90 capsule 1    levothyroxine (SYNTHROID) 75 MCG tablet Take 1 tablet by mouth daily 90 tablet 4    ibuprofen (IBU) 600 MG tablet Take 1 tablet by mouth every 6 hours as needed for Pain 120 tablet 0    ondansetron (ZOFRAN-ODT) 4 MG disintegrating tablet Take 1 tablet by mouth 3 times daily as needed for Nausea or Vomiting 21 tablet 0    dicyclomine (BENTYL) 10 MG capsule Take 1 capsule by mouth 4 times daily (before meals and nightly) 120 capsule 5    Cholecalciferol (VITAMIN D3) 50 MCG (2000 UT) CAPS 1 po daily with meal 90 capsule 4    cyanocobalamin ([employer] VITAMIN B12) 1000 MCG tablet Take 1 tablet by mouth daily 30 tablet 3    aspirin EC 81 MG EC tablet Take 1 tablet by mouth daily 90 tablet 4    PARoxetine (PAXIL) 10 MG tablet Take 1 tablet by mouth daily 90 tablet 4    atorvastatin (LIPITOR) 20 MG tablet Take 1 tablet by mouth daily 90 tablet 4    coenzyme Q-10 100 MG capsule Take 1 capsule by mouth daily 90 capsule 4    clobetasol (TEMOVATE) 0.05 % cream Apply topically 2 times daily Apply topically 2 times daily. 60 g 2    docusate sodium (COLACE) 100 MG capsule Take 100 mg by mouth 2 times daily      SUMAtriptan (IMITREX) 50 MG tablet Take 1 tablet by mouth once as needed for Migraine 9 tablet 0     No current facility-administered medications on file prior to visit. Review of Systems   Constitutional: Negative for activity change, appetite change, chills, diaphoresis, fatigue, fever and unexpected weight change. HENT: Negative for sore throat, trouble swallowing and voice change.     Eyes: Negative for visual disturbance. Respiratory: Negative for cough, choking, chest tightness, shortness of breath, wheezing and stridor. Cardiovascular: Negative for chest pain, palpitations and leg swelling. Gastrointestinal: Positive for abdominal pain and vomiting. Negative for abdominal distention, diarrhea and nausea. Genitourinary: Negative for difficulty urinating. Musculoskeletal: Negative for gait problem and joint swelling. Skin: Negative for rash and wound. Neurological: Negative for dizziness, seizures and light-headedness. Hematological: Negative for adenopathy. Does not bruise/bleed easily. Psychiatric/Behavioral: Negative for behavioral problems and confusion. Objective:   Pulse 68   Ht 5' 2\" (1.575 m)   Wt 137 lb (62.1 kg)   LMP  (LMP Unknown)   SpO2 97%   BMI 25.06 kg/m²     Physical Exam  Constitutional:       General: She is not in acute distress. Appearance: She is not ill-appearing, toxic-appearing or diaphoretic. HENT:      Head: Normocephalic and atraumatic. Nose: Nose normal.   Eyes:      Extraocular Movements: Extraocular movements intact. Conjunctiva/sclera: Conjunctivae normal.      Pupils: Pupils are equal, round, and reactive to light. Neck:      Vascular: No carotid bruit. Cardiovascular:      Rate and Rhythm: Normal rate and regular rhythm. Heart sounds: Normal heart sounds. No murmur heard. No gallop. Pulmonary:      Effort: Pulmonary effort is normal. No respiratory distress. Breath sounds: Normal breath sounds. No wheezing or rales. Abdominal:      General: Abdomen is flat. Bowel sounds are normal.      Palpations: Abdomen is soft. There is no mass. Tenderness: There is no abdominal tenderness. Musculoskeletal:         General: No swelling. Cervical back: Neck supple. Right lower leg: No edema. Left lower leg: No edema. Lymphadenopathy:      Cervical: No cervical adenopathy.    Skin: General: Skin is warm and dry. Neurological:      General: No focal deficit present. Mental Status: She is alert and oriented to person, place, and time. Psychiatric:         Mood and Affect: Mood normal.         Behavior: Behavior normal.         Thought Content: Thought content normal.         Judgment: Judgment normal.               Radiographs and Laboratory Studies:     Diagnostic Imaging Studies:    I personally viewed her CAT scan. She has a small type II paraesophageal hernia. I do not appreciate any reason for her left lower quadrant pain. Assessment/Plan     Symptomatic type II paraesophageal hernia. I have gone over in detail and recommended laparoscopic repair with fundoplication for asymptomatic type II paraesophageal hernia. .  Patient understands the risk benefits potential outcomes and alternative therapies. She has a disabled son that she is the sole caregiver for. This includes helping him stand and sometimes lifting him. The 6-week restriction of no lifting more than 10 or 15 pounds will severely affect her ability to take care of her son. Her son is also being seen today for rapidly recurring and symptomatic left pleural effusion. She would prefer to have her son treated for his condition first.  She will then see what help she can get to help take care of her son and will call my office to schedule her surgery when that can be arranged. Prior to her surgery would be nice to have a gastric emptying study. The patient was counseled at length about the risks of nanette Covid-19 during their perioperative period and any recovery window from their procedure. The patient was made aware that nanette Covid-19  may worsen their prognosis for recovering from their procedure  and lend to a higher morbidity and/or mortality risk. All material risks, benefits, and reasonable alternatives including postponing the procedure were discussed.  The patient does wish to proceed with the procedure at this time. Diagnosis Orders   1. Hiatal hernia     2. Other dysphagia       No orders of the defined types were placed in this encounter. No orders of the defined types were placed in this encounter. Return if symptoms worsen or fail to improve.       Donavon Jauregui MD

## 2022-02-09 NOTE — PROGRESS NOTES
[address] Encounter  CHIEF COMPLAINT       Chief Complaint   Patient presents with    Hip Pain     left buttocks and left leg swelling, patient states didn't fall, but might of strained using pedal machine, difficulty walking and bearing weight, hurts more to sit down, x5days tx: ibrophen, percocet       HISTORY OF PRESENT ILLNESS   Aminah eNw is a 66 y.o. female who presents with:  HPI  Reporting pain in left lower buttocks radiating down her leg that first began 5 days ago after exercising with pedal bike. Pain is moderate to severe when sitting or walking. REVIEW OF SYSTEMS     Review of Systems   Constitutional: Negative for activity change, chills, diaphoresis, fatigue and fever. Respiratory: Negative for cough, chest tightness and shortness of breath. Cardiovascular: Negative. Negative for chest pain. Gastrointestinal: Negative. Genitourinary: Negative for decreased urine volume, difficulty urinating, dysuria, flank pain and hematuria. Musculoskeletal: Positive for arthralgias, back pain and gait problem. Negative for joint swelling, myalgias, neck pain and neck stiffness. Skin: Negative. Neurological: Negative for dizziness, tremors, syncope, weakness, light-headedness, numbness and headaches. Hematological: Negative. Psychiatric/Behavioral: Negative for agitation, confusion and hallucinations. The patient is not nervous/anxious.       PAST MEDICAL HISTORY         Diagnosis Date    Anxiety and depression 5/2/2019    Anxiety and depression     Diverticulosis of colon (without mention of hemorrhage) 02/25/2015    DR Kelvin Evangelista    Focal lymphocytic colitis 1/27/2022    Hyperlipidemia 2/3/2015    Hypothyroidism     Lichen sclerosus     Migraines 2/3/2015    Renal insufficiency 2/3/2015    Spondylosis of lumbar region without myelopathy or radiculopathy 5/24/2016     SURGICAL HISTORY     Patient  has a past surgical history that includes Tubal ligation (1982); Cholecystectomy (2012); Colon surgery (2005); Appendectomy (2012); knee surgery (2013); Colonoscopy (2011); Colonoscopy (02/25/2015); Abdominal adhesion surgery (1/7/16); pr esophagogastroduodenoscopy transoral diagnostic (N/A, 1/26/2018); pr colonoscopy flx dx w/collj spec when pfrmd (N/A, 1/26/2018); and Cataract removal (Right). CURRENT MEDICATIONS       Previous Medications    ASPIRIN EC 81 MG EC TABLET    Take 1 tablet by mouth daily    ATORVASTATIN (LIPITOR) 20 MG TABLET    Take 1 tablet by mouth daily    CHOLECALCIFEROL (VITAMIN D3) 50 MCG (2000 UT) CAPS    1 po daily with meal    CLOBETASOL (TEMOVATE) 0.05 % CREAM    Apply topically 2 times daily Apply topically 2 times daily. COENZYME Q-10 100 MG CAPSULE    Take 1 capsule by mouth daily    CYANOCOBALAMIN ([employer]S VITAMIN B12) 1000 MCG TABLET    Take 1 tablet by mouth daily    DICYCLOMINE (BENTYL) 10 MG CAPSULE    Take 1 capsule by mouth 4 times daily (before meals and nightly)    DOCUSATE SODIUM (COLACE) 100 MG CAPSULE    Take 100 mg by mouth 2 times daily    IBUPROFEN (IBU) 600 MG TABLET    Take 1 tablet by mouth every 6 hours as needed for Pain    LEVOTHYROXINE (SYNTHROID) 75 MCG TABLET    Take 1 tablet by mouth daily    OMEPRAZOLE (PRILOSEC) 20 MG DELAYED RELEASE CAPSULE    Take 1 capsule by mouth daily    ONDANSETRON (ZOFRAN-ODT) 4 MG DISINTEGRATING TABLET    Take 1 tablet by mouth 3 times daily as needed for Nausea or Vomiting    PAROXETINE (PAXIL) 10 MG TABLET    Take 1 tablet by mouth daily    SUMATRIPTAN (IMITREX) 50 MG TABLET    Take 1 tablet by mouth once as needed for Migraine     ALLERGIES     Patient is is allergic to bactrim [sulfamethoxazole-trimethoprim] and ciprofloxacin. FAMILY HISTORY     Patient'sfamily history includes Diabetes in her father; Heart Disease in her father; Other in her mother; Stomach Cancer in her father; Stroke in her father. HISTORY     Patient  reports that she has never smoked.  She has never used smokeless tobacco. She reports that she does not drink alcohol and does not use drugs. PHYSICAL EXAM     VITALS  BP: 122/78, Temp: 95.1 °F (35.1 °C), Pulse: 76,  , SpO2: 96 %  Physical Exam  Constitutional:       General: She is not in acute distress. Appearance: Normal appearance. She is not ill-appearing or diaphoretic. Cardiovascular:      Rate and Rhythm: Normal rate and regular rhythm. Pulses: Normal pulses. Pulmonary:      Effort: Pulmonary effort is normal.   Abdominal:      General: Abdomen is flat. Musculoskeletal:         General: Tenderness present. No swelling, deformity or signs of injury. Normal range of motion. Skin:     General: Skin is warm and dry. Capillary Refill: Capillary refill takes less than 2 seconds. Coloration: Skin is not jaundiced or pale. Findings: No bruising, erythema, lesion or rash. Neurological:      Mental Status: She is alert and oriented to person, place, and time. Mental status is at baseline. Sensory: No sensory deficit. Motor: No weakness. Coordination: Coordination normal.      Gait: Gait normal.   Psychiatric:         Mood and Affect: Mood normal.       READY CARE COURSE   No orders of the defined types were placed in this encounter. Labs:  No results found for this visit on 02/09/22. IMAGING:  No orders to display     Scheduled Meds:    Continuous Infusions:  PRN Meds:. PROCEDURES:  FINAL IMPRESSION      1. Sciatica of left side        DISPOSITION/PLAN     HISTORY OF PRESENT ILLNESS   Tyler Nice is a 66 y.o. female who presents with pain in left lower buttocks radiating down her leg that first began 5 days ago after exercising with pedal bike. Pt is afebrile has nontoxic appearance and VS are stable. On examination there is no redness erythremia or swelling visible to the left buttocks or thigh. Patient denies pain with palpation to the anterior or lateral femur.   Reports tenderness and flinches when the left lower lumbar region is palpated, states pain radiates down left leg. She denies any falls or injuries. Suspicion is for left side sciatica. Patient medicated in office with 50 mg IM Toradol. DC with prescriptions for prednisone and ibuprofen. Provided stretching exercises to perform after pain subsides    PATIENT REFERRED TO:  Return if symptoms worsen or fail to improve, for Follow up with PCP. DISCHARGE MEDICATIONS:  New Prescriptions    IBUPROFEN (ADVIL;MOTRIN) 600 MG TABLET    Take 1 tablet by mouth 2 times daily for 7 days    PREDNISONE (DELTASONE) 20 MG TABLET    Take 2 tablets by mouth daily for 4 days, THEN 1 tablet daily for 4 days, THEN 0.5 tablets daily for 4 days. Cannot display discharge medications since this is not an admission.        Andi Estevez, TANNA - CNP

## 2022-02-09 NOTE — PROGRESS NOTES
After obtaining consent, and per orders of TANNA Grace-MAKEDA injection of Toradol given in Right upper quad. gluteus by Robertha Rubinstein, MA. Patient instructed to remain in clinic for 20 minutes afterwards, and to report any adverse reaction to me immediately.

## 2022-02-09 NOTE — PATIENT INSTRUCTIONS
Patient Education        Sciatica: Care Instructions  Your Care Instructions     Sciatica (say \"kfj-HX-cg-kuh\") is an irritation of one of the sciatic nerves, which come from the spinal cord in the lower back. The sciatic nerves and their branches extend down through the buttock to the foot. Sciatica can develop when an injured disc in the back irritates or presses against a spinal nerve root. Its main symptom is pain, numbness, or weakness that is often worse in the leg or foot than in the back. Sciatica often will improve and go away with time. Early treatment usually includes medicines and exercises to relieve pain. Follow-up care is a key part of your treatment and safety. Be sure to make and go to all appointments, and call your doctor if you are having problems. It's also a good idea to know your test results and keep a list of the medicines you take. How can you care for yourself at home? · Take pain medicines exactly as directed. ? If the doctor gave you a prescription medicine for pain, take it as prescribed. ? If you are not taking a prescription pain medicine, ask your doctor if you can take an over-the-counter medicine. · Use heat or ice to relieve pain. ? To apply heat, put a warm water bottle, heating pad set on low, or warm cloth on your back. Do not go to sleep with a heating pad on your skin. ? To use ice, put ice or a cold pack on the area for 10 to 20 minutes at a time. Put a thin cloth between the ice and your skin. · Avoid sitting if possible, unless it feels better than standing. · Alternate lying down with short walks. Increase your walking distance as you are able to without making your symptoms worse. · Do not do anything that makes your symptoms worse. When should you call for help? Call 911 anytime you think you may need emergency care. For example, call if:    · You are unable to move a leg at all.    Call your doctor now or seek immediate medical care if:    · You have new or worse symptoms in your legs or buttocks. Symptoms may include:  ? Numbness or tingling. ? Weakness. ? Pain.     · You lose bladder or bowel control. Watch closely for changes in your health, and be sure to contact your doctor if:    · You are not getting better as expected. Where can you learn more? Go to https://[email or web address].[employer]. org and sign in to your [employer] account. Enter [phone] in the [facility] box to learn more about \"Sciatica: Care Instructions. \"     If you do not have an account, please click on the \"Sign Up Now\" link. Current as of: July 1, 2021               Content Version: 13.1  © 2006-2021 [employer]. Care instructions adapted under license by [facility] ([facility]). If you have questions about a medical condition or this instruction, always ask your healthcare professional. [address] any warranty or liability for your use of this information.

## 2022-02-11 ENCOUNTER — TELEPHONE (OUTPATIENT)
Dept: FAMILY MEDICINE CLINIC | Age: 72
End: 2022-02-11

## 2022-02-11 NOTE — TELEPHONE ENCOUNTER
Pt c/o left sciatica pain. Pt went to walk in   clinic yesterday for this pain was given shot of Tramadol that hasn't   helped Pt is still in a lot of pain. wants something for the pain. [facility].  Please call pt to let her know if this will be called   in

## 2022-02-11 NOTE — TELEPHONE ENCOUNTER
----- Message from Michellemary anne Michellestella sent at 2/10/2022  4:17 PM EST -----  Subject: Message to Provider    QUESTIONS  Information for Provider? Pt is in pain and wanted to see if the doctor   can call her in something for the pain and swollen. The tramadol did not   work.  ---------------------------------------------------------------------------  --------------  Danna GREENE  What is the best way for the office to contact you? OK to leave message on   voicemail  Preferred Call Back Phone Number? [phone]  ---------------------------------------------------------------------------  --------------  SCRIPT ANSWERS  Relationship to Patient?  Self

## 2022-02-23 ENCOUNTER — OFFICE VISIT (OUTPATIENT)
Dept: GASTROENTEROLOGY | Age: 72
End: 2022-02-23
Payer: MEDICARE

## 2022-02-23 VITALS
HEART RATE: 75 BPM | RESPIRATION RATE: 12 BRPM | DIASTOLIC BLOOD PRESSURE: 72 MMHG | OXYGEN SATURATION: 98 % | WEIGHT: 137 LBS | SYSTOLIC BLOOD PRESSURE: 120 MMHG | BODY MASS INDEX: 25.06 KG/M2

## 2022-02-23 DIAGNOSIS — R10.32 LEFT LOWER QUADRANT ABDOMINAL PAIN: ICD-10-CM

## 2022-02-23 DIAGNOSIS — R12 HEART BURN: Primary | ICD-10-CM

## 2022-02-23 DIAGNOSIS — K59.00 CONSTIPATION, UNSPECIFIED CONSTIPATION TYPE: ICD-10-CM

## 2022-02-23 PROCEDURE — 99204 OFFICE O/P NEW MOD 45 MIN: CPT | Performed by: INTERNAL MEDICINE

## 2022-02-23 RX ORDER — POLYETHYLENE GLYCOL 3350 17 G/17G
238 POWDER, FOR SOLUTION ORAL ONCE
Qty: 238 G | Refills: 0 | Status: SHIPPED | OUTPATIENT
Start: 2022-02-23 | End: 2022-02-23

## 2022-02-23 ASSESSMENT — ENCOUNTER SYMPTOMS
CHEST TIGHTNESS: 0
NAUSEA: 0
ABDOMINAL DISTENTION: 0
ABDOMINAL PAIN: 1
EYE REDNESS: 0
TROUBLE SWALLOWING: 0
WHEEZING: 0
VOMITING: 0
COLOR CHANGE: 0
SHORTNESS OF BREATH: 0
CONSTIPATION: 1
BLOOD IN STOOL: 1
RECTAL PAIN: 0
EYE PAIN: 0
PHOTOPHOBIA: 0
DIARRHEA: 1
VOICE CHANGE: 0

## 2022-02-23 NOTE — PROGRESS NOTES
Subjective:      Patient ID: Cristobal Martinez is a 66 y.o. female who presents today for:  Chief Complaint   Patient presents with    Abdominal Pain    Hiatal Hernia       HPI   Patient came in to establish GI care with complaints of abdominal pain and heartburn. Was previously seen in the office by other providers, most recently 2019. Reports longstanding history of intermittent left lower quadrant pain, reports previous sigmoid resection secondary to benign growth in 2005 done at outside hospital, no records are available for review. Feels as though her bowel habits have been abnormal since then, is concerned this could be scar tissue. Has associated alternating bowel habits between constipation and diarrhea, occasionally sees bright red blood when she strains. Otherwise no unintentional weight loss, changes to appetite, family history of CRC or IBD. No nocturnal pain or progressively worsening pain. At baseline is not on oral anticoagulation or antiplatelet therapy. In addition patient complains of longstanding history of heartburn and takes omeprazole 20 mg daily has breakthrough symptoms typically in the evening or nocturnally. Was recently seen and evaluated by Dr. Teri Stevens for possible hiatal hernia repair, has radiological evidence of small to medium hiatal hernia, no recent EGD noted. Past Medical History:   Diagnosis Date    Anxiety and depression 5/2/2019    Anxiety and depression     Diverticulosis of colon (without mention of hemorrhage) 02/25/2015    DR Javier Kidd    Focal lymphocytic colitis 1/27/2022    Hyperlipidemia 2/3/2015    Hypothyroidism     Lichen sclerosus     Migraines 2/3/2015    Renal insufficiency 2/3/2015    Spondylosis of lumbar region without myelopathy or radiculopathy 5/24/2016     Past Surgical History:   Procedure Laterality Date    ABDOMINAL ADHESION SURGERY  1/7/16    DR. MERAZ    APPENDECTOMY  2012    CATARACT REMOVAL Right     CHOLECYSTECTOMY 2012    COLON SURGERY  2005    13\" of colon removed-NO CA    COLONOSCOPY  2011    polyps    COLONOSCOPY  02/25/2015    DR Starr Rollins - DIVERTICULOSIS    KNEE SURGERY  2013    [place] COLONOSCOPY FLX DX W/COLLJ SPEC WHEN PFRMD N/A 1/26/2018    [facility] ESOPHAGOGASTRODUODENOSCOPY TRANSORAL DIAGNOSTIC N/A 1/26/2018    EGD ESOPHAGOGASTRODUODENOSCOPY WITH DILATION performed by [facility] [address] History     Socioeconomic History    Marital status: Single     Spouse name: Not on file    Number of children: Not on file    Years of education: Not on file    Highest education level: Not on file   Occupational History    Not on file   Tobacco Use    Smoking status: Never Smoker    Smokeless tobacco: Never Used   Substance and Sexual Activity    Alcohol use: No     Alcohol/week: 0.0 standard drinks    Drug use: No    Sexual activity: Not Currently   Other Topics Concern    Not on file   Social History Narrative    Not on file     Social Determinants of Health     Financial Resource Strain: Low Risk     Difficulty of Paying Living Expenses: Not hard at all   Food Insecurity: No Food Insecurity    Worried About [address] [facility] in the Last Year: Never true    Madi of Food in the Last Year: Never true   Transportation Needs: No Transportation Needs    Lack of Transportation (Medical): No    Lack of Transportation (Non-Medical):  No   Physical Activity:     Days of Exercise per Week: Not on file    Minutes of Exercise per Session: Not on file   Stress:     Feeling of Stress : Not on file   Social Connections:     Frequency of Communication with Friends and Family: Not on file    Frequency of Social Gatherings with Friends and Family: Not on file    Attends Taoism Services: Not on file    Active Member of Clubs or Organizations: Not on file    Attends Club or Organization Physical Exam  Constitutional:       General: She is not in acute distress. Appearance: She is well-developed. HENT:      Head: Normocephalic and atraumatic. Eyes:      Conjunctiva/sclera: Conjunctivae normal.      Pupils: Pupils are equal, round, and reactive to light. Cardiovascular:      Rate and Rhythm: Normal rate and regular rhythm. Heart sounds: Normal heart sounds. Pulmonary:      Effort: Pulmonary effort is normal. No respiratory distress. Breath sounds: Normal breath sounds. No wheezing or rales. Abdominal:      General: Bowel sounds are normal. There is no distension. Palpations: Abdomen is soft. Abdomen is not rigid. There is no hepatomegaly, splenomegaly or mass. Tenderness: There is abdominal tenderness (LLQ). There is no guarding or rebound. Musculoskeletal:         General: No tenderness or deformity. Normal range of motion. Cervical back: Neck supple. Skin:     Coloration: Skin is not pale. Findings: No erythema or rash. Neurological:      Mental Status: She is alert and oriented to person, place, and time. Laboratory, Pathology, Radiology reviewed in detail with relevantimportant investigations summarized below:  Lab Results   Component Value Date    WBC 6.7 01/11/2022    WBC 5.9 12/13/2021    WBC 8.4 09/24/2021    WBC 8.7 11/21/2018    WBC 5.3 08/16/2018    HGB 14.1 01/11/2022    HGB 13.4 12/13/2021    HGB 13.6 09/24/2021    HGB 14.1 11/21/2018    HGB 13.8 08/16/2018    HCT 43.4 01/11/2022    HCT 40.8 12/13/2021    HCT 40.4 09/24/2021    HCT 40.9 11/21/2018    HCT 40.7 08/16/2018    MCV 88.5 01/11/2022    MCV 87.6 12/13/2021    MCV 85.6 09/24/2021    MCV 88.6 11/21/2018    MCV 88.7 08/16/2018     01/11/2022     12/13/2021     09/24/2021     11/21/2018     08/16/2018    .   Lab Results   Component Value Date    ALT 9 09/24/2021    ALT 10 04/23/2021    ALT 12 03/11/2020    AST 14 09/24/2021    AST 13 04/23/2021    AST 16 03/11/2020    ALKPHOS 95 09/24/2021    ALKPHOS 77 04/23/2021    ALKPHOS 72 03/11/2020    BILITOT 0.6 09/24/2021    BILITOT 0.3 04/23/2021    BILITOT 0.5 03/11/2020       No results found. No results found for: IRON, TIBC, FERRITIN  No results found for: INR  No components found for: ACUTEHEPATITISSCREEN  No components found for: CELIACPANEL  No components found for: STOOLCULTURE, C.DIFF, STOOLOVAPARASITE, STOOLLEUCOCYTE        Assessment:       Diagnosis Orders   1. Heart burn     2. Constipation, unspecified constipation type  EGD    Endoscopy, colon, diagnostic   3. Left lower quadrant abdominal pain                 Orders Placed This Encounter   Procedures    EGD     Standing Status:   Future     Standing Expiration Date:   6/23/2022     Order Specific Question:   Screening or Diagnostic? Answer:   Diagnostic    Endoscopy, colon, diagnostic     Standing Status:   Future     Standing Expiration Date:   8/23/2022     Order Specific Question:   Pre-procedure Diagnosis     Answer:   abdominal pain     Order Specific Question:   Pre-admission testing needed? Answer:   No [0]     Order Specific Question:   Special needs     Answer:   none     Orders Placed This Encounter   Medications    polyethylene glycol (MIRALAX) 17 GM/SCOOP powder     Sig: Take 238 g by mouth once for 1 dose     Dispense:  238 g     Refill:  0     Plan:  1. LLQ pain, change in [facility], hematochezia  longstanding history of intermittent left lower quadrant pain, associated with alternating bowel habits between constipation and diarrhea with occasional hematochezia with straining. Reports previous sigmoid resection secondary to benign growth in 2005 done at outside hospital, no records are available for review. Feels as though her bowel habits have been abnormal since then, is concerned this could be scar tissue. Otherwise no unintentional weight loss, changes to appetite, family history of CRC or IBD.   No nocturnal pain or progressively worsening pain. At baseline is not on oral anticoagulation or antiplatelet therapy. Recent CT scan noted diverticulosis. Most recent colonoscopy 2018 by Dr. Brea Adams, noted sigmoid erythema, diverticulosis, and internal hemorrhoids, biopsies were negative    2. GERD, HH  Longstanding history of GERD, on low-dose PPI, recent imaging notable for small to moderate hiatal hernia, has been seen by Dr Carina Polanco, no immediate plans for surgical intervention. EGD 2018 Dr Brea Adams -gastritis, bx noted lymphocytic cell infiltration, no H. pylori  = Advised on the correct dose and timing of the PPI, 20 to 30 min before breakfast, increase omeprazole to 40 mg daily  = Pathophysiology and etiology of reflux discussed at length  = Lifestyle modification recommended and discussed with the patient   --Avoid spicy and acidic based foods   --Limit coffee, tea, alcohol use   --Limit the amount of food during meal time   --Avoid bedtime snacks and eat meals 3 to 4 hrs before lying down   --Elevate the head of the bed    --Keep healthy weight  EGD requested to assess for heartburn/GERD related complication. Assess the presence of hiatal hernia. The upper endoscopy procedure, complications, risk and benefit were reviewed. Patient would like to proceed accordingly. 3. Associated medical conditions include but are not limited to. . Hyperlipidemia, chronic renal insufficiency, anxiety and depression, hypothyroidism, migraines, diverticulosis, GERD, hiatal hernia. Return in about 4 weeks (around 3/23/2022), or if symptoms worsen or fail to improve, for Post procedure results discussion, further management.     Vitaly Castro MD

## 2022-03-04 ENCOUNTER — ANESTHESIA EVENT (OUTPATIENT)
Dept: ENDOSCOPY | Age: 72
End: 2022-03-04
Payer: MEDICARE

## 2022-03-04 ENCOUNTER — ANCILLARY PROCEDURE (OUTPATIENT)
Dept: ENDOSCOPY | Age: 72
End: 2022-03-04
Attending: INTERNAL MEDICINE
Payer: MEDICARE

## 2022-03-04 ENCOUNTER — ANESTHESIA (OUTPATIENT)
Dept: ENDOSCOPY | Age: 72
End: 2022-03-04
Payer: MEDICARE

## 2022-03-04 ENCOUNTER — HOSPITAL ENCOUNTER (OUTPATIENT)
Age: 72
Setting detail: OUTPATIENT SURGERY
Discharge: HOME | End: 2022-03-04
Attending: INTERNAL MEDICINE | Admitting: INTERNAL MEDICINE
Payer: MEDICARE

## 2022-03-04 VITALS
HEIGHT: 62 IN | SYSTOLIC BLOOD PRESSURE: 169 MMHG | OXYGEN SATURATION: 98 % | BODY MASS INDEX: 25.21 KG/M2 | TEMPERATURE: 97.7 F | RESPIRATION RATE: 16 BRPM | DIASTOLIC BLOOD PRESSURE: 79 MMHG | HEART RATE: 73 BPM | WEIGHT: 137 LBS

## 2022-03-04 VITALS
DIASTOLIC BLOOD PRESSURE: 55 MMHG | SYSTOLIC BLOOD PRESSURE: 110 MMHG | OXYGEN SATURATION: 93 % | RESPIRATION RATE: 12 BRPM

## 2022-03-04 PROCEDURE — 88342 IMHCHEM/IMCYTCHM 1ST ANTB: CPT

## 2022-03-04 PROCEDURE — 45385 COLONOSCOPY W/LESION REMOVAL: CPT | Performed by: INTERNAL MEDICINE

## 2022-03-04 PROCEDURE — 2580000003 HC RX 258

## 2022-03-04 PROCEDURE — 6360000002 HC RX W HCPCS: Performed by: NURSE ANESTHETIST, CERTIFIED REGISTERED

## 2022-03-04 PROCEDURE — 88305 TISSUE EXAM BY PATHOLOGIST: CPT

## 2022-03-04 PROCEDURE — 2500000003 HC RX 250 WO HCPCS: Performed by: NURSE ANESTHETIST, CERTIFIED REGISTERED

## 2022-03-04 PROCEDURE — 2580000003 HC RX 258: Performed by: INTERNAL MEDICINE

## 2022-03-04 PROCEDURE — 3700000000 HC ANESTHESIA ATTENDED CARE: Performed by: INTERNAL MEDICINE

## 2022-03-04 PROCEDURE — 3609027000 HC COLONOSCOPY: Performed by: INTERNAL MEDICINE

## 2022-03-04 PROCEDURE — 3700000001 HC ADD 15 MINUTES (ANESTHESIA): Performed by: INTERNAL MEDICINE

## 2022-03-04 PROCEDURE — 2709999900 HC NON-CHARGEABLE SUPPLY: Performed by: INTERNAL MEDICINE

## 2022-03-04 PROCEDURE — 7100000010 HC PHASE II RECOVERY - FIRST 15 MIN: Performed by: INTERNAL MEDICINE

## 2022-03-04 PROCEDURE — 3609017100 HC EGD: Performed by: INTERNAL MEDICINE

## 2022-03-04 PROCEDURE — 7100000011 HC PHASE II RECOVERY - ADDTL 15 MIN: Performed by: INTERNAL MEDICINE

## 2022-03-04 PROCEDURE — 43239 EGD BIOPSY SINGLE/MULTIPLE: CPT | Performed by: INTERNAL MEDICINE

## 2022-03-04 RX ORDER — SODIUM CHLORIDE 9 MG/ML
INJECTION, SOLUTION INTRAVENOUS
Status: COMPLETED
Start: 2022-03-04 | End: 2022-03-04

## 2022-03-04 RX ORDER — SODIUM CHLORIDE 0.9 % (FLUSH) 0.9 %
5-40 SYRINGE (ML) INJECTION EVERY 12 HOURS SCHEDULED
Status: DISCONTINUED | OUTPATIENT
Start: 2022-03-04 | End: 2022-03-04 | Stop reason: HOSPADM

## 2022-03-04 RX ORDER — LIDOCAINE HYDROCHLORIDE 20 MG/ML
INJECTION, SOLUTION INFILTRATION; PERINEURAL PRN
Status: DISCONTINUED | OUTPATIENT
Start: 2022-03-04 | End: 2022-03-04 | Stop reason: SDUPTHER

## 2022-03-04 RX ORDER — PROPOFOL 10 MG/ML
INJECTION, EMULSION INTRAVENOUS PRN
Status: DISCONTINUED | OUTPATIENT
Start: 2022-03-04 | End: 2022-03-04 | Stop reason: SDUPTHER

## 2022-03-04 RX ORDER — SODIUM CHLORIDE 9 MG/ML
INJECTION, SOLUTION INTRAVENOUS CONTINUOUS
Status: DISCONTINUED | OUTPATIENT
Start: 2022-03-04 | End: 2022-03-04 | Stop reason: HOSPADM

## 2022-03-04 RX ORDER — MAGNESIUM HYDROXIDE 1200 MG/15ML
LIQUID ORAL PRN
Status: DISCONTINUED | OUTPATIENT
Start: 2022-03-04 | End: 2022-03-04 | Stop reason: ALTCHOICE

## 2022-03-04 RX ORDER — SODIUM CHLORIDE 9 MG/ML
25 INJECTION, SOLUTION INTRAVENOUS PRN
Status: DISCONTINUED | OUTPATIENT
Start: 2022-03-04 | End: 2022-03-04 | Stop reason: HOSPADM

## 2022-03-04 RX ORDER — SODIUM CHLORIDE 0.9 % (FLUSH) 0.9 %
5-40 SYRINGE (ML) INJECTION PRN
Status: DISCONTINUED | OUTPATIENT
Start: 2022-03-04 | End: 2022-03-04 | Stop reason: HOSPADM

## 2022-03-04 RX ADMIN — PROPOFOL 80 MG: 10 INJECTION, EMULSION INTRAVENOUS at 13:25

## 2022-03-04 RX ADMIN — PHENYLEPHRINE HYDROCHLORIDE 100 MCG: 10 INJECTION INTRAVENOUS at 13:44

## 2022-03-04 RX ADMIN — PROPOFOL 150 MG: 10 INJECTION, EMULSION INTRAVENOUS at 13:26

## 2022-03-04 RX ADMIN — SODIUM CHLORIDE: 9 INJECTION, SOLUTION INTRAVENOUS at 12:02

## 2022-03-04 RX ADMIN — LIDOCAINE HYDROCHLORIDE 40 MG: 20 INJECTION, SOLUTION INFILTRATION; PERINEURAL at 13:23

## 2022-03-04 ASSESSMENT — ENCOUNTER SYMPTOMS: SHORTNESS OF BREATH: 1

## 2022-03-04 NOTE — ANESTHESIA POSTPROCEDURE EVALUATION
Department of Anesthesiology  Postprocedure Note    Patient: Paul King  MRN: [id number]  YOB: 1950  Date of evaluation: 3/4/2022  Time:  2:02 PM     Procedure Summary     Date: 03/04/22 Room / Location: [facility] OR 01 / [facility]    Anesthesia Start: 1321 Anesthesia Stop: 1402    Procedures:       EGD ESOPHAGOGASTRODUODENOSCOPY (N/A )      COLONOSCOPY DIAGNOSTIC (N/A ) Diagnosis: (constipation;   K59.00)    Surgeons: Liliana Jalloh MD Responsible Provider: TANNA Estrada CRNA    Anesthesia Type: MAC ASA Status: 3          Anesthesia Type: MAC    [place] Phase I: [place] Score: 10    [place] Phase II:      Last vitals: Reviewed and per EMR flowsheets.        Anesthesia Post Evaluation    Patient location during evaluation: bedside  Patient participation: complete - patient participated  Level of consciousness: awake and awake and alert  Pain score: 0  Airway patency: patent  Nausea & Vomiting: no nausea and no vomiting  Complications: no  Cardiovascular status: blood pressure returned to baseline and hemodynamically stable  Respiratory status: acceptable and face mask  Hydration status: euvolemic

## 2022-03-04 NOTE — H&P
Patient Name: Arnie Ross  : 1950  MRN: [id number]  DATE: 22      ENDOSCOPY  History and Physical    Procedure:    [x] Diagnostic Colonoscopy       [] Screening Colonoscopy  [x] EGD      [] ERCP      [] EUS       [] Other    [x] Previous office notes/History and Physical reviewed from the patients chart. Please see EMR for further details of HPI. I have examined the patient's status immediately prior to the procedure and:      Indications/HPI:    [x]Abdominal Pain   []Cancer- GI/Lung  []Fhx of colon CA/polyps  []History of Polyps   []Bells   []Melena  []Abnormal Imaging   []Dysphagia    []Persistent Pneumonia  []Anemia   []Food Impaction  []History of Polyps  []GI Bleed   []Pulmonary nodule/Mass  []Change in bowel habits  []Heartburn/Reflux  []Rectal Bleed (BRBPR)  []Chest Pain - Non Cardiac  []Heme (+) Stool  []Ulcers  []Constipation   []Hemoptysis   []Varices  []Diarrhea   []Hypoxemia  []Nausea/Vomiting   []Screening   []Crohns/Colitis  []Other:    Anesthesia:   [x] MAC [] Moderate Sedation   [] General   [] None     ROS: 12 pt Review of Symptoms was negative unless mentioned above    Medications:   Prior to Admission medications    Medication Sig Start Date End Date Taking?  Authorizing Provider   omeprazole (PRILOSEC) 20 MG delayed release capsule Take 1 capsule by mouth daily 22  Yes Lisseth Correa MD   levothyroxine (SYNTHROID) 75 MCG tablet Take 1 tablet by mouth daily 21  Yes Lisseth Correa MD   ondansetron (ZOFRAN-ODT) 4 MG disintegrating tablet Take 1 tablet by mouth 3 times daily as needed for Nausea or Vomiting 21  Yes Emily Marrero DO   dicyclomine (BENTYL) 10 MG capsule Take 1 capsule by mouth 4 times daily (before meals and nightly) 5/10/21  Yes Lisseth Correa MD   atorvastatin (LIPITOR) 20 MG tablet Take 1 tablet by mouth daily 21  Yes Lisseth Correa MD   clobetasol (TEMOVATE) 0.05 % cream Apply topically 2 times daily Apply topically 2 times daily. 12/4/20  Yes Bridgette Lynch MD   ibuprofen (ADVIL;MOTRIN) 600 MG tablet Take 1 tablet by mouth 2 times daily for 7 days 2/9/22 2/16/22  TANNA Glover CNP   SUMAtriptan (IMITREX) 50 MG tablet Take 1 tablet by mouth once as needed for Migraine 1/3/22 2/9/22  Una Jones MD   ibuprofen (IBU) 600 MG tablet Take 1 tablet by mouth every 6 hours as needed for Pain 9/24/21   Stephanie Hickman DO   Cholecalciferol (VITAMIN D3) 50 MCG (2000 UT) CAPS 1 po daily with meal 4/26/21   Bridgette Lynch MD   cyanocobalamin ([facility] VITAMIN B12) 1000 MCG tablet Take 1 tablet by mouth daily 4/26/21   Bridgette Lynch MD   aspirin EC 81 MG EC tablet Take 1 tablet by mouth daily 2/25/21   Bridgette Lynch MD   PARoxetine (PAXIL) 10 MG tablet Take 1 tablet by mouth daily 2/25/21   Bridgette Lynch MD   coenzyme Q-10 100 MG capsule Take 1 capsule by mouth daily 2/25/21   Bridgette Lynch MD   docusate sodium (COLACE) 100 MG capsule Take 100 mg by mouth 2 times daily    Historical Provider, MD       Allergies:    Allergies   Allergen Reactions    Bactrim [Sulfamethoxazole-Trimethoprim]      hallucinations    Ciprofloxacin Other (See Comments)     Pt not 100% sure, but thinks cipro is the antibiotic she is allergic to  Anxiety, confusion        History of allergic reaction to anesthesia:  No    Past Medical History:  Past Medical History:   Diagnosis Date    Anxiety and depression 5/2/2019    Anxiety and depression     Diverticulosis of colon (without mention of hemorrhage) 02/25/2015    DR Edgard Wilde    Focal lymphocytic colitis 1/27/2022    Hyperlipidemia 2/3/2015    Hypothyroidism     Lichen sclerosus     Migraines 2/3/2015    Renal insufficiency 2/3/2015    Spondylosis of lumbar region without myelopathy or radiculopathy 5/24/2016       Past Surgical History:  Past Surgical History:   Procedure Laterality Date    ABDOMINAL ADHESION SURGERY  1/7/16    DR. MERAZ    APPENDECTOMY  2012    CATARACT REMOVAL Right     CHOLECYSTECTOMY  2012    COLON SURGERY  2005    13\" of colon removed-NO CA    COLONOSCOPY  2011    polyps    COLONOSCOPY  02/25/2015    DR Joseph Mcdonnell - DIVERTICULOSIS    KNEE SURGERY  2013    [place] COLONOSCOPY FLX DX W/COLLJ SPEC WHEN PFRMD N/A 1/26/2018    COLONOSCOPY performed by Alicia Velasco MD at [facility] 61 ESOPHAGOGASTRODUODENOSCOPY TRANSORAL DIAGNOSTIC N/A 1/26/2018    EGD ESOPHAGOGASTRODUODENOSCOPY WITH DILATION performed by Alicia Velasco MD at [place] [address]       Social History:  Social History     Tobacco Use    Smoking status: Never Smoker    Smokeless tobacco: Never Used   Substance Use Topics    Alcohol use: No     Alcohol/week: 0.0 standard drinks    Drug use: No       Vital Signs:   Vitals:    03/04/22 1150   BP: (!) 145/67   Pulse: 68   Resp: 16   Temp: 97.7 °F (36.5 °C)   SpO2: 97%        Physical Exam:  Cardiac:  [x]WNL  []Comments:  Pulmonary:  [x]WNL   []Comments:   Neuro/Mental Status:  [x]WNL  []Comments:  Abdominal:  [x]WNL    []Comments:  Other:   []WNL  []Comments:    Informed Consent:  The risks and benefits of the procedure have been discussed with either the patient or if they cannot consent, their representative. Assessment:  Patient examined and appropriate for planned sedation and procedure. Plan:  Proceed with planned sedation and procedure as above.     Chayo Rosa MD  12:20 PM

## 2022-03-04 NOTE — ANESTHESIA PRE PROCEDURE
by mouth daily 2/25/21   Eyad Carolina MD   clobetasol (TEMOVATE) 0.05 % cream Apply topically 2 times daily Apply topically 2 times daily. 12/4/20   Eyad Carolina MD   docusate sodium (COLACE) 100 MG capsule Take 100 mg by mouth 2 times daily    Historical Provider, MD       Current medications:    No current facility-administered medications for this encounter. Allergies: Allergies   Allergen Reactions    Bactrim [Sulfamethoxazole-Trimethoprim]      hallucinations    Ciprofloxacin Other (See Comments)     Pt not 100% sure, but thinks cipro is the antibiotic she is allergic to  Anxiety, confusion       Problem List:    Patient Active Problem List   Diagnosis Code    Hypothyroidism E03.9    Mixed hyperlipidemia E78.2    Migraines G43.909    Osteoporosis M81.0    Spondylosis of lumbar region without myelopathy or radiculopathy M47.816    Sacroiliac joint dysfunction of left side M53.3    Left lower quadrant abdominal pain R10.32    Diverticulosis of large intestine without diverticulitis K57.30    Prolapsed hemorrhoids K64.8    Heart burn R12    Hiatal hernia K44.9    Ulcer of esophagus without bleeding K22.10    Other dysphagia R13.19    Chest pain R07.9    SOB (shortness of breath) R06.02    Anxiety and depression F41.9, F32. A    Lichen sclerosus et atrophicus of the vulva N90.4    Heart murmur R01.1    Small vessel disease, cerebrovascular I67.9    New onset headache R51.9    Cervicalgia M54.2    Intractable chronic migraine without aura and without status migrainosus G43.719    Focal lymphocytic colitis K52.832    Constipation K59.00       Past Medical History:        Diagnosis Date    Anxiety and depression 5/2/2019    Anxiety and depression     Diverticulosis of colon (without mention of hemorrhage) 02/25/2015    DR Dalila Calvillo    Focal lymphocytic colitis 1/27/2022    Hyperlipidemia 2/3/2015    Hypothyroidism     Lichen sclerosus     Migraines 2/3/2015    Renal insufficiency 2/3/2015    Spondylosis of lumbar region without myelopathy or radiculopathy 5/24/2016       Past Surgical History:        Procedure Laterality Date    ABDOMINAL ADHESION SURGERY  1/7/16    DR. MERAZ    APPENDECTOMY  2012    CATARACT REMOVAL Right     CHOLECYSTECTOMY  2012    COLON SURGERY  2005    13\" of colon removed-NO CA    COLONOSCOPY  2011    polyps    COLONOSCOPY  02/25/2015    DR Bridger Mccartney - DIVERTICULOSIS    KNEE SURGERY  2013    [place] COLONOSCOPY FLX DX W/COLLJ SPEC WHEN PFRMD N/A 1/26/2018    COLONOSCOPY performed by Luke Maya MD at [facility] 61 ESOPHAGOGASTRODUODENOSCOPY TRANSORAL DIAGNOSTIC N/A 1/26/2018    EGD ESOPHAGOGASTRODUODENOSCOPY WITH DILATION performed by Luke Maya MD at [facility] 115       Social History:    Social History     Tobacco Use    Smoking status: Never Smoker    Smokeless tobacco: Never Used   Substance Use Topics    Alcohol use: No     Alcohol/week: 0.0 standard drinks                                Counseling given: Not Answered      Vital Signs (Current): There were no vitals filed for this visit.                                            BP Readings from Last 3 Encounters:   02/23/22 120/72   02/09/22 122/78   01/27/22 116/70       NPO Status:                                                                                 BMI:   Wt Readings from Last 3 Encounters:   02/23/22 137 lb (62.1 kg)   02/09/22 137 lb (62.1 kg)   02/09/22 137 lb (62.1 kg)     There is no height or weight on file to calculate BMI.    CBC:   Lab Results   Component Value Date    WBC 6.7 01/11/2022    RBC 4.91 01/11/2022    HGB 14.1 01/11/2022    HCT 43.4 01/11/2022    MCV 88.5 01/11/2022    RDW 13.3 01/11/2022     01/11/2022       CMP:   Lab Results   Component Value Date     01/11/2022    K 4.4 01/11/2022    CL 98 01/11/2022    CO2 27 01/11/2022    BUN 18 01/11/2022    CREATININE 0.61 01/11/2022    CREATININE 0.7 01/11/2022    GFRAA >60.0 01/11/2022    LABGLOM >60.0 01/11/2022    GLUCOSE 81 01/11/2022    PROT 6.9 09/24/2021    CALCIUM 9.9 01/11/2022    BILITOT 0.6 09/24/2021    ALKPHOS 95 09/24/2021    AST 14 09/24/2021    ALT 9 09/24/2021       POC Tests: No results for input(s): POCGLU, POCNA, POCK, POCCL, POCBUN, POCHEMO, POCHCT in the last 72 hours. Coags: No results found for: PROTIME, INR, APTT    HCG (If Applicable): No results found for: PREGTESTUR, PREGSERUM, HCG, HCGQUANT     ABGs: No results found for: PHART, PO2ART, JXG2QBX, YND7FAE, BEART, Y7QLHFZG     Type & Screen (If Applicable):  No results found for: LABABO, LABRH    Drug/Infectious Status (If Applicable):  No results found for: HIV, HEPCAB    COVID-19 Screening (If Applicable):   Lab Results   Component Value Date    COVID19 Not-Detected 01/27/2022           Anesthesia Evaluation  Patient summary reviewed and Nursing notes reviewed no history of anesthetic complications:   Airway: Mallampati: II  TM distance: >3 FB   Neck ROM: full  Mouth opening: > = 3 FB Dental: normal exam         Pulmonary:normal exam    (+) shortness of breath:                             Cardiovascular:Negative CV ROS  Exercise tolerance: good (>4 METS),                     Neuro/Psych:   (+) headaches:, psychiatric history:            GI/Hepatic/Renal:   (+) hiatal hernia, PUD,           Endo/Other:    (+) hypothyroidism: arthritis:., .                 Abdominal:             Vascular: negative vascular ROS. Other Findings:             Anesthesia Plan      MAC     ASA 3       Induction: intravenous. Anesthetic plan and risks discussed with patient. Use of blood products discussed with patient whom. Plan discussed with CRNA.                   TANNA Farnsworth - WILLIAM   3/4/2022

## 2022-03-10 ENCOUNTER — OFFICE VISIT (OUTPATIENT)
Dept: FAMILY MEDICINE CLINIC | Age: 72
End: 2022-03-10
Payer: MEDICARE

## 2022-03-10 VITALS
HEART RATE: 67 BPM | DIASTOLIC BLOOD PRESSURE: 72 MMHG | BODY MASS INDEX: 24.99 KG/M2 | TEMPERATURE: 96.9 F | OXYGEN SATURATION: 98 % | RESPIRATION RATE: 15 BRPM | WEIGHT: 135.8 LBS | SYSTOLIC BLOOD PRESSURE: 128 MMHG | HEIGHT: 62 IN

## 2022-03-10 DIAGNOSIS — K44.9 PARAESOPHAGEAL HIATAL HERNIA: ICD-10-CM

## 2022-03-10 DIAGNOSIS — K21.9 GASTROESOPHAGEAL REFLUX DISEASE WITHOUT ESOPHAGITIS: ICD-10-CM

## 2022-03-10 DIAGNOSIS — K57.92 DIVERTICULITIS: ICD-10-CM

## 2022-03-10 DIAGNOSIS — R13.19 OTHER DYSPHAGIA: Primary | ICD-10-CM

## 2022-03-10 DIAGNOSIS — R10.32 LLQ PAIN: ICD-10-CM

## 2022-03-10 DIAGNOSIS — F41.9 ANXIETY AND DEPRESSION: ICD-10-CM

## 2022-03-10 DIAGNOSIS — F32.A ANXIETY AND DEPRESSION: ICD-10-CM

## 2022-03-10 PROBLEM — R07.9 CHEST PAIN: Status: RESOLVED | Noted: 2019-03-01 | Resolved: 2022-03-10

## 2022-03-10 PROBLEM — R06.02 SOB (SHORTNESS OF BREATH): Status: RESOLVED | Noted: 2019-03-01 | Resolved: 2022-03-10

## 2022-03-10 PROCEDURE — 99214 OFFICE O/P EST MOD 30 MIN: CPT | Performed by: FAMILY MEDICINE

## 2022-03-10 NOTE — PROGRESS NOTES
COLONOSCOPY N/A 3/4/2022    COLONOSCOPY DIAGNOSTIC performed by Ernesto Chicas MD at [address]  2013    AR COLONOSCOPY FLX DX W/COLLJ [address] WHEN PFRMD N/A 1/26/2018    COLONOSCOPY performed by Guerrero Crawford MD at [address] ESOPHAGOGASTRODUODENOSCOPY TRANSORAL DIAGNOSTIC N/A 1/26/2018    EGD ESOPHAGOGASTRODUODENOSCOPY WITH DILATION performed by Guerrero Crawford MD at [address] ENDOSCOPY N/A 3/4/2022    EGD ESOPHAGOGASTRODUODENOSCOPY performed by Ernesto Chicas MD at [facility] Marital status: Single     Spouse name: Not on file    Number of children: Not on file    Years of education: Not on file    Highest education level: Not on file   Occupational History    Not on file   Tobacco Use    Smoking status: Never Smoker    Smokeless tobacco: Never Used   Substance and Sexual Activity    Alcohol use: No     Alcohol/week: 0.0 standard drinks    Drug use: No    Sexual activity: Not Currently   Other Topics Concern    Not on file   Social History Narrative    Not on file     Social Determinants of Health     Financial Resource Strain: Low Risk     Difficulty of Paying Living Expenses: Not hard at all   Food Insecurity: No Food Insecurity    Worried About [facility] in the Last Year: Never true    Madi of Food in the Last Year: Never true   Transportation Needs: No Transportation Needs    Lack of Transportation (Medical): No    Lack of Transportation (Non-Medical):  No   Physical Activity:     Days of Exercise per Week: Not on file    Minutes of Exercise per Session: Not on file   Stress:     Feeling of Stress : Not on file   Social Connections:     Frequency of Communication with Friends and Family: Not on file    Frequency of Social Gatherings with Friends and Family: Not on file    Attends Temple Services: Not on file   [facility] [employer] or Organizations: Not on file    Attends Club or Organization Meetings: Not on file    Marital Status: Not on file   Intimate Partner Violence:     Fear of Current or Ex-Partner: Not on file    Emotionally Abused: Not on file    Physically Abused: Not on file    Sexually Abused: Not on file   Housing Stability:     Unable to Pay for Housing in the Last Year: Not on file    Number of [place] in the Last Year: Not on file    Unstable Housing in the Last Year: Not on file     Family History   Problem Relation Age of Onset    Heart Disease Father     Stroke Father     Diabetes Father     Stomach Cancer Father     Other Mother         Bowel Obstruction    Colon Cancer Neg Hx      Allergies   Allergen Reactions    Bactrim [Sulfamethoxazole-Trimethoprim]      hallucinations    Ciprofloxacin Other (See Comments)     Pt not 100% sure, but thinks cipro is the antibiotic she is allergic to  Anxiety, confusion     Current Outpatient Medications   Medication Sig Dispense Refill    omeprazole (PRILOSEC) 20 MG delayed release capsule Take 1 capsule by mouth daily 90 capsule 1    SUMAtriptan (IMITREX) 50 MG tablet Take 1 tablet by mouth once as needed for Migraine 9 tablet 0    levothyroxine (SYNTHROID) 75 MCG tablet Take 1 tablet by mouth daily 90 tablet 4    ibuprofen (IBU) 600 MG tablet Take 1 tablet by mouth every 6 hours as needed for Pain 120 tablet 0    ondansetron (ZOFRAN-ODT) 4 MG disintegrating tablet Take 1 tablet by mouth 3 times daily as needed for Nausea or Vomiting 21 tablet 0    dicyclomine (BENTYL) 10 MG capsule Take 1 capsule by mouth 4 times daily (before meals and nightly) 120 capsule 5    Cholecalciferol (VITAMIN D3) 50 MCG (2000 UT) CAPS 1 po daily with meal 90 capsule 4    cyanocobalamin ([employer] VITAMIN B12) 1000 MCG tablet Take 1 tablet by mouth daily 30 tablet 3    aspirin EC 81 MG EC tablet Take 1 tablet by mouth daily 90 tablet 4    PARoxetine (PAXIL) 10 MG tablet Take 1 tablet by mouth daily 90 tablet 4    atorvastatin (LIPITOR) 20 MG tablet Take 1 tablet by mouth daily 90 tablet 4    coenzyme Q-10 100 MG capsule Take 1 capsule by mouth daily 90 capsule 4    clobetasol (TEMOVATE) 0.05 % cream Apply topically 2 times daily Apply topically 2 times daily. 60 g 2    docusate sodium (COLACE) 100 MG capsule Take 100 mg by mouth 2 times daily      ibuprofen (ADVIL;MOTRIN) 600 MG tablet Take 1 tablet by mouth 2 times daily for 7 days 14 tablet 0     No current facility-administered medications for this visit. PMH, Surgical Hx, Family Hx, and Social Hxreviewed and updated. Health Maintenance reviewed. Objective    Vitals:    03/10/22 1253   BP: 128/72   Pulse: 67   Resp: 15   Temp: 96.9 °F (36.1 °C)   TempSrc: Temporal   SpO2: 98%   Weight: 135 lb 12.8 oz (61.6 kg)   Height: 5' 2\" (1.575 m)        Physical Exam  Constitutional:       General: She is not in acute distress. Appearance: She is well-developed. HENT:      Head: Normocephalic and atraumatic. Eyes:      General: No scleral icterus. Conjunctiva/sclera: Conjunctivae normal.   Cardiovascular:      Rate and Rhythm: Normal rate and regular rhythm. Heart sounds: Normal heart sounds. Pulmonary:      Effort: Pulmonary effort is normal. No respiratory distress. Breath sounds: No wheezing or rales. Skin:     General: Skin is warm and dry. Neurological:      Mental Status: She is alert and oriented to person, place, and time. Psychiatric:         Behavior: Behavior normal.         Thought Content:  Thought content normal.         Judgment: Judgment normal.           Lab Results   Component Value Date    LABA1C 5.2 01/19/2018    LABA1C 5.3 11/23/2015    LABA1C 5.5 10/07/2014     Lab Results   Component Value Date    CREATININE 0.61 01/11/2022     Lab Results   Component Value Date    ALT 9 09/24/2021    AST 14 09/24/2021     Lab Results   Component Value Date    CHOL 262 (H) 11/21/2018    TRIG 160 11/21/2018    HDL 59 04/23/2021    LDLCALC 123 04/23/2021        Assessment & Plan   Visit Diagnoses and Associated Orders     Other dysphagia    -  Primary    Improved some. On PPI. Has changed diet. Followed by GI. Diverticulitis        resolved; high fiber diet; consider fiber gummies. Gastroesophageal reflux disease without esophagitis        Improving with diet changes, PPI. LLQ pain        Improving but not resolved; high fiber diet and fiber supplementation         Anxiety and depression        improved on paxil 15 mg         Paraesophageal hiatal hernia        f/u with Dr. Kirby Lim. Will plan reduction. Pt beatrice kauffman b/c she will have help with Librado Huagn. Reviewed with the patient: all disease processes, current clinical status, medications, activities and diet.      Side effects, adverse effects of the medication prescribed today, as well as treatment plan/ rationale and result expectations have been discussed with the patient who expresses understanding and desires to proceed.     Close follow up to evaluate treatment results and for coordination of care. I have reviewed the patient's medical history in detail and updated the computerized patient record. More than 50% of the appointment was spent in face-to-face counseling, education and care coordination. Please note this report has been partially produced using speech recognition software and may contain mistakes related to that system including errors in grammar, punctuation and spelling as well as words and phrases that may seem inappropriate. If there are questions or concerns, please feel free to contact me to clarify. No orders of the defined types were placed in this encounter. No orders of the defined types were placed in this encounter. There are no discontinued medications.   Return in about 4 months (around 7/10/2022) for GERD, Mood Disorder,caregiver burden - OV.        Controlled Substance Monitoring:    Acute and Chronic Pain Monitoring:   RX Monitoring 10/30/2018   Attestation The Prescription Monitoring Report for this patient was reviewed today.            Josefina Jain MD

## 2022-03-18 DIAGNOSIS — E03.9 ACQUIRED HYPOTHYROIDISM: ICD-10-CM

## 2022-03-18 LAB — TSH SERPL DL<=0.05 MIU/L-ACNC: 2.15 UIU/ML (ref 0.44–3.86)

## 2022-03-22 ENCOUNTER — TELEPHONE (OUTPATIENT)
Dept: FAMILY MEDICINE CLINIC | Age: 72
End: 2022-03-22

## 2022-03-22 NOTE — TELEPHONE ENCOUNTER
Patient is calling requesting lab orders be placed for MRSA and Sepsis.     Please advise and thank you

## 2022-03-27 NOTE — RESULT ENCOUNTER NOTE
3/27/2022  [facility]    Dear Shae Garland,    Your colonoscopy reveled a growth on the large intestine (Colon) called a polyp. A polyp was removed during your procedure. As instructed after your colonoscopy and given the colonoscopy prep quality, recommendations suggest a follow up colonoscopy in 1-2 years with an extended colon prep pending functional status evaluation    We hope you are doing well, and if you have any questions please contact me at [phone]. Thank you for choosing [facility] for your healthcare.     Sincerely,     Florencio Sanders MD

## 2022-03-30 NOTE — TELEPHONE ENCOUNTER
I am sorry that Genet Arzola was admitted to [facility] with sepsis. This is very concerning to me. When is his next appointment with me? It should be soon. What tests were she acting what tests was she asking for?

## 2022-03-31 NOTE — TELEPHONE ENCOUNTER
It's in June. She is wanting Sepsis and MRSA tests done for herself and Deepa Mccurdy. Do you want a sooner appt for both?

## 2022-04-01 DIAGNOSIS — F32.A ANXIETY AND DEPRESSION: Chronic | ICD-10-CM

## 2022-04-01 DIAGNOSIS — F41.9 ANXIETY AND DEPRESSION: Chronic | ICD-10-CM

## 2022-04-01 RX ORDER — PAROXETINE 10 MG/1
10 TABLET, FILM COATED ORAL DAILY
Qty: 90 TABLET | Refills: 4 | Status: SHIPPED | OUTPATIENT
Start: 2022-04-01 | End: 2022-04-27 | Stop reason: SDUPTHER

## 2022-04-01 NOTE — TELEPHONE ENCOUNTER
Last refill 2/25/22  Last visit 3/10/22  Pt stated to remind you at last visit you would increase this to 20mg   Please advise

## 2022-04-01 NOTE — TELEPHONE ENCOUNTER
I understand she is concerned. However, there is not a test for either that is done except for very specific reasons - namely, the patient is very sick or is going to be having a certain kind of surgery. If she would like to come in with Andrzej Wharton, I am happy to help talk with her about her worries.

## 2022-04-04 ENCOUNTER — HOSPITAL ENCOUNTER (EMERGENCY)
Age: 72
Discharge: HOME | End: 2022-04-04
Attending: EMERGENCY MEDICINE
Payer: MEDICARE

## 2022-04-04 ENCOUNTER — NURSE TRIAGE (OUTPATIENT)
Dept: OTHER | Facility: CLINIC | Age: 72
End: 2022-04-04

## 2022-04-04 ENCOUNTER — APPOINTMENT (OUTPATIENT)
Dept: CT IMAGING | Age: 72
End: 2022-04-04
Payer: MEDICARE

## 2022-04-04 VITALS
OXYGEN SATURATION: 97 % | DIASTOLIC BLOOD PRESSURE: 72 MMHG | HEIGHT: 62 IN | TEMPERATURE: 98.4 F | WEIGHT: 137 LBS | RESPIRATION RATE: 16 BRPM | HEART RATE: 70 BPM | SYSTOLIC BLOOD PRESSURE: 130 MMHG | BODY MASS INDEX: 25.21 KG/M2

## 2022-04-04 DIAGNOSIS — R10.30 LOWER ABDOMINAL PAIN: Primary | ICD-10-CM

## 2022-04-04 DIAGNOSIS — R19.7 DIARRHEA, UNSPECIFIED TYPE: ICD-10-CM

## 2022-04-04 LAB
ALBUMIN SERPL-MCNC: 4.5 G/DL (ref 3.5–4.6)
ALP BLD-CCNC: 71 U/L (ref 40–130)
ALT SERPL-CCNC: 11 U/L (ref 0–33)
ANION GAP SERPL CALCULATED.3IONS-SCNC: 11 MEQ/L (ref 9–15)
AST SERPL-CCNC: 15 U/L (ref 0–35)
BASOPHILS ABSOLUTE: 0 K/UL (ref 0–0.2)
BASOPHILS RELATIVE PERCENT: 0.8 %
BILIRUB SERPL-MCNC: 0.5 MG/DL (ref 0.2–0.7)
BILIRUBIN URINE: NEGATIVE
BLOOD, URINE: NEGATIVE
BUN BLDV-MCNC: 13 MG/DL (ref 8–23)
CALCIUM SERPL-MCNC: 9.5 MG/DL (ref 8.5–9.9)
CHLORIDE BLD-SCNC: 105 MEQ/L (ref 95–107)
CLARITY: CLEAR
CO2: 24 MEQ/L (ref 20–31)
COLOR: YELLOW
CREAT SERPL-MCNC: 0.62 MG/DL (ref 0.5–0.9)
EOSINOPHILS ABSOLUTE: 0.2 K/UL (ref 0–0.7)
EOSINOPHILS RELATIVE PERCENT: 3.2 %
GFR AFRICAN AMERICAN: >60
GFR NON-AFRICAN AMERICAN: >60
GLOBULIN: 2.1 G/DL (ref 2.3–3.5)
GLUCOSE BLD-MCNC: 101 MG/DL (ref 70–99)
GLUCOSE URINE: NEGATIVE MG/DL
HCT VFR BLD CALC: 39.9 % (ref 37–47)
HEMOGLOBIN: 13.1 G/DL (ref 12–16)
KETONES, URINE: NEGATIVE MG/DL
LACTIC ACID: 1.3 MMOL/L (ref 0.5–2.2)
LEUKOCYTE ESTERASE, URINE: NEGATIVE
LIPASE: 16 U/L (ref 12–95)
LYMPHOCYTES ABSOLUTE: 1.4 K/UL (ref 1–4.8)
LYMPHOCYTES RELATIVE PERCENT: 29.4 %
MCH RBC QN AUTO: 29 PG (ref 27–31.3)
MCHC RBC AUTO-ENTMCNC: 32.9 % (ref 33–37)
MCV RBC AUTO: 88.2 FL (ref 82–100)
MONOCYTES ABSOLUTE: 0.4 K/UL (ref 0.2–0.8)
MONOCYTES RELATIVE PERCENT: 7.7 %
NEUTROPHILS ABSOLUTE: 2.8 K/UL (ref 1.4–6.5)
NEUTROPHILS RELATIVE PERCENT: 58.9 %
NITRITE, URINE: NEGATIVE
PDW BLD-RTO: 13.7 % (ref 11.5–14.5)
PH UA: 7 (ref 5–9)
PLATELET # BLD: 162 K/UL (ref 130–400)
POC CREATININE WHOLE BLOOD: 0.6
POTASSIUM SERPL-SCNC: 4 MEQ/L (ref 3.4–4.9)
PROTEIN UA: NEGATIVE MG/DL
RBC # BLD: 4.52 M/UL (ref 4.2–5.4)
SODIUM BLD-SCNC: 140 MEQ/L (ref 135–144)
SPECIFIC GRAVITY UA: 1.02 (ref 1–1.03)
TOTAL PROTEIN: 6.6 G/DL (ref 6.3–8)
URINE REFLEX TO CULTURE: NORMAL
UROBILINOGEN, URINE: 0.2 E.U./DL
WBC # BLD: 4.8 K/UL (ref 4.8–10.8)

## 2022-04-04 PROCEDURE — 6370000000 HC RX 637 (ALT 250 FOR IP): Performed by: EMERGENCY MEDICINE

## 2022-04-04 PROCEDURE — 6360000002 HC RX W HCPCS: Performed by: EMERGENCY MEDICINE

## 2022-04-04 PROCEDURE — 96375 TX/PRO/DX INJ NEW DRUG ADDON: CPT

## 2022-04-04 PROCEDURE — 80053 COMPREHEN METABOLIC PANEL: CPT

## 2022-04-04 PROCEDURE — 74177 CT ABD & PELVIS W/CONTRAST: CPT

## 2022-04-04 PROCEDURE — 96374 THER/PROPH/DIAG INJ IV PUSH: CPT

## 2022-04-04 PROCEDURE — 85025 COMPLETE CBC W/AUTO DIFF WBC: CPT

## 2022-04-04 PROCEDURE — 6360000004 HC RX CONTRAST MEDICATION: Performed by: EMERGENCY MEDICINE

## 2022-04-04 PROCEDURE — 83690 ASSAY OF LIPASE: CPT

## 2022-04-04 PROCEDURE — 83605 ASSAY OF LACTIC ACID: CPT

## 2022-04-04 PROCEDURE — 36415 COLL VENOUS BLD VENIPUNCTURE: CPT

## 2022-04-04 PROCEDURE — 2580000003 HC RX 258: Performed by: EMERGENCY MEDICINE

## 2022-04-04 PROCEDURE — 81003 URINALYSIS AUTO W/O SCOPE: CPT

## 2022-04-04 PROCEDURE — 99283 EMERGENCY DEPT VISIT LOW MDM: CPT

## 2022-04-04 RX ORDER — DICYCLOMINE HYDROCHLORIDE 10 MG/1
20 CAPSULE ORAL ONCE
Status: COMPLETED | OUTPATIENT
Start: 2022-04-04 | End: 2022-04-04

## 2022-04-04 RX ORDER — DICYCLOMINE HYDROCHLORIDE 10 MG/1
20 CAPSULE ORAL
Qty: 20 CAPSULE | Refills: 1 | Status: SHIPPED | OUTPATIENT
Start: 2022-04-04 | End: 2022-04-04 | Stop reason: SDUPTHER

## 2022-04-04 RX ORDER — MORPHINE SULFATE 4 MG/ML
4 INJECTION, SOLUTION INTRAMUSCULAR; INTRAVENOUS ONCE
Status: COMPLETED | OUTPATIENT
Start: 2022-04-04 | End: 2022-04-04

## 2022-04-04 RX ORDER — ONDANSETRON 2 MG/ML
4 INJECTION INTRAMUSCULAR; INTRAVENOUS ONCE
Status: COMPLETED | OUTPATIENT
Start: 2022-04-04 | End: 2022-04-04

## 2022-04-04 RX ORDER — SODIUM CHLORIDE 0.9 % (FLUSH) 0.9 %
10 SYRINGE (ML) INJECTION 2 TIMES DAILY
Status: DISCONTINUED | OUTPATIENT
Start: 2022-04-04 | End: 2022-04-04 | Stop reason: HOSPADM

## 2022-04-04 RX ORDER — HYOSCYAMINE SULFATE 0.12 MG/1
1 TABLET SUBLINGUAL EVERY 4 HOURS PRN
Qty: 20 EACH | Refills: 0 | Status: SHIPPED | OUTPATIENT
Start: 2022-04-04 | End: 2022-05-19

## 2022-04-04 RX ORDER — 0.9 % SODIUM CHLORIDE 0.9 %
1000 INTRAVENOUS SOLUTION INTRAVENOUS ONCE
Status: COMPLETED | OUTPATIENT
Start: 2022-04-04 | End: 2022-04-04

## 2022-04-04 RX ADMIN — SODIUM CHLORIDE 1000 ML: 9 INJECTION, SOLUTION INTRAVENOUS at 12:09

## 2022-04-04 RX ADMIN — ONDANSETRON 4 MG: 2 INJECTION INTRAMUSCULAR; INTRAVENOUS at 12:10

## 2022-04-04 RX ADMIN — DICYCLOMINE HYDROCHLORIDE 20 MG: 10 CAPSULE ORAL at 15:08

## 2022-04-04 RX ADMIN — IOPAMIDOL 100 ML: 612 INJECTION, SOLUTION INTRAVENOUS at 12:40

## 2022-04-04 RX ADMIN — MORPHINE SULFATE 4 MG: 4 INJECTION, SOLUTION INTRAMUSCULAR; INTRAVENOUS at 12:09

## 2022-04-04 ASSESSMENT — PAIN DESCRIPTION - ORIENTATION: ORIENTATION: LEFT;UPPER

## 2022-04-04 ASSESSMENT — PAIN SCALES - GENERAL
PAINLEVEL_OUTOF10: 10
PAINLEVEL_OUTOF10: 10

## 2022-04-04 ASSESSMENT — PAIN DESCRIPTION - DESCRIPTORS: DESCRIPTORS: SHARP

## 2022-04-04 ASSESSMENT — PAIN - FUNCTIONAL ASSESSMENT: PAIN_FUNCTIONAL_ASSESSMENT: 0-10

## 2022-04-04 ASSESSMENT — PAIN DESCRIPTION - LOCATION: LOCATION: ABDOMEN

## 2022-04-04 ASSESSMENT — PAIN DESCRIPTION - PAIN TYPE: TYPE: CHRONIC PAIN

## 2022-04-04 NOTE — TELEPHONE ENCOUNTER
Received call from Jamal Johnson at [facility] with Red Flag Complaint. Subjective:  Left chest (when I gag and cough) and abdominal pain continuously all week. Has worsened this week. Have been gagging and spitting up again. I think I have a UTI. Current Symptoms:lower  left abdominal pain at this time. Not much urine when I go to bathroom. Sometimes when I take a deep breath I feel it under my breast. Not present in chest right now. Onset: 1 week ago; worsening    Associated Symptoms: NA    Pain Severity: 8/10; sharp, bend you double intermittent    Temperature: denies fever by unknown method    What has been tried: nothing    LMP: NA Pregnant: NA    Recommended disposition: See in Office Today. Advised to go to  [facility] or ED if no appts available. Care advice provided, patient verbalizes understanding; denies any other questions or concerns; instructed to call back for any new or worsening symptoms. Patient/Caller agrees with recommended disposition; writer provided warm transfer to Graciela Diaz at [facility] for appointment scheduling     Attention Provider: Thank you for allowing me to participate in the care of your patient. The patient was connected to triage in response to information provided to the ECC/PSC. Please do not respond through this encounter as the response is not directed to a shared pool. Reason for Disposition   MODERATE pain (e.g., interferes with normal activities that comes and goes (cramps) lasts > 24 hours (Exception: pain with Vomiting or Diarrhea - see that Protocol)    Additional Information   Negative: Chest pain     No chest pain currently. States she gets cp at times when she coughs and gags.     Protocols used: ABDOMINAL PAIN - [facility] - JERSEY KOENIG

## 2022-04-04 NOTE — ED NOTES
Urine sent to lab      [facility] MENTAL [facility], RN  04/04/22 2275

## 2022-04-04 NOTE — ED NOTES
Pt denies needs at this time  No distress noted  Sitting up in bed talking to family   Call light within reach  Will continue to monitor      Jabier Boswell V RN  04/04/22 9865

## 2022-04-04 NOTE — ED NOTES
Pt states that her pain is better  Call light within reach  Denies needs  Will continue to monitor      [address] V, RN  04/04/22 9730

## 2022-04-04 NOTE — CARE COORDINATION
With the patient's permission, follow up appointments were made with Dr Tio Donis for 5/19/22 @ 1500, pt wants to only see her and is aware this is the only appointment we can get. An appointment with Joan Adamson NP (with Dr Konrad Alcantara) is made for tomorrow 4/5/22 @ [phone]. Pt states these appointments are acceptable to her and we reviewed what to bring with her and she states understanding.

## 2022-04-05 ENCOUNTER — OFFICE VISIT (OUTPATIENT)
Dept: GASTROENTEROLOGY | Age: 72
End: 2022-04-05
Payer: MEDICARE

## 2022-04-05 VITALS
HEART RATE: 82 BPM | BODY MASS INDEX: 25.24 KG/M2 | RESPIRATION RATE: 12 BRPM | DIASTOLIC BLOOD PRESSURE: 70 MMHG | WEIGHT: 138 LBS | OXYGEN SATURATION: 98 % | SYSTOLIC BLOOD PRESSURE: 128 MMHG

## 2022-04-05 DIAGNOSIS — K59.00 CONSTIPATION, UNSPECIFIED CONSTIPATION TYPE: Primary | ICD-10-CM

## 2022-04-05 PROCEDURE — 99213 OFFICE O/P EST LOW 20 MIN: CPT | Performed by: NURSE PRACTITIONER

## 2022-04-05 RX ORDER — POLYETHYLENE GLYCOL 3350 17 G/17G
17 POWDER, FOR SOLUTION ORAL DAILY
Qty: 510 G | Refills: 3 | Status: SHIPPED | OUTPATIENT
Start: 2022-04-05 | End: 2022-05-05

## 2022-04-05 ASSESSMENT — ENCOUNTER SYMPTOMS
VOICE CHANGE: 0
PHOTOPHOBIA: 0
COLOR CHANGE: 0
NAUSEA: 0
ABDOMINAL PAIN: 1
DIARRHEA: 1
VOMITING: 0
WHEEZING: 0
SHORTNESS OF BREATH: 0
ANAL BLEEDING: 0
CONSTIPATION: 1
EYE PAIN: 0
TROUBLE SWALLOWING: 0
BLOOD IN STOOL: 0
EYE REDNESS: 0
RECTAL PAIN: 0
ABDOMINAL DISTENTION: 0
CHEST TIGHTNESS: 0

## 2022-04-05 NOTE — PROGRESS NOTES
Subjective:      Patient ID: Matt Blount is a 66 y.o. female who presents today for:  Chief Complaint   Patient presents with    Follow Up After Procedure    Abdominal Pain     reports having lower left abd pain        HPI   Patient came in today as follow-up for complaints of abdominal pain. Had recent EGD and colonoscopy, EGD noted small hiatal hernia and gastritis-no evidence of H pylori, colonoscopy noted diverticulosis, and one polyp biopsy showed hyperplastic polyp, colon prep was suboptimal, pt reports taking all of the prep, of note patient does report that her pain was better for approximately 4 days after completing her colon prep and colonoscopy. She was seen in the emergency department yesterday for similar complaints, had essentially normal CT of her abdomen and pelvis ? Colitis vs underdistension, normal blood work, normal urine. She has no fever or chills. Currently reports persistent alternating bowel habits between diarrhea and constipation. No melena or hematochezia. Background  Patient came in to establish GI care with complaints of abdominal pain and heartburn. Was previously seen in the office by other providers, most recently 2019. Reports longstanding history of intermittent left lower quadrant pain, reports previous sigmoid resection secondary to benign growth in 2005 done at outside hospital, no records are available for review. Feels as though her bowel habits have been abnormal since then, is concerned this could be scar tissue. Has associated alternating bowel habits between constipation and diarrhea, occasionally sees bright red blood when she strains. Otherwise no unintentional weight loss, changes to appetite, family history of CRC or IBD. No nocturnal pain or progressively worsening pain. At baseline is not on oral anticoagulation or antiplatelet therapy.   In addition patient complains of longstanding history of heartburn and takes omeprazole 20 mg daily has breakthrough symptoms typically in the evening or nocturnally. Was recently seen and evaluated by Dr. Philip León for possible hiatal hernia repair, has radiological evidence of small to medium hiatal hernia, no recent EGD noted  Past Medical History:   Diagnosis Date    Anxiety and depression 5/2/2019    Anxiety and depression     Chest pain 3/1/2019    Diverticulosis of colon (without mention of hemorrhage) 02/25/2015    DR Bakari May    Focal lymphocytic colitis 1/27/2022    Gastroesophageal reflux disease without esophagitis     Hyperlipidemia 2/3/2015    Hypothyroidism     Lichen sclerosus     Migraines 2/3/2015    Renal insufficiency 2/3/2015    Spondylosis of lumbar region without myelopathy or radiculopathy 5/24/2016     Past Surgical History:   Procedure Laterality Date    ABDOMINAL ADHESION SURGERY  1/7/16    DR. MERAZ    APPENDECTOMY  2012    CATARACT REMOVAL Right     CHOLECYSTECTOMY  2012    COLON SURGERY  2005    13\" of colon removed-NO CA    COLONOSCOPY  2011    polyps    COLONOSCOPY  02/25/2015    DR Bakari May - DIVERTICULOSIS    COLONOSCOPY N/A 3/4/2022    COLONOSCOPY DIAGNOSTIC performed by Velma Baker MD at [address]  2013    NM COLONOSCOPY FLX DX W/COLLJ SPEC WHEN PFRMD N/A 1/26/2018    COLONOSCOPY performed by Jacquelyn Peterson MD at [address] ESOPHAGOGASTRODUODENOSCOPY TRANSORAL DIAGNOSTIC N/A 1/26/2018    EGD ESOPHAGOGASTRODUODENOSCOPY WITH DILATION performed by Jacquelyn Peterson MD at [address] ENDOSCOPY N/A 3/4/2022    EGD ESOPHAGOGASTRODUODENOSCOPY performed by Velma Baker MD at [address] [facility] History     Socioeconomic History    Marital status: Single     Spouse name: Not on file    Number of children: Not on file    Years of education: Not on file    Highest education level: Not on file   Occupational History    Not on file   Tobacco Use  Smoking status: Never Smoker    Smokeless tobacco: Never Used   Substance and Sexual Activity    Alcohol use: No     Alcohol/week: 0.0 standard drinks    Drug use: No    Sexual activity: Not Currently   Other Topics Concern    Not on file   Social History Narrative    Not on file     Social Determinants of Health     Financial Resource Strain: Low Risk     Difficulty of Paying Living Expenses: Not hard at all   Food Insecurity: No Food Insecurity    Worried About Running Out of Food in the Last Year: Never true    Madi of Food in the Last Year: Never true   Transportation Needs: No Transportation Needs    Lack of Transportation (Medical): No    Lack of Transportation (Non-Medical):  No   Physical Activity:     Days of Exercise per Week: Not on file    Minutes of Exercise per Session: Not on file   Stress:     Feeling of Stress : Not on file   Social Connections:     Frequency of Communication with Friends and Family: Not on file    Frequency of Social Gatherings with Friends and Family: Not on file    Attends Jehovah's witness Services: Not on file    Active Member of [address] [place] [address] or Organizations: Not on file    Attends Club or Organization Meetings: Not on file    Marital Status: Not on file   Intimate Partner Violence:     Fear of Current or Ex-Partner: Not on file    Emotionally Abused: Not on file    Physically Abused: Not on file    Sexually Abused: Not on file   Housing Stability:     Unable to Pay for Housing in the Last Year: Not on file    Number of [place] in the Last Year: Not on file    Unstable Housing in the Last Year: Not on file     Family History   Problem Relation Age of Onset    Heart Disease Father     Stroke Father     Diabetes Father     Stomach Cancer Father     Other Mother         Bowel Obstruction    Colon Cancer Neg Hx      Allergies   Allergen Reactions    Bactrim [Sulfamethoxazole-Trimethoprim]      hallucinations    Ciprofloxacin Other (See Comments) Pt not 100% sure, but thinks cipro is the antibiotic she is allergic to  Anxiety, confusion         Review of Systems   Constitutional: Negative for appetite change, chills, fever and unexpected weight change. HENT: Negative for nosebleeds, tinnitus, trouble swallowing and voice change. Eyes: Negative for photophobia, pain and redness. Respiratory: Negative for chest tightness, shortness of breath and wheezing. Cardiovascular: Negative for chest pain, palpitations and leg swelling. Gastrointestinal: Positive for abdominal pain, constipation and diarrhea. Negative for abdominal distention, anal bleeding, blood in stool, nausea, rectal pain and vomiting. Endocrine: Negative for polydipsia, polyphagia and polyuria. Genitourinary: Negative for difficulty urinating and hematuria. Skin: Negative for color change, pallor and rash. Neurological: Negative for dizziness, speech difficulty and headaches. Psychiatric/Behavioral: Negative for confusion and suicidal ideas. Objective:   /70 (Site: Left Upper Arm, Position: Sitting, Cuff Size: Small Adult)   Pulse 82   Resp 12   Wt 138 lb (62.6 kg)   LMP  (LMP Unknown)   SpO2 98%   BMI 25.24 kg/m²     Physical Exam  Vitals reviewed. Constitutional:       General: She is not in acute distress. Appearance: Normal appearance. She is well-developed and well-groomed. HENT:      Head: Normocephalic and atraumatic. Nose: Nose normal.   Eyes:      General: No scleral icterus. Extraocular Movements: Extraocular movements intact. Conjunctiva/sclera: Conjunctivae normal.      Pupils: Pupils are equal, round, and reactive to light. Cardiovascular:      Rate and Rhythm: Normal rate and regular rhythm. Pulses: Normal pulses. Heart sounds: Normal heart sounds. Pulmonary:      Effort: Pulmonary effort is normal. No respiratory distress. Breath sounds: Normal breath sounds. No wheezing or rales.    Abdominal: General: Abdomen is flat. Bowel sounds are normal. There is no distension. Palpations: Abdomen is soft. There is no hepatomegaly, splenomegaly or mass. Tenderness: There is no abdominal tenderness. There is no guarding or rebound. Musculoskeletal:         General: No tenderness or deformity. Normal range of motion. Cervical back: Neck supple. Right lower leg: No edema. Left lower leg: No edema. Skin:     General: Skin is warm and dry. Capillary Refill: Capillary refill takes less than 2 seconds. Coloration: Skin is not jaundiced. Findings: No erythema or rash. Neurological:      General: No focal deficit present. Mental Status: She is alert and oriented to person, place, and time. Psychiatric:         Mood and Affect: Mood normal.         Behavior: Behavior normal.         Laboratory, Pathology, Radiology reviewed in detail with relevantimportant investigations summarized below:  Lab Results   Component Value Date    WBC 4.8 04/04/2022    WBC 6.7 01/11/2022    WBC 5.9 12/13/2021    WBC 8.4 09/24/2021    WBC 8.7 11/21/2018    HGB 13.1 04/04/2022    HGB 14.1 01/11/2022    HGB 13.4 12/13/2021    HGB 13.6 09/24/2021    HGB 14.1 11/21/2018    HCT 39.9 04/04/2022    HCT 43.4 01/11/2022    HCT 40.8 12/13/2021    HCT 40.4 09/24/2021    HCT 40.9 11/21/2018    MCV 88.2 04/04/2022    MCV 88.5 01/11/2022    MCV 87.6 12/13/2021    MCV 85.6 09/24/2021    MCV 88.6 11/21/2018     04/04/2022     01/11/2022     12/13/2021     09/24/2021     11/21/2018    .   Lab Results   Component Value Date    ALT 11 04/04/2022    ALT 9 09/24/2021    ALT 10 04/23/2021    AST 15 04/04/2022    AST 14 09/24/2021    AST 13 04/23/2021    ALKPHOS 71 04/04/2022    ALKPHOS 95 09/24/2021    ALKPHOS 77 04/23/2021    BILITOT 0.5 04/04/2022    BILITOT 0.6 09/24/2021    BILITOT 0.3 04/23/2021       CT ABDOMEN PELVIS W IV CONTRAST Additional Contrast? None    Result Date: 4/4/2022  Comparison: January 11, 2022 History: Upper abdominal pain  Technique: CT ABDOMEN PELVIS W IV CONTRAST Helically Acquired CT of the  abdomen and pelvis was performed during the intravenous infusion of 100 mL of Isovue-370. Axial delayed images were also performed. Reformatted sagittal and coronal images were also  obtained. Findings: In the visualized portion of the lingula there is opacity and also in the right middle lobe. This may reflect atelectasis. A moderate size hiatal hernia is seen. No stones or hydronephrosis is seen. The 3 mm hypodensity is seen in the inferior pole of the right kidney. The gallbladder surgically absent and there our small hypodensity is again seen in both lobes of the liver. The pancreas, adrenal glands and pancreas are unremarkable. No  dilated loops of bowel, free air or free fluid is seen. . Mild bowel wall thickening versus incomplete distention is seen in the descending colon. Diverticulosis coli is seen in the sigmoid colon without evidence for diverticulitis. The uterus and adnexa are unremarkable. . A levoscoliosis is seen in the lumbar spine. Degenerative changes are seen at multiple levels. Mild anterior wedging is seen in the T12-L1 level. Intervertebral disc space narrowing is seen at L1-2, L2-3 and L5-S1. Vacuum phenomenon seen at multiple levels. Also there are small anterior osteophytes seen at multiple levels. Impression: 1. There is no evidence for obstruction. Bowel wall thickening versus incomplete distention is seen in the descending colon that could reflect mild colitis in the right clinical setting. 2. No hydronephrosis is seen.  3. Moderate size hiatal hernia is again seen  All CT scans at this facility use dose modulation, iterative reconstruction, and/or weight based dosing     No results found for: IRON, TIBC, FERRITIN  No results found for: INR  No components found for: ACUTEHEPATITISSCREEN  No components found for: CELIACPANEL  No components found for: STOOLCULTURE, C.DIFF, STOOLOVAPARASITE, STOOLLEUCOCYTE    Endoscopic hx:  EGD Dr Magnolia Jackson 3/4/22  A 3 to 4 cm hiatal hernia identified, mildly irregular Z-line  Gastritis status post biopsies  PLAN : Continue GERD precautions and PPI  Colonoscopy Dr Magnolia Jackson 3/4/22  Predominantly left colon diverticulosis  Sigmoid colon polyp removed with hot snare polypectomy  Fair prep after extensive washing and suctioning  Bx:  A GASTRIC BIOPSIES:   FRAGMENTS OF GASTRIC MUCOSA WITH FOCAL MILD CHRONIC INFLAMMATION. NEGATIVE FOR HELICOBACTER PYLORI ORGANISMS. SEE COMMENT.     B SIGMOID COLON POLYP:   HYPERPLASTIC COLONIC POLYP. Assessment:       Diagnosis Orders   1. Constipation, unspecified constipation type  polyethylene glycol (MIRALAX) 17 GM/SCOOP powder               No orders of the defined types were placed in this encounter. Orders Placed This Encounter   Medications    polyethylene glycol (MIRALAX) 17 GM/SCOOP powder     Sig: Take 17 g by mouth daily     Dispense:  510 g     Refill:  3     Plan:  1. IBS mixed pattern  Longstanding history of alternating bowel habits between diarrhea and constipation associated with abdominal cramping, recent colonoscopy noted 1 hyperplastic polyp and diverticulosis and suboptimal prep despite completing the prep as prescirbed. Was seen in the emergency department yesterday for similar symptoms had CT scan abdomen was essentially normal, noted ? Colitis vs underdistension, blood work was normal, UA was negative. Patient reports that her symptoms were resolved after recent colonoscopy for approximately 4 days, now with hard firm stool followed by loose liquid stool. No fever or chills. This raises the suspicion of underlying constipation with overflow diarrhea.   -miralax colon cleanse, instructions provided  -continue colace 100 mg twice daily  -observe clinical course, close follow up, further recommendations pending clinical course.      2. GERD  EGD noted 3 cm HH, and mild gastritis, otherwise normal bx negative for HP. Has been on PPI and symptoms are well controlled. Was previously seen by Dr Reji Rodriguez for [facility], no plan for surgical intervention. = Lifestyle modification recommended and discussed with the patient              --Avoid spicy and acidic based foods              --Limit coffee, tea, alcohol use              --Limit the amount of food during meal time              --Avoid bedtime snacks and eat meals 3 to 4 hrs before lying down              --Elevate the head of the bed               --Keep healthy weight    3. Associated medical conditions include but are not limited to. . Hyperlipidemia, chronic renal insufficiency, anxiety and depression, hypothyroidism, migraines, diverticulosis, GERD, hiatal hernia.     Return in about 1 week (around 4/12/2022), or if symptoms worsen or fail to improve.       Eriberto Crain, APRN - CNP

## 2022-04-06 ASSESSMENT — ENCOUNTER SYMPTOMS
ABDOMINAL PAIN: 1
NAUSEA: 0
CHEST TIGHTNESS: 0
VOMITING: 0
SHORTNESS OF BREATH: 0
EYE PAIN: 0
DIARRHEA: 1
SORE THROAT: 0

## 2022-04-06 NOTE — ED PROVIDER NOTES
[address] [facility] ED  EMERGENCY DEPARTMENT ENCOUNTER      Pt Name: Nina Aiken  MRN: [id number]  [place] 1950  Date of evaluation: 4/4/2022  Provider: Reginaldo Morales DO    CHIEF COMPLAINT       Chief Complaint   Patient presents with    Abdominal Pain     luq pain for the last few weeks          HISTORY OF PRESENT ILLNESS   (Location/Symptom, Timing/Onset, Context/Setting, Quality, Duration, Modifying Factors, Severity)  Note limiting factors. Nina Aiken is a 66 y.o. female who presents to the emergency department . Patient is here with left upper quadrant abdominal pain for the last few weeks. She has been having a lot of diarrhea which is not unusual.  History of diverticulosis. She has had multiple abdominal surgeries. She states that she can feel a lump on the left side of her abdomen when the pain gets really bad and then it will go away. Feels like something gets stuck. Patient is taking Bentyl at home which is not helping. Patient has seen Cecile Borden and has had colonoscopy that only showed diverticulosis and she had 1 polyp taken out. Patient also has a hiatal hernia. HPI    Nursing Notes were reviewed. REVIEW OF SYSTEMS    (2-9 systems for level 4, 10 or more for level 5)     Review of Systems   Constitutional: Negative for activity change, appetite change, fatigue and fever. HENT: Negative for congestion and sore throat. Eyes: Negative for pain and visual disturbance. Respiratory: Negative for chest tightness and shortness of breath. Cardiovascular: Negative for chest pain. Gastrointestinal: Positive for abdominal pain and diarrhea. Negative for nausea and vomiting. Endocrine: Negative for polydipsia. Genitourinary: Negative for flank pain and urgency. Musculoskeletal: Negative for gait problem and neck stiffness. Skin: Negative for rash. Neurological: Negative for weakness, light-headedness and headaches.    Psychiatric/Behavioral: Negative for confusion and sleep disturbance. Except as noted above the remainder of the review of systems was reviewed and negative. PAST MEDICAL HISTORY     Past Medical History:   Diagnosis Date    Anxiety and depression 5/2/2019    Anxiety and depression     Chest pain 3/1/2019    Diverticulosis of colon (without mention of hemorrhage) 02/25/2015    DR John Fraah    Focal lymphocytic colitis 1/27/2022    Gastroesophageal reflux disease without esophagitis     Hyperlipidemia 2/3/2015    Hypothyroidism     Lichen sclerosus     Migraines 2/3/2015    Renal insufficiency 2/3/2015    Spondylosis of lumbar region without myelopathy or radiculopathy 5/24/2016         SURGICAL HISTORY       Past Surgical History:   Procedure Laterality Date    ABDOMINAL ADHESION SURGERY  1/7/16    DR. MERAZ    APPENDECTOMY  2012    CATARACT REMOVAL Right     CHOLECYSTECTOMY  2012    COLON SURGERY  2005    13\" of colon removed-NO CA    COLONOSCOPY  2011    polyps    COLONOSCOPY  02/25/2015    DR John Farah - DIVERTICULOSIS    COLONOSCOPY N/A 3/4/2022    COLONOSCOPY DIAGNOSTIC performed by Jorge Dominguez MD at [address]  2013    SC COLONOSCOPY FLX DX W/COLLJ SPEC WHEN PFRMD N/A 1/26/2018    COLONOSCOPY performed by Marcie Ku MD at [facility] ESOPHAGOGASTRODUODENOSCOPY TRANSORAL DIAGNOSTIC N/A 1/26/2018    EGD ESOPHAGOGASTRODUODENOSCOPY WITH DILATION performed by Marcie Ku MD at [place] [address] 3/4/2022    EGD ESOPHAGOGASTRODUODENOSCOPY performed by Jorge Dominguez MD at [address] [place] [address]       Discharge Medication List as of 4/4/2022  3:34 PM      CONTINUE these medications which have NOT CHANGED    Details   PARoxetine (PAXIL) 10 MG tablet Take 1 tablet by mouth daily, Disp-90 tablet, R-4Normal      omeprazole (PRILOSEC) 20 MG delayed release capsule Take 1 capsule by mouth daily, Disp-90 capsule, R-1Normal      SUMAtriptan (IMITREX) 50 MG tablet Take 1 tablet by mouth once as needed for Migraine, Disp-9 tablet, R-0Normal      levothyroxine (SYNTHROID) 75 MCG tablet Take 1 tablet by mouth daily, Disp-90 tablet, R-4Normal      ibuprofen (IBU) 600 MG tablet Take 1 tablet by mouth every 6 hours as needed for Pain, Disp-120 tablet, R-0Print      ondansetron (ZOFRAN-ODT) 4 MG disintegrating tablet Take 1 tablet by mouth 3 times daily as needed for Nausea or Vomiting, Disp-21 tablet, R-0Print      !! dicyclomine (BENTYL) 10 MG capsule Take 1 capsule by mouth 4 times daily (before meals and nightly), Disp-120 capsule, R-5Normal      Cholecalciferol (VITAMIN D3) 50 MCG (2000 UT) CAPS 1 po daily with meal, Disp-90 capsule, R-4Normal      cyanocobalamin ([employer] VITAMIN B12) 1000 MCG tablet Take 1 tablet by mouth daily, Disp-30 tablet, R-3Normal      aspirin EC 81 MG EC tablet Take 1 tablet by mouth daily, Disp-90 tablet, R-4Normal      atorvastatin (LIPITOR) 20 MG tablet Take 1 tablet by mouth daily, Disp-90 tablet, R-4Normal      coenzyme Q-10 100 MG capsule Take 1 capsule by mouth daily, Disp-90 capsule, R-4Normal      clobetasol (TEMOVATE) 0.05 % cream Apply topically 2 times daily Apply topically 2 times daily. , Topical, 2 TIMES DAILY Starting Fri 12/4/2020, Disp-60 g, R-2, Normal      docusate sodium (COLACE) 100 MG capsule Take 100 mg by mouth 2 times dailyHistorical Med       !! - Potential duplicate medications found. Please discuss with provider.           ALLERGIES     Bactrim [sulfamethoxazole-trimethoprim] and Ciprofloxacin    FAMILY HISTORY       Family History   Problem Relation Age of Onset    Heart Disease Father     Stroke Father     Diabetes Father     Stomach Cancer Father     Other Mother         Bowel Obstruction    Colon Cancer Neg Hx           SOCIAL HISTORY       Social History     Socioeconomic History    Marital status: Single     Spouse name: None  Number of children: None    Years of education: None    Highest education level: None   Occupational History    None   Tobacco Use    Smoking status: Never Smoker    Smokeless tobacco: Never Used   Substance and Sexual Activity    Alcohol use: No     Alcohol/week: 0.0 standard drinks    Drug use: No    Sexual activity: Not Currently   Other Topics Concern    None   Social History Narrative    None     Social Determinants of Health     Financial Resource Strain: Low Risk     Difficulty of Paying Living Expenses: Not hard at all   Food Insecurity: No Food Insecurity    Worried About Running Out of Food in the Last Year: Never true    Madi of Food in the Last Year: Never true   Transportation Needs: No Transportation Needs    Lack of Transportation (Medical): No    Lack of Transportation (Non-Medical):  No   Physical Activity:     Days of Exercise per Week: Not on file    Minutes of Exercise per Session: Not on file   Stress:     Feeling of Stress : Not on file   Social Connections:     Frequency of Communication with Friends and Family: Not on file    Frequency of Social Gatherings with Friends and Family: Not on file    Attends [employer] Services: Not on file    Active Member of [address] [place] [address] or Organizations: Not on file    Attends Club or Organization Meetings: Not on file    Marital Status: Not on file   Intimate Partner Violence:     Fear of Current or Ex-Partner: Not on file    Emotionally Abused: Not on file    Physically Abused: Not on file    Sexually Abused: Not on file   Housing Stability:     Unable to Pay for Housing in the Last Year: Not on file    Number of [place] in the Last Year: Not on file    Unstable Housing in the Last Year: Not on file       SCREENINGS        Snyder Coma Scale  Eye Opening: Spontaneous  Best Verbal Response: Oriented  Best Motor Response: Obeys commands  Snyder Coma Scale Score: 15               PHYSICAL EXAM    (up to 7 for level 4, 8 or more for level 5)     ED Triage Vitals   BP Temp Temp Source Pulse Resp SpO2 Height Weight   04/04/22 1151 04/04/22 1155 04/04/22 1155 04/04/22 1151 04/04/22 1151 04/04/22 1151 04/04/22 1151 04/04/22 1151   (!) 154/121 98.4 °F (36.9 °C) Oral 88 17 99 % 5' 2\" (1.575 m) 137 lb (62.1 kg)       Physical Exam  Vitals and nursing note reviewed. Constitutional:       General: She is not in acute distress. Appearance: She is well-developed. She is not diaphoretic. HENT:      Head: Normocephalic and atraumatic. Right Ear: External ear normal.      Left Ear: External ear normal.      Mouth/Throat:      Pharynx: No oropharyngeal exudate. Eyes:      Conjunctiva/sclera: Conjunctivae normal.      Pupils: Pupils are equal, round, and reactive to light. Neck:      Thyroid: No thyromegaly. Vascular: No JVD. Trachea: No tracheal deviation. Cardiovascular:      Rate and Rhythm: Normal rate. Heart sounds: Normal heart sounds. No murmur heard. Pulmonary:      Effort: Pulmonary effort is normal. No respiratory distress. Breath sounds: Normal breath sounds. No wheezing. Abdominal:      General: Bowel sounds are normal.      Palpations: Abdomen is soft. Tenderness: There is abdominal tenderness in the left lower quadrant. There is no guarding or rebound. Musculoskeletal:         General: Normal range of motion. Cervical back: Normal range of motion and neck supple. Skin:     General: Skin is warm and dry. Findings: No rash. Neurological:      Mental Status: She is alert and oriented to person, place, and time. Cranial Nerves: No cranial nerve deficit.    Psychiatric:         Behavior: Behavior normal.         DIAGNOSTIC RESULTS     EKG: All EKG's are interpreted by the Emergency Department Physician who either signs or Co-signs this chart in the absence of a cardiologist.        RADIOLOGY:   Non-plain film images such as CT, Ultrasound and MRI are read by the radiologist. Plain radiographic images are visualized and preliminarily interpreted by the emergency physician with the below findings:        Interpretation per the Radiologist below, if available at the time of this note:    CT ABDOMEN PELVIS W IV CONTRAST Additional Contrast? None   Final Result   Impression:   1. There is no evidence for obstruction. Bowel wall thickening versus incomplete distention is seen in the descending colon that could reflect mild colitis in the right clinical setting. 2. No hydronephrosis is seen. 3. Moderate size hiatal hernia is again seen        All CT scans at this facility use dose modulation, iterative reconstruction, and/or weight based dosing                                                       ED BEDSIDE ULTRASOUND:   Performed by ED Physician - none    LABS:  Labs Reviewed   CBC WITH AUTO DIFFERENTIAL - Abnormal; Notable for the following components:       Result Value    MCHC 32.9 (*)     All other components within normal limits   COMPREHENSIVE METABOLIC PANEL - Abnormal; Notable for the following components:    Glucose 101 (*)     Globulin 2.1 (*)     All other components within normal limits   POCT CREATININE - URINE - Normal   LIPASE   LACTIC ACID   URINALYSIS WITH REFLEX TO CULTURE       All other labs were within normal range or not returned as of this dictation. EMERGENCY DEPARTMENT COURSE and DIFFERENTIAL DIAGNOSIS/MDM:   Vitals:    Vitals:    04/04/22 1151 04/04/22 1155 04/04/22 1254 04/04/22 1354   BP: (!) 154/121  133/70 130/72   Pulse: 88  75 70   Resp: 17  16 16   Temp:  98.4 °F (36.9 °C)     TempSrc:  Oral     SpO2: 99%  97% 97%   Weight: 137 lb (62.1 kg)      Height: 5' 2\" (1.575 m)          Patient here with left-sided abdominal pain. I believe she likely has adhesions. Her pain is intermittent and when it gets bad she feels a lump on the left side and when that resolves so does her pain.   Multiple abdominal surgeries in the past.  Patient discharged home she has no obstruction no acute pathology    MDM      REASSESSMENT          CRITICAL CARE TIME   Total Critical Care time was 0 minutes, excluding separately reportable procedures. There was a high probability of clinically significant/life threatening deterioration in the patient's condition which required my urgent intervention. CONSULTS:  None    PROCEDURES:  Unless otherwise noted below, none     Procedures        FINAL IMPRESSION      1. Lower abdominal pain    2. Diarrhea, unspecified type          DISPOSITION/PLAN   DISPOSITION Decision To Discharge 04/04/2022 04:00:28 PM      PATIENT REFERRED TO:  MD Clarissa Dkue [phone]            DISCHARGE MEDICATIONS:  Discharge Medication List as of 4/4/2022  3:34 PM      START taking these medications    Details   !! dicyclomine (BENTYL) 10 MG capsule Take 2 capsules by mouth 4 times daily (before meals and nightly), Disp-20 capsule, R-1Normal       !! - Potential duplicate medications found. Please discuss with provider. Controlled Substances Monitoring:     RX Monitoring 10/30/2018   Attestation The Prescription Monitoring Report for this patient was reviewed today.        (Please note that portions of this note were completed with a voice recognition program.  Efforts were made to edit the dictations but occasionally words are mis-transcribed.)    Haven Grace DO (electronically signed)  Attending Emergency Physician            Haven Grace DO  04/06/22 0774

## 2022-04-15 ENCOUNTER — OFFICE VISIT (OUTPATIENT)
Dept: GASTROENTEROLOGY | Age: 72
End: 2022-04-15
Payer: MEDICARE

## 2022-04-15 VITALS — WEIGHT: 136 LBS | BODY MASS INDEX: 25.03 KG/M2 | HEIGHT: 62 IN

## 2022-04-15 DIAGNOSIS — K59.00 CONSTIPATION, UNSPECIFIED CONSTIPATION TYPE: Primary | ICD-10-CM

## 2022-04-15 PROCEDURE — 99213 OFFICE O/P EST LOW 20 MIN: CPT | Performed by: NURSE PRACTITIONER

## 2022-04-15 ASSESSMENT — ENCOUNTER SYMPTOMS
TROUBLE SWALLOWING: 0
EYE REDNESS: 0
CONSTIPATION: 1
EYE PAIN: 0
SHORTNESS OF BREATH: 0
VOMITING: 0
ABDOMINAL DISTENTION: 0
COLOR CHANGE: 0
ABDOMINAL PAIN: 0
ANAL BLEEDING: 0
VOICE CHANGE: 0
PHOTOPHOBIA: 0
CHEST TIGHTNESS: 0
DIARRHEA: 0
WHEEZING: 0
NAUSEA: 0
RECTAL PAIN: 0
BLOOD IN STOOL: 0

## 2022-04-15 NOTE — PROGRESS NOTES
Subjective:      Patient ID: Howie Miner is a 66 y.o. female who presents today for:  Chief Complaint   Patient presents with    Follow-up     Patient is here today for a 1 week follow up after having constipation. Patient states that she is still not feeling very well and is still having problems with her bowel movements. HPI   Patient came in as follow-up and further evaluation of abdominal pain, as recall was seen approximately 1 week ago with severe abdominal pain and constipation, was prescribed a colon cleanse. She is currently asymptomatic, has no further abdominal pain. Reports issues with constipation typically relieved by Dulcolax and MiraLAX. Otherwise no new complaints or concerns, no melena or hematochezia. OV 4/5/22  Patient came in today as follow-up for complaints of abdominal pain. Had recent EGD and colonoscopy, EGD noted small hiatal hernia and gastritis-no evidence of H pylori, colonoscopy noted diverticulosis, and one polyp biopsy showed hyperplastic polyp, colon prep was suboptimal, pt reports taking all of the prep, of note patient does report that her pain was better for approximately 4 days after completing her colon prep and colonoscopy. She was seen in the emergency department yesterday for similar complaints, had essentially normal CT of her abdomen and pelvis ? Colitis vs underdistension, normal blood work, normal urine. She has no fever or chills. Currently reports persistent alternating bowel habits between diarrhea and constipation. No melena or hematochezia.   Background  Patient came in to establish GI care with complaints of abdominal pain and heartburn.  Was previously seen in the office by other providers, most recently 2019.  Reports longstanding history of intermittent left lower quadrant pain, reports previous sigmoid resection secondary to benign growth in 2005 done at outside hospital, no records are available for review.  Feels as though her bowel habits have been abnormal since then, is concerned this could be scar tissue.  Has associated alternating bowel habits between constipation and diarrhea, occasionally sees bright red blood when she strains.  Otherwise no unintentional weight loss, changes to appetite, family history of CRC or IBD.  No nocturnal pain or progressively worsening pain.  At baseline is not on oral anticoagulation or antiplatelet therapy.  In addition patient complains of longstanding history of heartburn and takes omeprazole 20 mg daily has breakthrough symptoms typically in the evening or nocturnally.  Was recently seen and evaluated by Dr. Trudy Kumar possible hiatal hernia repair, has radiological evidence of small to medium hiatal hernia, no recent EGD noted    Past Medical History:   Diagnosis Date    Anxiety and depression 5/2/2019    Anxiety and depression     Chest pain 3/1/2019    Diverticulosis of colon (without mention of hemorrhage) 02/25/2015    DR Edson Ruffin    Focal lymphocytic colitis 1/27/2022    Gastroesophageal reflux disease without esophagitis     Hyperlipidemia 2/3/2015    Hypothyroidism     Lichen sclerosus     Migraines 2/3/2015    Renal insufficiency 2/3/2015    Spondylosis of lumbar region without myelopathy or radiculopathy 5/24/2016     Past Surgical History:   Procedure Laterality Date    ABDOMINAL ADHESION SURGERY  1/7/16    DR. MERAZ    APPENDECTOMY  2012    CATARACT REMOVAL Right     CHOLECYSTECTOMY  2012    COLON SURGERY  2005    13\" of colon removed-NO CA    COLONOSCOPY  2011    polyps    COLONOSCOPY  02/25/2015    DR Edson Ruffin - DIVERTICULOSIS    COLONOSCOPY N/A 3/4/2022    COLONOSCOPY DIAGNOSTIC performed by Benjamin Leal MD at [address]  2013    DC COLONOSCOPY FLX DX W/COLLJ SPEC WHEN PFRMD N/A 1/26/2018    [facility] ESOPHAGOGASTRODUODENOSCOPY TRANSORAL DIAGNOSTIC N/A 1/26/2018    EGD ESOPHAGOGASTRODUODENOSCOPY WITH DILATION performed by Mansi Cheung MD at [address] ENDOSCOPY N/A 3/4/2022    EGD ESOPHAGOGASTRODUODENOSCOPY performed by [facility]al MD at [facility] Marital status: Single     Spouse name: Not on file    Number of children: Not on file    Years of education: Not on file    Highest education level: Not on file   Occupational History    Not on file   Tobacco Use    Smoking status: Never Smoker    Smokeless tobacco: Never Used   Substance and Sexual Activity    Alcohol use: No     Alcohol/week: 0.0 standard drinks    Drug use: No    Sexual activity: Not Currently   Other Topics Concern    Not on file   Social History Narrative    Not on file     Social Determinants of Health     Financial Resource Strain: Low Risk     Difficulty of Paying Living Expenses: Not hard at all   Food Insecurity: No Food Insecurity    Worried About [address] [place] [address] in the Last Year: Never true    Madi of Food in the Last Year: Never true   Transportation Needs: No Transportation Needs    Lack of Transportation (Medical): No    Lack of Transportation (Non-Medical):  No   Physical Activity:     Days of Exercise per Week: Not on file    Minutes of Exercise per Session: Not on file   Stress:     Feeling of Stress : Not on file   Social Connections:     Frequency of Communication with Friends and Family: Not on file    Frequency of Social Gatherings with Friends and Family: Not on file    Attends [employer]ism Services: Not on file    Active Member of Clubs or Organizations: Not on file    Attends Club or Organization Meetings: Not on file    Marital Status: Not on file   Intimate Partner Violence:     Fear of Current or Ex-Partner: Not on file    Emotionally Abused: Not on file    Physically Abused: Not on file    Sexually Abused: Not on file   Housing Stability:     Unable to Pay for Housing in the Last Year: Not on file    Number of Places Lived in the Last Year: Not on file    Unstable Housing in the Last Year: Not on file     Family History   Problem Relation Age of Onset    Heart Disease Father     Stroke Father     Diabetes Father     Stomach Cancer Father     Other Mother         Bowel Obstruction    Colon Cancer Neg Hx      Allergies   Allergen Reactions    Bactrim [Sulfamethoxazole-Trimethoprim]      hallucinations    Ciprofloxacin Other (See Comments)     Pt not 100% sure, but thinks cipro is the antibiotic she is allergic to  Anxiety, confusion         Review of Systems   Constitutional: Negative for appetite change, chills, fever and unexpected weight change. HENT: Negative for nosebleeds, tinnitus, trouble swallowing and voice change. Eyes: Negative for photophobia, pain and redness. Respiratory: Negative for chest tightness, shortness of breath and wheezing. Cardiovascular: Negative for chest pain, palpitations and leg swelling. Gastrointestinal: Positive for constipation. Negative for abdominal distention, abdominal pain, anal bleeding, blood in stool, diarrhea, nausea, rectal pain and vomiting. Endocrine: Negative for polydipsia, polyphagia and polyuria. Genitourinary: Negative for difficulty urinating and hematuria. Skin: Negative for color change, pallor and rash. Neurological: Negative for dizziness, speech difficulty and headaches. Psychiatric/Behavioral: Negative for confusion and suicidal ideas. Objective:   Ht 5' 2\" (1.575 m)   Wt 136 lb (61.7 kg)   LMP  (LMP Unknown)   BMI 24.87 kg/m²     Physical Exam  Vitals reviewed. Constitutional:       General: She is not in acute distress. Appearance: Normal appearance. She is well-developed and well-groomed. HENT:      Head: Normocephalic and atraumatic. Nose: Nose normal.   Eyes:      General: No scleral icterus.      Extraocular Movements: Extraocular movements intact. Conjunctiva/sclera: Conjunctivae normal.      Pupils: Pupils are equal, round, and reactive to light. Cardiovascular:      Rate and Rhythm: Normal rate and regular rhythm. Pulses: Normal pulses. Heart sounds: Normal heart sounds. Pulmonary:      Effort: Pulmonary effort is normal. No respiratory distress. Breath sounds: Normal breath sounds. No wheezing or rales. Abdominal:      General: Abdomen is flat. Bowel sounds are normal. There is no distension. Palpations: Abdomen is soft. There is no hepatomegaly, splenomegaly or mass. Tenderness: There is no abdominal tenderness. There is no guarding or rebound. Musculoskeletal:         General: No tenderness or deformity. Normal range of motion. Cervical back: Neck supple. Right lower leg: No edema. Left lower leg: No edema. Skin:     General: Skin is warm and dry. Capillary Refill: Capillary refill takes less than 2 seconds. Coloration: Skin is not jaundiced. Findings: No erythema or rash. Neurological:      General: No focal deficit present. Mental Status: She is alert and oriented to person, place, and time.    Psychiatric:         Mood and Affect: Mood normal.         Behavior: Behavior normal.         Laboratory, Pathology, Radiology reviewed in detail with relevantimportant investigations summarized below:  Lab Results   Component Value Date    WBC 4.8 04/04/2022    WBC 6.7 01/11/2022    WBC 5.9 12/13/2021    WBC 8.4 09/24/2021    WBC 8.7 11/21/2018    HGB 13.1 04/04/2022    HGB 14.1 01/11/2022    HGB 13.4 12/13/2021    HGB 13.6 09/24/2021    HGB 14.1 11/21/2018    HCT 39.9 04/04/2022    HCT 43.4 01/11/2022    HCT 40.8 12/13/2021    HCT 40.4 09/24/2021    HCT 40.9 11/21/2018    MCV 88.2 04/04/2022    MCV 88.5 01/11/2022    MCV 87.6 12/13/2021    MCV 85.6 09/24/2021    MCV 88.6 11/21/2018     04/04/2022     01/11/2022     12/13/2021    PLT 190 09/24/2021     11/21/2018    . Lab Results   Component Value Date    ALT 11 04/04/2022    ALT 9 09/24/2021    ALT 10 04/23/2021    AST 15 04/04/2022    AST 14 09/24/2021    AST 13 04/23/2021    ALKPHOS 71 04/04/2022    ALKPHOS 95 09/24/2021    ALKPHOS 77 04/23/2021    BILITOT 0.5 04/04/2022    BILITOT 0.6 09/24/2021    BILITOT 0.3 04/23/2021       CT ABDOMEN PELVIS W IV CONTRAST Additional Contrast? None    Result Date: 4/4/2022  Comparison: January 11, 2022 History: Upper abdominal pain  Technique: CT ABDOMEN PELVIS W IV CONTRAST Helically Acquired CT of the  abdomen and pelvis was performed during the intravenous infusion of 100 mL of Isovue-370. Axial delayed images were also performed. Reformatted sagittal and coronal images were also  obtained. Findings: In the visualized portion of the lingula there is opacity and also in the right middle lobe. This may reflect atelectasis. A moderate size hiatal hernia is seen. No stones or hydronephrosis is seen. The 3 mm hypodensity is seen in the inferior pole of the right kidney. The gallbladder surgically absent and there our small hypodensity is again seen in both lobes of the liver. The pancreas, adrenal glands and pancreas are unremarkable. No  dilated loops of bowel, free air or free fluid is seen. . Mild bowel wall thickening versus incomplete distention is seen in the descending colon. Diverticulosis coli is seen in the sigmoid colon without evidence for diverticulitis. The uterus and adnexa are unremarkable. . A levoscoliosis is seen in the lumbar spine. Degenerative changes are seen at multiple levels. Mild anterior wedging is seen in the T12-L1 level. Intervertebral disc space narrowing is seen at L1-2, L2-3 and L5-S1. Vacuum phenomenon seen at multiple levels. Also there are small anterior osteophytes seen at multiple levels. Impression: 1. There is no evidence for obstruction.  Bowel wall thickening versus incomplete distention is seen in the descending colon that could reflect mild colitis in the right clinical setting. 2. No hydronephrosis is seen. 3. Moderate size hiatal hernia is again seen  All CT scans at this facility use dose modulation, iterative reconstruction, and/or weight based dosing     No results found for: IRON, TIBC, FERRITIN  No results found for: INR  No components found for: ACUTEHEPATITISSCREEN  No components found for: CELIACPANEL  No components found for: STOOLCULTURE, C.DIFF, STOOLOVAPARASITE, STOOLLEUCOCYTE    Endoscopic hx:  EGD Dr Anneliese Cai 3/4/22  A 3 to 4 cm hiatal hernia identified, mildly irregular Z-line  Gastritis status post biopsies  PLAN : Continue GERD precautions and PPI  Colonoscopy Dr Anneliese Cai 3/4/22  Predominantly left colon diverticulosis  Sigmoid colon polyp removed with hot snare polypectomy  Fair prep after extensive washing and suctioning  Bx:  A GASTRIC BIOPSIES:   FRAGMENTS OF GASTRIC MUCOSA WITH FOCAL MILD CHRONIC INFLAMMATION. NEGATIVE FOR HELICOBACTER PYLORI ORGANISMS. SEE COMMENT.     B SIGMOID COLON POLYP:   HYPERPLASTIC COLONIC POLYP    Assessment:       Diagnosis Orders   1. Constipation, unspecified constipation type           Plan:   1. Abdominal pain, constipation, hx of IBS mixed pattern  Abdominal pain improved after colon cleanse, longstanding history of alternating bowel habits between diarrhea and constipation associated with abdominal cramping, recent colonoscopy noted 1 hyperplastic polyp and diverticulosis and suboptimal prep despite completing the prep as prescirbed.   -Continue current constipation regimen  Constipation  -Discussed with patient at length recommendation to increase fluid, fiber intake and physical activity.   -Patients who overuse laxatives should be advised to try to taper their use, as they introduce new measures to improve bowel function.   -Advised to try to defecate after meals, thereby taking advantage of normal postprandial increases in colonic motility.  This is particularly important in the morning when colonic motor activity is highest.  -Start fiber (ex. Metamucil 3.4 g by mouth daily with a glass of water or juice) titrate up to 2-3 times per day as needed. 2. Associated medical conditions include but are not limited to. . Hyperlipidemia, chronic renal insufficiency, anxiety and depression, hypothyroidism, migraines, diverticulosis, GERD, hiatal hernia. Return in about 3 months (around 7/15/2022).       Eriberto Crain, APRN - CNP

## 2022-04-27 DIAGNOSIS — F41.9 ANXIETY AND DEPRESSION: Chronic | ICD-10-CM

## 2022-04-27 DIAGNOSIS — F32.A ANXIETY AND DEPRESSION: Chronic | ICD-10-CM

## 2022-04-27 NOTE — TELEPHONE ENCOUNTER
Rx request   Requested Prescriptions     Pending Prescriptions Disp Refills    PARoxetine (PAXIL) 10 MG tablet 90 tablet 4     Sig: Take 2 tablets by mouth daily     LOV 3/10/2022  Next Visit Date:  Future Appointments   Date Time Provider Tony Motley   5/19/2022  3:00 PM Nisa Hermosillo MD [facility]   7/11/2022  1:30 PM Nisa Hermosillo MD [facility]   7/18/2022 11:30 AM TANNA Islas [facility]   8/22/2022  1:15 PM Avni Valerio MD [facility]

## 2022-04-29 RX ORDER — PAROXETINE 10 MG/1
20 TABLET, FILM COATED ORAL DAILY
Qty: 90 TABLET | Refills: 4 | Status: SHIPPED | OUTPATIENT
Start: 2022-04-29

## 2022-05-19 ENCOUNTER — OFFICE VISIT (OUTPATIENT)
Dept: FAMILY MEDICINE CLINIC | Age: 72
End: 2022-05-19
Payer: MEDICARE

## 2022-05-19 VITALS
OXYGEN SATURATION: 97 % | DIASTOLIC BLOOD PRESSURE: 62 MMHG | HEIGHT: 62 IN | SYSTOLIC BLOOD PRESSURE: 112 MMHG | BODY MASS INDEX: 24.4 KG/M2 | WEIGHT: 132.6 LBS | HEART RATE: 66 BPM | TEMPERATURE: 96.7 F

## 2022-05-19 DIAGNOSIS — K92.1 BLACK STOOL: ICD-10-CM

## 2022-05-19 DIAGNOSIS — F32.A ANXIETY AND DEPRESSION: ICD-10-CM

## 2022-05-19 DIAGNOSIS — E78.2 MIXED HYPERLIPIDEMIA: ICD-10-CM

## 2022-05-19 DIAGNOSIS — F41.9 ANXIETY AND DEPRESSION: ICD-10-CM

## 2022-05-19 DIAGNOSIS — R10.84 GENERALIZED ABDOMINAL PAIN: ICD-10-CM

## 2022-05-19 DIAGNOSIS — K21.9 GASTROESOPHAGEAL REFLUX DISEASE WITHOUT ESOPHAGITIS: ICD-10-CM

## 2022-05-19 DIAGNOSIS — E03.9 ACQUIRED HYPOTHYROIDISM: Primary | ICD-10-CM

## 2022-05-19 DIAGNOSIS — E53.8 B12 DEFICIENCY: ICD-10-CM

## 2022-05-19 DIAGNOSIS — R26.89 BALANCE PROBLEM: ICD-10-CM

## 2022-05-19 DIAGNOSIS — I67.9 SMALL VESSEL DISEASE, CEREBROVASCULAR: Chronic | ICD-10-CM

## 2022-05-19 DIAGNOSIS — K58.2 IRRITABLE BOWEL SYNDROME WITH BOTH CONSTIPATION AND DIARRHEA: ICD-10-CM

## 2022-05-19 PROCEDURE — 1123F ACP DISCUSS/DSCN MKR DOCD: CPT | Performed by: FAMILY MEDICINE

## 2022-05-19 PROCEDURE — 99214 OFFICE O/P EST MOD 30 MIN: CPT | Performed by: FAMILY MEDICINE

## 2022-05-19 RX ORDER — ASPIRIN 81 MG/1
81 TABLET ORAL DAILY
Qty: 90 TABLET | Refills: 4 | Status: SHIPPED | OUTPATIENT
Start: 2022-05-19

## 2022-05-19 RX ORDER — ATORVASTATIN CALCIUM 20 MG/1
20 TABLET, FILM COATED ORAL DAILY
Qty: 90 TABLET | Refills: 4 | Status: SHIPPED | OUTPATIENT
Start: 2022-05-19

## 2022-05-19 RX ORDER — GREEN TEA/HOODIA GORDONII 315-12.5MG
1 CAPSULE ORAL DAILY
Qty: 90 TABLET | Refills: 4 | Status: SHIPPED | OUTPATIENT
Start: 2022-05-19 | End: 2022-06-18

## 2022-05-19 RX ORDER — CHOLECALCIFEROL (VITAMIN D3) 125 MCG
100 CAPSULE ORAL DAILY
Qty: 90 CAPSULE | Refills: 4 | Status: SHIPPED | OUTPATIENT
Start: 2022-05-19

## 2022-05-19 RX ORDER — ACETAMINOPHEN 160 MG
TABLET,DISINTEGRATING ORAL
Qty: 90 CAPSULE | Refills: 4 | Status: SHIPPED | OUTPATIENT
Start: 2022-05-19

## 2022-05-19 ASSESSMENT — PATIENT HEALTH QUESTIONNAIRE - PHQ9
SUM OF ALL RESPONSES TO PHQ9 QUESTIONS 1 & 2: 0
SUM OF ALL RESPONSES TO PHQ QUESTIONS 1-9: 0
1. LITTLE INTEREST OR PLEASURE IN DOING THINGS: 0
7. TROUBLE CONCENTRATING ON THINGS, SUCH AS READING THE NEWSPAPER OR WATCHING TELEVISION: 0
8. MOVING OR SPEAKING SO SLOWLY THAT OTHER PEOPLE COULD HAVE NOTICED. OR THE OPPOSITE, BEING SO FIGETY OR RESTLESS THAT YOU HAVE BEEN MOVING AROUND A LOT MORE THAN USUAL: 0
SUM OF ALL RESPONSES TO PHQ QUESTIONS 1-9: 0
2. FEELING DOWN, DEPRESSED OR HOPELESS: 0
6. FEELING BAD ABOUT YOURSELF - OR THAT YOU ARE A FAILURE OR HAVE LET YOURSELF OR YOUR FAMILY DOWN: 0
9. THOUGHTS THAT YOU WOULD BE BETTER OFF DEAD, OR OF HURTING YOURSELF: 0
5. POOR APPETITE OR OVEREATING: 0
SUM OF ALL RESPONSES TO PHQ QUESTIONS 1-9: 0
10. IF YOU CHECKED OFF ANY PROBLEMS, HOW DIFFICULT HAVE THESE PROBLEMS MADE IT FOR YOU TO DO YOUR WORK, TAKE CARE OF THINGS AT HOME, OR GET ALONG WITH OTHER PEOPLE: 0
SUM OF ALL RESPONSES TO PHQ QUESTIONS 1-9: 0
3. TROUBLE FALLING OR STAYING ASLEEP: 0
4. FEELING TIRED OR HAVING LITTLE ENERGY: 0

## 2022-05-19 NOTE — PROGRESS NOTES
No chest pain, pressure or tightness, dizziness, headache, vision changes, palpitations, swelling in feet/legs. No fevers, chills, sweats. No unintended weight loss. Positive for abdominal pain, nausea, vomiting, diarrhea, constipation, bloody stools, black tarry stools. No rashes. No swollen glands.

## 2022-05-19 NOTE — PROGRESS NOTES
Subjective  Loura Monday, 67 y.o. female presents today with:  Chief Complaint   Patient presents with    Follow-up     ED follow up; is not having diarrhea but she is straining to go to the bathroom            HPI  01/27/2022:    Alfredo Smith was seen in urgent care for LLQ pain and had abdominal CT which was negative for diverticulitis but was positive for moderate hiatal hernia. At that time was placed on Augmentin for possible diverticulitis. Then on 01/19/2022 presented for coughing and diarrhea for most of the month and nasal congestion and continuous pain in ribs under breast.  At that time she had rhonchi on lung exam and was negative for COVID. Her x-ray was negative for acute cardiopulmonary disease. She was placed on prednisone and the pain resolved in two to three days.      Still having LLQ pain with eating. Knows tomatoes and onions bother her. Now feels a little tired and that is it. Within 30 minutes of eating she has diarrhea. Feels hungry all of the time. Gets full feeling in chest; has early satiety; regurgitates food after eating; gets heartburn sometimes even though she is on omeprazole. Coughs when she lies down but not postprandially. She also takes Bentyl for abdominal cramping. Colonoscopy in 2018 remarkable for diverticulae and colitis. The low abdominal pain has been intermittent since 2019. Upper GI symptoms have not been getting worse. Hardly nothing while on meds. No blood in stools and stools were black but are now dark, dark brown. 2 pound weight loss. 03/10/2022:   Caregiver burden - son having several procedures; sometimes looks at her like he's in a daze and doesn't respond to her; batteries replaced in nerve stimulators and needs sutures out. Is trying to disperse her possession. Sister is going to be moving up her after she retires.     GERD/paraesophageal hernia II - avoiding certain foods; regurg of food and dysphagia have improved;  Bentyl helps with GI distress and LLQ pain which is much better than it was.     Hypothyroidism. Compliant with levothyroxine which is taken correctly. No diarrhea, constipation, palpitations, dry skin, depression, difficulty sleeping or fatigue. No weight gain or loss.     Depression and anxiety. Divesting herself oh physical possessions. Scared by her recent extended period of feeling very poorly. Taking Paxil 15 mg nightly. 05/2022: Son Johanna Rivas has undergone several surgeries lately. Has 40 lawn bags full of clippings. GI issues - recent EGD with gastritis and small hiatal hernia; colonoscopy with diverticulosis and hyperplastic polyp in setting of suboptimal bowel prep. Has IBD - is taking fiber gummies and fiber bars. Now is constipated. Sometimes she has diarrhea and occasionally has urgent need to move bowels with associated cramping. Stool is greenish-yellow most of the time for a few months (normal CMP in 04/2022). No bloody or black tarry stools. Levsin no help. Omeprazole helps GERD. Dicyclomine helps with her frequent morning GI sx. Hypothyroid - taking levothyroxine correctly.      No other questions and or concerns for today's visit      Past Medical History:   Diagnosis Date    Anxiety and depression 5/2/2019    Anxiety and depression     Chest pain 3/1/2019    Diverticulosis of colon (without mention of hemorrhage) 02/25/2015    DR Cook15 [facility]    Focal lymphocytic colitis 1/27/2022    Gastroesophageal reflux disease without esophagitis     Hyperlipidemia 2/3/2015    Hypothyroidism     Lichen sclerosus     Migraines 2/3/2015    Renal insufficiency 2/3/2015    Spondylosis of lumbar region without myelopathy or radiculopathy 5/24/2016     Past Surgical History:   Procedure Laterality Date    ABDOMINAL ADHESION SURGERY  1/7/16    DR. MERAZ    APPENDECTOMY  2012    CATARACT REMOVAL Right     CHOLECYSTECTOMY  2012    COLON SURGERY  2005    13\" of colon removed-NO CA    COLONOSCOPY  2011    polyps    COLONOSCOPY  02/25/2015    DR Davis Lies - DIVERTICULOSIS    COLONOSCOPY N/A 3/4/2022    COLONOSCOPY DIAGNOSTIC performed by Elsa Lewis MD at [address]  2013    NC COLONOSCOPY FLX DX W/COLLJ [address] WHEN PFRMD N/A 1/26/2018    COLONOSCOPY performed by Paty Edmondson MD at [address] ESOPHAGOGASTRODUODENOSCOPY TRANSORAL DIAGNOSTIC N/A 1/26/2018    EGD ESOPHAGOGASTRODUODENOSCOPY WITH DILATION performed by Paty Edmondson MD at [address] [facility]  ENDOSCOPY N/A 3/4/2022    EGD ESOPHAGOGASTRODUODENOSCOPY performed by Elsa Lewis MD at [facility] Marital status: Single     Spouse name: Not on file    Number of children: Not on file    Years of education: Not on file    Highest education level: Not on file   Occupational History    Not on file   Tobacco Use    Smoking status: Never Smoker    Smokeless tobacco: Never Used   Substance and Sexual Activity    Alcohol use: No     Alcohol/week: 0.0 standard drinks    Drug use: No    Sexual activity: Not Currently   Other Topics Concern    Not on file   Social History Narrative    Not on file     Social Determinants of Health     Financial Resource Strain: Low Risk     Difficulty of Paying Living Expenses: Not hard at all   Food Insecurity: No Food Insecurity    Worried About [facility] in the Last Year: Never true    Madi of Food in the Last Year: Never true   Transportation Needs: No Transportation Needs    Lack of Transportation (Medical): No    Lack of Transportation (Non-Medical):  No   Physical Activity:     Days of Exercise per Week: Not on file    Minutes of Exercise per Session: Not on file   Stress:     Feeling of Stress : Not on file   Social Connections:     Frequency of Communication with Friends and Family: Not on file    Frequency of Social Gatherings with Friends and Family: Not on file    Attends Protestant Services: Not on file    Active Member of Clubs or Organizations: Not on file    Attends Club or Organization Meetings: Not on file    Marital Status: Not on file   Intimate Partner Violence:     Fear of Current or Ex-Partner: Not on file    Emotionally Abused: Not on file    Physically Abused: Not on file    Sexually Abused: Not on file   Housing Stability:     Unable to Pay for Housing in the Last Year: Not on file    Number of [place] in the Last Year: Not on file    Unstable Housing in the Last Year: Not on file     Family History   Problem Relation Age of Onset    Heart Disease Father     Stroke Father     Diabetes Father     Stomach Cancer Father     Other Mother         Bowel Obstruction    Colon Cancer Neg Hx      Allergies   Allergen Reactions    Bactrim [Sulfamethoxazole-Trimethoprim]      hallucinations    Ciprofloxacin Other (See Comments)     Pt not 100% sure, but thinks cipro is the antibiotic she is allergic to  Anxiety, confusion     Current Outpatient Medications   Medication Sig Dispense Refill    Probiotic Acidophilus (FLORANEX) TABS Take 1 tablet by mouth daily 90 tablet 4    Cholecalciferol (VITAMIN D3) 50 MCG (2000 UT) CAPS 1 po daily with meal 90 capsule 4    cyanocobalamin ([facility] VITAMIN B12) 1000 MCG tablet Take 1 tablet by mouth daily 90 tablet 4    aspirin EC 81 MG EC tablet Take 1 tablet by mouth daily 90 tablet 4    atorvastatin (LIPITOR) 20 MG tablet Take 1 tablet by mouth daily 90 tablet 4    coenzyme Q-10 100 MG capsule Take 1 capsule by mouth daily 90 capsule 4    PARoxetine (PAXIL) 10 MG tablet Take 2 tablets by mouth daily 90 tablet 4    omeprazole (PRILOSEC) 20 MG delayed release capsule Take 1 capsule by mouth daily 90 capsule 1    SUMAtriptan (IMITREX) 50 MG tablet Take 1 tablet by mouth once as needed for Migraine 9 tablet 0    levothyroxine (SYNTHROID) 75 MCG tablet Take 1 tablet by mouth daily 90 tablet 4    ibuprofen (IBU) 600 MG tablet Take 1 tablet by mouth every 6 hours as needed for Pain 120 tablet 0    dicyclomine (BENTYL) 10 MG capsule Take 1 capsule by mouth 4 times daily (before meals and nightly) 120 capsule 5    clobetasol (TEMOVATE) 0.05 % cream Apply topically 2 times daily Apply topically 2 times daily. 60 g 2    docusate sodium (COLACE) 100 MG capsule Take 100 mg by mouth 2 times daily       No current facility-administered medications for this visit. PMH, Surgical Hx, Family Hx, and Social Hxreviewed and updated. Health Maintenance reviewed. Objective    Vitals:    05/19/22 1447   BP: 112/62   Pulse: 66   Temp: 96.7 °F (35.9 °C)   TempSrc: Temporal   SpO2: 97%   Weight: 132 lb 9.6 oz (60.1 kg)   Height: 5' 2\" (1.575 m)        Physical Exam  Constitutional:       General: She is not in acute distress. Appearance: She is well-developed. HENT:      Head: Normocephalic and atraumatic. Eyes:      General: No scleral icterus. Conjunctiva/sclera: Conjunctivae normal.   Cardiovascular:      Rate and Rhythm: Normal rate and regular rhythm. Heart sounds: Normal heart sounds. Pulmonary:      Effort: No respiratory distress. Breath sounds: No wheezing or rales. Skin:     General: Skin is warm and dry. Neurological:      Mental Status: She is alert and oriented to person, place, and time.        Lab Results   Component Value Date    WBC 4.8 04/04/2022    HGB 13.1 04/04/2022    HCT 39.9 04/04/2022     04/04/2022    CHOL 262 (H) 11/21/2018    TRIG 160 11/21/2018    HDL 59 04/23/2021    ALT 11 04/04/2022    AST 15 04/04/2022     04/04/2022    K 4.0 04/04/2022     04/04/2022    CREATININE 0.62 04/04/2022    BUN 13 04/04/2022    CO2 24 04/04/2022    TSH 2.150 03/18/2022    LABA1C 5.2 01/19/2018         Lab Results   Component Value Date    LABA1C 5.2 01/19/2018    LABA1C 5.3 11/23/2015    LABA1C 5.5 10/07/2014     Lab Results   Component Value Date CREATININE 0.62 04/04/2022     Lab Results   Component Value Date    ALT 11 04/04/2022    AST 15 04/04/2022     Lab Results   Component Value Date    CHOL 262 (H) 11/21/2018    TRIG 160 11/21/2018    HDL 59 04/23/2021    LDLCALC 123 04/23/2021        Assessment & Plan   Dariel Foreman was seen today for follow-up. Diagnoses and all orders for this visit:    Acquired hypothyroidism  -     TSH; Future    Anxiety and depression  Comments:  stable on PAxil    Gastroesophageal reflux disease without esophagitis  Comments:  stable on PPI    Generalized abdominal pain  Comments:  Resolved with Bentyl. Balance problem  Comments:  Check vitamin D levels and supplement with vitamin D 2000 IU daily. Take with food. Orders:  -     Cholecalciferol (VITAMIN D3) 50 MCG (2000 UT) CAPS; 1 po daily with meal    B12 deficiency  Comments:  Check labs. Supplement with B12 1000 mcg daily. Orders:  -     cyanocobalamin ([employer] VITAMIN B12) 1000 MCG tablet; Take 1 tablet by mouth daily    Black stool  Comments:  Restart coenzyme Q 10. Reassess in 2 weeks. To ER for dizziness, abdominal pain, bloody stools. Small vessel disease, cerebrovascular  Comments:  Re-start Lipitor (patient has been told she needs to be on it previously). Aspirin 81 mg daily. Orders:  -     aspirin EC 81 MG EC tablet; Take 1 tablet by mouth daily  -     atorvastatin (LIPITOR) 20 MG tablet; Take 1 tablet by mouth daily  -     coenzyme Q-10 100 MG capsule; Take 1 capsule by mouth daily    Mixed hyperlipidemia  Comments:  Restart atorvastatin and add coenzyme Q 10 4 side effects medication. Reassess in 2 months  Orders:  -     atorvastatin (LIPITOR) 20 MG tablet; Take 1 tablet by mouth daily  -     coenzyme Q-10 100 MG capsule; Take 1 capsule by mouth daily  -     Lipid, Fasting;  Future    Irritable bowel syndrome with both constipation and diarrhea  Comments:  reviewed diet, stress mgt, exercise, probiotics, fiber supplementation  Orders:  -     Probiotic Acidophilus (FLORANEX) TABS; Take 1 tablet by mouth daily            Reviewed with the patient: all disease processes, current clinical status, medications, activities and diet.      Side effects, adverse effects of the medication prescribed today, as well as treatment plan/ rationale and result expectations have been discussed with the patient who expresses understanding and desires to proceed.     Close follow up to evaluate treatment results and for coordination of care. I have reviewed the patient's medical history in detail and updated the computerized patient record. More than 50% of the appointment was spent in face-to-face counseling, education and care coordination. Please note this report has been partially produced using speech recognition software and may contain mistakes related to that system including errors in grammar, punctuation and spelling as well as words and phrases that may seem inappropriate. If there are questions or concerns, please feel free to contact me to clarify.     Orders Placed This Encounter   Procedures    TSH     Standing Status:   Future     Standing Expiration Date:   2023    Lipid, Fasting     Standing Status:   Future     Standing Expiration Date:   2023     Orders Placed This Encounter   Medications    Probiotic Acidophilus (FLORANEX) TABS     Sig: Take 1 tablet by mouth daily     Dispense:  90 tablet     Refill:  4    Cholecalciferol (VITAMIN D3) 50 MCG (2000 UT) CAPS     Si po daily with meal     Dispense:  90 capsule     Refill:  4    cyanocobalamin ([employer] VITAMIN B12) 1000 MCG tablet     Sig: Take 1 tablet by mouth daily     Dispense:  90 tablet     Refill:  4    aspirin EC 81 MG EC tablet     Sig: Take 1 tablet by mouth daily     Dispense:  90 tablet     Refill:  4    atorvastatin (LIPITOR) 20 MG tablet     Sig: Take 1 tablet by mouth daily     Dispense:  90 tablet     Refill:  4    coenzyme Q-10 100 MG capsule     Sig: Take 1 capsule by mouth daily     Dispense:  90 capsule     Refill:  4     Medications Discontinued During This Encounter   Medication Reason    Hyoscyamine Sulfate SL (LEVSIN/SL) 0.125 MG SUBL LIST CLEANUP    ondansetron (ZOFRAN-ODT) 4 MG disintegrating tablet LIST CLEANUP    aspirin EC 81 MG EC tablet REORDER    atorvastatin (LIPITOR) 20 MG tablet REORDER    coenzyme Q-10 100 MG capsule REORDER    Cholecalciferol (VITAMIN D3) 50 MCG (2000 UT) CAPS REORDER    cyanocobalamin ([employer]S VITAMIN B12) 1000 MCG tablet REORDER     Return in about 6 months (around 11/19/2022) for Mood Disorder, GERD, suspected IBD - OV. Controlled Substance Monitoring:    Acute and Chronic Pain Monitoring:   RX Monitoring 10/30/2018   Attestation The Prescription Monitoring Report for this patient was reviewed today.            Rosemarie Galan MD

## 2022-07-08 ENCOUNTER — TELEPHONE (OUTPATIENT)
Dept: PAIN MANAGEMENT | Age: 72
End: 2022-07-08

## 2022-07-08 NOTE — TELEPHONE ENCOUNTER
Called on 7/8/2022 at 4:16 PM both the numbers [phone] As well as [phone] in order to offer the patient earlier appointment. Voicemail reached at both numbers. A voicemail was left without any protected health information.

## 2022-07-11 DIAGNOSIS — R12 HEARTBURN: ICD-10-CM

## 2022-07-11 DIAGNOSIS — K44.9 HIATAL HERNIA: ICD-10-CM

## 2022-07-11 RX ORDER — OMEPRAZOLE 20 MG/1
20 CAPSULE, DELAYED RELEASE ORAL DAILY
Qty: 90 CAPSULE | Refills: 1 | Status: SHIPPED | OUTPATIENT
Start: 2022-07-11 | End: 2022-10-11 | Stop reason: SDUPTHER

## 2022-07-21 DIAGNOSIS — E03.9 ACQUIRED HYPOTHYROIDISM: ICD-10-CM

## 2022-07-21 DIAGNOSIS — E78.2 MIXED HYPERLIPIDEMIA: ICD-10-CM

## 2022-07-21 LAB
CHOLESTEROL, FASTING: 230 MG/DL (ref 0–199)
HDLC SERPL-MCNC: 75 MG/DL (ref 40–59)
LDL CHOLESTEROL CALCULATED: 135 MG/DL (ref 0–129)
TRIGLYCERIDE, FASTING: 100 MG/DL (ref 0–150)
TSH SERPL DL<=0.05 MIU/L-ACNC: 3.48 UIU/ML (ref 0.44–3.86)

## 2022-08-08 ENCOUNTER — OFFICE VISIT (OUTPATIENT)
Dept: PAIN MANAGEMENT | Age: 72
End: 2022-08-08
Payer: MEDICARE

## 2022-08-08 VITALS — BODY MASS INDEX: 23.45 KG/M2 | TEMPERATURE: 97.2 F | HEIGHT: 62 IN | WEIGHT: 127.4 LBS

## 2022-08-08 DIAGNOSIS — M47.817 LUMBOSACRAL SPONDYLOSIS WITHOUT MYELOPATHY: Primary | ICD-10-CM

## 2022-08-08 PROCEDURE — 1123F ACP DISCUSS/DSCN MKR DOCD: CPT | Performed by: PHYSICAL MEDICINE & REHABILITATION

## 2022-08-08 PROCEDURE — 99204 OFFICE O/P NEW MOD 45 MIN: CPT | Performed by: PHYSICAL MEDICINE & REHABILITATION

## 2022-08-08 RX ORDER — LIDOCAINE 40 MG/G
CREAM TOPICAL
Qty: 45 G | Refills: 1 | Status: SHIPPED | OUTPATIENT
Start: 2022-08-08

## 2022-08-08 RX ORDER — BACLOFEN 10 MG/1
5 TABLET ORAL NIGHTLY PRN
Qty: 7 TABLET | Refills: 0 | Status: SHIPPED | OUTPATIENT
Start: 2022-08-08 | End: 2022-08-15

## 2022-08-08 ASSESSMENT — ENCOUNTER SYMPTOMS
CONSTIPATION: 0
BACK PAIN: 1
SHORTNESS OF BREATH: 0
NAUSEA: 0
DIARRHEA: 0

## 2022-08-08 NOTE — PROGRESS NOTES
Missael Heath  ([birth year])    8/8/2022    Subjective: Missael Heath is 67 y.o. female who complains today of:    Chief Complaint   Patient presents with    Back Pain     Left side        Missael Heath is a 67 y.o. female who presents for evaluation for low back pain. She has struggled with pain for over 3 months. She denies any immediately-preceding traumatic or inciting events. She has been previously evaluated by Dr Shahab Adhikari whose records are reviewed below. She describes pain located in the left low back and into her left abdomen, not as much into the left thigh or leg. Pain is a constant ache and is currently a 8/10 and gets up to a 10/10 at its worst and goes down to a 7/10 at its best. The pain gets so bad \"I lay down and cry. \" Pain is worse with standing up. Pain is better with sitting, better with laying on her stomach and putting pressure on it. Pain is located 0% on the right and 100% on the left. Pain is located 100% in the back and 0% in the legs. She denies any numbness, tingling, weakness, bowel or bladder dysfunction, saddle anesthesia, falls, history of cancer, unexplained weight loss, persistent night pain and sweats, fever, IV drug abuse, immunocompromise, chronic prednisone or antibiotic use, or any other red flag symptoms. Mood is good, denies any suicidal or homicidal ideation. Sleep is good, awakes fatigued. Her weight has been stable.      She has tried:  Home exercise program with minimal relief    Diagnostic testing previously performed includes XRs    Medications tried include:  Acetaminophen with minimal relief for over 3 months  Ibuprofen with minimal relief for over 3 months    Allergies, Medications, Past Medical History, Family History, Social History, Work History, and Review of Systems reviewed below    +frequent Kidney infections, UTIs  +hiatal hernia  +Depression and  Anxiety on Paxil    No Seizures, Epilepsy or Brain Surgery     Spends her time: takes care of son Malina Maurice who is in a wheelchair, has not left his side since Dec 2013. Son has parkinson's disease and was involved in motor vehicle accident, has traumatic brain injury. Allergies:  Bactrim [sulfamethoxazole-trimethoprim] and Ciprofloxacin    Past Medical History:   Diagnosis Date    Anxiety and depression 05/02/2019    Anxiety and depression     Chest pain 03/01/2019    Diverticulosis of colon (without mention of hemorrhage) 02/25/2015    DR BURNETTE    Focal lymphocytic colitis 01/27/2022    Gastroesophageal reflux disease without esophagitis     GI problem     Hyperlipidemia 02/03/2015    Hypothyroidism     Lichen sclerosus 99/6803    Migraines 02/03/2015    Renal insufficiency 02/03/2015    Spondylosis of lumbar region without myelopathy or radiculopathy 05/24/2016     Past Surgical History:   Procedure Laterality Date    ABDOMINAL ADHESION SURGERY  1/7/16    DR. MERAZ    APPENDECTOMY  2012    CATARACT REMOVAL Right     CHOLECYSTECTOMY  2012    COLON SURGERY  2005    13\" of colon removed-NO CA    COLONOSCOPY  2011    polyps    COLONOSCOPY  02/25/2015    DR Oliva [place] - DIVERTICULOSIS    COLONOSCOPY N/A 3/4/2022    COLONOSCOPY DIAGNOSTIC performed by Jeanette Scott MD at [facility]  2013    CA COLONOSCOPY FLX DX W/COLLJ SPEC WHEN PFRMD N/A 1/26/2018    COLONOSCOPY performed by Rah Stubbs MD at [facility] 60 ESOPHAGOGASTRODUODENOSCOPY TRANSORAL DIAGNOSTIC N/A 1/26/2018    EGD ESOPHAGOGASTRODUODENOSCOPY WITH DILATION performed by Rah Stubbs MD at [address] [place] [address] N/A 3/4/2022    EGD ESOPHAGOGASTRODUODENOSCOPY performed by Jeanette Scott MD at [facility]     Family History   Problem Relation Age of Onset    Other Mother         Bowel Obstruction    High Blood Pressure Mother     High Blood Pressure Father     Cancer Father     Arthritis Father     Coronary Art Dis Father     Heart Disease Father     Stroke Father     Diabetes Father     Stomach Cancer Father     Colon Cancer Neg Hx      Social History     Socioeconomic History    Marital status: Single     Spouse name: Not on file    Number of children: Not on file    Years of education: Not on file    Highest education level: Not on file   Occupational History    Not on file   Tobacco Use    Smoking status: Never    Smokeless tobacco: Never   Vaping Use    Vaping Use: Never used   Substance and Sexual Activity    Alcohol use: No     Alcohol/week: 0.0 standard drinks    Drug use: No    Sexual activity: Not Currently   Other Topics Concern    Not on file   Social History Narrative    Not on file     Social Determinants of Health     Financial Resource Strain: Low Risk     Difficulty of Paying Living Expenses: Not hard at all   Food Insecurity: No Food Insecurity    Worried About Running Out of Food in the Last Year: Never true    [address] in the Last Year: Never true   Transportation Needs: No Transportation Needs    Lack of Transportation (Medical): No    Lack of Transportation (Non-Medical):  No   Physical Activity: Not on file   Stress: Not on file   Social Connections: Not on file   Intimate Partner Violence: Not on file   Housing Stability: Not on file       Current Outpatient Medications on File Prior to Visit   Medication Sig Dispense Refill    omeprazole (PRILOSEC) 20 MG delayed release capsule TAKE 1 CAPSULE BY MOUTH DAILY 90 capsule 1    Cholecalciferol (VITAMIN D3) 50 MCG (2000 UT) CAPS 1 po daily with meal 90 capsule 4    cyanocobalamin ([employer] VITAMIN B12) 1000 MCG tablet Take 1 tablet by mouth daily 90 tablet 4    aspirin EC 81 MG EC tablet Take 1 tablet by mouth daily 90 tablet 4    atorvastatin (LIPITOR) 20 MG tablet Take 1 tablet by mouth daily 90 tablet 4    coenzyme Q-10 100 MG capsule Take 1 capsule by mouth daily 90 capsule 4    PARoxetine (PAXIL) 10 MG tablet Take 2 tablets by mouth daily 90 tablet 4    SUMAtriptan (IMITREX) 50 MG tablet Take 1 tablet by mouth once as needed for Migraine 9 tablet 0    levothyroxine (SYNTHROID) 75 MCG tablet Take 1 tablet by mouth daily 90 tablet 4    ibuprofen (IBU) 600 MG tablet Take 1 tablet by mouth every 6 hours as needed for Pain 120 tablet 0    dicyclomine (BENTYL) 10 MG capsule Take 1 capsule by mouth 4 times daily (before meals and nightly) 120 capsule 5    clobetasol (TEMOVATE) 0.05 % cream Apply topically 2 times daily Apply topically 2 times daily. 60 g 2    docusate sodium (COLACE) 100 MG capsule Take 100 mg by mouth 2 times daily       No current facility-administered medications on file prior to visit. Review of Systems   Constitutional:  Negative for fever. HENT:  Negative for hearing loss. Respiratory:  Negative for shortness of breath. Gastrointestinal:  Negative for constipation, diarrhea and nausea. Genitourinary:  Negative for difficulty urinating. Musculoskeletal:  Positive for back pain. Negative for neck pain. Skin:  Negative for rash. Neurological:  Negative for headaches. Hematological:  Does not bruise/bleed easily. Psychiatric/Behavioral:  Negative for sleep disturbance. Objective:     Vitals:  Temp 97.2 °F (36.2 °C)   Ht 5' 2\" (1.575 m)   Wt 127 lb 6.4 oz (57.8 kg)   LMP  (LMP Unknown)   BMI 23.30 kg/m² Pain Score:   4      Exam performed under Coronavirus precautions  Gen: No acute distress  Neck: Grossly symmetric without any significant thyromegaly or masses appreciated. Eyes: No scleral icterus or lid lag appreciated bilaterally. Irises without gross defects bilaterally. HEENT: Hearing grossly intact bilaterally. Normocephalic, external ears and visible portions of nose and mouth atraumatic. Lymph: No gross neck or axillary lymphadenopathy  Cardio: No significant lower extremity edema, pulses intact without significant digit ischemia. Abd: No gross masses or large hernias appreciated.    Skin: Visualized skin without any dermatomal rashes or sores. Palpation free of any tightening or subcutaneous nodules. MSK: Gait is antalgic. No significant upper limb digit ischemia appreciated. Psych: Pleasant and cooperative with the history and exam. Mood and Affect normal. Appropriately dressed with good eye contact. Judgement and insight normal. Recent and remote memory intact. Alert and Oriented x3. Neuro: Cranial nerves II-XII grossly intact. No significant pathologic reflexes appreciated. Rises from a seated to standing position with mild difficulty. Gait is antalgic. No assistive devices used. Heel and toe walk intact. Lumbar flexion to 100 degrees, extension to 25 degrees. Limited lumbar spine range of motion. Rotation and extension reproduces axial low back pain. Other facet provocative maneuvers are positive. No gross step offs noted. Tenderness to palpation over the mid to low lumbar spinous processes and left lumbar paraspinals from L2 down to the sacrum. No tenderness over bilateral PSIS. No tenderness over bilateral greater trochanters. No tenderness over bilateral deep gluteal regions. Sensation grossly intact in both legs. Reflexes and strength functional for ambulation, no abnormal reflexes appreciated on exam today  Strength greater than 3/5 bilateral legs  Straight leg raise negative bilaterally. Outside record review:  XR LS Spine 9/24/18: levoscoliosis, degenerative disc disease, facet arthropathy, no fracture  MRI LS Spine 4/20/16: lumbar degenerative disc disease, lumbar facet arthropathy. No sig central canal stenosis. Disc bulge L5/S1 with some left L5 nerve root contact. XR LS Spine 2/23/16: lumbar degenerative disc disease and facet arthropathy.         Family history of alcohol abuse +1 father heavy alcohol use  Family history of illegal drug abuse 0  Family history of prescription drug abuse 0    Personal history of alcohol abuse/DUI +3 DUI in 2005, no longer drinks alcohol  Personal history of illegal drug abuse 0  Personal history of prescription drug abuse 0    Age between 17-45 0    History of preadolescent sexual abuse 0    Personal history of obsessive compulsive disorder +2  Personal history of attention deficit disorder 0  Personal history of bipolar disorder +  Personal history of schizophrenia 0  Personal history of depression +1    Score = 7, moderate risk  Assessment:      Diagnosis Orders   1. Lumbosacral spondylosis without myelopathy  XR LUMBAR SPINE (2-3 VIEWS)    [facility]    lidocaine (LMX) 4 % cream    IN INJ DX/THER AGNT PARAVERT FACET JOINT, LUMBAR/SAC, 1ST LEVEL    baclofen (LIORESAL) 10 MG tablet          Plan:     Periodic Controlled Substance Monitoring: Assessed functional status. Jennifer Saez MD)    Orders Placed This Encounter   Medications    lidocaine (LMX) 4 % cream     Sig: Apply a half dollar sized amount to intact skin topically up to twice daily as needed for pain     Dispense:  45 g     Refill:  1    baclofen (LIORESAL) 10 MG tablet     Sig: Take 0.5 tablets by mouth nightly as needed (pain spasms)     Dispense:  7 tablet     Refill:  0       Orders Placed This Encounter   Procedures    XR LUMBAR SPINE (2-3 VIEWS)     Standing Status:   Future     Standing Expiration Date:   8/8/2023     Order Specific Question:   Reason for exam:     Answer:   Please evaluate for the presence of lumbar facet arthropathy    [facility]     Referral Priority:   Routine     Referral Type:   Eval and Treat     Referral Reason:   Specialty Services Required     Requested Specialty:   Physical Therapist     Number of Visits Requested:   1    IN INJ DX/THER AGNT PARAVERT FACET JOINT, LUMBAR/SAC, 1ST LEVEL     Left Lumbar L3/4/5 medial branch blocks under XR with Dr Yahaira De La Rosa. +Asa 81 mg okay, no antibiotics, no diabetes mellitus, no osteoporosis, no bleeding or platelet dysfunction, IV Dye okay, allergies reviewed. 15 minute procedure. Standing Status:   Future     Standing Expiration Date:   11/6/2022       -PT for lumbar spondylosis  -XR LS Spine AP LAT to evaluate for lumbar facet arthropathy  -Consideration for updated MRI LS Spine without contrast may be given if her symptoms do not improve with conservative treatment  -Lidocaine 4% ointment topical BID prn #1 tube one refill start 8/8/2022   -No NSAIDs given age and risk of comorbidities  -Previously tried Tizanidine, caused excessive sedation.   -Trial Baclofen 5 mg qHS prn #7 no ref start 8/8/2022  Avoid all other muscle relaxers and/or sedating medicines. -Will hold on opioids at this time  -Left Lumbar L3/4/5 medial branch blocks under XR with Dr Gigi Sagastume. +Asa 81 mg okay, no antibiotics, no diabetes mellitus, no osteoporosis, no bleeding or platelet dysfunction, IV Dye okay, allergies reviewed. 15 minute procedure. Consider 3 mg betamethasone   -Sparing use of back brace    Controlled Substance Monitoring:    Acute and Chronic Pain Monitoring:   RX Monitoring 8/8/2022   Attestation -   Periodic Controlled Substance Monitoring Assessed functional status. Discussed the risks, side effects, and symptoms that would warrant urgent or emergent physician evaluation of all medications prescribed today. Discussed the risks of the above recommended procedures including but not limited to bleeding, infection, worsened pain, damage to surrounding structures, side effects, toxicity, allergic reactions to medications used, immune and stress-response dysfunction, fat necrosis, skin pigmentation changes, blood sugar elevation, headache, vision changes, need for surgery, as well as catastrophic injury such as vision loss, paralysis, stroke, spinal cord and/or plexus infarction or injury, intrathecal injection, spinal cord puncture, arachnoiditis, discitis, bowel or bladder incontinence, ventilator dependence, loss of use of the arms and/or legs, and death.  Discussed the risks, benefits, alternative procedures, and alternatives to the procedure including no procedure at all. Discussed that we cannot undo any permanent neurologic damage or change the course of any underlying disease. The patient appears to be a good candidate for the above recommended procedures, but no guarantees expressed or implied are given regarding the outcome of any procedure. After thorough discussion, patient expressed understanding and willingness to proceed. Provided education and counseling regarding the diagnosis, prognosis, and treatment options. All questions were answered. Encouraged her to follow-up with her primary care physician and/or specialists as required for her overall health and management of her comorbidities as well as any new positive symptoms mentioned in review of systems above. Care was provided within the definitions and limitations of our specialty practice. Encouraged lifestyle interventions including healthy habits, lifestyle changes, regular aerobic exercise and appropriate weight maintenance as advised by their primary care physician or cardiovascular health provider. Discussed well care and disease prevention/maintenance. All recommendations for therapy are provided to improve function with activities of daily living, decrease pain, and help develop an exercise program. All recommendations for medications are meant to help decrease pain, improve function with activities of daily living, maintain compliance with home exercise program, and improve quality of life. All recommendations for diagnostic injections are meant to help assess the hypothesis that the targeted structure is a significant pain generator that limits the patient's function, causes pain, and reduces her quality of life. Encouraged compliance with her home exercise program. Recommended compliance with physical therapy program as outlined above.  Informed her to wear bracing as appropriate, and that bracing is not a replacement for core strengthening or muscle stabilization. Discussed the elevated risks of excessive sedation while on pain medications. Advised her against driving or operating heavy machinery or performing any activities where she may harm herself or others while on pain medications. Particular caution was emphasized especially during dose adjustments and medication changes. Discussed the elevated risks of respiratory depression and death while on opioid medications, especially when combined with other sedative substances. Discussed the risks of temporary disability, permanent disability, morbidity, and mortality with poorly-managed or undiagnosed medical conditions and comorbidities. Emphasized the importance of timely medical evaluation and treatment as previously recommended by us or other medical professionals. Risks of not pursing these recommendations were emphasized. The patient was offered a treatment at our facility. The physician and patient have discussed in detail the risk of exposure to and/or potential harm posed by the COVID-19 virus with having office visits and procedures at this time versus the risk of delaying the visits and procedures. It is not possible to know either the risk of delaying the visits or procedure or chance of getting an infection with perfect accuracy, but a joint decision was made between the patient and the physician to proceed at this time with the scheduled visits and procedures. Advised her that any lab testing, imaging, or other diagnostic test results are best discussed in person in the office so that we can provide a clear explanation of their significance and best treatment based upon these results. It is her responsibility to make and keep a follow up appointment to discuss these test results in person to discuss the significance of the findings and appropriate follow-up steps.  She expressed complete understanding and agreement with the entire plan as outlined above. Portions of this note may have been typed, auto-populated, dictated or transcribed by voice recognition resulting in errors, omissions, or close substitutions which may be missed despite careful proofreading. Please contact the author for any questions or concerns. Thank you Dr. Peng Mane for the opportunity to participate in this patient's care. If you have any questions or concerns, please do not hesitate to contact us. Follow up:  Return in about 2 months (around 10/8/2022) for reassessment of pain and symptoms, P.T. Internal Ref.     Nael Newton MD

## 2022-08-09 ENCOUNTER — TELEPHONE (OUTPATIENT)
Dept: PAIN MANAGEMENT | Age: 72
End: 2022-08-09

## 2022-08-09 NOTE — TELEPHONE ENCOUNTER
ORDER PLACED:    Date: 8/8/22  Description: LEFT L3,4,5 MBB  Order Number: [id number]  Ordering Provider: [facility]  Performing Provider: [facility]  CPT Codes: 69267,89786  ICD10 Codes: C24.885

## 2022-08-09 NOTE — TELEPHONE ENCOUNTER
INSURANCE COMPANY WILL REQUIRE IMAGING    LUMBAR XR ORDERED ON 8/8/22  WILL WAIT TO SUBMIT FOR AUTH UNTIL XR HAS BEEN COMPLETED BY PATIENT

## 2022-08-17 NOTE — TELEPHONE ENCOUNTER
AUTHORIZATION:    INSURANCE: PEPE García #: [id number]    DATE RANGE: 9/7/22-9/20/22    TELEPHONE CALL ROUTED TO [place] TO SCHEDULE.

## 2022-08-17 NOTE — TELEPHONE ENCOUNTER
LEFT L3,4,5 MBB    AUTH APPROVED FROM 9/7/22-9/20/22    OK to schedule procedure approved as above. Please note sides/levels approved and date range. (If applicable, sides/levels approved may differ from those ordered)    TO BE SCHEDULED WITH DR. Strickland Sox

## 2022-08-18 DIAGNOSIS — M47.817 LUMBOSACRAL SPONDYLOSIS WITHOUT MYELOPATHY: ICD-10-CM

## 2022-08-18 NOTE — TELEPHONE ENCOUNTER
1ST ATTEMPT - PT WAS CALLED AND LMVM. LEFT L3,4,5 MBB, AUTH APPROVED FROM 9/7/22-9/20/22 WITH DR. Ashley Calles.

## 2022-08-22 ENCOUNTER — OFFICE VISIT (OUTPATIENT)
Dept: NEUROLOGY | Age: 72
End: 2022-08-22
Payer: MEDICARE

## 2022-08-22 VITALS
HEART RATE: 68 BPM | SYSTOLIC BLOOD PRESSURE: 120 MMHG | WEIGHT: 130 LBS | BODY MASS INDEX: 23.78 KG/M2 | DIASTOLIC BLOOD PRESSURE: 62 MMHG

## 2022-08-22 DIAGNOSIS — G43.719 INTRACTABLE CHRONIC MIGRAINE WITHOUT AURA AND WITHOUT STATUS MIGRAINOSUS: Primary | ICD-10-CM

## 2022-08-22 PROCEDURE — 1123F ACP DISCUSS/DSCN MKR DOCD: CPT | Performed by: PSYCHIATRY & NEUROLOGY

## 2022-08-22 PROCEDURE — 99213 OFFICE O/P EST LOW 20 MIN: CPT | Performed by: PSYCHIATRY & NEUROLOGY

## 2022-08-22 ASSESSMENT — ENCOUNTER SYMPTOMS
NAUSEA: 0
CHOKING: 0
BACK PAIN: 0
SHORTNESS OF BREATH: 0
TROUBLE SWALLOWING: 0
COLOR CHANGE: 0
PHOTOPHOBIA: 0
VOMITING: 0

## 2022-08-22 NOTE — PROGRESS NOTES
Subjective:      Patient ID: Mikhail Waller is a 67 y.o. female who presents today for:  Chief Complaint   Patient presents with    Follow-up     Pt states that she is doing well with migraines. She states that she has not had one in a while. HPI 69-year-old right-handed female with a history of again headaches receiving yearly basis and she is doing quite well. She occasionally still uses sumatriptan she does not have cardiac issues. Headaches are usually worse in the winter but not in the summers. She is doing otherwise well without any memory changes and takes care of her son    Past Medical History:   Diagnosis Date    Anxiety and depression 05/02/2019    Anxiety and depression     Chest pain 03/01/2019    Diverticulosis of colon (without mention of hemorrhage) 02/25/2015    DR BURNETTE    Focal lymphocytic colitis 01/27/2022    Gastroesophageal reflux disease without esophagitis     GI problem     Hyperlipidemia 02/03/2015    Hypothyroidism     Lichen sclerosus 94/0635    Migraines 02/03/2015    Renal insufficiency 02/03/2015    Spondylosis of lumbar region without myelopathy or radiculopathy 05/24/2016     Past Surgical History:   Procedure Laterality Date    ABDOMINAL ADHESION SURGERY  1/7/16    DR. MERAZ    APPENDECTOMY  2012    CATARACT REMOVAL Right     CHOLECYSTECTOMY  2012    COLON SURGERY  2005    13\" of colon removed-NO CA    COLONOSCOPY  2011    polyps    COLONOSCOPY  02/25/2015    DR Sherin Morrison - DIVERTICULOSIS    COLONOSCOPY N/A 3/4/2022    COLONOSCOPY DIAGNOSTIC performed by Seema Mckinnon MD at [facility] 66  2013    [place] COLONOSCOPY FLX DX W/COLLJ SPEC WHEN PFRMD N/A 1/26/2018    COLONOSCOPY performed by Cara Guan MD at [facility] 60 ESOPHAGOGASTRODUODENOSCOPY TRANSORAL DIAGNOSTIC N/A 1/26/2018    EGD ESOPHAGOGASTRODUODENOSCOPY WITH DILATION performed by Cara Guan MD at [address] [place] [address] ENDOSCOPY N/A 3/4/2022    EGD ESOPHAGOGASTRODUODENOSCOPY performed by Elías Palmer MD at [facility] History     Socioeconomic History    Marital status: Single     Spouse name: Not on file    Number of children: Not on file    Years of education: Not on file    Highest education level: Not on file   Occupational History    Not on file   Tobacco Use    Smoking status: Never    Smokeless tobacco: Never   Vaping Use    Vaping Use: Never used   Substance and Sexual Activity    Alcohol use: No     Alcohol/week: 0.0 standard drinks    Drug use: No    Sexual activity: Not Currently   Other Topics Concern    Not on file   Social History Narrative    Not on file     Social Determinants of Health     Financial Resource Strain: Low Risk     Difficulty of Paying Living Expenses: Not hard at all   Food Insecurity: No Food Insecurity    Worried About [address] in the Last Year: Never true    [address]  [employer] in the Last Year: Never true   Transportation Needs: No Transportation Needs    Lack of Transportation (Medical): No    Lack of Transportation (Non-Medical):  No   Physical Activity: Not on file   Stress: Not on file   Social Connections: Not on file   Intimate Partner Violence: Not on file   Housing Stability: Not on file     Family History   Problem Relation Age of Onset    Other Mother         Bowel Obstruction    High Blood Pressure Mother     High Blood Pressure Father     Cancer Father     Arthritis Father     Coronary Art Dis Father     Heart Disease Father     Stroke Father     Diabetes Father     Stomach Cancer Father     Colon Cancer Neg Hx      Allergies   Allergen Reactions    Bactrim [Sulfamethoxazole-Trimethoprim]      hallucinations    Ciprofloxacin Other (See Comments)     Pt not 100% sure, but thinks cipro is the antibiotic she is allergic to  Anxiety, confusion       Current Outpatient Medications   Medication Sig Dispense Refill    lidocaine (LMX) 4 % cream Apply a half dollar syncope, facial asymmetry, speech difficulty, weakness, light-headedness, numbness and headaches. Psychiatric/Behavioral:  Negative for behavioral problems, confusion, hallucinations and sleep disturbance. Objective:   /62 (Site: Left Upper Arm, Position: Sitting, Cuff Size: Medium Adult)   Pulse 68   Wt 130 lb (59 kg)   LMP  (LMP Unknown)   BMI 23.78 kg/m²     Physical Exam  Vitals reviewed. Eyes:      Pupils: Pupils are equal, round, and reactive to light. Cardiovascular:      Rate and Rhythm: Normal rate and regular rhythm. Heart sounds: No murmur heard. Pulmonary:      Effort: Pulmonary effort is normal.      Breath sounds: Normal breath sounds. Abdominal:      General: Bowel sounds are normal.   Musculoskeletal:         General: Normal range of motion. Cervical back: Normal range of motion. Skin:     General: Skin is warm. Neurological:      Mental Status: She is alert and oriented to person, place, and time. Cranial Nerves: No cranial nerve deficit. Sensory: No sensory deficit. Motor: No abnormal muscle tone. Coordination: Coordination normal.      Deep Tendon Reflexes: Reflexes are normal and symmetric. Babinski sign absent on the right side. Babinski sign absent on the left side. Psychiatric:         Mood and Affect: Mood normal.       CT ABDOMEN PELVIS W IV CONTRAST Additional Contrast? None    Result Date: 4/4/2022  Comparison: January 11, 2022 History: Upper abdominal pain  Technique: CT ABDOMEN PELVIS W IV CONTRAST Helically Acquired CT of the  abdomen and pelvis was performed during the intravenous infusion of 100 mL of Isovue-370. Axial delayed images were also performed. Reformatted sagittal and coronal images were also  obtained. Findings: In the visualized portion of the lingula there is opacity and also in the right middle lobe. This may reflect atelectasis. A moderate size hiatal hernia is seen. No stones or hydronephrosis is seen.  The 3 mm hypodensity is seen in the inferior pole of the right kidney. The gallbladder surgically absent and there our small hypodensity is again seen in both lobes of the liver. The pancreas, adrenal glands and pancreas are unremarkable. No  dilated loops of bowel, free air or free fluid is seen. . Mild bowel wall thickening versus incomplete distention is seen in the descending colon. Diverticulosis coli is seen in the sigmoid colon without evidence for diverticulitis. The uterus and adnexa are unremarkable. . A levoscoliosis is seen in the lumbar spine. Degenerative changes are seen at multiple levels. Mild anterior wedging is seen in the T12-L1 level. Intervertebral disc space narrowing is seen at L1-2, L2-3 and L5-S1. Vacuum phenomenon seen at multiple levels. Also there are small anterior osteophytes seen at multiple levels. Impression: 1. There is no evidence for obstruction. Bowel wall thickening versus incomplete distention is seen in the descending colon that could reflect mild colitis in the right clinical setting. 2. No hydronephrosis is seen.  3. Moderate size hiatal hernia is again seen  All CT scans at this facility use dose modulation, iterative reconstruction, and/or weight based dosing       Lab Results   Component Value Date/Time    WBC 4.8 04/04/2022 12:00 PM    RBC 4.52 04/04/2022 12:00 PM    HGB 13.1 04/04/2022 12:00 PM    HCT 39.9 04/04/2022 12:00 PM    MCV 88.2 04/04/2022 12:00 PM    MCH 29.0 04/04/2022 12:00 PM    MCHC 32.9 04/04/2022 12:00 PM    RDW 13.7 04/04/2022 12:00 PM     04/04/2022 12:00 PM     Lab Results   Component Value Date/Time     04/04/2022 12:00 PM    K 4.0 04/04/2022 12:00 PM     04/04/2022 12:00 PM    CO2 24 04/04/2022 12:00 PM    BUN 13 04/04/2022 12:00 PM    CREATININE 0.62 04/04/2022 12:00 PM    GFRAA >60.0 04/04/2022 12:00 PM    LABGLOM >60.0 04/04/2022 12:00 PM    GLUCOSE 101 04/04/2022 12:00 PM    PROT 6.6 04/04/2022 12:00 PM    LABALBU 4.5 04/04/2022 12:00 PM    CALCIUM 9.5 04/04/2022 12:00 PM    BILITOT 0.5 04/04/2022 12:00 PM    ALKPHOS 71 04/04/2022 12:00 PM    AST 15 04/04/2022 12:00 PM    ALT 11 04/04/2022 12:00 PM     No results found for: PROTIME, INR  Lab Results   Component Value Date/Time    TSH 3.480 07/21/2022 09:31 AM    ESPUQGSX49 976 12/13/2021 11:37 AM    FOLATE 14.1 07/11/2016 12:05 PM     Lab Results   Component Value Date/Time    TRIG 160 11/21/2018 03:12 PM    HDL 75 07/21/2022 09:31 AM    LDLCALC 135 07/21/2022 09:31 AM     No results found for: LABAMPH, BARBSCNU, LABBENZ, CANNAB, COCAINESCRN, LABMETH, OPIATESCREENURINE, PHENCYCLIDINESCREENURINE, PPXUR, ETOH  No results found for: LITHIUM, DILFRTOT, VALPROATE    Assessment:       Diagnosis Orders   1. Intractable chronic migraine without aura and without status migrainosus        Intractable migraine headaches which have not reoccurred. His headaches are worse in the winter and therefore she has been quite well for few months. She has not had to use Imitrex. She has not any side effects of the medication and does not have cardiac issues. We will keep her follow her every year on the same      Plan:      No orders of the defined types were placed in this encounter. No orders of the defined types were placed in this encounter. No follow-ups on file.       Radha Arzola MD

## 2022-08-23 NOTE — TELEPHONE ENCOUNTER
Surgery Scheduling (SM-LEFT L3,4,5 MBB-AUTH APPROVED 9/7/22-9/20/22)      TRIED TO CALL THE PATIENT AND MAIL BOX IS FULL., UNABLE TO REACH THE PATIENT

## 2022-09-02 ENCOUNTER — OFFICE VISIT (OUTPATIENT)
Dept: CARDIOTHORACIC SURGERY | Age: 72
End: 2022-09-02
Payer: MEDICARE

## 2022-09-02 ENCOUNTER — OFFICE VISIT (OUTPATIENT)
Dept: SURGERY | Age: 72
End: 2022-09-02
Payer: MEDICARE

## 2022-09-02 VITALS
HEIGHT: 62 IN | HEART RATE: 86 BPM | BODY MASS INDEX: 23.92 KG/M2 | TEMPERATURE: 96.6 F | OXYGEN SATURATION: 98 % | WEIGHT: 130 LBS

## 2022-09-02 VITALS
BODY MASS INDEX: 23.92 KG/M2 | HEART RATE: 86 BPM | WEIGHT: 130 LBS | HEIGHT: 62 IN | TEMPERATURE: 96.6 F | OXYGEN SATURATION: 98 %

## 2022-09-02 DIAGNOSIS — R10.32 LEFT LOWER QUADRANT ABDOMINAL PAIN: ICD-10-CM

## 2022-09-02 DIAGNOSIS — K21.9 GASTROESOPHAGEAL REFLUX DISEASE WITHOUT ESOPHAGITIS: Primary | ICD-10-CM

## 2022-09-02 DIAGNOSIS — R10.30 ABDOMINAL PAIN, LOWER: Primary | ICD-10-CM

## 2022-09-02 PROCEDURE — 1123F ACP DISCUSS/DSCN MKR DOCD: CPT | Performed by: THORACIC SURGERY (CARDIOTHORACIC VASCULAR SURGERY)

## 2022-09-02 PROCEDURE — 99213 OFFICE O/P EST LOW 20 MIN: CPT | Performed by: THORACIC SURGERY (CARDIOTHORACIC VASCULAR SURGERY)

## 2022-09-02 PROCEDURE — 1123F ACP DISCUSS/DSCN MKR DOCD: CPT | Performed by: COLON & RECTAL SURGERY

## 2022-09-02 PROCEDURE — 99203 OFFICE O/P NEW LOW 30 MIN: CPT | Performed by: COLON & RECTAL SURGERY

## 2022-09-02 ASSESSMENT — ENCOUNTER SYMPTOMS
STRIDOR: 0
CONSTIPATION: 0
ABDOMINAL DISTENTION: 0
VOMITING: 0
NAUSEA: 0
DIARRHEA: 0
SHORTNESS OF BREATH: 0
CONSTIPATION: 1
ABDOMINAL PAIN: 1
DIARRHEA: 0
ABDOMINAL PAIN: 1
CHEST TIGHTNESS: 0
SHORTNESS OF BREATH: 0
COUGH: 0
VOICE CHANGE: 0
CHEST TIGHTNESS: 0
TROUBLE SWALLOWING: 0
SORE THROAT: 0
WHEEZING: 0
VOMITING: 0
COLOR CHANGE: 0
CHOKING: 0

## 2022-09-02 NOTE — PROGRESS NOTES
Subjective:      Patient ID: Xochitl Salinas is a 67 y.o. female who presents today for:  Chief Complaint   Patient presents with    Hiatal Hernia       HPI  Patient was seen months ago for a mild to moderately symptomatic hiatal hernia. After she was seen by me she started developing persistent lower abdominal pain. It got better if that she went to the emergency department. A CAT scan was unremarkable other than the hiatal hernia. She saw GI medicine who performed a colonoscopy. Other than some diverticular disease and a small polyp was relatively unremarkable. She is return to the clinic because she is still having the same symptoms from her hiatal hernia. She takes medication but still occasionally has reflux. Food rarely sticks. She still occasionally has coughing at night. Her main complaint is the continued lower abdominal pain which is also now periumbilical.  Her doctor asked her to come back to see me in case this pain was due to her hiatal hernia. Other than the worsening lower abdominal pain she now says that she is having trouble moving her bowels and when her bowels do move it is coming out long and thin instead of more normal sized bowel movements. She says her pain does get better when she has a good bowel movement. Past Medical History:   Diagnosis Date    Anxiety and depression 05/02/2019    Anxiety and depression     Chest pain 03/01/2019    Diverticulosis of colon (without mention of hemorrhage) 02/25/2015    DR BURNETTE    Focal lymphocytic colitis 01/27/2022    Gastroesophageal reflux disease without esophagitis     GI problem     Hyperlipidemia 02/03/2015    Hypothyroidism     Lichen sclerosus 22/1888    Migraines 02/03/2015    Renal insufficiency 02/03/2015    Spondylosis of lumbar region without myelopathy or radiculopathy 05/24/2016      Past Surgical History:   Procedure Laterality Date    ABDOMINAL ADHESION SURGERY  1/7/16    DR. MERAZ    APPENDECTOMY  2012    CATARACT History   Problem Relation Age of Onset    Other Mother         Bowel Obstruction    High Blood Pressure Mother     High Blood Pressure Father     Cancer Father     Arthritis Father     Coronary Art Dis Father     Heart Disease Father     Stroke Father     Diabetes Father     Stomach Cancer Father     Colon Cancer Neg Hx      Allergies   Allergen Reactions    Bactrim [Sulfamethoxazole-Trimethoprim]      hallucinations    Ciprofloxacin Other (See Comments)     Pt not 100% sure, but thinks cipro is the antibiotic she is allergic to  Anxiety, confusion     Current Outpatient Medications on File Prior to Visit   Medication Sig Dispense Refill    lidocaine (LMX) 4 % cream Apply a half dollar sized amount to intact skin topically up to twice daily as needed for pain 45 g 1    omeprazole (PRILOSEC) 20 MG delayed release capsule TAKE 1 CAPSULE BY MOUTH DAILY 90 capsule 1    Cholecalciferol (VITAMIN D3) 50 MCG (2000 UT) CAPS 1 po daily with meal 90 capsule 4    cyanocobalamin ([employer] VITAMIN B12) 1000 MCG tablet Take 1 tablet by mouth daily 90 tablet 4    aspirin EC 81 MG EC tablet Take 1 tablet by mouth daily 90 tablet 4    atorvastatin (LIPITOR) 20 MG tablet Take 1 tablet by mouth daily 90 tablet 4    coenzyme Q-10 100 MG capsule Take 1 capsule by mouth daily 90 capsule 4    PARoxetine (PAXIL) 10 MG tablet Take 2 tablets by mouth daily 90 tablet 4    levothyroxine (SYNTHROID) 75 MCG tablet Take 1 tablet by mouth daily 90 tablet 4    ibuprofen (IBU) 600 MG tablet Take 1 tablet by mouth every 6 hours as needed for Pain 120 tablet 0    dicyclomine (BENTYL) 10 MG capsule Take 1 capsule by mouth 4 times daily (before meals and nightly) 120 capsule 5    clobetasol (TEMOVATE) 0.05 % cream Apply topically 2 times daily Apply topically 2 times daily.  60 g 2    docusate sodium (COLACE) 100 MG capsule Take 100 mg by mouth 2 times daily      SUMAtriptan (IMITREX) 50 MG tablet Take 1 tablet by mouth once as needed for Migraine 9 tablet 0 No current facility-administered medications on file prior to visit. Review of Systems   Constitutional:  Negative for activity change, appetite change, chills, diaphoresis, fatigue, fever and unexpected weight change. HENT:  Negative for sore throat, trouble swallowing and voice change. Eyes:  Negative for visual disturbance. Respiratory:  Negative for cough, choking, chest tightness, shortness of breath, wheezing and stridor. Cardiovascular:  Negative for chest pain, palpitations and leg swelling. Gastrointestinal:  Positive for abdominal pain and constipation. Negative for abdominal distention, diarrhea, nausea and vomiting. Genitourinary:  Negative for difficulty urinating. Musculoskeletal:  Negative for gait problem and joint swelling. Skin:  Negative for rash and wound. Neurological:  Negative for dizziness, seizures and light-headedness. Hematological:  Negative for adenopathy. Does not bruise/bleed easily. Psychiatric/Behavioral:  Negative for behavioral problems and confusion. Objective:   Pulse 86   Temp (!) 96.6 °F (35.9 °C) (Temporal)   Ht 5' 2\" (1.575 m)   Wt 130 lb (59 kg)   LMP  (LMP Unknown)   SpO2 98%   BMI 23.78 kg/m²     Physical Exam  Constitutional:       General: She is not in acute distress. Appearance: She is not ill-appearing, toxic-appearing or diaphoretic. HENT:      Head: Normocephalic and atraumatic. Nose: Nose normal.   Eyes:      Extraocular Movements: Extraocular movements intact. Conjunctiva/sclera: Conjunctivae normal.      Pupils: Pupils are equal, round, and reactive to light. Neck:      Vascular: No carotid bruit. Cardiovascular:      Rate and Rhythm: Normal rate and regular rhythm. Heart sounds: Normal heart sounds. No murmur heard. No gallop. Pulmonary:      Effort: Pulmonary effort is normal. No respiratory distress. Breath sounds: Normal breath sounds. No wheezing or rales.    Abdominal: General: Abdomen is flat. Bowel sounds are normal.      Palpations: Abdomen is soft. There is no mass. Tenderness: There is abdominal tenderness. Musculoskeletal:         General: No swelling. Cervical back: Neck supple. Right lower leg: No edema. Left lower leg: No edema. Lymphadenopathy:      Cervical: No cervical adenopathy. Skin:     General: Skin is warm and dry. Neurological:      General: No focal deficit present. Mental Status: She is alert and oriented to person, place, and time. Psychiatric:         Mood and Affect: Mood normal.         Behavior: Behavior normal.         Thought Content: Thought content normal.         Judgment: Judgment normal.             Radiographs and Laboratory Studies:     Diagnostic Imaging Studies:    Once again went over her CAT scan from 5 months ago. I see no reason for her lower abdominal pain or pelvic pain. Assessment/Plan     Stable symptoms of her hiatal hernia which are tolerable to her at this point. I do not see a connection between this hiatal hernia and periumbilical and lower abdominal pain. Because she said she had a lysis of adhesions years ago which did take her pain away for a couple years and because she is having a change in the size of her bowel movements I will ask general surgery to evaluate her and make recommendations. Diagnosis Orders   1. Gastroesophageal reflux disease without esophagitis        2. Left lower quadrant abdominal pain          No orders of the defined types were placed in this encounter. No orders of the defined types were placed in this encounter. No follow-ups on file.       Lisa Tang MD

## 2022-09-02 NOTE — PROGRESS NOTES
Subjective:      Patient ID: Margaret Tang is a 67 y.o. female who presents for:  Chief Complaint   Patient presents with    New Patient       This is a 69-year-old female who was seeing Dr. Svetlana Shelley regarding a hiatal hernia. Patient has lower abdominal pain and I was asked to see her while she was here at this visit for surgical evaluation. I reviewed a CAT scan from April which showed diverticular disease present. No evidence of bowel obstruction. I reviewed a colonoscopy done around March of this year which showed diverticular disease present. Her gastroenterologist prescribed her Bentyl to use for spasm. She had an abdominal operation by Dr. Idania Hermosillo in the past for lysis of adhesions. I reviewed the remainder of her past medical and surgical history. Past Medical History:   Diagnosis Date    Anxiety and depression 05/02/2019    Anxiety and depression     Chest pain 03/01/2019    Diverticulosis of colon (without mention of hemorrhage) 02/25/2015    DR BURNETTE    Focal lymphocytic colitis 01/27/2022    Gastroesophageal reflux disease without esophagitis     GI problem     Hyperlipidemia 02/03/2015    Hypothyroidism     Lichen sclerosus 70/6871    Migraines 02/03/2015    Renal insufficiency 02/03/2015    Spondylosis of lumbar region without myelopathy or radiculopathy 05/24/2016     Past Surgical History:   Procedure Laterality Date    ABDOMINAL ADHESION SURGERY  1/7/16    DR. MERAZ    APPENDECTOMY  2012    CATARACT REMOVAL Right     CHOLECYSTECTOMY  2012    COLON SURGERY  2005    13\" of colon removed-NO CA    COLONOSCOPY  2011    polyps    COLONOSCOPY  02/25/2015    DR Leonila Mcdonough - DIVERTICULOSIS    COLONOSCOPY N/A 3/4/2022    COLONOSCOPY DIAGNOSTIC performed by Phong Matthew MD at [facility] 66  2013    [place] COLONOSCOPY FLX DX W/COLLJ SPEC WHEN PFRMD N/A 1/26/2018    COLONOSCOPY performed by Divya Cardona MD at [address] [facility] [place] and unexpected weight change. HENT:  Negative for congestion. Respiratory:  Negative for chest tightness and shortness of breath. Cardiovascular:  Negative for chest pain and palpitations. Gastrointestinal:  Positive for abdominal pain. Negative for constipation, diarrhea and vomiting. Genitourinary:  Negative for difficulty urinating. Musculoskeletal:  Negative for arthralgias. Skin:  Negative for color change. Neurological:  Negative for dizziness and headaches. Hematological:  Does not bruise/bleed easily. Psychiatric/Behavioral:  Negative for agitation. Objective:    Pulse 86   Temp (!) 96.6 °F (35.9 °C) (Temporal)   Ht 5' 2\" (1.575 m)   Wt 130 lb (59 kg)   LMP  (LMP Unknown)   SpO2 98%   BMI 23.78 kg/m²     Physical Exam  Constitutional:       Appearance: She is well-developed. HENT:      Head: Normocephalic and atraumatic. Eyes:      Pupils: Pupils are equal, round, and reactive to light. Cardiovascular:      Rate and Rhythm: Normal rate and regular rhythm. Heart sounds: Normal heart sounds. Pulmonary:      Effort: Pulmonary effort is normal. No respiratory distress. Breath sounds: Normal breath sounds. No wheezing. Abdominal:      General: There is no distension. Palpations: There is no mass. Tenderness: There is no abdominal tenderness. There is no guarding or rebound. Hernia: No hernia is present. Musculoskeletal:         General: Normal range of motion. Cervical back: Normal range of motion and neck supple. Skin:     General: Skin is warm and dry. Coloration: Skin is not pale. Findings: No erythema or rash. Neurological:      Mental Status: She is alert and oriented to person, place, and time. Psychiatric:         Behavior: Behavior normal.         Judgment: Judgment normal.            Assessment/Plan:          Diagnosis Orders   1.  Abdominal pain, lower          Without evidence of obstruction, I do not recommend lysis of adhesions. I do not do this for pain relief. It does not have any significant beneficial effect. In addition her pain might be related to her diverticular disease. I believe Bentyl is the right medication to help with spasm. I can continue additional imaging but I do not expect to find something surgically correctable. I would be happy to continue that search if the Bentyl does not make significant improvement. Please note this report has beenpartially produced using speech recognition software and may cause contain errors related to that system including grammar, punctuation and spelling as well as words and phrases that may seem inappropriate.  If there arequestions or concerns please feel free to contact me to clarify

## 2022-09-26 ENCOUNTER — TELEPHONE (OUTPATIENT)
Dept: FAMILY MEDICINE CLINIC | Age: 72
End: 2022-09-26

## 2022-09-26 NOTE — TELEPHONE ENCOUNTER
----- Message from Ya Quiroga sent at 9/26/2022  2:32 PM EDT -----  Subject: Message to Provider    QUESTIONS  Information for Provider? Pt called in and want to talk to someone at the   office no one pciked up and pt did hang up if someone can give the pt a   call back . ---------------------------------------------------------------------------  --------------  Kelsey IRWIN  [phone]; OK to leave message on voicemail  ---------------------------------------------------------------------------  --------------  SCRIPT ANSWERS  Relationship to Patient?  Self

## 2022-09-26 NOTE — TELEPHONE ENCOUNTER
----- Message from Leoncio Foreman sent at 9/26/2022  2:32 PM EDT -----  Subject: Message to Provider    QUESTIONS  Information for Provider? Pt called in and want to talk to someone at the   office no one pciked up and pt did hang up if someone can give the pt a   call back . ---------------------------------------------------------------------------  --------------  Miquel GONZALEZ  [phone]; OK to leave message on voicemail  ---------------------------------------------------------------------------  --------------  SCRIPT ANSWERS  Relationship to Patient?  Self

## 2022-10-06 NOTE — TELEPHONE ENCOUNTER
AUTHORIZATION:    [facility] Bis #: [id number]    DATE RANGE: 10/6/22-10/19/22    RANDY WAS MADE AWARE OF APPROVAL

## 2022-10-11 ENCOUNTER — OFFICE VISIT (OUTPATIENT)
Dept: FAMILY MEDICINE CLINIC | Age: 72
End: 2022-10-11
Payer: MEDICARE

## 2022-10-11 ENCOUNTER — PROCEDURE VISIT (OUTPATIENT)
Dept: PAIN MANAGEMENT | Age: 72
End: 2022-10-11
Payer: MEDICARE

## 2022-10-11 VITALS
DIASTOLIC BLOOD PRESSURE: 66 MMHG | WEIGHT: 132 LBS | SYSTOLIC BLOOD PRESSURE: 120 MMHG | BODY MASS INDEX: 24.29 KG/M2 | TEMPERATURE: 97.9 F | OXYGEN SATURATION: 98 % | HEIGHT: 62 IN | HEART RATE: 68 BPM

## 2022-10-11 DIAGNOSIS — K52.9 COLITIS: ICD-10-CM

## 2022-10-11 DIAGNOSIS — K44.9 HIATAL HERNIA: ICD-10-CM

## 2022-10-11 DIAGNOSIS — R12 HEARTBURN: ICD-10-CM

## 2022-10-11 DIAGNOSIS — K52.9 COLITIS: Primary | ICD-10-CM

## 2022-10-11 DIAGNOSIS — M47.817 LUMBOSACRAL SPONDYLOSIS WITHOUT MYELOPATHY: Primary | ICD-10-CM

## 2022-10-11 DIAGNOSIS — K21.9 GASTROESOPHAGEAL REFLUX DISEASE WITHOUT ESOPHAGITIS: ICD-10-CM

## 2022-10-11 LAB
ALBUMIN SERPL-MCNC: 4.8 G/DL (ref 3.5–4.6)
ALP BLD-CCNC: 78 U/L (ref 40–130)
ALT SERPL-CCNC: <5 U/L (ref 0–33)
ANION GAP SERPL CALCULATED.3IONS-SCNC: 12 MEQ/L (ref 9–15)
AST SERPL-CCNC: <5 U/L (ref 0–35)
BILIRUB SERPL-MCNC: 0.3 MG/DL (ref 0.2–0.7)
BUN BLDV-MCNC: 14 MG/DL (ref 8–23)
CALCIUM SERPL-MCNC: 9.3 MG/DL (ref 8.5–9.9)
CHLORIDE BLD-SCNC: 102 MEQ/L (ref 95–107)
CO2: 26 MEQ/L (ref 20–31)
CREAT SERPL-MCNC: 0.59 MG/DL (ref 0.5–0.9)
GFR AFRICAN AMERICAN: >60
GFR NON-AFRICAN AMERICAN: >60
GLOBULIN: 2.4 G/DL (ref 2.3–3.5)
GLUCOSE BLD-MCNC: 78 MG/DL (ref 70–99)
HCT VFR BLD CALC: 38.2 % (ref 37–47)
HEMOGLOBIN: 12.2 G/DL (ref 12–16)
MCH RBC QN AUTO: 28.7 PG (ref 27–31.3)
MCHC RBC AUTO-ENTMCNC: 32 % (ref 33–37)
MCV RBC AUTO: 89.7 FL (ref 82–100)
PDW BLD-RTO: 13.7 % (ref 11.5–14.5)
PLATELET # BLD: 205 K/UL (ref 130–400)
POTASSIUM SERPL-SCNC: 4.5 MEQ/L (ref 3.4–4.9)
RBC # BLD: 4.26 M/UL (ref 4.2–5.4)
SODIUM BLD-SCNC: 140 MEQ/L (ref 135–144)
TOTAL PROTEIN: 7.2 G/DL (ref 6.3–8)
WBC # BLD: 5.7 K/UL (ref 4.8–10.8)

## 2022-10-11 PROCEDURE — 64493 INJ PARAVERT F JNT L/S 1 LEV: CPT | Performed by: PHYSICAL MEDICINE & REHABILITATION

## 2022-10-11 PROCEDURE — 64494 INJ PARAVERT F JNT L/S 2 LEV: CPT | Performed by: PHYSICAL MEDICINE & REHABILITATION

## 2022-10-11 PROCEDURE — 1123F ACP DISCUSS/DSCN MKR DOCD: CPT | Performed by: PHYSICIAN ASSISTANT

## 2022-10-11 PROCEDURE — 99214 OFFICE O/P EST MOD 30 MIN: CPT | Performed by: PHYSICIAN ASSISTANT

## 2022-10-11 RX ORDER — OMEPRAZOLE 40 MG/1
40 CAPSULE, DELAYED RELEASE ORAL 2 TIMES DAILY
Qty: 90 CAPSULE | Refills: 2 | Status: SHIPPED | OUTPATIENT
Start: 2022-10-11

## 2022-10-11 RX ORDER — LIDOCAINE HYDROCHLORIDE 10 MG/ML
5 INJECTION, SOLUTION INFILTRATION; PERINEURAL ONCE
Status: COMPLETED | OUTPATIENT
Start: 2022-10-11 | End: 2022-10-11

## 2022-10-11 RX ORDER — BETAMETHASONE SODIUM PHOSPHATE AND BETAMETHASONE ACETATE 3; 3 MG/ML; MG/ML
3 INJECTION, SUSPENSION INTRA-ARTICULAR; INTRALESIONAL; INTRAMUSCULAR; SOFT TISSUE ONCE
Status: COMPLETED | OUTPATIENT
Start: 2022-10-11 | End: 2022-10-11

## 2022-10-11 RX ADMIN — Medication 0.5 MEQ: at 17:04

## 2022-10-11 RX ADMIN — LIDOCAINE HYDROCHLORIDE 5 ML: 10 INJECTION, SOLUTION INFILTRATION; PERINEURAL at 17:04

## 2022-10-11 RX ADMIN — BETAMETHASONE SODIUM PHOSPHATE AND BETAMETHASONE ACETATE 3 MG: 3; 3 INJECTION, SUSPENSION INTRA-ARTICULAR; INTRALESIONAL; INTRAMUSCULAR; SOFT TISSUE at 17:03

## 2022-10-11 SDOH — ECONOMIC STABILITY: FOOD INSECURITY: WITHIN THE PAST 12 MONTHS, YOU WORRIED THAT YOUR FOOD WOULD RUN OUT BEFORE YOU GOT MONEY TO BUY MORE.: NEVER TRUE

## 2022-10-11 SDOH — ECONOMIC STABILITY: FOOD INSECURITY: WITHIN THE PAST 12 MONTHS, THE FOOD YOU BOUGHT JUST DIDN'T LAST AND YOU DIDN'T HAVE MONEY TO GET MORE.: NEVER TRUE

## 2022-10-11 ASSESSMENT — ENCOUNTER SYMPTOMS
NAUSEA: 0
VOMITING: 0
BLOOD IN STOOL: 0
SHORTNESS OF BREATH: 0
COLOR CHANGE: 0
DIARRHEA: 1
ABDOMINAL PAIN: 1
ABDOMINAL DISTENTION: 1
BACK PAIN: 1
COUGH: 0
RECTAL PAIN: 0
CHEST TIGHTNESS: 0
CONSTIPATION: 0

## 2022-10-11 ASSESSMENT — SOCIAL DETERMINANTS OF HEALTH (SDOH): HOW HARD IS IT FOR YOU TO PAY FOR THE VERY BASICS LIKE FOOD, HOUSING, MEDICAL CARE, AND HEATING?: NOT HARD AT ALL

## 2022-10-11 NOTE — PROGRESS NOTES
Lumbar Medial Branch Blocks      Patient Name: Alyssa Mathews   : 1950  Date: 10/11/2022     Provider: Manisha Avila MD        Alyssa Mathews is here today for interventional pain management. Preoperatively, the patient presents with symptoms and physical exam findings consistent with lumbar facet zygapophyseal joint mediated pain. She has had persistent pain that limits her function and activities of daily living. The pain is persistent despite conservative measures. She has significant functional and psychological impairment due to this condition. Given her symptoms, physical exam findings, impairment in activities of daily living, and lack of response to conservative measures, consideration for lumbar medial branch blocks was given. Discussed the risks of the procedure including, but not limited to, bleeding, infection, worsened pain, damage to surrounding structures, side effects, toxicity, allergic reactions to medications used, immune and stress-response dysfunction, fat necrosis, avascular necrosis, skin pigmentation changes, blood sugar elevation, headache, vision changes, need for surgery, as well as catastrophic injury such as vision loss, paralysis, stroke, spinal cord infarction or injury, intrathecal injection, spinal cord puncture, arachnoiditis, bowel or bladder incontinence, loss of use of the legs, ventilator dependence, and death. Discussed the risks, benefits, alternative procedures, and alternatives to the procedure including no procedure at all. Discussed that we cannot undo any permanent neurologic damage or change the course of any underlying disease. After thorough discussion, patient expressed understanding and willingness to proceed. Written consent was obtained and is in the chart. Verbal consent to proceed was obtained. Standard ASIPP guidelines were followed and sterile technique used. Area was cleaned with Betadine three times.  Fluoroscopic guidance was used for this procedure. The L5 vertebral body was taken as the first lumbar-appearing vertebral body directly above the sacrum on a lateral view. Junction of superior articular process and transverse process was identified. For L5 dorsal primary ramus groove of sacral ala and superior articular process of S1 was identified. Then two 26 gauge 3.5 inch spinal needles were used and each needle was advanced to each medial branch and/or dorsal ramus. Aspiration was negative throughout the procedure. Oblique and declined views were obtained. Each medial branch and/or dorsal ramus was treated with approximately 0.5 mL of 1% preservative-free Lidocaine. A total of 0.5 mL of 3 mg of Betamethasone was used for today's procedure. She tolerated the procedure well, no obvious complications occurred during the procedure. She was appropriately monitored and discharged home in stable condition with her usual motor strength. She will keep close track of pain over the next several hours and call our office tomorrow and let us know what percentage of pain relief is experienced. Postoperative instructions were provided. She will continue anticoagulation uninterrupted.           [] Bilateral [] T12    [] L1   [] Right [] L2    [x] L3   [x] Left [x] L4      [x] L5         [address]  Phone [phone]

## 2022-10-11 NOTE — PROGRESS NOTES
Subjective  Chitra Ritchie, 67 y.o. female presents today with:  Chief Complaint   Patient presents with    Follow-up     Pain on the Lt side stomach , state she has a hernia , pt is having diarrhea, black stool  , phlegm      HPI  In the office today for same day, acute on chronic, concern. Previous PCP: dr. Kelle Cruz the office today for recurrent umbilical to LLQ abdominal pain. History of ? Colitis. Most recent colon ca screen with Dr. Cadence Ramires 3/22. +diverticula. Poor prep, was recommended she return for repeat in 1-2 years. Noted to have changes to her stool. Dark in color with discomfort. No hematochezia. Sensitive to foods. Not on oral iron supplement. Taking bentyl 2 times daily for IBS-D. Was previously on QID but was able to wean down. Most recently having increased bowel changes. No weight change. History of gerd with hiatal hernia. Taking 20 mg omeprazole with some break through reflux. Denies excessive NSAID use. Takes imitrex PRN for migraine as prescribed by Dr. Pat Dawkins. Sees pain management for chronic back pain. Will take 600 mg ibuprofen PRN. Will use lidocaine cream.      On lipitor with coq10 for mixed hld. Denies myalgias or muscle weakness. On vit D replacement. On thyroid replacement therapy. TSH WNL. Review of Systems   Constitutional:  Positive for appetite change. Negative for chills, diaphoresis, fatigue and fever. Respiratory:  Negative for cough, chest tightness and shortness of breath. Cardiovascular:  Negative for chest pain, palpitations and leg swelling. Gastrointestinal:  Positive for abdominal distention, abdominal pain and diarrhea. Negative for blood in stool, constipation, nausea, rectal pain and vomiting. Musculoskeletal:  Positive for arthralgias and back pain. Skin:  Negative for color change. Neurological:  Negative for dizziness, weakness, light-headedness and headaches.    Psychiatric/Behavioral: Negative for dysphoric mood and sleep disturbance. The patient is not nervous/anxious. Past Medical History:   Diagnosis Date    Anxiety and depression 05/02/2019    Anxiety and depression     Chest pain 03/01/2019    Diverticulosis of colon (without mention of hemorrhage) 02/25/2015    DR BURNETTE    Focal lymphocytic colitis 01/27/2022    Gastroesophageal reflux disease without esophagitis     GI problem     Hyperlipidemia 02/03/2015    Hypothyroidism     Lichen sclerosus 12/4317    Migraines 02/03/2015    Renal insufficiency 02/03/2015    Spondylosis of lumbar region without myelopathy or radiculopathy 05/24/2016     Past Surgical History:   Procedure Laterality Date    ABDOMINAL ADHESION SURGERY  1/7/16    DR. MERAZ    APPENDECTOMY  2012    CATARACT REMOVAL Right     CHOLECYSTECTOMY  2012    COLON SURGERY  2005    13\" of colon removed-NO CA    COLONOSCOPY  2011    polyps    COLONOSCOPY  02/25/2015    DR Ella Cates - DIVERTICULOSIS    COLONOSCOPY N/A 3/4/2022    COLONOSCOPY DIAGNOSTIC performed by Ermias Berry MD at [place] [address]  2013    [place] COLONOSCOPY FLX DX W/COLLJ SPEC WHEN PFRMD N/A 1/26/2018    COLONOSCOPY performed by Shellie Riggs MD at [address] [facility] ESOPHAGOGASTRODUODENOSCOPY TRANSORAL DIAGNOSTIC N/A 1/26/2018    EGD ESOPHAGOGASTRODUODENOSCOPY WITH DILATION performed by Shellie Riggs MD at [facility] N/A 3/4/2022    EGD ESOPHAGOGASTRODUODENOSCOPY performed by Ermias Berry MD at [address] [facility] History     Socioeconomic History    Marital status: Single     Spouse name: Not on file    Number of children: Not on file    Years of education: Not on file    Highest education level: Not on file   Occupational History    Not on file   Tobacco Use    Smoking status: Never    Smokeless tobacco: Never   Vaping Use    Vaping Use: Never used   Substance and Sexual Activity    Alcohol use: No     Alcohol/week: 0.0 standard drinks    Drug use: No    Sexual activity: Not Currently   Other Topics Concern    Not on file   Social History Narrative    Not on file     Social Determinants of Health     Financial Resource Strain: Low Risk     Difficulty of Paying Living Expenses: Not hard at all   Food Insecurity: No Food Insecurity    Worried About Running Out of Food in the Last Year: Never true    Ran Out of Food in the Last Year: Never true   Transportation Needs: Not on file   Physical Activity: Not on file   Stress: Not on file   Social Connections: Not on file   Intimate Partner Violence: Not on file   Housing Stability: Not on file     Family History   Problem Relation Age of Onset    Other Mother         Bowel Obstruction    High Blood Pressure Mother     High Blood Pressure Father     Cancer Father     Arthritis Father     Coronary Art Dis Father     Heart Disease Father     Stroke Father     Diabetes Father     Stomach Cancer Father     Colon Cancer Neg Hx      Allergies   Allergen Reactions    Bactrim [Sulfamethoxazole-Trimethoprim]      hallucinations    Ciprofloxacin Other (See Comments)     Pt not 100% sure, but thinks cipro is the antibiotic she is allergic to  Anxiety, confusion     Current Outpatient Medications   Medication Sig Dispense Refill    omeprazole (PRILOSEC) 40 MG delayed release capsule Take 1 capsule by mouth in the morning and at bedtime 90 capsule 2    lidocaine (LMX) 4 % cream Apply a half dollar sized amount to intact skin topically up to twice daily as needed for pain 45 g 1    Cholecalciferol (VITAMIN D3) 50 MCG (2000 UT) CAPS 1 po daily with meal 90 capsule 4    cyanocobalamin ([employer] VITAMIN B12) 1000 MCG tablet Take 1 tablet by mouth daily 90 tablet 4    aspirin EC 81 MG EC tablet Take 1 tablet by mouth daily 90 tablet 4    atorvastatin (LIPITOR) 20 MG tablet Take 1 tablet by mouth daily 90 tablet 4    coenzyme Q-10 100 MG capsule Take 1 capsule by mouth daily 90 capsule 4    PARoxetine (PAXIL) 10 MG tablet Take 2 tablets by mouth daily 90 tablet 4    SUMAtriptan (IMITREX) 50 MG tablet Take 1 tablet by mouth once as needed for Migraine 9 tablet 0    levothyroxine (SYNTHROID) 75 MCG tablet Take 1 tablet by mouth daily 90 tablet 4    ibuprofen (IBU) 600 MG tablet Take 1 tablet by mouth every 6 hours as needed for Pain 120 tablet 0    dicyclomine (BENTYL) 10 MG capsule Take 1 capsule by mouth 4 times daily (before meals and nightly) 120 capsule 5    clobetasol (TEMOVATE) 0.05 % cream Apply topically 2 times daily Apply topically 2 times daily. 60 g 2    docusate sodium (COLACE) 100 MG capsule Take 100 mg by mouth 2 times daily       No current facility-administered medications for this visit. PMH, Surgical Hx, Family Hx, and Social Hx reviewed and updated. Health Maintenance reviewed. Objective  Vitals:    10/11/22 1319   BP: 120/66   Pulse: 68   Temp: 97.9 °F (36.6 °C)   SpO2: 98%   Weight: 132 lb (59.9 kg)   Height: 5' 2\" (1.575 m)     BP Readings from Last 3 Encounters:   10/11/22 120/66   08/22/22 120/62   05/19/22 112/62     Wt Readings from Last 3 Encounters:   10/11/22 132 lb (59.9 kg)   09/02/22 130 lb (59 kg)   09/02/22 130 lb (59 kg)     Physical Exam  HENT:      Head: Normocephalic and atraumatic. Right Ear: External ear normal.      Left Ear: External ear normal.      Nose: Nose normal.      Mouth/Throat:      Mouth: Mucous membranes are moist.   Eyes:      Conjunctiva/sclera: Conjunctivae normal.   Cardiovascular:      Rate and Rhythm: Normal rate and regular rhythm. Pulmonary:      Effort: Pulmonary effort is normal.      Breath sounds: Normal breath sounds. Abdominal:      General: There is no distension. Palpations: There is no mass. Tenderness: There is abdominal tenderness (umbilical, LLQ). There is guarding (with deep/firm palpation). There is no rebound. Hernia: No hernia is present.    Musculoskeletal:         General: Normal range of motion. Skin:     General: Skin is warm and dry. Neurological:      General: No focal deficit present. Mental Status: She is alert and oriented to person, place, and time. Assessment & Plan   Soila Kelly was seen today for follow-up. Diagnoses and all orders for this visit:    Colitis  -     Calprotectin Stool; Future  -     Common Food Allergen Profile; Future  -     CBC; Future  -     Comprehensive Metabolic Panel; Future    Heartburn  Comments:  Continue omeprazole as needed. Given PPI permanently. Orders:  -     omeprazole (PRILOSEC) 40 MG delayed release capsule; Take 1 capsule by mouth in the morning and at bedtime    Hiatal hernia  Comments:  See above. Orders:  -     omeprazole (PRILOSEC) 40 MG delayed release capsule; Take 1 capsule by mouth in the morning and at bedtime    Gastroesophageal reflux disease without esophagitis    Labs today, calprotectin for evaluation of stool. Increase bentyl back to TID. Prilosec increased to 40.  2 week follow up. Orders Placed This Encounter   Procedures    Calprotectin Stool     Standing Status:   Future     Standing Expiration Date:   10/11/2023    Common Food Allergen Profile     Standing Status:   Future     Number of Occurrences:   1     Standing Expiration Date:   10/11/2023    CBC     Standing Status:   Future     Number of Occurrences:   1     Standing Expiration Date:   10/11/2023    Comprehensive Metabolic Panel     Standing Status:   Future     Number of Occurrences:   1     Standing Expiration Date:   10/11/2023     Orders Placed This Encounter   Medications    omeprazole (PRILOSEC) 40 MG delayed release capsule     Sig: Take 1 capsule by mouth in the morning and at bedtime     Dispense:  90 capsule     Refill:  2     Medications Discontinued During This Encounter   Medication Reason    omeprazole (PRILOSEC) 20 MG delayed release capsule REORDER     No follow-ups on file.       Reviewed with the patient: current clinical status,

## 2022-10-13 LAB
ALLERGEN BARLEY IGE: <0.1 KU/L (ref 0–0.34)
ALLERGEN BEEF: <0.1 KU/L (ref 0–0.34)
ALLERGEN CABBAGE IGE: <0.1 KU/L (ref 0–0.34)
ALLERGEN CARROT IGE: <0.1 KU/L (ref 0–0.34)
ALLERGEN CHICKEN IGE: <0.1 KU/L (ref 0–0.34)
ALLERGEN CODFISH IGE: <0.1 KU/L (ref 0–0.34)
ALLERGEN CORN IGE: <0.1 KU/L (ref 0–0.34)
ALLERGEN COW MILK IGE: 0.15 KU/L (ref 0–0.34)
ALLERGEN CRAB IGE: <0.1 KU/L (ref 0–0.34)
ALLERGEN EGG WHITE IGE: 1.1 KU/L (ref 0–0.34)
ALLERGEN GRAPE IGE: <0.1 KU/L (ref 0–0.34)
ALLERGEN LETTUCE IGE: <0.1 KU/L (ref 0–0.34)
ALLERGEN NAVY BEAN: <0.1 KU/L (ref 0–0.34)
ALLERGEN OAT: <0.1 KU/L (ref 0–0.34)
ALLERGEN ORANGE IGE: <0.1 KU/L (ref 0–0.34)
ALLERGEN PEANUT (F13) IGE: 0.11 KU/L (ref 0–0.34)
ALLERGEN PEPPER C. ANNUUM IGE: <0.1 KU/L (ref 0–0.34)
ALLERGEN PORK: <0.1 KU/L (ref 0–0.34)
ALLERGEN RICE IGE: <0.1 KU/L (ref 0–0.34)
ALLERGEN RYE IGE: <0.1 KU/L (ref 0–0.34)
ALLERGEN SOYBEAN IGE: <0.1 KU/L (ref 0–0.34)
ALLERGEN TOMATO IGE: 0.25 KU/L (ref 0–0.34)
ALLERGEN TUNA IGE: <0.1 KU/L (ref 0–0.34)
ALLERGEN WHEAT IGE: 0.14 KU/L (ref 0–0.34)
IGE: 51 IU/ML
POTATO, IGE: <0.1 KU/L (ref 0–0.34)
SHRIMP: 0.11 KU/L (ref 0–0.34)

## 2022-10-14 DIAGNOSIS — K52.9 COLITIS: ICD-10-CM

## 2022-10-17 LAB — CALPROTECTIN, FECAL: 123 UG/G

## 2022-10-18 DIAGNOSIS — R19.5 ELEVATED FECAL CALPROTECTIN: ICD-10-CM

## 2022-10-18 DIAGNOSIS — K52.9 COLITIS: Primary | ICD-10-CM

## 2022-10-31 ENCOUNTER — OFFICE VISIT (OUTPATIENT)
Dept: FAMILY MEDICINE CLINIC | Age: 72
End: 2022-10-31
Payer: MEDICARE

## 2022-10-31 VITALS
OXYGEN SATURATION: 96 % | RESPIRATION RATE: 16 BRPM | HEIGHT: 62 IN | BODY MASS INDEX: 24.11 KG/M2 | HEART RATE: 67 BPM | DIASTOLIC BLOOD PRESSURE: 80 MMHG | SYSTOLIC BLOOD PRESSURE: 130 MMHG | WEIGHT: 131 LBS

## 2022-10-31 DIAGNOSIS — Z76.89 ENCOUNTER TO ESTABLISH CARE: ICD-10-CM

## 2022-10-31 DIAGNOSIS — R05.2 SUBACUTE COUGH: ICD-10-CM

## 2022-10-31 DIAGNOSIS — R19.5 ELEVATED FECAL CALPROTECTIN: Primary | ICD-10-CM

## 2022-10-31 DIAGNOSIS — K52.832 FOCAL LYMPHOCYTIC COLITIS: Chronic | ICD-10-CM

## 2022-10-31 DIAGNOSIS — Z23 NEED FOR INFLUENZA VACCINATION: ICD-10-CM

## 2022-10-31 PROCEDURE — 90694 VACC AIIV4 NO PRSRV 0.5ML IM: CPT | Performed by: PHYSICIAN ASSISTANT

## 2022-10-31 PROCEDURE — 99213 OFFICE O/P EST LOW 20 MIN: CPT | Performed by: PHYSICIAN ASSISTANT

## 2022-10-31 PROCEDURE — G0008 ADMIN INFLUENZA VIRUS VAC: HCPCS | Performed by: PHYSICIAN ASSISTANT

## 2022-10-31 PROCEDURE — 1123F ACP DISCUSS/DSCN MKR DOCD: CPT | Performed by: PHYSICIAN ASSISTANT

## 2022-10-31 RX ORDER — CHOLECALCIFEROL (VITAMIN D3) 125 MCG
5 CAPSULE ORAL DAILY
COMMUNITY

## 2022-10-31 RX ORDER — LANOLIN ALCOHOL/MO/W.PET/CERES
1000 CREAM (GRAM) TOPICAL DAILY
COMMUNITY

## 2022-10-31 ASSESSMENT — ENCOUNTER SYMPTOMS
DIARRHEA: 1
BLOOD IN STOOL: 0
COLOR CHANGE: 0
SORE THROAT: 1
CONSTIPATION: 0
SHORTNESS OF BREATH: 0
ABDOMINAL PAIN: 0
CHEST TIGHTNESS: 0
ABDOMINAL DISTENTION: 0
VOMITING: 0
RECTAL PAIN: 0
COUGH: 1
BACK PAIN: 1
NAUSEA: 0

## 2022-10-31 NOTE — PROGRESS NOTES
Subjective  Lugenia Olivarez, 67 y.o. female presents today with:  Chief Complaint   Patient presents with    Follow-up     2 week follow up     HPI  2 week follow up. Would like flu vaccine. Needing referral to MD/DO--per insurance request.      Upper URI. C/o 1 week history of sinus congestion/drainage. NO known exposure to flu or covid. Symptoms are gradually improving. Would like testing today as she [place] her son to [place] 4-5 times/week. No fever. Had sore throat with laryngitis--this is slowly improving. History of chronic abdominal bloating/pain/loose stools. Would like to review recent labs. Recently resumed bentayl at TID, which has helped looseness/bloating. Needing to follow up with GI. Recently increased omeprazole to bID--which has also helped reflux, abdominal pain/discomfort. Results for orders placed or performed in visit on 10/14/22   Calprotectin Stool   Result Value Ref Range    Calprotectin, Fecal 123 (H) <=49 ug/g     Review of Systems   Constitutional:  Negative for activity change, appetite change, chills, diaphoresis, fatigue and fever. HENT:  Positive for congestion and sore throat. Respiratory:  Positive for cough. Negative for chest tightness and shortness of breath. Cardiovascular:  Negative for chest pain, palpitations and leg swelling. Gastrointestinal:  Positive for diarrhea (improving). Negative for abdominal distention, abdominal pain, blood in stool, constipation, nausea, rectal pain and vomiting. Musculoskeletal:  Positive for arthralgias and back pain. Skin:  Negative for color change. Neurological:  Negative for dizziness, weakness, light-headedness and headaches. Psychiatric/Behavioral:  Negative for dysphoric mood and sleep disturbance. The patient is not nervous/anxious.       Past Medical History:   Diagnosis Date    Anxiety and depression 05/02/2019    Anxiety and depression     Chest pain 03/01/2019    Diverticulosis of colon (without mention of hemorrhage) 02/25/2015    DR BURNETTE    Focal lymphocytic colitis 01/27/2022    Gastroesophageal reflux disease without esophagitis     GI problem     Hyperlipidemia 02/03/2015    Hypothyroidism     Lichen sclerosus 43/6548    Migraines 02/03/2015    Renal insufficiency 02/03/2015    Spondylosis of lumbar region without myelopathy or radiculopathy 05/24/2016     Past Surgical History:   Procedure Laterality Date    ABDOMINAL ADHESION SURGERY  1/7/16    DR. MREAZ    APPENDECTOMY  2012    CATARACT REMOVAL Right     CHOLECYSTECTOMY  2012    COLON SURGERY  2005    13\" of colon removed-NO CA    COLONOSCOPY  2011    polyps    COLONOSCOPY  02/25/2015    DR Kirk Kate - DIVERTICULOSIS    COLONOSCOPY N/A 3/4/2022    COLONOSCOPY DIAGNOSTIC performed by Bev Sloan MD at [facility]  2013    [place] COLONOSCOPY FLX DX W/COLLJ SPEC WHEN PFRMD N/A 1/26/2018    COLONOSCOPY performed by Little Ochoa MD at [address] [facility] ESOPHAGOGASTRODUODENOSCOPY TRANSORAL DIAGNOSTIC N/A 1/26/2018    EGD ESOPHAGOGASTRODUODENOSCOPY WITH DILATION performed by Little Ochoa MD at [address] [facility] N/A 3/4/2022    EGD ESOPHAGOGASTRODUODENOSCOPY performed by Bev Sloan MD at [facility] History     Socioeconomic History    Marital status: Single     Spouse name: Not on file    Number of children: Not on file    Years of education: Not on file    Highest education level: Not on file   Occupational History    Not on file   Tobacco Use    Smoking status: Never    Smokeless tobacco: Never   Vaping Use    Vaping Use: Never used   Substance and Sexual Activity    Alcohol use: No     Alcohol/week: 0.0 standard drinks    Drug use: No    Sexual activity: Not Currently   Other Topics Concern    Not on file   Social History Narrative    Not on file     Social Determinants of Health     Financial Resource Strain: Low Risk Difficulty of Paying Living Expenses: Not hard at all   Food Insecurity: No Food Insecurity    Worried About Running Out of Food in the Last Year: Never true    Ran Out of Food in the Last Year: Never true   Transportation Needs: Not on file   Physical Activity: Not on file   Stress: Not on file   Social Connections: Not on file   Intimate Partner Violence: Not on file   Housing Stability: Not on file     Family History   Problem Relation Age of Onset    Other Mother         Bowel Obstruction    High Blood Pressure Mother     High Blood Pressure Father     Cancer Father     Arthritis Father     Coronary Art Dis Father     Heart Disease Father     Stroke Father     Diabetes Father     Stomach Cancer Father     Colon Cancer Neg Hx      Allergies   Allergen Reactions    Bactrim [Sulfamethoxazole-Trimethoprim]      hallucinations    Ciprofloxacin Other (See Comments)     Pt not 100% sure, but thinks cipro is the antibiotic she is allergic to  Anxiety, confusion     Current Outpatient Medications   Medication Sig Dispense Refill    vitamin B-12 (CYANOCOBALAMIN) 1000 MCG tablet Take 1,000 mcg by mouth daily      FIBER, GUAR GUM, PO Take 2 each by mouth in the morning and at bedtime      melatonin 5 MG TABS tablet Take 5 mg by mouth daily      omeprazole (PRILOSEC) 40 MG delayed release capsule Take 1 capsule by mouth in the morning and at bedtime 90 capsule 2    lidocaine (LMX) 4 % cream Apply a half dollar sized amount to intact skin topically up to twice daily as needed for pain 45 g 1    Cholecalciferol (VITAMIN D3) 50 MCG (2000 UT) CAPS 1 po daily with meal 90 capsule 4    aspirin EC 81 MG EC tablet Take 1 tablet by mouth daily 90 tablet 4    atorvastatin (LIPITOR) 20 MG tablet Take 1 tablet by mouth daily 90 tablet 4    coenzyme Q-10 100 MG capsule Take 1 capsule by mouth daily 90 capsule 4    PARoxetine (PAXIL) 10 MG tablet Take 2 tablets by mouth daily 90 tablet 4    SUMAtriptan (IMITREX) 50 MG tablet Take 1 tablet by mouth once as needed for Migraine 9 tablet 0    levothyroxine (SYNTHROID) 75 MCG tablet Take 1 tablet by mouth daily 90 tablet 4    ibuprofen (IBU) 600 MG tablet Take 1 tablet by mouth every 6 hours as needed for Pain 120 tablet 0    dicyclomine (BENTYL) 10 MG capsule Take 1 capsule by mouth 4 times daily (before meals and nightly) 120 capsule 5    clobetasol (TEMOVATE) 0.05 % cream Apply topically 2 times daily Apply topically 2 times daily. 60 g 2     No current facility-administered medications for this visit. PMH, Surgical Hx, Family Hx, and Social Hx reviewed and updated. Health Maintenance reviewed. Objective  Vitals:    10/31/22 1150 10/31/22 1153   BP: (!) 140/80 130/80   Site: Left Upper Arm    Position: Sitting    Cuff Size: Medium Adult    Pulse: 67    Resp: 16    SpO2: 96%    Weight: 131 lb (59.4 kg)    Height: 5' 2\" (1.575 m)      BP Readings from Last 3 Encounters:   10/31/22 130/80   10/11/22 120/66   08/22/22 120/62     Wt Readings from Last 3 Encounters:   10/31/22 131 lb (59.4 kg)   10/11/22 132 lb (59.9 kg)   09/02/22 130 lb (59 kg)     Physical Exam  HENT:      Head: Normocephalic and atraumatic. Right Ear: External ear normal.      Left Ear: External ear normal.      Nose: Nose normal.      Mouth/Throat:      Mouth: Mucous membranes are moist.   Eyes:      Conjunctiva/sclera: Conjunctivae normal.   Cardiovascular:      Rate and Rhythm: Normal rate and regular rhythm. Pulmonary:      Effort: Pulmonary effort is normal.      Breath sounds: Normal breath sounds. Abdominal:      General: There is no distension. Palpations: There is no mass. Tenderness: There is no rebound. Hernia: No hernia is present. Musculoskeletal:         General: Normal range of motion. Skin:     General: Skin is warm and dry. Neurological:      General: No focal deficit present. Mental Status: She is alert and oriented to person, place, and time.      Assessment & Plan Kelsi Bergeron was seen today for follow-up. Diagnoses and all orders for this visit:    Elevated fecal calprotectin    Subacute cough  -     Rsv Rapid Antigen; Future  -     COVID-19; Future    Encounter to establish care  -     Patricia Varela DO, Family Medicine, [place]    Focal lymphocytic colitis    Need for influenza vaccination  -     Influenza, FLUAD, (age 72 y+), IM, Preservative Free, 0.5 mL    Rest, fluids, supportive therapy for URI. Flu vaccine--patient wants vaccine today. Follow up with gastro re: calprotectin. Continue current medicatiosn. Return PRN. Orders Placed This Encounter   Procedures    Rsv Rapid Antigen     Standing Status:   Future     Standing Expiration Date:   10/31/2023    Influenza, FLUAD, (age 72 y+), IM, Preservative Free, 0.5 mL    COVID-19     Standing Status:   Future     Standing Expiration Date:   10/31/2023     Scheduling Instructions:      1) Due to current limited availability of the COVID-19 test, tests will be prioritized based on responses to questions above. Testing may be delayed due to volume. 2) Print and instruct patient to adhere to CDC home isolation program. (Link Above)              3) Set up or refer patient for a monitoring program.              4) Have patient sign up for and leverage [employer] (if not previously done). Order Specific Question:   Is this test for diagnosis or screening? Answer:   Diagnosis of ill patient     Order Specific Question:   Symptomatic for COVID-19 as defined by CDC? Answer:   Yes     Order Specific Question:   Date of Symptom Onset     Answer:   10/28/2022     Order Specific Question:   Hospitalized for COVID-19? Answer:   No     Order Specific Question:   Admitted to ICU for COVID-19? Answer:   No     Order Specific Question:   Employed in healthcare setting? Answer:   No     Order Specific Question:   Resident in a congregate (group) care setting?      Answer:   No     Order Specific Question:   Pregnant? Answer:   No     Order Specific Question:   Previously tested for COVID-19? Answer:   Yes    Brayden Smith DO, Family Medicine, [facility]     Referral Priority:   Routine     Referral Type:   Eval and Treat     Referral Reason:   Specialty Services Required     Referred to Provider:   Gail Hu DO     Requested Specialty:   Family Medicine     Number of Visits Requested:   1     No orders of the defined types were placed in this encounter. Medications Discontinued During This Encounter   Medication Reason    docusate sodium (COLACE) 100 MG capsule LIST CLEANUP    cyanocobalamin ([employer] VITAMIN B12) 9598 MCG tablet DUPLICATE     Return in about 4 months (around 2/28/2023) for follow up in office. Reviewed with the patient: current clinical status, medications, activities and diet. Side effects, adverse effects of the medication prescribed today, as well as treatment plan/ rationale and result expectations have been discussed with the patient who expresses understanding and desires to proceed. Close follow up to evaluate treatment results and for coordination of care. I have reviewed the patient's medical history in detail and updated the computerized patient record.     Tenisha Smith PA-C

## 2022-10-31 NOTE — PROGRESS NOTES
Vaccine Information Sheet, \"Influenza - Inactivated\"  given to [place], or parent/legal guardian of  [place] [employer] and verbalized understanding. Patient responses:    Have you ever had a reaction to a flu vaccine? No  Are you able to eat eggs without adverse effects? Yes  Do you have any current illness? No  Have you ever had Guillian [place] Syndrome? No    Flu vaccine given per order. Please see immunization tab.

## 2022-11-03 ENCOUNTER — OFFICE VISIT (OUTPATIENT)
Dept: GASTROENTEROLOGY | Age: 72
End: 2022-11-03
Payer: MEDICARE

## 2022-11-03 VITALS
BODY MASS INDEX: 23.96 KG/M2 | WEIGHT: 131 LBS | HEART RATE: 81 BPM | DIASTOLIC BLOOD PRESSURE: 72 MMHG | OXYGEN SATURATION: 98 % | SYSTOLIC BLOOD PRESSURE: 126 MMHG

## 2022-11-03 DIAGNOSIS — K58.1 IRRITABLE BOWEL SYNDROME WITH CONSTIPATION: Primary | ICD-10-CM

## 2022-11-03 PROCEDURE — 1123F ACP DISCUSS/DSCN MKR DOCD: CPT | Performed by: NURSE PRACTITIONER

## 2022-11-03 PROCEDURE — 99213 OFFICE O/P EST LOW 20 MIN: CPT | Performed by: NURSE PRACTITIONER

## 2022-11-03 ASSESSMENT — ENCOUNTER SYMPTOMS
RECTAL PAIN: 0
VOICE CHANGE: 0
NAUSEA: 0
EYE REDNESS: 0
CONSTIPATION: 1
CHEST TIGHTNESS: 0
ABDOMINAL DISTENTION: 0
BLOOD IN STOOL: 0
ANAL BLEEDING: 0
EYE PAIN: 0
DIARRHEA: 0
COLOR CHANGE: 0
TROUBLE SWALLOWING: 0
ABDOMINAL PAIN: 1
VOMITING: 0
SHORTNESS OF BREATH: 0
PHOTOPHOBIA: 0
WHEEZING: 0

## 2022-11-03 NOTE — PROGRESS NOTES
Subjective:      Patient ID: Mariela Kee is a 67 y.o. female who presents today for:  Chief Complaint   Patient presents with    Follow-up       HPI  Patient came in today with left lower quadrant pain, symptoms are chronic and have been present since 2019. Symptoms are associated with alternating bowel habits between constipation and diarrhea. Noted patient had colonoscopy less than 1 year ago with suboptimal prep and 1 polyp with recommendations for repeat in 1 to 2 years. Had stool for calprotectin completed by PCP with mild elevation with no clinical significance or correlation to colitis/IBD. As recall patient has previously felt better on 2 separate occasions once after her colonoscopy prep and again after completing a colon cleanse. Symptoms are more suggestive of IBS with mixed pattern but more concerned with constipation and possible overflow diarrhea. Does not have a regular bowel regimen established. No nausea or vomiting, hematemesis, melena, or hematochezia. No fever no chills. Otherwise no new complaints or concerns. OV 4/15/22  Patient came in as follow-up and further evaluation of abdominal pain, as recall was seen approximately 1 week ago with severe abdominal pain and constipation, was prescribed a colon cleanse. She is currently asymptomatic, has no further abdominal pain. Reports issues with constipation typically relieved by Dulcolax and MiraLAX. Otherwise no new complaints or concerns, no melena or hematochezia. OV 4/5/22  Patient came in today as follow-up for complaints of abdominal pain. Had recent EGD and colonoscopy, EGD noted small hiatal hernia and gastritis-no evidence of H pylori, colonoscopy noted diverticulosis, and one polyp biopsy showed hyperplastic polyp, colon prep was suboptimal, pt reports taking all of the prep, of note patient does report that her pain was better for approximately 4 days after completing her colon prep and colonoscopy.   She was seen in the emergency department yesterday for similar complaints, had essentially normal CT of her abdomen and pelvis ? Colitis vs underdistension, normal blood work, normal urine. She has no fever or chills. Currently reports persistent alternating bowel habits between diarrhea and constipation. No melena or hematochezia. Background  Patient came in to establish GI care with complaints of abdominal pain and heartburn. Was previously seen in the office by other providers, most recently 2019. Reports longstanding history of intermittent left lower quadrant pain, reports previous sigmoid resection secondary to benign growth in 2005 done at outside hospital, no records are available for review. Feels as though her bowel habits have been abnormal since then, is concerned this could be scar tissue. Has associated alternating bowel habits between constipation and diarrhea, occasionally sees bright red blood when she strains. Otherwise no unintentional weight loss, changes to appetite, family history of CRC or IBD. No nocturnal pain or progressively worsening pain. At baseline is not on oral anticoagulation or antiplatelet therapy. In addition patient complains of longstanding history of heartburn and takes omeprazole 20 mg daily has breakthrough symptoms typically in the evening or nocturnally.   Was recently seen and evaluated by Dr. Naveen Snyder for possible hiatal hernia repair, has radiological evidence of small to medium hiatal hernia, no recent EGD noted    Past Medical History:   Diagnosis Date    Anxiety and depression 05/02/2019    Anxiety and depression     Chest pain 03/01/2019    Diverticulosis of colon (without mention of hemorrhage) 02/25/2015    DR BURNETTE    Focal lymphocytic colitis 01/27/2022    Gastroesophageal reflux disease without esophagitis     GI problem     Hyperlipidemia 02/03/2015    Hypothyroidism     Lichen sclerosus 57/1451    Migraines 02/03/2015    Renal insufficiency 02/03/2015    Spondylosis of lumbar region without myelopathy or radiculopathy 05/24/2016     Past Surgical History:   Procedure Laterality Date    ABDOMINAL ADHESION SURGERY  1/7/16    DR. MERAZ    APPENDECTOMY  2012    CATARACT REMOVAL Right     CHOLECYSTECTOMY  2012    COLON SURGERY  2005    13\" of colon removed-NO CA    COLONOSCOPY  2011    polyps    COLONOSCOPY  02/25/2015    DR Mirella Spence - DIVERTICULOSIS    COLONOSCOPY N/A 3/4/2022    COLONOSCOPY DIAGNOSTIC performed by James Higginbotham MD at [facility]  2013    [place] COLONOSCOPY FLX DX W/COLLJ SPEC WHEN PFRMD N/A 1/26/2018    COLONOSCOPY performed by Rosanne Saeed MD at [address] [facility] ESOPHAGOGASTRODUODENOSCOPY TRANSORAL DIAGNOSTIC N/A 1/26/2018    EGD ESOPHAGOGASTRODUODENOSCOPY WITH DILATION performed by Rosanne Saeed MD at [address] ENDOSCOPY N/A 3/4/2022    EGD ESOPHAGOGASTRODUODENOSCOPY performed by James Higginbotham MD at [facility] History     Socioeconomic History    Marital status: Single     Spouse name: Not on file    Number of children: Not on file    Years of education: Not on file    Highest education level: Not on file   Occupational History    Not on file   Tobacco Use    Smoking status: Never    Smokeless tobacco: Never   Vaping Use    Vaping Use: Never used   Substance and Sexual Activity    Alcohol use: No     Alcohol/week: 0.0 standard drinks    Drug use: No    Sexual activity: Not Currently   Other Topics Concern    Not on file   Social History Narrative    Not on file     Social Determinants of Health     Financial Resource Strain: Low Risk     Difficulty of Paying Living Expenses: Not hard at all   Food Insecurity: No Food Insecurity    Worried About Running Out of Food in the Last Year: Never true    Ran Out of Food in the Last Year: Never true   Transportation Needs: Not on file   Physical Activity: Not on file   Stress: Not on file   Social Connections: Not on file   Intimate Partner Violence: Not on file   Housing Stability: Not on file     Family History   Problem Relation Age of Onset    Other Mother         Bowel Obstruction    High Blood Pressure Mother     High Blood Pressure Father     Cancer Father     Arthritis Father     Coronary Art Dis Father     Heart Disease Father     Stroke Father     Diabetes Father     Stomach Cancer Father     Colon Cancer Neg Hx      Allergies   Allergen Reactions    Bactrim [Sulfamethoxazole-Trimethoprim]      hallucinations    Ciprofloxacin Other (See Comments)     Pt not 100% sure, but thinks cipro is the antibiotic she is allergic to  Anxiety, confusion         Review of Systems   Constitutional:  Negative for appetite change, chills, fever and unexpected weight change. HENT:  Negative for nosebleeds, tinnitus, trouble swallowing and voice change. Eyes:  Negative for photophobia, pain and redness. Respiratory:  Negative for chest tightness, shortness of breath and wheezing. Cardiovascular:  Negative for chest pain, palpitations and leg swelling. Gastrointestinal:  Positive for abdominal pain and constipation. Negative for abdominal distention, anal bleeding, blood in stool, diarrhea, nausea, rectal pain and vomiting. Endocrine: Negative for polydipsia, polyphagia and polyuria. Genitourinary:  Negative for difficulty urinating and hematuria. Skin:  Negative for color change, pallor and rash. Neurological:  Negative for dizziness, speech difficulty and headaches. Psychiatric/Behavioral:  Negative for confusion and suicidal ideas. Objective:   /72 (Site: Right Upper Arm, Position: Sitting, Cuff Size: Small Adult)   Pulse 81   Wt 131 lb (59.4 kg)   LMP  (LMP Unknown)   SpO2 98%   BMI 23.96 kg/m²     Physical Exam  Vitals reviewed. Constitutional:       General: She is not in acute distress. Appearance: Normal appearance. She is well-developed and well-groomed.    HENT: Head: Normocephalic and atraumatic. Nose: Nose normal.   Eyes:      General: No scleral icterus. Extraocular Movements: Extraocular movements intact. Conjunctiva/sclera: Conjunctivae normal.      Pupils: Pupils are equal, round, and reactive to light. Cardiovascular:      Rate and Rhythm: Normal rate and regular rhythm. Pulses: Normal pulses. Heart sounds: Normal heart sounds. Pulmonary:      Effort: Pulmonary effort is normal. No respiratory distress. Breath sounds: Normal breath sounds. No wheezing or rales. Abdominal:      General: Abdomen is flat. Bowel sounds are normal. There is no distension. Palpations: Abdomen is soft. There is no hepatomegaly, splenomegaly or mass. Tenderness: There is no abdominal tenderness. There is no guarding or rebound. Musculoskeletal:         General: No tenderness or deformity. Normal range of motion. Cervical back: Neck supple. Right lower leg: No edema. Left lower leg: No edema. Skin:     General: Skin is warm and dry. Capillary Refill: Capillary refill takes less than 2 seconds. Coloration: Skin is not jaundiced. Findings: No erythema or rash. Neurological:      General: No focal deficit present. Mental Status: She is alert and oriented to person, place, and time.    Psychiatric:         Mood and Affect: Mood normal.         Behavior: Behavior normal.       Laboratory, Pathology, Radiology reviewed in detail with relevantimportant investigations summarized below:  Lab Results   Component Value Date/Time    WBC 5.7 10/11/2022 02:34 PM    WBC 4.8 04/04/2022 12:00 PM    WBC 6.7 01/11/2022 03:11 PM    WBC 5.9 12/13/2021 11:37 AM    WBC 8.4 09/24/2021 12:00 PM    HGB 12.2 10/11/2022 02:34 PM    HGB 13.1 04/04/2022 12:00 PM    HGB 14.1 01/11/2022 03:11 PM    HGB 13.4 12/13/2021 11:37 AM    HGB 13.6 09/24/2021 12:00 PM    HCT 38.2 10/11/2022 02:34 PM    HCT 39.9 04/04/2022 12:00 PM    HCT 43.4 01/11/2022 03:11 PM    HCT 40.8 12/13/2021 11:37 AM    HCT 40.4 09/24/2021 12:00 PM    MCV 89.7 10/11/2022 02:34 PM    MCV 88.2 04/04/2022 12:00 PM    MCV 88.5 01/11/2022 03:11 PM    MCV 87.6 12/13/2021 11:37 AM    MCV 85.6 09/24/2021 12:00 PM     10/11/2022 02:34 PM     04/04/2022 12:00 PM     01/11/2022 03:11 PM     12/13/2021 11:37 AM     09/24/2021 12:00 PM    .  Lab Results   Component Value Date/Time    ALT <5 10/11/2022 02:34 PM    ALT 11 04/04/2022 12:00 PM    ALT 9 09/24/2021 12:00 PM    AST <5 10/11/2022 02:34 PM    AST 15 04/04/2022 12:00 PM    AST 14 09/24/2021 12:00 PM    ALKPHOS 78 10/11/2022 02:34 PM    ALKPHOS 71 04/04/2022 12:00 PM    ALKPHOS 95 09/24/2021 12:00 PM    BILITOT 0.3 10/11/2022 02:34 PM    BILITOT 0.5 04/04/2022 12:00 PM    BILITOT 0.6 09/24/2021 12:00 PM       No results found. No results found for: IRON, TIBC, FERRITIN  No results found for: INR  No components found for: ACUTEHEPATITISSCREEN  No components found for: CELIACPANEL  No components found for: STOOLCULTURE, C.DIFF, STOOLOVAPARASITE, STOOLLEUCOCYTE        Assessment:       Diagnosis Orders   1. Irritable bowel syndrome with constipation  Peppermint Oil 90 MG CPCR                  No orders of the defined types were placed in this encounter. Orders Placed This Encounter   Medications    Peppermint Oil 90 MG CPCR     Sig: Take 2 capsules by mouth daily     Dispense:  2 capsule     Refill:  0     Plan:  LLQ pain, IBS mixed pattern  symptoms are chronic and have been present since 2019. Symptoms are associated with alternating bowel habits between constipation and diarrhea. Noted patient had colonoscopy less than 1 year ago with suboptimal prep and 1 polyp with recommendations for repeat in 1 to 2 years. Had stool for calprotectin completed by PCP with mild elevation with no clinical significance or correlation to colitis/IBD.   As recall patient has previously felt better on 2 separate occasions once after her colonoscopy prep and again after completing a colon cleanse. Symptoms are more suggestive of IBS with mixed pattern but more concerned with constipation and possible overflow diarrhea.   -colon cleanse with miralax  -IB Rae  -Bentyl sparingly  -Start fiber (ex. Metamucil 3.4 g by mouth daily with a glass of water or juice) titrate up to 2-3 times per day as needed.  -Start stool softener, Colace 100mg by mouth daily or 50mg by mouth 2 times per day. Can go up to 100mg 2 times per day if needed to soften stool.  -Start Miralax 17 g of powder dissolved in 8 oz of water once daily and titrate up or down (to a maximum of 34 g daily) to effect.   -will repeat colonoscopy VS repeat CT imaging pending sooner than later pending clinical course  2. Associated medical conditions include but are not limited to. . Hyperlipidemia, chronic renal insufficiency, anxiety and depression, hypothyroidism, migraines, diverticulosis, GERD, hiatal hernia, hx of abdominal surgery with lysis of adhesions    Return in about 6 weeks (around 12/15/2022), or if symptoms worsen or fail to improve.       Marija Ca, APRN - CNP

## 2022-11-16 ENCOUNTER — OFFICE VISIT (OUTPATIENT)
Dept: PAIN MANAGEMENT | Age: 72
End: 2022-11-16
Payer: MEDICARE

## 2022-11-16 VITALS
BODY MASS INDEX: 23.92 KG/M2 | HEIGHT: 62 IN | WEIGHT: 130 LBS | DIASTOLIC BLOOD PRESSURE: 64 MMHG | TEMPERATURE: 97.8 F | SYSTOLIC BLOOD PRESSURE: 118 MMHG

## 2022-11-16 DIAGNOSIS — M47.817 LUMBOSACRAL SPONDYLOSIS WITHOUT MYELOPATHY: Primary | ICD-10-CM

## 2022-11-16 PROCEDURE — 1123F ACP DISCUSS/DSCN MKR DOCD: CPT | Performed by: PHYSICAL MEDICINE & REHABILITATION

## 2022-11-16 PROCEDURE — 99213 OFFICE O/P EST LOW 20 MIN: CPT | Performed by: PHYSICAL MEDICINE & REHABILITATION

## 2022-11-16 RX ORDER — LIDOCAINE 40 MG/G
CREAM TOPICAL
Qty: 45 G | Refills: 1 | Status: SHIPPED | OUTPATIENT
Start: 2022-11-16

## 2022-11-16 RX ORDER — BACLOFEN 5 MG/1
5 TABLET ORAL NIGHTLY PRN
Qty: 30 TABLET | Refills: 0 | Status: SHIPPED | OUTPATIENT
Start: 2022-11-16 | End: 2022-12-16

## 2022-11-16 ASSESSMENT — ENCOUNTER SYMPTOMS
BACK PAIN: 1
SHORTNESS OF BREATH: 0
NAUSEA: 0
DIARRHEA: 0
CONSTIPATION: 0

## 2022-11-16 NOTE — PROGRESS NOTES
Donald Correa  ([id number])    11/16/2022    Subjective: Donald Correa is 67 y.o. female who complains today of:    Chief Complaint   Patient presents with    Back Pain     Last seen by me 8/8/2022: Seen by general surgery, no surgery recommended. Seen by thoracic surgery, no surgery was recommended. Underwent left lumbar L3-L4-L5 medial branch blocks on 10/11/2022 with greater than 80% short-term pain relief with a positive diagnostic response. \"I'm no longer getting sick and having to lay down when the pain comes. \" She is pleased. XR LS Spine done, report unable to be opened due to computer error. No new therapy done. She states that she is compliant with her physician directed home exercise program. Lidocaine and Baclofen helped, no side effects. No other tests, therapy, or updates from other physicians, no ER visits. Baclofen 5 mg and Lidocaine help with remaining functional with chores, personal hygiene, remaining compliant with home exercise program, maintaining her quality of life, and performing activities of daily living. She obtains greater than 50% pain relief without any significant side effects. She is clear to avoid driving or operating heavy machinery or to perform any activities where she may harm herself or others while on pain medications. Low back pain is a 5/10. Gets to a 7/10. Located in the left side of her low back. No leg pain. Right side is ok. Constant ache for over 6 months. Pain is worse with caring for her son and standing up. Pain is better with sitting, better with laying on her stomach and putting pressure on it. There are no other associated symptoms or contextual factors. She denies any classic radicular symptoms, new weakness, saddle anesthesia, bowel or bladder dysfunction, or falls.     Allergies:  Bactrim [sulfamethoxazole-trimethoprim] and Ciprofloxacin    Past Medical History:   Diagnosis Date    Anxiety and depression 05/02/2019    Anxiety and depression Smoking status: Never    Smokeless tobacco: Never   Vaping Use    Vaping Use: Never used   Substance and Sexual Activity    Alcohol use: No     Alcohol/week: 0.0 standard drinks    Drug use: No    Sexual activity: Not Currently   Other Topics Concern    Not on file   Social History Narrative    Not on file     Social Determinants of Health     Financial Resource Strain: Low Risk     Difficulty of Paying Living Expenses: Not hard at all   Food Insecurity: No Food Insecurity    Worried About Running Out of Food in the Last Year: Never true    Ran Out of Food in the Last Year: Never true   Transportation Needs: Not on file   Physical Activity: Not on file   Stress: Not on file   Social Connections: Not on file   Intimate Partner Violence: Not on file   Housing Stability: Not on file       Current Outpatient Medications on File Prior to Visit   Medication Sig Dispense Refill    Peppermint Oil 90 MG CPCR Take 2 capsules by mouth daily 2 capsule 0    vitamin B-12 (CYANOCOBALAMIN) 1000 MCG tablet Take 1,000 mcg by mouth daily      FIBER, GUAR GUM, PO Take 2 each by mouth in the morning and at bedtime      melatonin 5 MG TABS tablet Take 5 mg by mouth daily      omeprazole (PRILOSEC) 40 MG delayed release capsule Take 1 capsule by mouth in the morning and at bedtime 90 capsule 2    Cholecalciferol (VITAMIN D3) 50 MCG (2000 UT) CAPS 1 po daily with meal 90 capsule 4    aspirin EC 81 MG EC tablet Take 1 tablet by mouth daily 90 tablet 4    atorvastatin (LIPITOR) 20 MG tablet Take 1 tablet by mouth daily 90 tablet 4    coenzyme Q-10 100 MG capsule Take 1 capsule by mouth daily 90 capsule 4    PARoxetine (PAXIL) 10 MG tablet Take 2 tablets by mouth daily 90 tablet 4    SUMAtriptan (IMITREX) 50 MG tablet Take 1 tablet by mouth once as needed for Migraine 9 tablet 0    levothyroxine (SYNTHROID) 75 MCG tablet Take 1 tablet by mouth daily 90 tablet 4    ibuprofen (IBU) 600 MG tablet Take 1 tablet by mouth every 6 hours as needed for Pain 120 tablet 0    dicyclomine (BENTYL) 10 MG capsule Take 1 capsule by mouth 4 times daily (before meals and nightly) 120 capsule 5    clobetasol (TEMOVATE) 0.05 % cream Apply topically 2 times daily Apply topically 2 times daily. 60 g 2     No current facility-administered medications on file prior to visit. Review of Systems   Constitutional:  Negative for fever. HENT:  Negative for hearing loss. Respiratory:  Negative for shortness of breath. Gastrointestinal:  Negative for constipation, diarrhea and nausea. Genitourinary:  Negative for difficulty urinating. Musculoskeletal:  Positive for back pain. Negative for neck pain. Skin:  Negative for rash. Neurological:  Negative for headaches. Hematological:  Does not bruise/bleed easily. Psychiatric/Behavioral:  Negative for sleep disturbance. Objective:     Vitals:  /64   Temp 97.8 °F (36.6 °C)   Ht 5' 2\" (1.575 m)   Wt 130 lb (59 kg)   LMP  (LMP Unknown)   BMI 23.78 kg/m²        Exam performed under coronavirus precautions  General: No acute distress  Eyes: No scleral icterus appreciated bilaterally  ENMT: Moist mucous membranes  Neck: Symmetric without any overt masses or lesions  Respiratory: Respirations are non-labored  Skin: Visualized skin is intact  Psych: Pleasant and cooperative with history and exam.  Neurologic: Gait antalgic, no assistive devices for ambulation. Pt is alert and appropriately interactive. No signs of excessive sedation. Responds promptly and appropriately to questions asked. No aberrant behaviors appreciated. No gross step offs noted. Tenderness to palpation over the mid to low lumbar spinous processes and left lumbar paraspinals from L2 down to the sacrum. No tenderness over bilateral PSIS. No tenderness over bilateral greater trochanters. No tenderness over bilateral deep gluteal regions. Sensation grossly intact in both legs.    Reflexes and strength functional for ambulation, no abnormal reflexes appreciated on exam today  Strength greater than 3/5 bilateral legs  Straight leg raise negative bilaterally. Outside record review:  XR L-spine 8/8/2022:  XR LS Spine 9/24/18: levoscoliosis, degenerative disc disease, facet arthropathy, no fracture  MRI LS Spine 4/20/16: lumbar degenerative disc disease, lumbar facet arthropathy. No sig central canal stenosis. Disc bulge L5/S1 with some left L5 nerve root contact. XR LS Spine 2/23/16: lumbar degenerative disc disease and facet arthropathy. Opioid risk tool score 7, moderate risk. Assessment:      Diagnosis Orders   1. Lumbosacral spondylosis without myelopathy  baclofen (LIORESAL) 5 MG tablet    lidocaine (LMX) 4 % cream      +frequent Kidney infections, UTIs, hiatal hernia, Depression and Anxiety on Paxil  Plan:     Periodic Controlled Substance Monitoring: Assessed functional status. Audrie Aschoff, MD)    Orders Placed This Encounter   Medications    baclofen (LIORESAL) 5 MG tablet     Sig: Take 1 tablet by mouth nightly as needed (pain spasms)     Dispense:  30 tablet     Refill:  0    lidocaine (LMX) 4 % cream     Sig: Apply a half dollar sized amount to intact skin topically up to twice daily as needed for pain     Dispense:  45 g     Refill:  1         No orders of the defined types were placed in this encounter.      -Encourage PT for lumbar spondylosis  -Unable to open XR LS Spine report. Will review at the next time she is in the office in 2 months, patient is agreeable to this.   -Will hold on MRI LS Spine without contrast at this time as she feels well  -Continue Lidocaine 4% ointment topical BID prn #1 tube one refill start 11/16/2022   -No NSAIDs given age and risk of comorbidities  -Previously tried Tizanidine, caused excessive sedation.   -Continue Baclofen 5 mg qHS prn #30 no ref start 11/16/2022  Avoid all other muscle relaxers and/or sedating medicines.    -Will hold on opioids at this time  -She did well with Left Lumbar L3/4/5 MBB with positive diagnostic response. Currently she is feeling well. Consider second diagnostic Left Lumbar L3/4/5 MBB if her pain returns   -Sparing use of back brace    Controlled Substance Monitoring:    Acute and Chronic Pain Monitoring:   RX Monitoring 11/16/2022   Attestation -   Periodic Controlled Substance Monitoring Assessed functional status. Discussed the risks, side effects, and symptoms that would warrant urgent or emergent physician evaluation of all medications prescribed today. Discussed the risks of the above recommended procedures including but not limited to bleeding, infection, worsened pain, damage to surrounding structures, side effects, toxicity, allergic reactions to medications used, immune and stress-response dysfunction, fat necrosis, skin pigmentation changes, blood sugar elevation, headache, vision changes, need for surgery, as well as catastrophic injury such as vision loss, paralysis, stroke, spinal cord and/or plexus infarction or injury, intrathecal injection, spinal cord puncture, arachnoiditis, discitis, bowel or bladder incontinence, ventilator dependence, loss of use of the arms and/or legs, and death. Discussed the risks, benefits, alternative procedures, and alternatives to the procedure including no procedure at all. Discussed that we cannot undo any permanent neurologic damage or change the course of any underlying disease. The patient appears to be a good candidate for the above recommended procedures, but no guarantees expressed or implied are given regarding the outcome of any procedure. After thorough discussion, patient expressed understanding and willingness to proceed. Provided education and counseling regarding the diagnosis, prognosis, and treatment options. All questions were answered.  Encouraged her to follow-up with her primary care physician and/or specialists as required for her overall health and management of her comorbidities as well as any new positive symptoms mentioned in review of systems above. Care was provided within the definitions and limitations of our specialty practice. Encouraged lifestyle interventions including healthy habits, lifestyle changes, regular aerobic exercise and appropriate weight maintenance as advised by their primary care physician or cardiovascular health provider. Discussed well care and disease prevention/maintenance. All recommendations for therapy are provided to improve function with activities of daily living, decrease pain, and help develop an exercise program. All recommendations for medications are meant to help decrease pain, improve function with activities of daily living, maintain compliance with home exercise program, and improve quality of life. All recommendations for diagnostic injections are meant to help assess the hypothesis that the targeted structure is a significant pain generator that limits the patient's function, causes pain, and reduces her quality of life. Encouraged compliance with her home exercise program. Recommended compliance with physical therapy program as outlined above. Informed her to wear bracing as appropriate, and that bracing is not a replacement for core strengthening or muscle stabilization. Discussed the elevated risks of excessive sedation while on pain medications. Advised her against driving or operating heavy machinery or performing any activities where she may harm herself or others while on pain medications. Particular caution was emphasized especially during dose adjustments and medication changes. Discussed the elevated risks of respiratory depression and death while on opioid medications, especially when combined with other sedative substances. Discussed the risks of temporary disability, permanent disability, morbidity, and mortality with poorly-managed or undiagnosed medical conditions and comorbidities.  Emphasized the importance of timely medical evaluation and treatment as previously recommended by us or other medical professionals. Risks of not pursing these recommendations were emphasized. The patient was offered a treatment at our facility. The physician and patient have discussed in detail the risk of exposure to and/or potential harm posed by the COVID-19 virus with having office visits and procedures at this time versus the risk of delaying the visits and procedures. It is not possible to know either the risk of delaying the visits or procedure or chance of getting an infection with perfect accuracy, but a joint decision was made between the patient and the physician to proceed at this time with the scheduled visits and procedures. Advised her that any lab testing, imaging, or other diagnostic test results are best discussed in person in the office so that we can provide a clear explanation of their significance and best treatment based upon these results. It is her responsibility to make and keep a follow up appointment to discuss these test results in person to discuss the significance of the findings and appropriate follow-up steps. She expressed complete understanding and agreement with the entire plan as outlined above. Portions of this note may have been typed, auto-populated, dictated or transcribed by voice recognition resulting in errors, omissions, or close substitutions which may be missed despite careful proofreading. Please contact the author for any questions or concerns. Thank you Dr. Kelly Guerrero for the opportunity to participate in this patient's care. If you have any questions or concerns, please do not hesitate to contact us. Follow up:  Return in about 2 months (around 1/16/2023) for reassessment of pain and symptoms.     Deanne Gutierrez MD

## 2022-12-12 ENCOUNTER — OFFICE VISIT (OUTPATIENT)
Dept: FAMILY MEDICINE CLINIC | Age: 72
End: 2022-12-12
Payer: MEDICARE

## 2022-12-12 VITALS
TEMPERATURE: 97.4 F | OXYGEN SATURATION: 98 % | WEIGHT: 135.4 LBS | SYSTOLIC BLOOD PRESSURE: 134 MMHG | HEIGHT: 62 IN | DIASTOLIC BLOOD PRESSURE: 82 MMHG | HEART RATE: 66 BPM | BODY MASS INDEX: 24.92 KG/M2

## 2022-12-12 DIAGNOSIS — R12 HEARTBURN: Primary | ICD-10-CM

## 2022-12-12 DIAGNOSIS — N90.4 LICHEN SCLEROSUS ET ATROPHICUS OF THE VULVA: Chronic | ICD-10-CM

## 2022-12-12 DIAGNOSIS — K44.9 HIATAL HERNIA: ICD-10-CM

## 2022-12-12 DIAGNOSIS — R19.5 LOOSE STOOLS: ICD-10-CM

## 2022-12-12 DIAGNOSIS — M47.817 LUMBOSACRAL SPONDYLOSIS WITHOUT MYELOPATHY: ICD-10-CM

## 2022-12-12 PROBLEM — R05.2 SUBACUTE COUGH: Status: RESOLVED | Noted: 2022-10-31 | Resolved: 2022-12-12

## 2022-12-12 PROBLEM — K59.00 CONSTIPATION: Status: RESOLVED | Noted: 2022-02-23 | Resolved: 2022-12-12

## 2022-12-12 PROBLEM — R10.32 LEFT LOWER QUADRANT ABDOMINAL PAIN: Status: RESOLVED | Noted: 2022-09-02 | Resolved: 2022-12-12

## 2022-12-12 PROCEDURE — 1123F ACP DISCUSS/DSCN MKR DOCD: CPT | Performed by: FAMILY MEDICINE

## 2022-12-12 PROCEDURE — 99215 OFFICE O/P EST HI 40 MIN: CPT | Performed by: FAMILY MEDICINE

## 2022-12-12 RX ORDER — OMEPRAZOLE 20 MG/1
20 CAPSULE, DELAYED RELEASE ORAL DAILY
Qty: 1 CAPSULE | Refills: 0 | Status: SHIPPED
Start: 2022-12-12

## 2022-12-12 RX ORDER — BACLOFEN 5 MG/1
5 TABLET ORAL NIGHTLY PRN
Qty: 1 TABLET | Refills: 0
Start: 2022-12-12 | End: 2023-01-11

## 2022-12-12 ASSESSMENT — ENCOUNTER SYMPTOMS
ABDOMINAL DISTENTION: 0
ABDOMINAL PAIN: 0
CONSTIPATION: 0
DIARRHEA: 1
VOMITING: 0
NAUSEA: 0
SHORTNESS OF BREATH: 0
CHEST TIGHTNESS: 0
PHOTOPHOBIA: 0

## 2022-12-12 NOTE — PATIENT INSTRUCTIONS
Patient will restart her fiber Gummies to see how much that helps with the diarrhea. If she starts to tend towards constipation she should decrease how much she takes Bentyl. Patient will also examine her food supplements such as protein bars to see whether they contains sugar substitutes such as sucralose as this may be causing diarrhea    No change in Prilosec or other medications at this time. Patient will schedule for female exam and reexamination of her lichen sclerosus. Patient is status post injection for back pain which is responding well she does have all her as needed medications at home if required. Patient also has treatment for migraine available at home. All labs are up-to-date.

## 2022-12-12 NOTE — PROGRESS NOTES
Diagnosis Orders   1. Heartburn  omeprazole (PRILOSEC) 20 MG delayed release capsule    Continue omeprazole as needed. Given PPI permanently. 2. Loose stools        3. Hiatal hernia  omeprazole (PRILOSEC) 20 MG delayed release capsule    See above. 4. Lumbosacral spondylosis without myelopathy  Baclofen (LIORESAL) 5 MG tablet      5. Lichen sclerosus et atrophicus of the vulva          Return for As soon as available female exam due. Patient Instructions   Patient will restart her fiber Gummies to see how much that helps with the diarrhea. If she starts to tend towards constipation she should decrease how much she takes Bentyl. Patient will also examine her food supplements such as protein bars to see whether they contains sugar substitutes such as sucralose as this may be causing diarrhea    No change in Prilosec or other medications at this time. Patient will schedule for female exam and reexamination of her lichen sclerosus. Patient is status post injection for back pain which is responding well she does have all her as needed medications at home if required. Patient also has treatment for migraine available at home. All labs are up-to-date. Subjective:      Patient ID: Isma Sanchez is a 67 y.o. female who presents for:  Chief Complaint   Patient presents with    [facility]       Patient is here to establish new doctor. All her conditions are currently well controlled with the exception of loose stools. She states she has loose stools to the point of losing control in her clothing at times. She has not been able to isolate the agent. She does take Bentyl to try and keep this under control. It does not work all the time. She states she does take a fair amount of fibrin though she is no longer using her fiber Gummies. She also eats a fair amount of supplement bars    The Prilosec is helping to control her stomach acid.     The pain she was originally having in her left side that was uncertain whether it was related to bowel symptoms or back symptoms resolved after she had her most recent back injections. She does have medication at home for spasm, inflammation, and pain. Is not having migraines at this time but has medication available when needed. Current Outpatient Medications on File Prior to Visit   Medication Sig Dispense Refill    lidocaine (LMX) 4 % cream Apply a half dollar sized amount to intact skin topically up to twice daily as needed for pain 45 g 1    FIBER, GUAR GUM, PO Take 2 each by mouth in the morning and at bedtime      melatonin 5 MG TABS tablet Take 5 mg by mouth daily      atorvastatin (LIPITOR) 20 MG tablet Take 1 tablet by mouth daily 90 tablet 4    coenzyme Q-10 100 MG capsule Take 1 capsule by mouth daily 90 capsule 4    PARoxetine (PAXIL) 10 MG tablet Take 2 tablets by mouth daily 90 tablet 4    SUMAtriptan (IMITREX) 50 MG tablet Take 1 tablet by mouth once as needed for Migraine 9 tablet 0    levothyroxine (SYNTHROID) 75 MCG tablet Take 1 tablet by mouth daily 90 tablet 4    ibuprofen (IBU) 600 MG tablet Take 1 tablet by mouth every 6 hours as needed for Pain 120 tablet 0    dicyclomine (BENTYL) 10 MG capsule Take 1 capsule by mouth 4 times daily (before meals and nightly) 120 capsule 5    clobetasol (TEMOVATE) 0.05 % cream Apply topically 2 times daily Apply topically 2 times daily. 60 g 2     No current facility-administered medications on file prior to visit.      Past Medical History:   Diagnosis Date    Anxiety and depression 05/02/2019    Anxiety and depression     Chest pain 03/01/2019    Diverticulosis of colon (without mention of hemorrhage) 02/25/2015    DR BURNETTE    Focal lymphocytic colitis 01/27/2022    Gastroesophageal reflux disease without esophagitis     GI problem     Hyperlipidemia 02/03/2015    Hypothyroidism     Lichen sclerosus [id number]    Migraines 02/03/2015    Renal insufficiency 02/03/2015    Spondylosis of lumbar region without myelopathy or radiculopathy 05/24/2016     Past Surgical History:   Procedure Laterality Date    ABDOMINAL ADHESION SURGERY  1/7/16    DR. MERAZ    APPENDECTOMY  2012    CATARACT REMOVAL Right     CHOLECYSTECTOMY  2012    COLON SURGERY  2005    13\" of colon removed-NO CA    COLONOSCOPY  2011    polyps    COLONOSCOPY  02/25/2015    DR Na Edmondson - DIVERTICULOSIS    COLONOSCOPY N/A 3/4/2022    COLONOSCOPY DIAGNOSTIC performed by Alayna Carrington MD at [facility]  2013    MO COLONOSCOPY FLX DX W/COLLJ SPEC WHEN PFRMD N/A 1/26/2018    COLONOSCOPY performed by Valentín Jones MD at [address] [facility] ESOPHAGOGASTRODUODENOSCOPY TRANSORAL DIAGNOSTIC N/A 1/26/2018    EGD ESOPHAGOGASTRODUODENOSCOPY WITH DILATION performed by Valentín Jones MD at [address] ENDOSCOPY N/A 3/4/2022    EGD ESOPHAGOGASTRODUODENOSCOPY performed by Alayna Carrington MD at [address] [facility] History     Socioeconomic History    Marital status: Single     Spouse name: Not on file    Number of children: Not on file    Years of education: Not on file    Highest education level: Not on file   Occupational History    Not on file   Tobacco Use    Smoking status: Never    Smokeless tobacco: Never   Vaping Use    Vaping Use: Never used   Substance and Sexual Activity    Alcohol use: No     Alcohol/week: 0.0 standard drinks    Drug use: No    Sexual activity: Not Currently   Other Topics Concern    Not on file   Social History Narrative    Not on file     Social Determinants of Health     Financial Resource Strain: Low Risk     Difficulty of Paying Living Expenses: Not hard at all   Food Insecurity: No Food Insecurity    Worried About Running Out of Food in the Last Year: Never true    Ran Out of Food in the Last Year: Never true   Transportation Needs: Not on file   Physical Activity: Not on file   Stress: Not on file   Social Connections: Not on file   Intimate Partner Violence: Not on file   Housing Stability: Not on file     Family History   Problem Relation Age of Onset    Other Mother         Bowel Obstruction    High Blood Pressure Mother     High Blood Pressure Father     Cancer Father     Arthritis Father     Coronary Art Dis Father     Heart Disease Father     Stroke Father     Diabetes Father     Stomach Cancer Father     Colon Cancer Neg Hx      Allergies:  Bactrim [sulfamethoxazole-trimethoprim] and Ciprofloxacin    Review of Systems   Constitutional:  Negative for activity change, appetite change, diaphoresis and unexpected weight change. Eyes:  Negative for photophobia and visual disturbance. Respiratory:  Negative for chest tightness and shortness of breath. No orthopnea   Cardiovascular:  Negative for chest pain, palpitations and leg swelling. Gastrointestinal:  Positive for diarrhea. Negative for abdominal distention, abdominal pain, constipation, nausea and vomiting. Genitourinary:  Negative for flank pain and frequency. Musculoskeletal:  Negative for gait problem and joint swelling. Neurological:  Negative for dizziness, weakness, light-headedness and headaches. Psychiatric/Behavioral:  Negative for confusion. Objective:   /82   Pulse 66   Temp 97.4 °F (36.3 °C)   Ht 5' 2\" (1.575 m)   Wt 135 lb 6.4 oz (61.4 kg)   LMP  (LMP Unknown)   SpO2 98%   BMI 24.76 kg/m²     Physical Exam  Constitutional:       General: She is not in acute distress. Appearance: She is well-developed. She is not diaphoretic. HENT:      Head: Normocephalic and atraumatic. Right Ear: External ear normal.      Left Ear: External ear normal.      Nose: Nose normal.   Eyes:      General:         Right eye: No discharge. Left eye: No discharge. Conjunctiva/sclera: Conjunctivae normal.      Pupils: Pupils are equal, round, and reactive to light. Neck:      Thyroid: No thyromegaly.       Trachea: No tracheal deviation. Cardiovascular:      Rate and Rhythm: Normal rate and regular rhythm. Pulmonary:      Effort: Pulmonary effort is normal. No respiratory distress. Abdominal:      General: There is no distension. Musculoskeletal:      Cervical back: Neck supple. Skin:     General: Skin is warm and dry. Findings: No bruising or rash. Neurological:      Mental Status: She is alert. Coordination: Coordination normal.   Psychiatric:         Thought Content: Thought content normal.         Judgment: Judgment normal.       No results found for this visit on 12/12/22. Recent Results (from the past 2016 hour(s))   Comprehensive Metabolic Panel    Collection Time: 10/11/22  2:34 PM   Result Value Ref Range    Sodium 140 135 - 144 mEq/L    Potassium 4.5 3.4 - 4.9 mEq/L    Chloride 102 95 - 107 mEq/L    CO2 26 20 - 31 mEq/L    Anion Gap 12 9 - 15 mEq/L    Glucose 78 70 - 99 mg/dL    BUN 14 8 - 23 mg/dL    Creatinine 0.59 0.50 - 0.90 mg/dL    GFR Non-African American >60.0 >60    GFR  >60.0 >60    Calcium 9.3 8.5 - 9.9 mg/dL    Total Protein 7.2 6.3 - 8.0 g/dL    Albumin 4.8 (H) 3.5 - 4.6 g/dL    Total Bilirubin 0.3 0.2 - 0.7 mg/dL    Alkaline Phosphatase 78 40 - 130 U/L    ALT <5 0 - 33 U/L    AST <5 0 - 35 U/L    Globulin 2.4 2.3 - 3.5 g/dL   CBC    Collection Time: 10/11/22  2:34 PM   Result Value Ref Range    WBC 5.7 4.8 - 10.8 K/uL    RBC 4.26 4.20 - 5.40 M/uL    Hemoglobin 12.2 12.0 - 16.0 g/dL    Hematocrit 38.2 37.0 - 47.0 %    MCV 89.7 82.0 - 100.0 fL    MCH 28.7 27.0 - 31.3 pg    MCHC 32.0 (L) 33.0 - 37.0 %    RDW 13.7 11.5 - 14.5 %    Platelets 455 636 - 303 K/uL   Allergen, Food, Comprehensive Profile 1    Collection Time: 10/11/22  2:34 PM   Result Value Ref Range    Allergen Pepper C.  Annuum IgE <0.10 0.00 - 0.34 kU/L    Carrot IgE <0.10 0.00 - 0.34 kU/L    Allergen Chicken IgE <0.10 0.00 - 0.34 kU/L    Crab IgE <0.10 0.00 - 0.34 kU/L    Egg White IgE 1.10 (H) 0.00 - 0.34 kU/L    Grape IgE <0.10 0.00 - 0.34 kU/L    Lettuce IgE <0.10 0.00 - 0.34 kU/L    Milk, Cow's IgE 0.15 0.00 - 0.34 kU/L    ALLERGEN [place] <0.10 0.00 - 0.34 kU/L    Orange IgE <0.10 0.00 - 0.34 kU/L    Peanut IgE 0.11 0.00 - 0.34 kU/L    Allergen Rye IgE <0.10 0.00 - 0.34 kU/L    Shrimp 0.11 0.00 - 0.34 kU/L    Soybean IgE <0.10 0.00 - 0.34 kU/L    Tomato IgE 0.25 0.00 - 0.34 kU/L    Wheat IgE 0.14 0.00 - 0.34 kU/L    Codfish IgE <0.10 0.00 - 0.34 kU/L    Oat <0.10 0.00 - 0.34 kU/L    POTATO, IGE <0.10 0.00 - 0.34 kU/L    Allergen Rice IgE <0.10 0.00 - 0.34 kU/L    Barley IgE <0.10 0.00 - 0.34 kU/L    Allergen Tuna IgE <0.10 0.00 - 0.34 kU/L    Corn IgE <0.10 0.00 - 0.34 kU/L    Cabbage IgE <0.10 0.00 - 0.34 kU/L    Beef <0.10 0.00 - 0.34 kU/L    Allergen, Pork <0.10 0.00 - 0.34 kU/L    IgE 51 <101 IU/mL   Calprotectin Stool    Collection Time: 10/14/22  3:06 PM   Result Value Ref Range    Calprotectin, Fecal 123 (H) <=49 ug/g       [x] Pt was seen by provider for  45    Minutes  Counseling and coordination of care was done for all assessment diagnosis listed for today with patient and any family/friend present. More than 50% of this visit was spent coordinating current care, obtaining information for prior records, and counseling for current plan of action. Review of previous PCP records and labs and testing. All appear up-to-date with the exception of the female exam.      Assessment:       Diagnosis Orders   1. Heartburn  omeprazole (PRILOSEC) 20 MG delayed release capsule    Continue omeprazole as needed. Given PPI permanently. 2. Loose stools        3. Hiatal hernia  omeprazole (PRILOSEC) 20 MG delayed release capsule    See above. 4. Lumbosacral spondylosis without myelopathy  Baclofen (LIORESAL) 5 MG tablet      5. Lichen sclerosus et atrophicus of the vulva              No orders of the defined types were placed in this encounter.       Orders Placed This Encounter   Medications omeprazole (PRILOSEC) 20 MG delayed release capsule     Sig: Take 1 capsule by mouth daily     Dispense:  1 capsule     Refill:  0    Baclofen (LIORESAL) 5 MG tablet     Sig: Take 1 tablet by mouth nightly as needed (pain spasms)     Dispense:  1 tablet     Refill:  0          Medication List            Accurate as of December 12, 2022  1:11 PM. If you have any questions, ask your nurse or doctor. CHANGE how you take these medications      omeprazole 20 MG delayed release capsule  Commonly known as: PRILOSEC  Take 1 capsule by mouth daily  What changed:   medication strength  how much to take  when to take this  Changed by: Catalina Amador MD            CONTINUE taking these medications      atorvastatin 20 MG tablet  Commonly known as: LIPITOR  Take 1 tablet by mouth daily     Baclofen 5 MG tablet  Commonly known as: LIORESAL  Take 1 tablet by mouth nightly as needed (pain spasms)     clobetasol 0.05 % cream  Commonly known as: TEMOVATE  Apply topically 2 times daily Apply topically 2 times daily.      coenzyme Q-10 100 MG capsule  Take 1 capsule by mouth daily     dicyclomine 10 MG capsule  Commonly known as: Bentyl  Take 1 capsule by mouth 4 times daily (before meals and nightly)     FIBER (GUAR GUM) PO     ibuprofen 600 MG tablet  Commonly known as: IBU  Take 1 tablet by mouth every 6 hours as needed for Pain     levothyroxine 75 MCG tablet  Commonly known as: SYNTHROID  Take 1 tablet by mouth daily     lidocaine 4 % cream  Commonly known as: LMX  Apply a half dollar sized amount to intact skin topically up to twice daily as needed for pain     melatonin 5 MG Tabs tablet     PARoxetine 10 MG tablet  Commonly known as: PAXIL  Take 2 tablets by mouth daily     SUMAtriptan 50 MG tablet  Commonly known as: Imitrex  Take 1 tablet by mouth once as needed for Migraine            STOP taking these medications      aspirin EC 81 MG EC tablet  Stopped by: Catalina Amador MD     vitamin B-12 1000 MCG tablet  Commonly known as: CYANOCOBALAMIN  Stopped by: Roney Grayson MD     Vitamin D3 48 MCG (2000 UT) Caps  Stopped by: Roney Grayson MD               Where to Get Your Medications        Information about where to get these medications is not yet available    Ask your nurse or doctor about these medications  Baclofen 5 MG tablet  omeprazole 20 MG delayed release capsule           Plan:   Return for As soon as available female exam due. Patient Instructions   Patient will restart her fiber Gummies to see how much that helps with the diarrhea. If she starts to tend towards constipation she should decrease how much she takes Bentyl. Patient will also examine her food supplements such as protein bars to see whether they contains sugar substitutes such as sucralose as this may be causing diarrhea    No change in Prilosec or other medications at this time. Patient will schedule for female exam and reexamination of her lichen sclerosus. Patient is status post injection for back pain which is responding well she does have all her as needed medications at home if required. Patient also has treatment for migraine available at home. All labs are up-to-date. This note was partially created with the assistance of dictation. This may lead to grammatical or spelling errors. Brian Jeffries M.D.

## 2022-12-20 ENCOUNTER — OFFICE VISIT (OUTPATIENT)
Dept: FAMILY MEDICINE CLINIC | Age: 72
End: 2022-12-20
Payer: MEDICARE

## 2022-12-20 VITALS
WEIGHT: 135.2 LBS | SYSTOLIC BLOOD PRESSURE: 128 MMHG | HEART RATE: 88 BPM | HEIGHT: 62 IN | DIASTOLIC BLOOD PRESSURE: 72 MMHG | OXYGEN SATURATION: 98 % | BODY MASS INDEX: 24.88 KG/M2 | TEMPERATURE: 96.8 F

## 2022-12-20 DIAGNOSIS — F41.9 ANXIETY AND DEPRESSION: Chronic | ICD-10-CM

## 2022-12-20 DIAGNOSIS — R12 HEARTBURN: ICD-10-CM

## 2022-12-20 DIAGNOSIS — E03.9 ACQUIRED HYPOTHYROIDISM: ICD-10-CM

## 2022-12-20 DIAGNOSIS — K44.9 HIATAL HERNIA: ICD-10-CM

## 2022-12-20 DIAGNOSIS — R10.84 GENERALIZED ABDOMINAL PAIN: ICD-10-CM

## 2022-12-20 DIAGNOSIS — F32.A ANXIETY AND DEPRESSION: Chronic | ICD-10-CM

## 2022-12-20 DIAGNOSIS — Z12.31 BREAST CANCER SCREENING BY MAMMOGRAM: ICD-10-CM

## 2022-12-20 DIAGNOSIS — N90.4 LICHEN SCLEROSUS ET ATROPHICUS OF THE VULVA: Primary | ICD-10-CM

## 2022-12-20 DIAGNOSIS — M81.0 OSTEOPOROSIS, UNSPECIFIED OSTEOPOROSIS TYPE, UNSPECIFIED PATHOLOGICAL FRACTURE PRESENCE: ICD-10-CM

## 2022-12-20 PROCEDURE — 1123F ACP DISCUSS/DSCN MKR DOCD: CPT | Performed by: FAMILY MEDICINE

## 2022-12-20 PROCEDURE — 99215 OFFICE O/P EST HI 40 MIN: CPT | Performed by: FAMILY MEDICINE

## 2022-12-20 RX ORDER — LEVOTHYROXINE SODIUM 0.07 MG/1
75 TABLET ORAL DAILY
Qty: 90 TABLET | Refills: 4 | Status: SHIPPED | OUTPATIENT
Start: 2022-12-20

## 2022-12-20 RX ORDER — DICYCLOMINE HYDROCHLORIDE 10 MG/1
10 CAPSULE ORAL 2 TIMES DAILY
Qty: 180 CAPSULE | Refills: 1 | Status: SHIPPED | OUTPATIENT
Start: 2022-12-20

## 2022-12-20 RX ORDER — PAROXETINE 10 MG/1
20 TABLET, FILM COATED ORAL DAILY
Qty: 90 TABLET | Refills: 4 | Status: SHIPPED | OUTPATIENT
Start: 2022-12-20

## 2022-12-20 RX ORDER — OMEPRAZOLE 20 MG/1
20 CAPSULE, DELAYED RELEASE ORAL DAILY
Qty: 90 CAPSULE | Refills: 1 | Status: SHIPPED | OUTPATIENT
Start: 2022-12-20

## 2022-12-20 ASSESSMENT — ENCOUNTER SYMPTOMS
ABDOMINAL PAIN: 0
SHORTNESS OF BREATH: 0
DIARRHEA: 0
COUGH: 0
CONSTIPATION: 0
BACK PAIN: 0
VOICE CHANGE: 0
TROUBLE SWALLOWING: 0
ABDOMINAL DISTENTION: 0

## 2022-12-20 NOTE — PROGRESS NOTES
Postmenopausal Annual  Mark Noriega was seen today for gynecologic exam.    Diagnoses and all orders for this visit:    Lichen sclerosus et atrophicus of the vulva  -     PAP SMEAR    Osteoporosis, unspecified osteoporosis type, unspecified pathological fracture presence  -     DEXA BONE DENSITY AXIAL SKELETON; Future    Breast cancer screening by mammogram  -     [facility] DIGITAL SCREEN W OR WO CAD BILATERAL; Future    Generalized abdominal pain  Comments:  Resolved with Bentyl. Orders:  -     dicyclomine (BENTYL) 10 MG capsule; Take 1 capsule by mouth 2 times daily    Heartburn  Comments:  Continue omeprazole as needed. Given PPI permanently. Orders:  -     omeprazole (PRILOSEC) 20 MG delayed release capsule; Take 1 capsule by mouth daily    Hiatal hernia  Comments:  See above. Orders:  -     omeprazole (PRILOSEC) 20 MG delayed release capsule; Take 1 capsule by mouth daily    Acquired hypothyroidism  -     levothyroxine (SYNTHROID) 75 MCG tablet; Take 1 tablet by mouth daily    Anxiety and depression  Comments:  Stable and well-controlled on Paxil which she will take indefinitely  Orders:  -     PARoxetine (PAXIL) 10 MG tablet; Take 2 tablets by mouth daily    Return for for routine major medical condition management. Patient Instructions   ThinPrep completed. Mammogram ordered. DEXA scan ordered. Patient due for routine labs in July. Subjective: Alyssa Mathews is a 67y.o. year old female    female who presents today for her annual well woman exam.  The patient is not sexuallyactive. The patient is not taking hormone replacement therapy. Patient denies post-menopausal vaginal bleeding.     The patient has regular exercise: not asked    Past Medical History:   Diagnosis Date    Anxiety and depression 2019    Anxiety and depression     Chest pain 2019    Diverticulosis of colon (without mention of hemorrhage) 2015    DR BURNETTE    Focal lymphocytic colitis 2022 Gastroesophageal reflux disease without esophagitis     GI problem     Hyperlipidemia 02/03/2015    Hypothyroidism     Lichen sclerosus 71/6048    Migraines 02/03/2015    Renal insufficiency 02/03/2015    Spondylosis of lumbar region without myelopathy or radiculopathy 05/24/2016     Past Surgical History:   Procedure Laterality Date    ABDOMINAL ADHESION SURGERY  1/7/16    DR. MERAZ    APPENDECTOMY  2012    CATARACT REMOVAL Right     CHOLECYSTECTOMY  2012    COLON SURGERY  2005    13\" of colon removed-NO CA    COLONOSCOPY  2011    polyps    COLONOSCOPY  02/25/2015     [address] - DIVERTICULOSIS    COLONOSCOPY N/A 3/4/2022    COLONOSCOPY DIAGNOSTIC performed by James Higginbotham MD at [place] [address]  2013    IN COLONOSCOPY FLX DX W/COLLJ SPEC WHEN PFRMD N/A 1/26/2018    COLONOSCOPY performed by Rosanne Saeed MD at [facility] ESOPHAGOGASTRODUODENOSCOPY TRANSORAL DIAGNOSTIC N/A 1/26/2018    EGD ESOPHAGOGASTRODUODENOSCOPY WITH DILATION performed by Roasnne Saeed MD at [address] [place] [address] N/A 3/4/2022    EGD ESOPHAGOGASTRODUODENOSCOPY performed by James Higginbotham MD at [facility]     Family History   Problem Relation Age of Onset    Other Mother         Bowel Obstruction    High Blood Pressure Mother     High Blood Pressure Father     Cancer Father     Arthritis Father     Coronary Art Dis Father     Heart Disease Father     Stroke Father     Diabetes Father     Stomach Cancer Father     Colon Cancer Neg Hx      Social History     Socioeconomic History    Marital status: Single     Spouse name: Not on file    Number of children: Not on file    Years of education: Not on file    Highest education level: Not on file   Occupational History    Not on file   Tobacco Use    Smoking status: Never    Smokeless tobacco: Never   Vaping Use    Vaping Use: Never used   Substance and Sexual Activity    Alcohol use:  No Alcohol/week: 0.0 standard drinks    Drug use: No    Sexual activity: Not Currently   Other Topics Concern    Not on file   Social History Narrative    Not on file     Social Determinants of Health     Financial Resource Strain: Low Risk     Difficulty of Paying Living Expenses: Not hard at all   Food Insecurity: No Food Insecurity    Worried About Running Out of Food in the Last Year: Never true    Ran Out of Food in the Last Year: Never true   Transportation Needs: Not on file   Physical Activity: Not on file   Stress: Not on file   Social Connections: Not on file   Intimate Partner Violence: Not on file   Housing Stability: Not on file         Last mammogram 1/2022 normal  Last bone mineral density : years  Last Pap 2016 normal    No LMP recorded (lmp unknown). Patient is postmenopausal.      Age at menopause onset: 45s  Menopausal symptom assessment:  Vasomotor symptoms: none  Urinary incontinence &  symptoms: atrophy uses clobetasol  Sexual dysfunction: none  Present Hormonalmedications: none          Yearly flu vaccine recommended. Colonoscopy up-to-date    Review of Systems  Review of Systems   Constitutional:  Negative for chills, fever and unexpected weight change. HENT:  Negative for trouble swallowing and voice change. Respiratory:  Negative for cough and shortness of breath. Cardiovascular:  Negative for leg swelling. Gastrointestinal:  Negative for abdominal distention, abdominal pain, constipation and diarrhea. Endocrine: Negative for cold intolerance, polydipsia and polyuria. Genitourinary:  Positive for dyspareunia. Negative for difficulty urinating, dysuria, enuresis, frequency, genital sores, hematuria, menstrual problem, pelvic pain, urgency, vaginal bleeding, vaginal discharge and vaginal pain. No nipple discharge, breast lumps, concerns of the breast.   Musculoskeletal:  Negative for back pain. Hematological:  Negative for adenopathy.    Psychiatric/Behavioral:  Negative for agitation and sleep disturbance.      :     Vitals:  /72   Pulse 88   Temp 96.8 °F (36 °C)   Ht 5' 2\" (1.575 m)   Wt 135 lb 3.2 oz (61.3 kg)   LMP  (LMP Unknown)   SpO2 98%   BMI 24.73 kg/m²     Physical Exam  Physical Exam  Vitals reviewed. Chaperone present: deferred. Constitutional:       Appearance: Normal appearance. She is well-developed. She is not ill-appearing, toxic-appearing or diaphoretic. Comments: VS were reviewed. HENT:      Head: Normocephalic and atraumatic. No masses. Hair is normal.      Jaw: There is normal jaw occlusion. Right Ear: Hearing, tympanic membrane, ear canal and external ear normal.      Left Ear: Hearing, tympanic membrane, ear canal and external ear normal.      Nose: Nose normal. No nasal deformity. Right Nostril: No foreign body or epistaxis. Left Nostril: No foreign body or epistaxis. Mouth/Throat:      Lips: Pink. Mouth: Mucous membranes are moist. No oral lesions. Tongue: Tongue does not deviate from midline. Pharynx: Oropharynx is clear. Eyes:      General: Lids are normal.         Right eye: No discharge. Left eye: No discharge. Extraocular Movements: Extraocular movements intact. Right eye: No nystagmus. Left eye: No nystagmus. Conjunctiva/sclera: Conjunctivae normal.      Right eye: Right conjunctiva is not injected. Left eye: Left conjunctiva is not injected. Pupils: Pupils are equal, round, and reactive to light. Neck:      Thyroid: No thyroid mass or thyromegaly. Vascular: Normal carotid pulses. No carotid bruit or JVD. Trachea: Phonation normal. No tracheal tenderness or tracheal deviation. Cardiovascular:      Rate and Rhythm: Normal rate and regular rhythm. Pulses:           Carotid pulses are 2+ on the right side and 2+ on the left side. Radial pulses are 2+ on the right side and 2+ on the left side.         Dorsalis pedis pulses are 2+ on the right side and 2+ on the left side. Heart sounds: S1 normal and S2 normal. No murmur heard. No friction rub. No gallop. Comments: PMI normal location and intensity    Pulmonary:      Effort: Pulmonary effort is normal. No accessory muscle usage or respiratory distress. Breath sounds: Normal breath sounds. No wheezing, rhonchi or rales. Abdominal:      General: Bowel sounds are normal. There is no distension or abdominal bruit. Tenderness: There is no abdominal tenderness. There is no guarding or rebound. Hernia: Ventral: and umbilical.   Genitourinary:     Cervix: Erythema present. Uterus: Normal. Not enlarged and not tender. Comments: External genitalia demonstrate white shiny tissue without evidence of precancerous or cancerous changes or skin breakdown. Vaginal vault is thin with lack of rugae cervix is visible without discharge. No ovarian masses noted  Musculoskeletal:      Cervical back: Normal range of motion and neck supple. No deformity, spasms or bony tenderness. No pain with movement or muscular tenderness. Thoracic back: No deformity, spasms or bony tenderness. Normal range of motion. Lumbar back: No deformity, spasms or bony tenderness. Normal range of motion. Right foot: Normal range of motion. No deformity. Left foot: Normal range of motion. No deformity. Comments: All large and medium joints have NL ROM B. Small joints of the hand NL ROM B. Feet:      Right foot:      Skin integrity: Skin integrity normal.      Left foot:      Skin integrity: Skin integrity normal.   Lymphadenopathy:      Head:      Right side of head: Preauricular adenopathy: no adenopathy B. Cervical: No cervical adenopathy. Right cervical: Superficial cervical adenopathy: no adenopathy B. Upper Body:      Right upper body: Supraclavicular adenopathy: no adenopathy B. Skin:     General: Skin is warm and dry.       Findings: No bruising, lesion or rash. Nails: There is no clubbing. Neurological:      Mental Status: She is alert and oriented to person, place, and time. Cranial Nerves: No cranial nerve deficit or facial asymmetry. Sensory: Sensory deficit: general light touch sensation intact Armand arms, legs, hands, and feet. Motor: No tremor, atrophy or abnormal muscle tone. Coordination: Coordination normal.      Gait: Gait normal.      Deep Tendon Reflexes:      Reflex Scores:       Bicep reflexes are 2+ on the right side and 2+ on the left side. Patellar reflexes are 2+ on the right side and 2+ on the left side. Comments: HTS and JOSSIE normal armand. Normal gait   Psychiatric:         Attention and Perception: Attention normal.         Mood and Affect: Mood and affect normal. Mood is not anxious or depressed. Affect is not inappropriate. Speech: Speech normal.         Behavior: Behavior is not agitated or aggressive. Behavior is cooperative. Thought Content: Thought content normal.         Judgment: Judgment normal.         Assessment:      Diagnosis Orders   1. Lichen sclerosus et atrophicus of the vulva  PAP SMEAR      2. Osteoporosis, unspecified osteoporosis type, unspecified pathological fracture presence  DEXA BONE DENSITY AXIAL SKELETON      3. Breast cancer screening by mammogram  JANUARY DIGITAL SCREEN W OR WO CAD BILATERAL      4. Generalized abdominal pain  dicyclomine (BENTYL) 10 MG capsule    Resolved with Bentyl. 5. Heartburn  omeprazole (PRILOSEC) 20 MG delayed release capsule    Continue omeprazole as needed. Given PPI permanently. 6. Hiatal hernia  omeprazole (PRILOSEC) 20 MG delayed release capsule    See above. 7. Acquired hypothyroidism  levothyroxine (SYNTHROID) 75 MCG tablet      8. Anxiety and depression  PARoxetine (PAXIL) 10 MG tablet    Stable and well-controlled on Paxil which she will take indefinitely          Body mass index is 24.73 kg/m².       Plan:   PAP SMEAR : indicated:  performed. Breast exam : Normal       Orders Placed This Encounter   Procedures    JANUARY DIGITAL SCREEN W OR WO CAD BILATERAL     Standing Status:   Future     Standing Expiration Date:   2/19/2024     Order Specific Question:   Reason for exam:     Answer:   screening    DEXA BONE DENSITY AXIAL SKELETON     Standing Status:   Future     Standing Expiration Date:   12/20/2023    PAP SMEAR     Patient History:    No LMP recorded (lmp unknown). Patient is postmenopausal.  OBGYN Status: Postmenopausal  Past Surgical History:  1/7/16: ABDOMINAL ADHESION SURGERY      Comment:  DR. MERAZ  2012: APPENDECTOMY  No date: CATARACT REMOVAL; Right  2012: CHOLECYSTECTOMY  2005: COLON SURGERY      Comment:  13\" of colon removed-NO CA  2011: COLONOSCOPY      Comment:  polyps  02/25/2015: COLONOSCOPY      Comment:  DR Dorinda Little - DIVERTICULOSIS  3/4/2022: COLONOSCOPY; N/A      Comment:  COLONOSCOPY DIAGNOSTIC performed by Liudmila Johnson MD                at [facility]  2013: KNEE SURGERY  1/26/2018: [place] COLONOSCOPY FLX DX W/COLLJ SPEC WHEN PFRMD; N/A      Comment:  COLONOSCOPY performed by Sherren Conner, MD at [facility]  1/26/2018: [place] ESOPHAGOGASTRODUODENOSCOPY TRANSORAL DIAGNOSTIC; N/A      Comment:  EGD ESOPHAGOGASTRODUODENOSCOPY WITH DILATION performed                by Sherren Conner, MD at [address] [place] [address] East: [facility]  3/4/2022: UPPER GASTROINTESTINAL ENDOSCOPY; N/A      Comment:  EGD ESOPHAGOGASTRODUODENOSCOPY performed by Indira Brandt MD at [facility] History    Tobacco Use      Smoking status: Never      Smokeless tobacco: Never       Order Specific Question:   Collection Type     Answer:    Thin Prep     Order Specific Question:   Prior Abnormal Pap Test     Answer:   No     Order Specific Question:   Screening or Diagnostic     Answer:   Screening     Order Specific Question:   Additional STD Testing     Answer:   N/A Order Specific Question:   HPV Requested? Answer:   HPV Co-Test     Order Specific Question:   High Risk Patient     Answer:   N/A       Orders Placed This Encounter   Medications    dicyclomine (BENTYL) 10 MG capsule     Sig: Take 1 capsule by mouth 2 times daily     Dispense:  180 capsule     Refill:  1    omeprazole (PRILOSEC) 20 MG delayed release capsule     Sig: Take 1 capsule by mouth daily     Dispense:  90 capsule     Refill:  1    levothyroxine (SYNTHROID) 75 MCG tablet     Sig: Take 1 tablet by mouth daily     Dispense:  90 tablet     Refill:  4    PARoxetine (PAXIL) 10 MG tablet     Sig: Take 2 tablets by mouth daily     Dispense:  90 tablet     Refill:  4          Medication List            Accurate as of December 20, 2022  2:09 PM. If you have any questions, ask your nurse or doctor. CHANGE how you take these medications      dicyclomine 10 MG capsule  Commonly known as: Bentyl  Take 1 capsule by mouth 2 times daily  What changed: when to take this  Changed by: Alf Griffiths MD            CONTINUE taking these medications      atorvastatin 20 MG tablet  Commonly known as: LIPITOR  Take 1 tablet by mouth daily     Baclofen 5 MG tablet  Commonly known as: LIORESAL  Take 1 tablet by mouth nightly as needed (pain spasms)     clobetasol 0.05 % cream  Commonly known as: TEMOVATE  Apply topically 2 times daily Apply topically 2 times daily.      coenzyme Q-10 100 MG capsule  Take 1 capsule by mouth daily     FIBER (GUAR GUM) PO     ibuprofen 600 MG tablet  Commonly known as: IBU  Take 1 tablet by mouth every 6 hours as needed for Pain     levothyroxine 75 MCG tablet  Commonly known as: SYNTHROID  Take 1 tablet by mouth daily     lidocaine 4 % cream  Commonly known as: LMX  Apply a half dollar sized amount to intact skin topically up to twice daily as needed for pain     melatonin 5 MG Tabs tablet     omeprazole 20 MG delayed release capsule  Commonly known as: PRILOSEC  Take 1 capsule by mouth daily     PARoxetine 10 MG tablet  Commonly known as: PAXIL  Take 2 tablets by mouth daily     SUMAtriptan 50 MG tablet  Commonly known as: Imitrex  Take 1 tablet by mouth once as needed for Migraine               Where to Get Your Medications        These medications were sent to [address] [place] [address] [address] [place] [address] [address]  [address] [place] [address]      Phone: [phone]   dicyclomine 10 MG capsule  levothyroxine 75 MCG tablet  omeprazole 20 MG delayed release capsule  PARoxetine 10 MG tablet       Pt was seen by provider for 40  Minutes  Counseling and coordination of care was done for all assessment diagnosis listed for today with patient and any family/friend present. More than 50% of this visit was spent coordinating cuurent care, obtaining information for prior records, and counseling for current plan of action. Chart review from prior physician and testing reviewed      Follow up:  Return for for routine major medical condition management. Patient Instructions   ThinPrep completed. Mammogram ordered. DEXA scan ordered. Patient due for routine labs in July. HEALTH MAINTENANCE:   Health information given. Women's Health patient information and counselling done. regarding risk/benefits/alternatives to Hormone Therapy and need for yearly re-evaluation. Periodic Pap smear discussed. Mammogram recommended yearly. Colon cancer screening by age 48 & every 5-10years. Bone mineral density (by age 72 or sooner if indication it would affect Hormone Therapy management or other risk factors for osteoporosis).       Beatriz Sheriff MD

## 2023-01-11 ENCOUNTER — OFFICE VISIT (OUTPATIENT)
Dept: PAIN MANAGEMENT | Age: 73
End: 2023-01-11
Payer: MEDICARE

## 2023-01-11 VITALS
SYSTOLIC BLOOD PRESSURE: 118 MMHG | DIASTOLIC BLOOD PRESSURE: 72 MMHG | WEIGHT: 135 LBS | BODY MASS INDEX: 24.84 KG/M2 | TEMPERATURE: 97.5 F | HEIGHT: 62 IN

## 2023-01-11 DIAGNOSIS — M47.817 LUMBOSACRAL SPONDYLOSIS WITHOUT MYELOPATHY: Primary | ICD-10-CM

## 2023-01-11 PROCEDURE — 1123F ACP DISCUSS/DSCN MKR DOCD: CPT | Performed by: NURSE PRACTITIONER

## 2023-01-11 PROCEDURE — 99214 OFFICE O/P EST MOD 30 MIN: CPT | Performed by: NURSE PRACTITIONER

## 2023-01-11 ASSESSMENT — ENCOUNTER SYMPTOMS
COUGH: 0
BACK PAIN: 1
CONSTIPATION: 0
GASTROINTESTINAL NEGATIVE: 1
DIARRHEA: 0
TROUBLE SWALLOWING: 0
EYES NEGATIVE: 1
SHORTNESS OF BREATH: 0

## 2023-01-11 NOTE — PROGRESS NOTES
Donald Correa  ([birth year])    1/11/2023    Subjective: Donald Correa is 67 y.o. female who complains today of:    Chief Complaint   Patient presents with    Back Pain         Allergies:  Bactrim [sulfamethoxazole-trimethoprim] and Ciprofloxacin    Past Medical History:   Diagnosis Date    Anxiety and depression 05/02/2019    Anxiety and depression     Chest pain 03/01/2019    Diverticulosis of colon (without mention of hemorrhage) 02/25/2015    DR BURNETTE    Focal lymphocytic colitis 01/27/2022    Gastroesophageal reflux disease without esophagitis     GI problem     Hyperlipidemia 02/03/2015    Hypothyroidism     Lichen sclerosus 26/3624    Migraines 02/03/2015    Renal insufficiency 02/03/2015    Spondylosis of lumbar region without myelopathy or radiculopathy 05/24/2016     Past Surgical History:   Procedure Laterality Date    ABDOMINAL ADHESION SURGERY  1/7/16    DR. MERAZ    APPENDECTOMY  2012    CATARACT REMOVAL Right     CHOLECYSTECTOMY  2012    COLON SURGERY  2005    13\" of colon removed-NO CA    COLONOSCOPY  2011    polyps    COLONOSCOPY  02/25/2015    DR Eugenia Willis - DIVERTICULOSIS    COLONOSCOPY N/A 3/4/2022    COLONOSCOPY DIAGNOSTIC performed by Chana Fiore MD at [place] [address]  2013    IL COLONOSCOPY FLX DX W/COLLJ SPEC WHEN PFRMD N/A 1/26/2018    COLONOSCOPY performed by Mg Hernandez MD at [facility] ESOPHAGOGASTRODUODENOSCOPY TRANSORAL DIAGNOSTIC N/A 1/26/2018    EGD ESOPHAGOGASTRODUODENOSCOPY WITH DILATION performed by Mg Hernandez MD at [facility] N/A 3/4/2022    EGD ESOPHAGOGASTRODUODENOSCOPY performed by Chana Fiore MD at [facility]     Family History   Problem Relation Age of Onset    Other Mother         Bowel Obstruction    High Blood Pressure Mother     High Blood Pressure Father     Cancer Father     Arthritis Father     Coronary Art Dis Father Heart Disease Father     Stroke Father     Diabetes Father     Stomach Cancer Father     Colon Cancer Neg Hx      Social History     Socioeconomic History    Marital status: Single     Spouse name: Not on file    Number of children: Not on file    Years of education: Not on file    Highest education level: Not on file   Occupational History    Not on file   Tobacco Use    Smoking status: Never    Smokeless tobacco: Never   Vaping Use    Vaping Use: Never used   Substance and Sexual Activity    Alcohol use: No     Alcohol/week: 0.0 standard drinks    Drug use: No    Sexual activity: Not Currently   Other Topics Concern    Not on file   Social History Narrative    Not on file     Social Determinants of Health     Financial Resource Strain: Low Risk     Difficulty of Paying Living Expenses: Not hard at all   Food Insecurity: No Food Insecurity    Worried About Running Out of Food in the Last Year: Never true    Ran Out of Food in the Last Year: Never true   Transportation Needs: Not on file   Physical Activity: Not on file   Stress: Not on file   Social Connections: Not on file   Intimate Partner Violence: Not on file   Housing Stability: Not on file       Current Outpatient Medications on File Prior to Visit   Medication Sig Dispense Refill    dicyclomine (BENTYL) 10 MG capsule Take 1 capsule by mouth 2 times daily 180 capsule 1    omeprazole (PRILOSEC) 20 MG delayed release capsule Take 1 capsule by mouth daily 90 capsule 1    levothyroxine (SYNTHROID) 75 MCG tablet Take 1 tablet by mouth daily 90 tablet 4    PARoxetine (PAXIL) 10 MG tablet Take 2 tablets by mouth daily 90 tablet 4    Baclofen (LIORESAL) 5 MG tablet Take 1 tablet by mouth nightly as needed (pain spasms) 1 tablet 0    lidocaine (LMX) 4 % cream Apply a half dollar sized amount to intact skin topically up to twice daily as needed for pain 45 g 1    FIBER, GUAR GUM, PO Take 2 each by mouth in the morning and at bedtime      melatonin 5 MG TABS tablet Take 5 mg by mouth daily      atorvastatin (LIPITOR) 20 MG tablet Take 1 tablet by mouth daily 90 tablet 4    coenzyme Q-10 100 MG capsule Take 1 capsule by mouth daily 90 capsule 4    ibuprofen (IBU) 600 MG tablet Take 1 tablet by mouth every 6 hours as needed for Pain 120 tablet 0    clobetasol (TEMOVATE) 0.05 % cream Apply topically 2 times daily Apply topically 2 times daily. 60 g 2    SUMAtriptan (IMITREX) 50 MG tablet Take 1 tablet by mouth once as needed for Migraine 9 tablet 0     No current facility-administered medications on file prior to visit. Pt presents today for a f/u of her pain. PCP is Dr. Robert Teixeira MD.  She has DEXA scan scheduled. Last saw Dr. Catalino Severino in November. On 10/11/2022 had a left L3-4-5 MBB's with significant relief. Discussed possible second diagnostic MBB. She has a back brace. X-ray report of the lumbar spine dated 8/8/2022 shows diffuse arthritis and scoliosis with probable stenosis. No acute fracture noted. She says her pain is returning, but not as bad as prior to MBB. She is having Lt sided low back pain that can radiate into buttock. Denies radiating numbness. 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     Patient uses baclofen 5 mg nightly as needed and lidocaine 4% ointment up to twice a day. She says she doesn't use baclofen unless spasms bad. She has enough lidocaine which helps her Sx she thinks. She will use lidocaine ointment at night. Pt feels pain level 5/10. Pt feels that AM, laying on Lt, vacuuming, twisting makes the pain worse, and ibuprofen, heat, MBB makes the pain better. Pt denies radiating numbness and tingling. Denies recent falls, injuries or trauma. Pt denies new weakness. Pt reports PT has been done and continues to [employer] to exercise with her son. Review of Systems   Constitutional: Negative. Negative for fatigue. HENT: Negative. Negative for trouble swallowing. Eyes: Negative. Respiratory:  Negative for cough and shortness of breath. Cardiovascular:  Negative for chest pain. Gastrointestinal: Negative. Negative for constipation and diarrhea. Endocrine: Negative. Genitourinary: Negative. Musculoskeletal:  Positive for back pain. Skin: Negative. Neurological:  Negative for dizziness, weakness and headaches. Hematological: Negative. Psychiatric/Behavioral: Negative. Outside record review:  XR L-spine 8/8/2022:  XR LS Spine 9/24/18: levoscoliosis, degenerative disc disease, facet arthropathy, no fracture  MRI LS Spine 4/20/16: lumbar degenerative disc disease, lumbar facet arthropathy. No sig central canal stenosis. Disc bulge L5/S1 with some left L5 nerve root contact. XR LS Spine 2/23/16: lumbar degenerative disc disease and facet arthropathy. Opioid risk tool score 7, moderate risk  Objective:     Vitals:  /72   Temp 97.5 °F (36.4 °C)   Ht 5' 2\" (1.575 m)   Wt 135 lb (61.2 kg)   LMP  (LMP Unknown)   BMI 24.69 kg/m² Pain Score:   5      Physical Exam  Vitals and nursing note reviewed. This is a pleasant female who answers questions appropriately and follows commands. Pt is alert and oriented x 3. Recent and remote memory is intact. Mood and affect, judgement and insight are normal.  No signs of distress, no dyspnea or SOB noted. HEENT: PERRL. Neck is supple, trachea midline. No lymphadenopathy noted. Decreased ROM with flexion and extension of low back. Tender with palpation to lumbar spine with palpation on left > than right with positive provacative maneuvers noted. Negative SLR. Tightness in both hamstrings noted. Scoliosis noted. Balance and coordination normal.  Strength is functional for ambulation. Cranial nerves II-XII are intact. Assessment:      Diagnosis Orders   1.  Lumbosacral spondylosis without myelopathy  CHG FLUOR NEEDLE/CATH SPINE/PARASPINAL DX/THER ADDON    DE NJX DX/THER AGT PVRT FACET JT LMBR/SAC 1 LEVEL    DE NJX DX/THER AGT PVRT FACET JT LMBR/SAC 2ND LEVEL          Plan:          No orders of the defined types were placed in this encounter. Orders Placed This Encounter   Procedures    CHG FLUOR NEEDLE/CATH SPINE/PARASPINAL DX/THER ADDON     Standing Status:   Future     Standing Expiration Date:   4/11/2023    DE NJX DX/THER AGT PVRT FACET JT LMBR/SAC 1 LEVEL     Lt L3,4,5 MBB with SM - 2nd set     Standing Status:   Future     Standing Expiration Date:   4/11/2023    DE NJX DX/THER AGT PVRT FACET JT LMBR/SAC 2ND LEVEL     Standing Status:   Future     Standing Expiration Date:   4/11/2023     Discussed options with the patient today. Anatomic model pathology was shown and reviewed with pt. She says she has enough baclofen and lidocaine ointment. She is having pain returning in low back greater on Lt. We will go ahead and order 2nd diagnostic left L3, L4, L5 medial branch blocks to see if the patient is a candidate for RF ablation. she has failed conservative treatment in the past. Anatomic model of pathology was shown. Risks and benefits of the procedure were discussed. All questions were answered and patient understands and agrees with the plan. All questions were answered. Discussed home exercise program.  Relevant imaging and pain generators reviewed. Pt verbalized understanding and agrees with above plan. Pt has chronic pain. OARRS was reviewed. This NP saw pt under direct supervision of Dr. Haven Rubio. Follow up:  Return for for procedure with Dr. Haven Rubio.     Sean Baca, APRN - CNP

## 2023-01-12 ENCOUNTER — TELEPHONE (OUTPATIENT)
Dept: PAIN MANAGEMENT | Age: 73
End: 2023-01-12

## 2023-01-12 ENCOUNTER — HOSPITAL ENCOUNTER (OUTPATIENT)
Dept: WOMENS IMAGING | Age: 73
Discharge: HOME | End: 2023-01-14
Payer: MEDICARE

## 2023-01-12 DIAGNOSIS — Z12.31 BREAST CANCER SCREENING BY MAMMOGRAM: ICD-10-CM

## 2023-01-12 DIAGNOSIS — M81.0 OSTEOPOROSIS, UNSPECIFIED OSTEOPOROSIS TYPE, UNSPECIFIED PATHOLOGICAL FRACTURE PRESENCE: ICD-10-CM

## 2023-01-12 PROCEDURE — 77080 DXA BONE DENSITY AXIAL: CPT

## 2023-01-12 PROCEDURE — 77063 BREAST TOMOSYNTHESIS BI: CPT

## 2023-01-12 NOTE — TELEPHONE ENCOUNTER
SECOND LEFT L3,4,5 MBB    AUTH APPROVED FROM 1/19/23-2/1/23    OK to schedule procedure approved as above. Please note sides/levels approved and date range. (If applicable, sides/levels approved may differ from those ordered)    TO BE SCHEDULED WITH DR. Alyssa Williamson

## 2023-01-24 ENCOUNTER — PROCEDURE VISIT (OUTPATIENT)
Dept: PAIN MANAGEMENT | Age: 73
End: 2023-01-24

## 2023-01-24 DIAGNOSIS — M47.817 LUMBOSACRAL SPONDYLOSIS WITHOUT MYELOPATHY: Primary | ICD-10-CM

## 2023-01-24 RX ORDER — LIDOCAINE HYDROCHLORIDE 10 MG/ML
5 INJECTION, SOLUTION INFILTRATION; PERINEURAL ONCE
Status: COMPLETED | OUTPATIENT
Start: 2023-01-24 | End: 2023-01-24

## 2023-01-24 RX ORDER — BETAMETHASONE SODIUM PHOSPHATE AND BETAMETHASONE ACETATE 3; 3 MG/ML; MG/ML
3 INJECTION, SUSPENSION INTRA-ARTICULAR; INTRALESIONAL; INTRAMUSCULAR; SOFT TISSUE ONCE
Status: COMPLETED | OUTPATIENT
Start: 2023-01-24 | End: 2023-01-24

## 2023-01-24 RX ADMIN — LIDOCAINE HYDROCHLORIDE 5 ML: 10 INJECTION, SOLUTION INFILTRATION; PERINEURAL at 13:43

## 2023-01-24 RX ADMIN — BETAMETHASONE SODIUM PHOSPHATE AND BETAMETHASONE ACETATE 3 MG: 3; 3 INJECTION, SUSPENSION INTRA-ARTICULAR; INTRALESIONAL; INTRAMUSCULAR; SOFT TISSUE at 13:43

## 2023-01-24 RX ADMIN — Medication 0.5 MEQ: at 13:45

## 2023-01-24 NOTE — PROGRESS NOTES
Lumbar Medial Branch Blocks -2nd Diagnostic      Patient Name: Irvin Orellana   : 1950  Date: 2023     Provider: Nancy Hollis MD        Irvin Orellana is here today for interventional pain management. Preoperatively, the patient presents with symptoms and physical exam findings consistent with lumbar facet zygapophyseal joint mediated pain. She has had persistent pain that limits her function and activities of daily living. The pain is persistent despite conservative measures. She has significant functional and psychological impairment due to this condition. Given her symptoms, physical exam findings, impairment in activities of daily living, and lack of response to conservative measures, consideration for lumbar medial branch blocks was given. Discussed the risks of the procedure including, but not limited to, bleeding, infection, worsened pain, damage to surrounding structures, side effects, toxicity, allergic reactions to medications used, immune and stress-response dysfunction, fat necrosis, avascular necrosis, skin pigmentation changes, blood sugar elevation, headache, vision changes, need for surgery, as well as catastrophic injury such as vision loss, paralysis, stroke, spinal cord infarction or injury, intrathecal injection, spinal cord puncture, arachnoiditis, bowel or bladder incontinence, loss of use of the legs, ventilator dependence, and death. Discussed the risks, benefits, alternative procedures, and alternatives to the procedure including no procedure at all. Discussed that we cannot undo any permanent neurologic damage or change the course of any underlying disease. After thorough discussion, patient expressed understanding and willingness to proceed. Written consent was obtained and is in the chart. Verbal consent to proceed was obtained. Standard ASIPP guidelines were followed and sterile technique used. Area was cleaned with Betadine three times.  Fluoroscopic guidance was used for this procedure. The L5 vertebral body was taken as the first lumbar-appearing vertebral body directly above the sacrum on a lateral view. Junction of superior articular process and transverse process was identified. For L5 dorsal primary ramus groove of sacral ala and superior articular process of S1 was identified. Then two 26 gauge 3.5 inch spinal needles were used and each needle was advanced to each medial branch and/or dorsal ramus. Aspiration was negative throughout the procedure. Oblique and declined views were obtained. Each medial branch and/or dorsal ramus was treated with approximately 0.5 mL of 1% preservative-free Lidocaine. A total of 0.5 mL of 3 mg of Betamethasone was used for today's procedure. She tolerated the procedure well, no obvious complications occurred during the procedure. She was appropriately monitored and discharged home in stable condition with her usual motor strength. She will keep close track of pain over the next several hours and call our office tomorrow and let us know what percentage of pain relief is experienced. Postoperative instructions were provided. She will continue anticoagulation uninterrupted.           [] Bilateral [] T12    [] L1   [] Right [] L2    [x] L3   [x] Left [x] L4      [x] L5         [address]  Phone [phone]

## 2023-02-02 ENCOUNTER — TELEPHONE (OUTPATIENT)
Dept: PAIN MANAGEMENT | Age: 73
End: 2023-02-02

## 2023-02-02 NOTE — TELEPHONE ENCOUNTER
Patient received lumbar mbb on 1/24/23 with Dr. Javed Orozco and states when she was leaving the office \"something did not feel right. \" Pt states the pain on left side is now worse than before the procedure. Pt states she feels unsteady, keeps waking up at night from the pain, and difficulty eating. Pt asks Dr. Javed Orozco to advise? Lidocaine cream and baclofen are not helping.

## 2023-02-03 DIAGNOSIS — M47.817 LUMBOSACRAL SPONDYLOSIS WITHOUT MYELOPATHY: Primary | ICD-10-CM

## 2023-02-03 DIAGNOSIS — R52 PAIN: ICD-10-CM

## 2023-02-03 RX ORDER — METHYLPREDNISOLONE 4 MG/1
TABLET ORAL
Qty: 1 KIT | Refills: 0 | Status: SHIPPED | OUTPATIENT
Start: 2023-02-03 | End: 2023-02-09

## 2023-02-03 NOTE — TELEPHONE ENCOUNTER
Called patient. Spoke with patient and advised that, per Dr. Francisco Mayorga prescription was electronically sent to pharmacy.

## 2023-02-07 ENCOUNTER — HOSPITAL ENCOUNTER (EMERGENCY)
Age: 73
Discharge: ANOTHER HOSPITAL | End: 2023-02-07
Attending: STUDENT IN AN ORGANIZED HEALTH CARE EDUCATION/TRAINING PROGRAM
Payer: MEDICARE

## 2023-02-07 ENCOUNTER — APPOINTMENT (OUTPATIENT)
Dept: GENERAL RADIOLOGY | Age: 73
End: 2023-02-07
Payer: MEDICARE

## 2023-02-07 VITALS
DIASTOLIC BLOOD PRESSURE: 69 MMHG | RESPIRATION RATE: 12 BRPM | SYSTOLIC BLOOD PRESSURE: 110 MMHG | OXYGEN SATURATION: 98 % | HEIGHT: 62 IN | BODY MASS INDEX: 23.92 KG/M2 | WEIGHT: 130 LBS | HEART RATE: 77 BPM | TEMPERATURE: 97.8 F

## 2023-02-07 DIAGNOSIS — S42.291A OTHER CLOSED DISPLACED FRACTURE OF PROXIMAL END OF RIGHT HUMERUS, INITIAL ENCOUNTER: Primary | ICD-10-CM
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
## 2023-02-07 DIAGNOSIS — S82.101A CLOSED FRACTURE OF PROXIMAL END OF RIGHT TIBIA, UNSPECIFIED FRACTURE MORPHOLOGY, INITIAL ENCOUNTER: ICD-10-CM

## 2023-02-07 LAB
ALBUMIN SERPL-MCNC: 4.4 G/DL (ref 3.5–4.6)
ALP BLD-CCNC: 63 U/L (ref 40–130)
ALT SERPL-CCNC: 10 U/L (ref 0–33)
ANION GAP SERPL CALCULATED.3IONS-SCNC: 12 MEQ/L (ref 9–15)
APTT: 24.3 SEC (ref 24.4–36.8)
AST SERPL-CCNC: 14 U/L (ref 0–35)
BASOPHILS ABSOLUTE: 0.1 K/UL (ref 0–0.2)
BASOPHILS RELATIVE PERCENT: 0.7 %
BILIRUB SERPL-MCNC: 0.4 MG/DL (ref 0.2–0.7)
BUN BLDV-MCNC: 20 MG/DL (ref 8–23)
CALCIUM SERPL-MCNC: 9.1 MG/DL (ref 8.5–9.9)
CHLORIDE BLD-SCNC: 99 MEQ/L (ref 95–107)
CO2: 26 MEQ/L (ref 20–31)
CREAT SERPL-MCNC: 0.76 MG/DL (ref 0.5–0.9)
EOSINOPHILS ABSOLUTE: 0.1 K/UL (ref 0–0.7)
EOSINOPHILS RELATIVE PERCENT: 0.8 %
ETHANOL PERCENT: NORMAL G/DL
ETHANOL: <10 MG/DL (ref 0–0.08)
GFR SERPL CREATININE-BSD FRML MDRD: >60 ML/MIN/{1.73_M2}
GLOBULIN: 2 G/DL (ref 2.3–3.5)
GLUCOSE BLD-MCNC: 130 MG/DL (ref 70–99)
HCT VFR BLD CALC: 35.7 % (ref 37–47)
HEMOGLOBIN: 11.6 G/DL (ref 12–16)
INR BLD: 1.1
LACTIC ACID: 3 MMOL/L (ref 0.5–2.2)
LYMPHOCYTES ABSOLUTE: 2.4 K/UL (ref 1–4.8)
LYMPHOCYTES RELATIVE PERCENT: 22.1 %
MAGNESIUM: 2.2 MG/DL (ref 1.7–2.4)
MCH RBC QN AUTO: 28.9 PG (ref 27–31.3)
MCHC RBC AUTO-ENTMCNC: 32.5 % (ref 33–37)
MCV RBC AUTO: 88.7 FL (ref 79.4–94.8)
MONOCYTES ABSOLUTE: 0.7 K/UL (ref 0.2–0.8)
MONOCYTES RELATIVE PERCENT: 6.6 %
NEUTROPHILS ABSOLUTE: 7.6 K/UL (ref 1.4–6.5)
NEUTROPHILS RELATIVE PERCENT: 69.8 %
PDW BLD-RTO: 14.1 % (ref 11.5–14.5)
PLATELET # BLD: 183 K/UL (ref 130–400)
POTASSIUM SERPL-SCNC: 3.3 MEQ/L (ref 3.4–4.9)
PROTHROMBIN TIME: 14 SEC (ref 12.3–14.9)
RBC # BLD: 4.03 M/UL (ref 4.2–5.4)
SODIUM BLD-SCNC: 137 MEQ/L (ref 135–144)
TOTAL CK: 68 U/L (ref 0–170)
TOTAL PROTEIN: 6.4 G/DL (ref 6.3–8)
TROPONIN: <0.01 NG/ML (ref 0–0.01)
WBC # BLD: 10.9 K/UL (ref 4.8–10.8)

## 2023-02-07 PROCEDURE — 85730 THROMBOPLASTIN TIME PARTIAL: CPT

## 2023-02-07 PROCEDURE — 82077 ASSAY SPEC XCP UR&BREATH IA: CPT

## 2023-02-07 PROCEDURE — 96374 THER/PROPH/DIAG INJ IV PUSH: CPT

## 2023-02-07 PROCEDURE — 73030 X-RAY EXAM OF SHOULDER: CPT

## 2023-02-07 PROCEDURE — 93005 ELECTROCARDIOGRAM TRACING: CPT | Performed by: STUDENT IN AN ORGANIZED HEALTH CARE EDUCATION/TRAINING PROGRAM

## 2023-02-07 PROCEDURE — 73502 X-RAY EXAM HIP UNI 2-3 VIEWS: CPT

## 2023-02-07 PROCEDURE — 80053 COMPREHEN METABOLIC PANEL: CPT

## 2023-02-07 PROCEDURE — 6360000002 HC RX W HCPCS: Performed by: STUDENT IN AN ORGANIZED HEALTH CARE EDUCATION/TRAINING PROGRAM

## 2023-02-07 PROCEDURE — 85610 PROTHROMBIN TIME: CPT

## 2023-02-07 PROCEDURE — 83605 ASSAY OF LACTIC ACID: CPT

## 2023-02-07 PROCEDURE — 82550 ASSAY OF CK (CPK): CPT

## 2023-02-07 PROCEDURE — 73590 X-RAY EXAM OF LOWER LEG: CPT

## 2023-02-07 PROCEDURE — 2580000003 HC RX 258: Performed by: STUDENT IN AN ORGANIZED HEALTH CARE EDUCATION/TRAINING PROGRAM

## 2023-02-07 PROCEDURE — 36415 COLL VENOUS BLD VENIPUNCTURE: CPT

## 2023-02-07 PROCEDURE — 6830039000 HC L3 TRAUMA ALERT

## 2023-02-07 PROCEDURE — 99285 EMERGENCY DEPT VISIT HI MDM: CPT

## 2023-02-07 PROCEDURE — 83735 ASSAY OF MAGNESIUM: CPT

## 2023-02-07 PROCEDURE — 96361 HYDRATE IV INFUSION ADD-ON: CPT

## 2023-02-07 PROCEDURE — 84484 ASSAY OF TROPONIN QUANT: CPT

## 2023-02-07 PROCEDURE — 85025 COMPLETE CBC W/AUTO DIFF WBC: CPT

## 2023-02-07 PROCEDURE — 96375 TX/PRO/DX INJ NEW DRUG ADDON: CPT

## 2023-02-07 PROCEDURE — 71046 X-RAY EXAM CHEST 2 VIEWS: CPT

## 2023-02-07 RX ORDER — 0.9 % SODIUM CHLORIDE 0.9 %
1000 INTRAVENOUS SOLUTION INTRAVENOUS ONCE
Status: COMPLETED | OUTPATIENT
Start: 2023-02-07 | End: 2023-02-07

## 2023-02-07 RX ORDER — ONDANSETRON 2 MG/ML
4 INJECTION INTRAMUSCULAR; INTRAVENOUS ONCE
Status: COMPLETED | OUTPATIENT
Start: 2023-02-07 | End: 2023-02-07

## 2023-02-07 RX ORDER — FENTANYL CITRATE 0.05 MG/ML
50 INJECTION, SOLUTION INTRAMUSCULAR; INTRAVENOUS
Status: DISCONTINUED | OUTPATIENT
Start: 2023-02-07 | End: 2023-02-07 | Stop reason: HOSPADM

## 2023-02-07 RX ORDER — MORPHINE SULFATE 4 MG/ML
4 INJECTION, SOLUTION INTRAMUSCULAR; INTRAVENOUS ONCE
Status: COMPLETED | OUTPATIENT
Start: 2023-02-07 | End: 2023-02-07

## 2023-02-07 RX ADMIN — MORPHINE SULFATE 4 MG: 4 INJECTION, SOLUTION INTRAMUSCULAR; INTRAVENOUS at 14:31

## 2023-02-07 RX ADMIN — ONDANSETRON 4 MG: 2 INJECTION INTRAMUSCULAR; INTRAVENOUS at 14:31

## 2023-02-07 RX ADMIN — SODIUM CHLORIDE 1000 ML: 9 INJECTION, SOLUTION INTRAVENOUS at 14:31

## 2023-02-07 RX ADMIN — SODIUM CHLORIDE 1000 ML: 9 INJECTION, SOLUTION INTRAVENOUS at 16:55

## 2023-02-07 RX ADMIN — FENTANYL CITRATE 50 MCG: 0.05 INJECTION, SOLUTION INTRAMUSCULAR; INTRAVENOUS at 16:56

## 2023-02-07 ASSESSMENT — PAIN DESCRIPTION - LOCATION
LOCATION: ARM;LEG;SHOULDER

## 2023-02-07 ASSESSMENT — ENCOUNTER SYMPTOMS
FACIAL SWELLING: 0
NAUSEA: 0
ABDOMINAL PAIN: 0
SHORTNESS OF BREATH: 0
BACK PAIN: 0
EYE PAIN: 0
VOMITING: 0

## 2023-02-07 ASSESSMENT — PAIN DESCRIPTION - ONSET: ONSET: ON-GOING

## 2023-02-07 ASSESSMENT — PAIN SCALES - GENERAL
PAINLEVEL_OUTOF10: 10
PAINLEVEL_OUTOF10: 9
PAINLEVEL_OUTOF10: 10

## 2023-02-07 ASSESSMENT — PAIN - FUNCTIONAL ASSESSMENT: PAIN_FUNCTIONAL_ASSESSMENT: 0-10

## 2023-02-07 ASSESSMENT — PAIN DESCRIPTION - FREQUENCY: FREQUENCY: CONTINUOUS

## 2023-02-07 ASSESSMENT — PAIN DESCRIPTION - PAIN TYPE: TYPE: ACUTE PAIN

## 2023-02-07 ASSESSMENT — PAIN DESCRIPTION - ORIENTATION
ORIENTATION: RIGHT
ORIENTATION: RIGHT

## 2023-02-07 NOTE — ED TRIAGE NOTES
Pt to ed from home via ems after fall  T reports that she was standing on a chair, rearranging furniture  Pt reports fall to the ground, landing on right arm and right leg. Deformity noted to right shoulder. Pt also c/o right side chest walll tenderness. Pt alert and oriented, calm and cooperative.

## 2023-02-07 NOTE — ED NOTES
C-spine non tender, no step offs. Right anterior chest wall tenderness. Right arm splint. No abdominal tenderness. Pelvis is stable. Tenderness to right upper leg. Right leg splint. Right lower leg tenderness.       Sylvia Napoles RN  02/07/23 [id number]

## 2023-02-07 NOTE — ED NOTES
Pt back from CT, son at the bedside, pure wic in place     Mariya Siegel, [address] [place] [address]  02/07/23 [phone]

## 2023-02-07 NOTE — ED PROVIDER NOTES
[facility] ED  eMERGENCY dEPARTMENT eNCOUnter      Pt Name: Los Barrera  MRN: [id number]  [place] 1950  Date of evaluation: 2/7/2023  Provider: Fatou Esteves MD      HISTORY OF PRESENT ILLNESS      No chief complaint on file. The history is provided by the Patient and EMS. Los Barrera is a 67 y.o. female with a PMH clinically significant for Anxiety/Depression, Hypothyroidism, HLD and Osteoporosis presenting to the ED via EMS c/o right shoulder pain/deformity and right lower leg pain/swelling with initial onset just prior to arrival after the patient fell off of a stool. He states that she was trying to move around decorations and fell off the stool. No preceding lightheadedness, dizziness, CP, SOB or palpitations. Denies hitting head or LOC. Also denies any anticoagulation. Denies any associated headache, neck pain, back pain, chest pain, SOB, abdominal pain, N/V, numbness, weakness, tingling. Was not able to ambulate after the event. Characterizes the pain as aching, severe, constant. Worst in the right shoulder. EMS placed an air splinting. EMS report they administered 100 mcg of fentanyl prior to arrival.      Per Chart Review: PMH as noted above obtained via outpatient chart review. No AC noted. REVIEW OF SYSTEMS       Review of Systems   Constitutional:  Negative for activity change and fever. HENT:  Negative for facial swelling and nosebleeds. Eyes:  Negative for pain and visual disturbance. Respiratory:  Negative for shortness of breath. Cardiovascular:  Negative for chest pain. Gastrointestinal:  Negative for abdominal pain, nausea and vomiting. Genitourinary:  Negative for hematuria. Musculoskeletal:  Positive for arthralgias, gait problem, joint swelling and myalgias. Negative for back pain, neck pain and neck stiffness. Skin:  Negative for wound. Neurological:  Negative for weakness, numbness and headaches.      PAST MEDICAL HISTORY Past Medical History:   Diagnosis Date    Anxiety and depression 05/02/2019    Anxiety and depression     Chest pain 03/01/2019    Diverticulosis of colon (without mention of hemorrhage) 02/25/2015    DR BURNETTE    Focal lymphocytic colitis 01/27/2022    Gastroesophageal reflux disease without esophagitis     GI problem     Hyperlipidemia 02/03/2015    Hypothyroidism     Lichen sclerosus 94/5318    Migraines 02/03/2015    Renal insufficiency 02/03/2015    Spondylosis of lumbar region without myelopathy or radiculopathy 05/24/2016       SURGICAL HISTORY       Past Surgical History:   Procedure Laterality Date    ABDOMINAL ADHESION SURGERY  1/7/16    DR. MERAZ    APPENDECTOMY  2012    CATARACT REMOVAL Right     CHOLECYSTECTOMY  2012    COLON SURGERY  2005    13\" of colon removed-NO CA    COLONOSCOPY  2011    polyps    COLONOSCOPY  02/25/2015    DR Jorge Abreu - DIVERTICULOSIS    COLONOSCOPY N/A 3/4/2022    COLONOSCOPY DIAGNOSTIC performed by Sanna Morin MD at [place] [address]  2013    IA COLONOSCOPY FLX DX W/COLLJ SPEC WHEN PFRMD N/A 1/26/2018    COLONOSCOPY performed by Jazmín Caban MD at [place] [address] ESOPHAGOGASTRODUODENOSCOPY TRANSORAL DIAGNOSTIC N/A 1/26/2018    EGD ESOPHAGOGASTRODUODENOSCOPY WITH DILATION performed by Jazmín Caban MD at [facility] N/A 3/4/2022    EGD ESOPHAGOGASTRODUODENOSCOPY performed by Sanna Morin MD at [facility]       Family History   Problem Relation Age of Onset    Other Mother         Bowel Obstruction    High Blood Pressure Mother     High Blood Pressure Father     Cancer Father     Arthritis Father     Coronary Art Dis Father     Heart Disease Father     Stroke Father     Diabetes Father     Stomach Cancer Father     Colon Cancer Neg Hx        SOCIAL HISTORY       Social History     Socioeconomic History    Marital status: Single     Spouse name: None    Number of children: None    Years of education: None    Highest education level: None   Tobacco Use    Smoking status: Never    Smokeless tobacco: Never   Vaping Use    Vaping Use: Never used   Substance and Sexual Activity    Alcohol use: No     Alcohol/week: 0.0 standard drinks    Drug use: No    Sexual activity: Not Currently     Social Determinants of Health     Financial Resource Strain: Low Risk     Difficulty of Paying Living Expenses: Not hard at all   Food Insecurity: No Food Insecurity    Worried About Running Out of Food in the Last Year: Never true    Ran Out of Food in the Last Year: Never true       CURRENT MEDICATIONS       Previous Medications    ATORVASTATIN (LIPITOR) 20 MG TABLET    Take 1 tablet by mouth daily    CLOBETASOL (TEMOVATE) 0.05 % CREAM    Apply topically 2 times daily Apply topically 2 times daily. COENZYME Q-10 100 MG CAPSULE    Take 1 capsule by mouth daily    DICYCLOMINE (BENTYL) 10 MG CAPSULE    Take 1 capsule by mouth 2 times daily    FIBER, GUAR GUM, PO    Take 2 each by mouth in the morning and at bedtime    IBUPROFEN (IBU) 600 MG TABLET    Take 1 tablet by mouth every 6 hours as needed for Pain    LEVOTHYROXINE (SYNTHROID) 75 MCG TABLET    Take 1 tablet by mouth daily    LIDOCAINE (LMX) 4 % CREAM    Apply a half dollar sized amount to intact skin topically up to twice daily as needed for pain    MELATONIN 5 MG TABS TABLET    Take 5 mg by mouth daily    METHYLPREDNISOLONE (MEDROL DOSEPACK) 4 MG TABLET    Take by mouth.     OMEPRAZOLE (PRILOSEC) 20 MG DELAYED RELEASE CAPSULE    Take 1 capsule by mouth daily    PAROXETINE (PAXIL) 10 MG TABLET    Take 2 tablets by mouth daily    SUMATRIPTAN (IMITREX) 50 MG TABLET    Take 1 tablet by mouth once as needed for Migraine       ALLERGIES     Bactrim [sulfamethoxazole-trimethoprim] and Ciprofloxacin      PHYSICAL EXAM       ED Triage Vitals [02/07/23 1407]   BP Temp Temp src Heart Rate Resp SpO2 Height Weight   101/64 97.8 °F (36.6 °C) -- 65 18 94 % 5' 2\" (1.575 m) 130 lb (59 kg)       Physical Exam  Vitals and nursing note reviewed. Exam conducted with a chaperone present.   Constitutional:       General: She is not in acute distress.  HENT:      Head: Normocephalic and atraumatic.      Right Ear: Tympanic membrane normal.      Left Ear: Tympanic membrane normal.      Nose:      Right Nostril: No septal hematoma.      Left Nostril: No septal hematoma.      Mouth/Throat:      Mouth: Mucous membranes are moist. No injury.      Pharynx: Oropharynx is clear.   Eyes:      Extraocular Movements: Extraocular movements intact.      Pupils: Pupils are equal, round, and reactive to light.   Neck:      Trachea: Trachea normal.   Cardiovascular:      Rate and Rhythm: Normal rate and regular rhythm.      Pulses: Normal pulses.      Heart sounds: Normal heart sounds.   Pulmonary:      Effort: Pulmonary effort is normal. No respiratory distress.      Breath sounds: Normal breath sounds.   Chest:      Chest wall: Tenderness (Minimal TTP over the Right upper anterior chest wall.) present. No deformity or crepitus.       Abdominal:      General: There is no distension.      Palpations: Abdomen is soft.      Tenderness: There is no abdominal tenderness.   Musculoskeletal:      Right shoulder: Swelling, deformity, tenderness and bony tenderness present. Decreased range of motion. Normal pulse.      Right upper arm: Tenderness and bony tenderness present.      Right elbow: No swelling or deformity. No tenderness.      Right forearm: No tenderness or bony tenderness.      Right wrist: No tenderness or bony tenderness.      Right hand: No deformity, tenderness or bony tenderness. Normal range of motion.      Cervical back: No signs of trauma, tenderness or bony tenderness. No spinous process tenderness.      Thoracic back: No signs of trauma, tenderness or bony tenderness.      Lumbar back: No signs of trauma, tenderness or bony tenderness.      Right hip:  Tenderness and bony tenderness present. No deformity. Decreased range of motion. Right lower leg: Swelling, tenderness and bony tenderness present. No edema. Left lower leg: No edema. Legs:       Comments: Pelvis Stable   Skin:     General: Skin is warm and dry. Capillary Refill: Capillary refill takes less than 2 seconds. Neurological:      General: No focal deficit present. Mental Status: She is alert and oriented to person, place, and time.    Psychiatric:         Mood and Affect: Mood normal.         Behavior: Behavior normal.       MDM:   Chart Reviewed: PMH and additional information as noted in HPI obtained from chart review    Vitals:    Vitals:    02/07/23 1634 02/07/23 1700 02/07/23 1705 02/07/23 1707   BP: (!) 89/53 99/79     Pulse: 65 69 77 72   Resp: 14 14 14 10   Temp:       SpO2: 97% (!) 87% (!) 86% 100%   Weight:       Height:           PROCEDURES:  Unless otherwise noted below, none  Procedures    LABS:  Labs Reviewed   APTT - Abnormal; Notable for the following components:       Result Value    aPTT 24.3 (*)     All other components within normal limits   CBC WITH AUTO DIFFERENTIAL - Abnormal; Notable for the following components:    WBC 10.9 (*)     RBC 4.03 (*)     Hemoglobin 11.6 (*)     Hematocrit 35.7 (*)     MCHC 32.5 (*)     Neutrophils Absolute 7.6 (*)     All other components within normal limits   COMPREHENSIVE METABOLIC PANEL - Abnormal; Notable for the following components:    Potassium 3.3 (*)     Glucose 130 (*)     Globulin 2.0 (*)     All other components within normal limits   LACTIC ACID - Abnormal; Notable for the following components:    Lactic Acid 3.0 (*)     All other components within normal limits    Narrative:     CALL  Singh  [facility] tel. [phone],  lacid results called to and read back by Doctor HERRON, 02/07/2023 15:35, by ADITYA   PROTIME-INR   CK   MAGNESIUM   TROPONIN   ETHANOL    Narrative:     Annie Us  [facility] tel. [phone],  lacid results called to and read back by Doctor GERMAINE, 02/07/2023 15:35, by PALSO   URINALYSIS WITH REFLEX TO CULTURE   ETHANOL   TYPE AND SCREEN       XR CHEST (2 VW)   Final Result   No acute process. XR HIP 2-3 VW W PELVIS RIGHT   Final Result   Somewhat limited exam.  Osteopenia. No definite acute bony abnormality. XR TIBIA FIBULA RIGHT (2 VIEWS)   Final Result   Nondisplaced compound fractures proximal humerus. Remote internal fixation   right tibia. XR SHOULDER RIGHT (MIN 2 VIEWS)   Final Result   Fracture right humeral head. ED Course as of 02/07/23 1730   Tue Feb 07, 2023   1612 EKG 12 Lead  EKG showing normal sinus rhythm, rate of 68 bpm.  Normal axis, normal intervals. Moderate voltage criteria for LVH. No gross acute ST-T wave abnormalities. [NA]      ED Course User Index  [NA] Zara Quintana MD       67 y.o. female with a PMH clinically significant for Anxiety/Depression, Hypothyroidism, HLD and Osteoporosis presenting to the ED via EMS c/o right shoulder pain/deformity and right lower leg pain/swelling with initial onset just prior to arrival after the patient fell off of a stool. Upon initial evaluation, Pt uncomfortable appearing 2/2 pain, but otherwise Afebrile, HDS and grossly NV intact. PE as noted above. Labs, , EKG,, and Imaging interpreted by myself and as noted above. Given findings, clinical presentation most likely consistent w/ acute traumatic fractures of the right humerus and right proximal tibia as noted above. Although considered, lower suspicion for traumatic ICH or significant intrathoracic or intra-abdominal traumatic pathology. No TTP or step-off over the midline spines. Benign abdominal exam.  Grossly neurovascularly intact distally to the injuries in both extremities. Initially presenting without any O2 requirements, but did require 2 L by nasal cannula status post additional fentanyl administration for continued pain.   Placed in right-sided sling for comfort. Discussed with orthopedics, Dr. Franck Hay, who was not comfortable taking the patient at this facility due to severe osteoporosis. Recommended transfer to tertiary care facility. Therefore discussed with [facility] trauma center who will accept care of the patient. No further acute events under my care. Pt was administered   Medications   0.9 % sodium chloride bolus (1,000 mLs IntraVENous New Bag 2/7/23 1655)   fentaNYL (SUBLIMAZE) injection 50 mcg (50 mcg IntraVENous Given 2/7/23 1656)   0.9 % sodium chloride bolus (0 mLs IntraVENous Stopped 2/7/23 1656)   ondansetron (ZOFRAN) injection 4 mg (4 mg IntraVENous Given 2/7/23 1431)   morphine sulfate (PF) injection 4 mg (4 mg IntraVENous Given 2/7/23 1431)       Plan: Transfer to [facility] for further evaluation and management. Accepted by Dr. Lina Mccracken. and Patient understanding and amenable to the POC. CRITICAL CARE TIME   Total CriticalCare time was 0 minutes, excluding separately reportable procedures. There was a high probability of clinically significant/life threatening deterioration in the patient's condition which required my urgent intervention. FINAL IMPRESSION      1. Other closed displaced fracture of proximal end of right humerus, initial encounter    2. Closed fracture of proximal end of right tibia, unspecified fracture morphology, initial encounter    3.  Fall, initial encounter          DISPOSITION/PLAN   DISPOSITION Decision To Transfer 02/07/2023 05:12:03 PM      Current Discharge Medication List           MD Melissa Mtz MD  02/07/23 3068

## 2023-02-07 NOTE — ED NOTES
Pt SPo2 decreased to 86-87 on room air after fentanyl, RR 10. Pt placed on 2L NC.  Spo2     Michelle Luciano, [place]  02/07/23 8843

## 2023-02-08 LAB
EKG ATRIAL RATE: 68 BPM
EKG P AXIS: 30 DEGREES
EKG P-R INTERVAL: 142 MS
EKG Q-T INTERVAL: 408 MS
EKG QRS DURATION: 80 MS
EKG QTC CALCULATION (BAZETT): 433 MS
EKG R AXIS: 3 DEGREES
EKG T AXIS: 0 DEGREES
EKG VENTRICULAR RATE: 68 BPM

## 2023-02-08 PROCEDURE — 93010 ELECTROCARDIOGRAM REPORT: CPT | Performed by: INTERNAL MEDICINE

## 2023-02-24 ENCOUNTER — HOSPITAL ENCOUNTER (OUTPATIENT)
Dept: CT IMAGING | Age: 73
End: 2023-02-24
Payer: MEDICARE

## 2023-02-24 DIAGNOSIS — S42.211A CLOSED FRACTURE OF NECK OF RIGHT HUMERUS, INITIAL ENCOUNTER: ICD-10-CM

## 2023-02-24 PROCEDURE — 73200 CT UPPER EXTREMITY W/O DYE: CPT

## 2023-02-24 PROCEDURE — 76376 3D RENDER W/INTRP POSTPROCES: CPT

## 2023-02-27 ENCOUNTER — TELEPHONE (OUTPATIENT)
Dept: FAMILY MEDICINE CLINIC | Age: 73
End: 2023-02-27

## 2023-02-27 ENCOUNTER — OFFICE VISIT (OUTPATIENT)
Dept: FAMILY MEDICINE CLINIC | Age: 73
End: 2023-02-27
Payer: MEDICARE

## 2023-02-27 DIAGNOSIS — K21.9 GASTROESOPHAGEAL REFLUX DISEASE WITHOUT ESOPHAGITIS: ICD-10-CM

## 2023-02-27 DIAGNOSIS — D64.9 ANEMIA, UNSPECIFIED TYPE: Primary | ICD-10-CM

## 2023-02-27 PROCEDURE — 99441 PR PHYS/QHP TELEPHONE EVALUATION 5-10 MIN: CPT | Performed by: FAMILY MEDICINE

## 2023-02-27 ASSESSMENT — ENCOUNTER SYMPTOMS
ABDOMINAL DISTENTION: 0
CHEST TIGHTNESS: 0
PHOTOPHOBIA: 0
SHORTNESS OF BREATH: 1
ABDOMINAL PAIN: 0

## 2023-02-27 NOTE — PROGRESS NOTES
Diagnosis Orders   1. Anemia, unspecified type  CBC with Auto Differential      2. Gastroesophageal reflux disease without esophagitis  Basic Metabolic Panel    Iron and TIBC            Orders Placed This Encounter   Procedures    CBC with Auto Differential     Standing Status:   Future     Standing Expiration Date:   7/01/1470    Basic Metabolic Panel     Standing Status:   Future     Standing Expiration Date:   2/27/2024    Iron and TIBC     Standing Status:   Future     Standing Expiration Date:   2/27/2024       Return for Based on test results. Patient Instructions   Pt will have labs done to verify normalization in post operative healing time       This visit began at 6:15 pm    The location for this appointment was the [facility] primary care site. TELEHEALTH APPOINTMENT  Patient has been screened to determine that this visit qualifies for a \"Telephone Visit\". This visit was via telephone due to the restrictions of the COVID-19 pandemic. All issues as below were discussed and addressed but note a limited visually based physical exam was performed. If it was felt the patient should be evaluated in the clinic there will be comment below demonstrating they were directed there. The patient is aware and has given verbal consent to be billed for this video encounter. The resources used for this visit were [employer] for chart access and telephone conversation for patient input    No chief complaint on file. Pt feeling overall well but lightheaded with position changes and walking.   Lab reviewed demonstrates hemoglobin 12 down to 7.8 by end of hospitalization          PMH:    Current Outpatient Medications on File Prior to Visit   Medication Sig Dispense Refill    dicyclomine (BENTYL) 10 MG capsule Take 1 capsule by mouth 2 times daily 180 capsule 1    omeprazole (PRILOSEC) 20 MG delayed release capsule Take 1 capsule by mouth daily 90 capsule 1    levothyroxine (SYNTHROID) 75 MCG tablet Take 1 tablet by mouth daily 90 tablet 4    PARoxetine (PAXIL) 10 MG tablet Take 2 tablets by mouth daily 90 tablet 4    lidocaine (LMX) 4 % cream Apply a half dollar sized amount to intact skin topically up to twice daily as needed for pain 45 g 1    FIBER, GUAR GUM, PO Take 2 each by mouth in the morning and at bedtime      melatonin 5 MG TABS tablet Take 5 mg by mouth daily      atorvastatin (LIPITOR) 20 MG tablet Take 1 tablet by mouth daily 90 tablet 4    coenzyme Q-10 100 MG capsule Take 1 capsule by mouth daily 90 capsule 4    SUMAtriptan (IMITREX) 50 MG tablet Take 1 tablet by mouth once as needed for Migraine 9 tablet 0    ibuprofen (IBU) 600 MG tablet Take 1 tablet by mouth every 6 hours as needed for Pain 120 tablet 0    clobetasol (TEMOVATE) 0.05 % cream Apply topically 2 times daily Apply topically 2 times daily. 60 g 2     No current facility-administered medications on file prior to visit. Past Medical History:   Diagnosis Date    Anxiety and depression 05/02/2019    Anxiety and depression     Chest pain 03/01/2019    Diverticulosis of colon (without mention of hemorrhage) 02/25/2015    DR BURNETTE    Focal lymphocytic colitis 01/27/2022    Gastroesophageal reflux disease without esophagitis     GI problem     Hyperlipidemia 02/03/2015    Hypothyroidism     Lichen sclerosus 54/1084    Migraines 02/03/2015    Renal insufficiency 02/03/2015    Spondylosis of lumbar region without myelopathy or radiculopathy 05/24/2016     Past Surgical History:   Procedure Laterality Date    ABDOMINAL ADHESION SURGERY  1/7/16    DR. MERAZ    APPENDECTOMY  2012    CATARACT REMOVAL Right     CHOLECYSTECTOMY  2012    COLON SURGERY  2005    13\" of colon removed-NO CA    COLONOSCOPY  2011    polyps    COLONOSCOPY  02/25/2015    DR Melissa Babcock - DIVERTICULOSIS    COLONOSCOPY N/A 3/4/2022    COLONOSCOPY DIAGNOSTIC performed by Soraida Nevarez MD at [place] [address]  2013    [place] COLONOSCOPY FLX DX W/COLLJ SPEC WHEN PFRMD N/A 1/26/2018    COLONOSCOPY performed by Kadie Camarillo MD at [address] ESOPHAGOGASTRODUODENOSCOPY TRANSORAL DIAGNOSTIC N/A 1/26/2018    EGD ESOPHAGOGASTRODUODENOSCOPY WITH DILATION performed by Kadie Camarillo MD at [address] ENDOSCOPY N/A 3/4/2022    EGD ESOPHAGOGASTRODUODENOSCOPY performed by Evie Cook MD at [address] History     Socioeconomic History    Marital status: Single     Spouse name: Not on file    Number of children: Not on file    Years of education: Not on file    Highest education level: Not on file   Occupational History    Not on file   Tobacco Use    Smoking status: Never    Smokeless tobacco: Never   Vaping Use    Vaping Use: Never used   Substance and Sexual Activity    Alcohol use: No     Alcohol/week: 0.0 standard drinks    Drug use: No    Sexual activity: Not Currently   Other Topics Concern    Not on file   Social History Narrative    Not on file     Social Determinants of Health     Financial Resource Strain: Low Risk     Difficulty of Paying Living Expenses: Not hard at all   Food Insecurity: No Food Insecurity    Worried About Running Out of Food in the Last Year: Never true    Ran Out of Food in the Last Year: Never true   Transportation Needs: Not on file   Physical Activity: Not on file   Stress: Not on file   Social Connections: Not on file   Intimate Partner Violence: Not on file   Housing Stability: Not on file     Family History   Problem Relation Age of Onset    Other Mother         Bowel Obstruction    High Blood Pressure Mother     High Blood Pressure Father     Cancer Father     Arthritis Father     Coronary Art Dis Father     Heart Disease Father     Stroke Father     Diabetes Father     Stomach Cancer Father     Colon Cancer Neg Hx      Allergies:  Bactrim [sulfamethoxazole-trimethoprim] and Ciprofloxacin    Review of Systems   Constitutional:  Negative for activity change, appetite change, diaphoresis and unexpected weight change. Eyes:  Negative for photophobia and visual disturbance. Respiratory:  Positive for shortness of breath. Negative for chest tightness. No orthopnea   Cardiovascular:  Negative for chest pain, palpitations and leg swelling. Gastrointestinal:  Negative for abdominal distention and abdominal pain. Genitourinary:  Negative for flank pain and frequency. Musculoskeletal:  Negative for gait problem and joint swelling. Neurological:  Positive for light-headedness. Negative for dizziness, weakness and headaches. Psychiatric/Behavioral:  Negative for confusion. PHYSICAL EXAM/ RESULTS    (Limited exam: only performed what is available through a visual exam during the video encounter as indicated due to the restrictions of the COVID-19 pandemic)    No flowsheet data found.            Recent Results (from the past 2016 hour(s))   Human papillomavirus (HPV) DNA probe thin prep high risk    Collection Time: 12/20/22  8:01 PM   Result Value Ref Range    HPV Genotype 16 Not Detected Not Detected    HPV Type 18 Not Detected Not Detected    HPVOH (OTHER TYPES) Not Detected Not Detected    HPV Comment See below    Protime-INR    Collection Time: 02/07/23  2:30 PM   Result Value Ref Range    Protime 14.0 12.3 - 14.9 sec    INR 1.1    APTT    Collection Time: 02/07/23  2:30 PM   Result Value Ref Range    aPTT 24.3 (L) 24.4 - 36.8 sec   CK    Collection Time: 02/07/23  2:30 PM   Result Value Ref Range    Total CK 68 0 - 170 U/L   CBC with Auto Differential    Collection Time: 02/07/23  2:30 PM   Result Value Ref Range    WBC 10.9 (H) 4.8 - 10.8 K/uL    RBC 4.03 (L) 4.20 - 5.40 M/uL    Hemoglobin 11.6 (L) 12.0 - 16.0 g/dL    Hematocrit 35.7 (L) 37.0 - 47.0 %    MCV 88.7 79.4 - 94.8 fL    MCH 28.9 27.0 - 31.3 pg    MCHC 32.5 (L) 33.0 - 37.0 %    RDW 14.1 11.5 - 14.5 %    Platelets 313 975 - 193 K/uL    Neutrophils % 69.8 %    Lymphocytes % 22.1 %    Monocytes % 6.6 %    Eosinophils % 0.8 %    Basophils % 0.7 %    Neutrophils Absolute 7.6 (H) 1.4 - 6.5 K/uL    Lymphocytes Absolute 2.4 1.0 - 4.8 K/uL    Monocytes Absolute 0.7 0.2 - 0.8 K/uL    Eosinophils Absolute 0.1 0.0 - 0.7 K/uL    Basophils Absolute 0.1 0.0 - 0.2 K/uL   Magnesium    Collection Time: 02/07/23  2:30 PM   Result Value Ref Range    Magnesium 2.2 1.7 - 2.4 mg/dL   Comprehensive Metabolic Panel    Collection Time: 02/07/23  2:30 PM   Result Value Ref Range    Sodium 137 135 - 144 mEq/L    Potassium 3.3 (L) 3.4 - 4.9 mEq/L    Chloride 99 95 - 107 mEq/L    CO2 26 20 - 31 mEq/L    Anion Gap 12 9 - 15 mEq/L    Glucose 130 (H) 70 - 99 mg/dL    BUN 20 8 - 23 mg/dL    Creatinine 0.76 0.50 - 0.90 mg/dL    Est, Glom Filt Rate >60.0 >60    Calcium 9.1 8.5 - 9.9 mg/dL    Total Protein 6.4 6.3 - 8.0 g/dL    Albumin 4.4 3.5 - 4.6 g/dL    Total Bilirubin 0.4 0.2 - 0.7 mg/dL    Alkaline Phosphatase 63 40 - 130 U/L    ALT 10 0 - 33 U/L    AST 14 0 - 35 U/L    Globulin 2.0 (L) 2.3 - 3.5 g/dL   Lactic Acid    Collection Time: 02/07/23  2:30 PM   Result Value Ref Range    Lactic Acid 3.0 (HH) 0.5 - 2.2 mmol/L   Troponin    Collection Time: 02/07/23  2:30 PM   Result Value Ref Range    Troponin <0.010 0.000 - 0.010 ng/mL   Ethanol    Collection Time: 02/07/23  2:30 PM   Result Value Ref Range    Ethanol Lvl <10 mg/dL    Ethanol percent Not indicated G/dL   EKG 12 Lead    Collection Time: 02/07/23  2:38 PM   Result Value Ref Range    Ventricular Rate 68 BPM    Atrial Rate 68 BPM    P-R Interval 142 ms    QRS Duration 80 ms    Q-T Interval 408 ms    QTc Calculation (Bazett) 433 ms    P Axis 30 degrees    R Axis 3 degrees    T Axis 0 degrees           Assessment:       Diagnosis Orders   1. Anemia, unspecified type  CBC with Auto Differential      2.  Gastroesophageal reflux disease without esophagitis  Basic Metabolic Panel    Iron and TIBC            Orders Placed This Encounter   Procedures    CBC with Auto Differential     Standing Status:   Future     Standing Expiration Date:   5/86/6730    Basic Metabolic Panel     Standing Status:   Future     Standing Expiration Date:   2/27/2024    Iron and TIBC     Standing Status:   Future     Standing Expiration Date:   2/27/2024         Plan:   Return for Based on test results. Patient Instructions   Pt will have labs done to verify normalization in post operative healing time     This visit ended at 6:25pm    The resources used for this visit were Epic for chart access and documentation and telephone conversation for patient input      Brian Lara M.D. An  electronic signature was used to authenticate this note. --Kary Gaitan MD on 2/27/2023 at 6:26 PM        This evaluation was performed through a synchronous (real-time) audio-video encounter. The patient (or guardian if applicable) is aware that this is a billable service, which includes applicable co-pays. This virtual visit was conducted with patient's (and/or legal guardians) consent. The visit was conducted pursuant to the emergency declaration under the [facility], 1135 waiver authority and the [employer] Supplemental Appropriation's Act. The patient identification was verified, and the caregiver was present when appropriate. The patient was located in the state where the provider was licensed to provide care.

## 2023-02-27 NOTE — TELEPHONE ENCOUNTER
Pt calling on Feb she had a fall at home broke Rt shoulder and Rt Leg. Rt leg was addressed at [facility] . Rt shoulder she goes today to  [address] [place] [address] . They changed a lot of her medications. VV scheduled tonight .  She can only talk just has use of one arm         MICHELLE

## 2023-02-28 DIAGNOSIS — K21.9 GASTROESOPHAGEAL REFLUX DISEASE WITHOUT ESOPHAGITIS: ICD-10-CM

## 2023-02-28 DIAGNOSIS — D64.9 ANEMIA, UNSPECIFIED TYPE: ICD-10-CM

## 2023-02-28 LAB
ANION GAP SERPL CALCULATED.3IONS-SCNC: 16 MEQ/L (ref 9–15)
BASOPHILS ABSOLUTE: 0.1 K/UL (ref 0–0.2)
BASOPHILS RELATIVE PERCENT: 1 %
BUN BLDV-MCNC: 11 MG/DL (ref 8–23)
CALCIUM SERPL-MCNC: 9.8 MG/DL (ref 8.5–9.9)
CHLORIDE BLD-SCNC: 105 MEQ/L (ref 95–107)
CO2: 23 MEQ/L (ref 20–31)
CREAT SERPL-MCNC: 0.57 MG/DL (ref 0.5–0.9)
EOSINOPHILS ABSOLUTE: 0.2 K/UL (ref 0–0.7)
EOSINOPHILS RELATIVE PERCENT: 4.2 %
GFR SERPL CREATININE-BSD FRML MDRD: >60 ML/MIN/{1.73_M2}
GLUCOSE BLD-MCNC: 130 MG/DL (ref 70–99)
HCT VFR BLD CALC: 31.4 % (ref 37–47)
HEMOGLOBIN: 10.2 G/DL (ref 12–16)
LYMPHOCYTES ABSOLUTE: 1.1 K/UL (ref 1–4.8)
LYMPHOCYTES RELATIVE PERCENT: 20 %
MCH RBC QN AUTO: 29.8 PG (ref 27–31.3)
MCHC RBC AUTO-ENTMCNC: 32.5 % (ref 33–37)
MCV RBC AUTO: 91.8 FL (ref 79.4–94.8)
MONOCYTES ABSOLUTE: 0.5 K/UL (ref 0.2–0.8)
MONOCYTES RELATIVE PERCENT: 9.7 %
NEUTROPHILS ABSOLUTE: 3.5 K/UL (ref 1.4–6.5)
NEUTROPHILS RELATIVE PERCENT: 65.1 %
PDW BLD-RTO: 17.2 % (ref 11.5–14.5)
PLATELET # BLD: 288 K/UL (ref 130–400)
POTASSIUM SERPL-SCNC: 4.1 MEQ/L (ref 3.4–4.9)
RBC # BLD: 3.42 M/UL (ref 4.2–5.4)
SODIUM BLD-SCNC: 144 MEQ/L (ref 135–144)
WBC # BLD: 5.4 K/UL (ref 4.8–10.8)

## 2023-03-01 LAB
IRON SATURATION: 12 % (ref 20–55)
IRON: 46 UG/DL (ref 37–145)
TOTAL IRON BINDING CAPACITY: 374 UG/DL (ref 250–450)
UNSATURATED IRON BINDING CAPACITY: 328 UG/DL (ref 112–347)

## 2023-03-02 DIAGNOSIS — D64.9 ANEMIA, UNSPECIFIED TYPE: Primary | ICD-10-CM

## 2023-03-02 RX ORDER — FERROUS SULFATE 325(65) MG
325 TABLET ORAL
Qty: 30 TABLET | Refills: 5 | Status: SHIPPED | OUTPATIENT
Start: 2023-03-02

## 2023-03-07 ENCOUNTER — OFFICE VISIT (OUTPATIENT)
Dept: FAMILY MEDICINE CLINIC | Age: 73
End: 2023-03-07
Payer: MEDICARE

## 2023-03-07 VITALS
SYSTOLIC BLOOD PRESSURE: 130 MMHG | HEIGHT: 62 IN | TEMPERATURE: 97.7 F | OXYGEN SATURATION: 95 % | BODY MASS INDEX: 23.78 KG/M2 | DIASTOLIC BLOOD PRESSURE: 74 MMHG | HEART RATE: 73 BPM

## 2023-03-07 DIAGNOSIS — F32.A ANXIETY AND DEPRESSION: Chronic | ICD-10-CM

## 2023-03-07 DIAGNOSIS — R19.5 LOOSE STOOLS: ICD-10-CM

## 2023-03-07 DIAGNOSIS — K21.9 GASTROESOPHAGEAL REFLUX DISEASE WITHOUT ESOPHAGITIS: ICD-10-CM

## 2023-03-07 DIAGNOSIS — D64.9 ANEMIA, UNSPECIFIED TYPE: ICD-10-CM

## 2023-03-07 DIAGNOSIS — F41.9 ANXIETY AND DEPRESSION: Chronic | ICD-10-CM

## 2023-03-07 DIAGNOSIS — G47.09 OTHER INSOMNIA: ICD-10-CM

## 2023-03-07 DIAGNOSIS — F32.1 CURRENT MODERATE EPISODE OF MAJOR DEPRESSIVE DISORDER, UNSPECIFIED WHETHER RECURRENT (HCC): ICD-10-CM

## 2023-03-07 DIAGNOSIS — D64.9 ANEMIA, UNSPECIFIED TYPE: Primary | ICD-10-CM

## 2023-03-07 DIAGNOSIS — L89.301 PRESSURE INJURY OF BUTTOCK, STAGE 1, UNSPECIFIED LATERALITY: ICD-10-CM

## 2023-03-07 LAB
ALBUMIN SERPL-MCNC: 4.4 G/DL (ref 3.5–4.6)
ALP BLD-CCNC: 136 U/L (ref 40–130)
ALT SERPL-CCNC: <5 U/L (ref 0–33)
ANION GAP SERPL CALCULATED.3IONS-SCNC: 13 MEQ/L (ref 9–15)
AST SERPL-CCNC: 16 U/L (ref 0–35)
BASOPHILS ABSOLUTE: 0.1 K/UL (ref 0–0.2)
BASOPHILS RELATIVE PERCENT: 1.2 %
BILIRUB SERPL-MCNC: 0.5 MG/DL (ref 0.2–0.7)
BUN BLDV-MCNC: 10 MG/DL (ref 8–23)
CALCIUM SERPL-MCNC: 10 MG/DL (ref 8.5–9.9)
CHLORIDE BLD-SCNC: 102 MEQ/L (ref 95–107)
CO2: 26 MEQ/L (ref 20–31)
CREAT SERPL-MCNC: 0.47 MG/DL (ref 0.5–0.9)
EOSINOPHILS ABSOLUTE: 0.1 K/UL (ref 0–0.7)
EOSINOPHILS RELATIVE PERCENT: 2.9 %
GFR SERPL CREATININE-BSD FRML MDRD: >60 ML/MIN/{1.73_M2}
GLOBULIN: 2.6 G/DL (ref 2.3–3.5)
GLUCOSE BLD-MCNC: 89 MG/DL (ref 70–99)
HCT VFR BLD CALC: 33.6 % (ref 37–47)
HEMOGLOBIN: 10.9 G/DL (ref 12–16)
LYMPHOCYTES ABSOLUTE: 1.2 K/UL (ref 1–4.8)
LYMPHOCYTES RELATIVE PERCENT: 25.1 %
MCH RBC QN AUTO: 29.3 PG (ref 27–31.3)
MCHC RBC AUTO-ENTMCNC: 32.3 % (ref 33–37)
MCV RBC AUTO: 90.6 FL (ref 79.4–94.8)
MONOCYTES ABSOLUTE: 0.4 K/UL (ref 0.2–0.8)
MONOCYTES RELATIVE PERCENT: 9.2 %
NEUTROPHILS ABSOLUTE: 3 K/UL (ref 1.4–6.5)
NEUTROPHILS RELATIVE PERCENT: 61.6 %
PDW BLD-RTO: 16.9 % (ref 11.5–14.5)
PLATELET # BLD: 202 K/UL (ref 130–400)
POTASSIUM SERPL-SCNC: 4.3 MEQ/L (ref 3.4–4.9)
RBC # BLD: 3.71 M/UL (ref 4.2–5.4)
SODIUM BLD-SCNC: 141 MEQ/L (ref 135–144)
TOTAL PROTEIN: 7 G/DL (ref 6.3–8)
WBC # BLD: 4.8 K/UL (ref 4.8–10.8)

## 2023-03-07 PROCEDURE — 99215 OFFICE O/P EST HI 40 MIN: CPT | Performed by: FAMILY MEDICINE

## 2023-03-07 PROCEDURE — 1123F ACP DISCUSS/DSCN MKR DOCD: CPT | Performed by: FAMILY MEDICINE

## 2023-03-07 RX ORDER — PAROXETINE 10 MG/1
20 TABLET, FILM COATED ORAL DAILY
Qty: 90 TABLET | Refills: 4 | Status: SHIPPED | OUTPATIENT
Start: 2023-03-07

## 2023-03-07 RX ORDER — ACETAMINOPHEN 325 MG/1
TABLET ORAL EVERY 4 HOURS
COMMUNITY
Start: 2023-02-15

## 2023-03-07 RX ORDER — METHOCARBAMOL 750 MG/1
TABLET, FILM COATED ORAL
COMMUNITY
Start: 2023-02-22

## 2023-03-07 RX ORDER — CHOLECALCIFEROL (VITAMIN D3) 125 MCG
5 CAPSULE ORAL DAILY
Qty: 1 TABLET | Refills: 0
Start: 2023-03-07

## 2023-03-07 SDOH — ECONOMIC STABILITY: INCOME INSECURITY: HOW HARD IS IT FOR YOU TO PAY FOR THE VERY BASICS LIKE FOOD, HOUSING, MEDICAL CARE, AND HEATING?: NOT HARD AT ALL

## 2023-03-07 SDOH — ECONOMIC STABILITY: FOOD INSECURITY: WITHIN THE PAST 12 MONTHS, YOU WORRIED THAT YOUR FOOD WOULD RUN OUT BEFORE YOU GOT MONEY TO BUY MORE.: NEVER TRUE

## 2023-03-07 SDOH — ECONOMIC STABILITY: HOUSING INSECURITY
IN THE LAST 12 MONTHS, WAS THERE A TIME WHEN YOU DID NOT HAVE A STEADY PLACE TO SLEEP OR SLEPT IN A SHELTER (INCLUDING NOW)?: NO

## 2023-03-07 SDOH — ECONOMIC STABILITY: FOOD INSECURITY: WITHIN THE PAST 12 MONTHS, THE FOOD YOU BOUGHT JUST DIDN'T LAST AND YOU DIDN'T HAVE MONEY TO GET MORE.: NEVER TRUE

## 2023-03-07 ASSESSMENT — PATIENT HEALTH QUESTIONNAIRE - PHQ9
7. TROUBLE CONCENTRATING ON THINGS, SUCH AS READING THE NEWSPAPER OR WATCHING TELEVISION: 0
10. IF YOU CHECKED OFF ANY PROBLEMS, HOW DIFFICULT HAVE THESE PROBLEMS MADE IT FOR YOU TO DO YOUR WORK, TAKE CARE OF THINGS AT HOME, OR GET ALONG WITH OTHER PEOPLE: 0
6. FEELING BAD ABOUT YOURSELF - OR THAT YOU ARE A FAILURE OR HAVE LET YOURSELF OR YOUR FAMILY DOWN: 0
5. POOR APPETITE OR OVEREATING: 0
8. MOVING OR SPEAKING SO SLOWLY THAT OTHER PEOPLE COULD HAVE NOTICED. OR THE OPPOSITE, BEING SO FIGETY OR RESTLESS THAT YOU HAVE BEEN MOVING AROUND A LOT MORE THAN USUAL: 0
4. FEELING TIRED OR HAVING LITTLE ENERGY: 0
3. TROUBLE FALLING OR STAYING ASLEEP: 0
SUM OF ALL RESPONSES TO PHQ9 QUESTIONS 1 & 2: 0
SUM OF ALL RESPONSES TO PHQ QUESTIONS 1-9: 0
9. THOUGHTS THAT YOU WOULD BE BETTER OFF DEAD, OR OF HURTING YOURSELF: 0
2. FEELING DOWN, DEPRESSED OR HOPELESS: 0
SUM OF ALL RESPONSES TO PHQ QUESTIONS 1-9: 0
1. LITTLE INTEREST OR PLEASURE IN DOING THINGS: 0

## 2023-03-07 ASSESSMENT — ENCOUNTER SYMPTOMS
CHEST TIGHTNESS: 0
ABDOMINAL PAIN: 0
ABDOMINAL DISTENTION: 0
PHOTOPHOBIA: 0
SHORTNESS OF BREATH: 0

## 2023-03-07 NOTE — PROGRESS NOTES
Diagnosis Orders   1. Anemia, unspecified type  CBC with Auto Differential    Vitamin B12 & Folate      2. Gastroesophageal reflux disease without esophagitis  Comprehensive Metabolic Panel      3. Loose stools  Clostridium Difficile Toxin/Antigen    O&P SCREEN(CRYPTOSPORIDIUM/GIARDIA/E. HISTOLYTICA) #1    CBC with Auto Differential    Comprehensive Metabolic Panel      4. Anxiety and depression  PARoxetine (PAXIL) 10 MG tablet    Stable and well-controlled on Paxil which she will take indefinitely      5. Current moderate episode of major depressive disorder, unspecified whether recurrent (HCC)        6. Other insomnia  melatonin 5 MG TABS tablet      7. Pressure injury of buttock, stage 1, unspecified laterality          Return for with specialist.  Patient Instructions   Clearance for shoulder surgery will be pended at the moment. Laboratory are stable regarding kidney function, anemia etc.  However waiting on stool samples    Patient will come in for nurse visit when due for next vitamin B12 shot which will be approximately in the mid 25s of March. Recommend diaper cream with zinc in it for skin breakdown of the buttocks. Loose stool/diarrhea will have stool studies done. Patient's been hospitalized a few times she may be a risk for C. difficile. We will pend approval of this case for surgery until I see the result from stool cultures    We will recheck blood counts B12 folate for history of anemia. Patient will restart Paxil. We will initiate 10 mg tablets once a day for 1 week and then she can increase to 2 tablets daily thereafter. Subjective:      Patient ID: Taty Russ is a 67 y.o. female who presents for:  Chief Complaint   Patient presents with    Discuss Medications    Other     Pt post op leg surgery. Patient here for surgical clearance for total shoulder replacement. She states she was sent for medical clearance.   We have reviewed her recent hemoglobins and kidney function. She has been getting treatment for the anemia. Denies any bloody stools. Denies any hematuria. Or other sources of bleeding. Has stated she has had watery stools off and on for years now. Son states the stools have a terrible odor to them. She denies any fevers. She does have abdominal discomfort. States the leg seems to be healing pretty well from her surgical repair from her fracture. She was given a cream for her rash she has in her groin. Not sure if she has any problems on her back and. Does not feel sore. Patient is noting increased emotional agitation. Trouble sleeping. She would like to restart her Paxil. She does not understand why they stopped it at the hospital      Current Outpatient Medications on File Prior to Visit   Medication Sig Dispense Refill    acetaminophen (TYLENOL) 325 MG tablet Take by mouth every 4 hours      ferrous sulfate (IRON 325) 325 (65 Fe) MG tablet Take 1 tablet by mouth daily (with breakfast) 30 tablet 5    omeprazole (PRILOSEC) 20 MG delayed release capsule Take 1 capsule by mouth daily 90 capsule 1    lidocaine (LMX) 4 % cream Apply a half dollar sized amount to intact skin topically up to twice daily as needed for pain 45 g 1    FIBER, GUAR GUM, PO Take 2 each by mouth in the morning and at bedtime      atorvastatin (LIPITOR) 20 MG tablet Take 1 tablet by mouth daily 90 tablet 4    methocarbamol (ROBAXIN) 750 MG tablet Daily      levothyroxine (SYNTHROID) 75 MCG tablet Take 1 tablet by mouth daily (Patient not taking: Reported on 3/7/2023) 90 tablet 4    SUMAtriptan (IMITREX) 50 MG tablet Take 1 tablet by mouth once as needed for Migraine (Patient not taking: Reported on 3/7/2023) 9 tablet 0     No current facility-administered medications on file prior to visit.      Past Medical History:   Diagnosis Date    Anxiety and depression 05/02/2019    Anxiety and depression     Chest pain 03/01/2019    Diverticulosis of colon (without mention of hemorrhage) 02/25/2015    DR BURNETTE    Focal lymphocytic colitis 01/27/2022    Gastroesophageal reflux disease without esophagitis     GI problem     Hyperlipidemia 02/03/2015    Hypothyroidism     Lichen sclerosus 89/2328    Migraines 02/03/2015    Renal insufficiency 02/03/2015    Spondylosis of lumbar region without myelopathy or radiculopathy 05/24/2016     Past Surgical History:   Procedure Laterality Date    ABDOMINAL ADHESION SURGERY  1/7/16    DR. MERAZ    APPENDECTOMY  2012    CATARACT REMOVAL Right     CHOLECYSTECTOMY  2012    COLON SURGERY  2005    13\" of colon removed-NO CA    COLONOSCOPY  2011    polyps    COLONOSCOPY  02/25/2015    DR Buzz Dupree - DIVERTICULOSIS    COLONOSCOPY N/A 3/4/2022    COLONOSCOPY DIAGNOSTIC performed by Al Mix MD at [facility]  2013    OK COLONOSCOPY FLX DX W/COLLJ SPEC WHEN PFRMD N/A 1/26/2018    COLONOSCOPY performed by Lico Mart MD at [facility] ESOPHAGOGASTRODUODENOSCOPY TRANSORAL DIAGNOSTIC N/A 1/26/2018    EGD ESOPHAGOGASTRODUODENOSCOPY WITH DILATION performed by Lico Mart MD at [facility] N/A 3/4/2022    EGD ESOPHAGOGASTRODUODENOSCOPY performed by Al Mix MD at [facility] History     Socioeconomic History    Marital status: Single     Spouse name: Not on file    Number of children: Not on file    Years of education: Not on file    Highest education level: Not on file   Occupational History    Not on file   Tobacco Use    Smoking status: Never    Smokeless tobacco: Never   Vaping Use    Vaping Use: Never used   Substance and Sexual Activity    Alcohol use: No     Alcohol/week: 0.0 standard drinks    Drug use: No    Sexual activity: Not Currently   Other Topics Concern    Not on file   Social History Narrative    Not on file     Social Determinants of Health     Financial Resource Strain: Low Risk     Difficulty of Paying Living Expenses: Not hard at all   Food Insecurity: No Food Insecurity    Worried About [address] in the Last Year: Never true    Ran Out of Food in the Last Year: Never true   Transportation Needs: Unknown    Lack of Transportation (Medical): Not on file    Lack of Transportation (Non-Medical): No   Physical Activity: Not on file   Stress: Not on file   Social Connections: Not on file   Intimate Partner Violence: Not on file   Housing Stability: Unknown    Unable to Pay for Housing in the Last Year: Not on file    Number of Places Lived in the Last Year: Not on file    Unstable Housing in the Last Year: No     Family History   Problem Relation Age of Onset    Other Mother         Bowel Obstruction    High Blood Pressure Mother     High Blood Pressure Father     Cancer Father     Arthritis Father     Coronary Art Dis Father     Heart Disease Father     Stroke Father     Diabetes Father     Stomach Cancer Father     Colon Cancer Neg Hx      Allergies:  Bactrim [sulfamethoxazole-trimethoprim] and Ciprofloxacin    Review of Systems   Constitutional:  Negative for activity change, appetite change, diaphoresis and unexpected weight change. Eyes:  Negative for photophobia and visual disturbance. Respiratory:  Negative for chest tightness and shortness of breath. No orthopnea   Cardiovascular:  Negative for chest pain, palpitations and leg swelling. Gastrointestinal:  Negative for abdominal distention and abdominal pain. Genitourinary:  Negative for flank pain and frequency. Musculoskeletal:  Negative for gait problem and joint swelling. Neurological:  Negative for dizziness, weakness, light-headedness and headaches. Psychiatric/Behavioral:  Positive for dysphoric mood. Negative for confusion and sleep disturbance. The patient is not nervous/anxious.       Objective:   /74   Pulse 73   Temp 97.7 °F (36.5 °C)   Ht 5' 2\" (1.575 m)   LMP  (LMP Unknown)   SpO2 95%   BMI 23.78 kg/m² Physical Exam  Constitutional:       General: She is not in acute distress. Appearance: She is well-developed. She is not diaphoretic. HENT:      Head: Normocephalic and atraumatic. Right Ear: External ear normal.      Left Ear: External ear normal.      Nose: Nose normal.   Eyes:      General:         Right eye: No discharge. Left eye: No discharge. Conjunctiva/sclera: Conjunctivae normal.      Pupils: Pupils are equal, round, and reactive to light. Neck:      Thyroid: No thyromegaly. Trachea: No tracheal deviation. Cardiovascular:      Rate and Rhythm: Normal rate and regular rhythm. Heart sounds: Normal heart sounds. Pulmonary:      Effort: Pulmonary effort is normal. No respiratory distress. Breath sounds: Normal breath sounds. Abdominal:      General: There is no distension. Musculoskeletal:      Cervical back: Neck supple. Comments: RUE in sling   Skin:     General: Skin is warm and dry. Findings: No bruising or rash. Comments: Incisions on right lower extremity demonstrate good healing. Mild bruising noted. No erythema or induration. Buttock reveals no inflammation. Small amount of skin breakdown noted bilateral buttock at the crease of the thigh to the buttock. Minimally erythematous with mild skin peeling   Neurological:      Mental Status: She is alert. Coordination: Coordination normal.   Psychiatric:         Thought Content: Thought content normal.         Judgment: Judgment normal.       No results found for this visit on 03/07/23.     Recent Results (from the past 2016 hour(s))   Human papillomavirus (HPV) DNA probe thin prep high risk    Collection Time: 12/20/22  8:01 PM   Result Value Ref Range    HPV Genotype 16 Not Detected Not Detected    HPV Type 18 Not Detected Not Detected    HPVOH (OTHER TYPES) Not Detected Not Detected    HPV Comment See below    Protime-INR    Collection Time: 02/07/23  2:30 PM   Result Value Ref Range    Protime 14.0 12.3 - 14.9 sec    INR 1.1    APTT    Collection Time: 02/07/23  2:30 PM   Result Value Ref Range    aPTT 24.3 (L) 24.4 - 36.8 sec   CK    Collection Time: 02/07/23  2:30 PM   Result Value Ref Range    Total CK 68 0 - 170 U/L   CBC with Auto Differential    Collection Time: 02/07/23  2:30 PM   Result Value Ref Range    WBC 10.9 (H) 4.8 - 10.8 K/uL    RBC 4.03 (L) 4.20 - 5.40 M/uL    Hemoglobin 11.6 (L) 12.0 - 16.0 g/dL    Hematocrit 35.7 (L) 37.0 - 47.0 %    MCV 88.7 79.4 - 94.8 fL    MCH 28.9 27.0 - 31.3 pg    MCHC 32.5 (L) 33.0 - 37.0 %    RDW 14.1 11.5 - 14.5 %    Platelets 393 451 - 084 K/uL    Neutrophils % 69.8 %    Lymphocytes % 22.1 %    Monocytes % 6.6 %    Eosinophils % 0.8 %    Basophils % 0.7 %    Neutrophils Absolute 7.6 (H) 1.4 - 6.5 K/uL    Lymphocytes Absolute 2.4 1.0 - 4.8 K/uL    Monocytes Absolute 0.7 0.2 - 0.8 K/uL    Eosinophils Absolute 0.1 0.0 - 0.7 K/uL    Basophils Absolute 0.1 0.0 - 0.2 K/uL   Magnesium    Collection Time: 02/07/23  2:30 PM   Result Value Ref Range    Magnesium 2.2 1.7 - 2.4 mg/dL   Comprehensive Metabolic Panel    Collection Time: 02/07/23  2:30 PM   Result Value Ref Range    Sodium 137 135 - 144 mEq/L    Potassium 3.3 (L) 3.4 - 4.9 mEq/L    Chloride 99 95 - 107 mEq/L    CO2 26 20 - 31 mEq/L    Anion Gap 12 9 - 15 mEq/L    Glucose 130 (H) 70 - 99 mg/dL    BUN 20 8 - 23 mg/dL    Creatinine 0.76 0.50 - 0.90 mg/dL    Est, Glom Filt Rate >60.0 >60    Calcium 9.1 8.5 - 9.9 mg/dL    Total Protein 6.4 6.3 - 8.0 g/dL    Albumin 4.4 3.5 - 4.6 g/dL    Total Bilirubin 0.4 0.2 - 0.7 mg/dL    Alkaline Phosphatase 63 40 - 130 U/L    ALT 10 0 - 33 U/L    AST 14 0 - 35 U/L    Globulin 2.0 (L) 2.3 - 3.5 g/dL   Lactic Acid    Collection Time: 02/07/23  2:30 PM   Result Value Ref Range    Lactic Acid 3.0 (HH) 0.5 - 2.2 mmol/L   Troponin    Collection Time: 02/07/23  2:30 PM   Result Value Ref Range    Troponin <0.010 0.000 - 0.010 ng/mL   Ethanol    Collection Time: 02/07/23  2:30 PM   Result Value Ref Range    Ethanol Lvl <10 mg/dL    Ethanol percent Not indicated G/dL   EKG 12 Lead    Collection Time: 02/07/23  2:38 PM   Result Value Ref Range    Ventricular Rate 68 BPM    Atrial Rate 68 BPM    P-R Interval 142 ms    QRS Duration 80 ms    Q-T Interval 408 ms    QTc Calculation (Bazett) 433 ms    P Axis 30 degrees    R Axis 3 degrees    T Axis 0 degrees   Iron and TIBC    Collection Time: 02/28/23  1:06 PM   Result Value Ref Range    Iron 46 37 - 145 ug/dL    TIBC 374 250 - 450 ug/dL    Iron Saturation 12 (L) 20 - 55 %    UIBC 328 112 - 347 ug/dL   Basic Metabolic Panel    Collection Time: 02/28/23  1:06 PM   Result Value Ref Range    Sodium 144 135 - 144 mEq/L    Potassium 4.1 3.4 - 4.9 mEq/L    Chloride 105 95 - 107 mEq/L    CO2 23 20 - 31 mEq/L    Anion Gap 16 (H) 9 - 15 mEq/L    Glucose 130 (H) 70 - 99 mg/dL    BUN 11 8 - 23 mg/dL    Creatinine 0.57 0.50 - 0.90 mg/dL    Est, Glom Filt Rate >60.0 >60    Calcium 9.8 8.5 - 9.9 mg/dL   CBC with Auto Differential    Collection Time: 02/28/23  1:06 PM   Result Value Ref Range    WBC 5.4 4.8 - 10.8 K/uL    RBC 3.42 (L) 4.20 - 5.40 M/uL    Hemoglobin 10.2 (L) 12.0 - 16.0 g/dL    Hematocrit 31.4 (L) 37.0 - 47.0 %    MCV 91.8 79.4 - 94.8 fL    MCH 29.8 27.0 - 31.3 pg    MCHC 32.5 (L) 33.0 - 37.0 %    RDW 17.2 (H) 11.5 - 14.5 %    Platelets 288 130 - 400 K/uL    Neutrophils % 65.1 %    Lymphocytes % 20.0 %    Monocytes % 9.7 %    Eosinophils % 4.2 %    Basophils % 1.0 %    Neutrophils Absolute 3.5 1.4 - 6.5 K/uL    Lymphocytes Absolute 1.1 1.0 - 4.8 K/uL    Monocytes Absolute 0.5 0.2 - 0.8 K/uL    Eosinophils Absolute 0.2 0.0 - 0.7 K/uL    Basophils Absolute 0.1 0.0 - 0.2 K/uL   Vitamin B12 & Folate    Collection Time: 03/07/23  3:16 PM   Result Value Ref Range    Vitamin B-12 976 232 - 1245 pg/mL    Folate 16.0 >4.8 ng/mL   Comprehensive Metabolic Panel    Collection Time: 03/07/23  3:16 PM   Result Value Ref Range    Sodium 141 135  - 144 mEq/L    Potassium 4.3 3.4 - 4.9 mEq/L    Chloride 102 95 - 107 mEq/L    CO2 26 20 - 31 mEq/L    Anion Gap 13 9 - 15 mEq/L    Glucose 89 70 - 99 mg/dL    BUN 10 8 - 23 mg/dL    Creatinine 0.47 (L) 0.50 - 0.90 mg/dL    Est, Glom Filt Rate >60.0 >60    Calcium 10.0 (H) 8.5 - 9.9 mg/dL    Total Protein 7.0 6.3 - 8.0 g/dL    Albumin 4.4 3.5 - 4.6 g/dL    Total Bilirubin 0.5 0.2 - 0.7 mg/dL    Alkaline Phosphatase 136 (H) 40 - 130 U/L    ALT <5 0 - 33 U/L    AST 16 0 - 35 U/L    Globulin 2.6 2.3 - 3.5 g/dL   CBC with Auto Differential    Collection Time: 03/07/23  3:16 PM   Result Value Ref Range    WBC 4.8 4.8 - 10.8 K/uL    RBC 3.71 (L) 4.20 - 5.40 M/uL    Hemoglobin 10.9 (L) 12.0 - 16.0 g/dL    Hematocrit 33.6 (L) 37.0 - 47.0 %    MCV 90.6 79.4 - 94.8 fL    MCH 29.3 27.0 - 31.3 pg    MCHC 32.3 (L) 33.0 - 37.0 %    RDW 16.9 (H) 11.5 - 14.5 %    Platelets 463 563 - 336 K/uL    Neutrophils % 61.6 %    Lymphocytes % 25.1 %    Monocytes % 9.2 %    Eosinophils % 2.9 %    Basophils % 1.2 %    Neutrophils Absolute 3.0 1.4 - 6.5 K/uL    Lymphocytes Absolute 1.2 1.0 - 4.8 K/uL    Monocytes Absolute 0.4 0.2 - 0.8 K/uL    Eosinophils Absolute 0.1 0.0 - 0.7 K/uL    Basophils Absolute 0.1 0.0 - 0.2 K/uL       [x] Pt was seen by provider for  45    Minutes  Counseling and coordination of care was done for all assessment diagnosis listed for today with patient and any family/friend present. More than 50% of this visit was spent coordinating current care, obtaining information for prior records, and counseling for current plan of action. Assessment:       Diagnosis Orders   1. Anemia, unspecified type  CBC with Auto Differential    Vitamin B12 & Folate      2. Gastroesophageal reflux disease without esophagitis  Comprehensive Metabolic Panel      3. Loose stools  Clostridium Difficile Toxin/Antigen    O&P SCREEN(CRYPTOSPORIDIUM/GIARDIA/E. HISTOLYTICA) #1    CBC with Auto Differential    Comprehensive Metabolic Panel      4. Anxiety and depression  PARoxetine (PAXIL) 10 MG tablet    Stable and well-controlled on Paxil which she will take indefinitely      5. Current moderate episode of major depressive disorder, unspecified whether recurrent (HCC)        6. Other insomnia  melatonin 5 MG TABS tablet      7. Pressure injury of buttock, stage 1, unspecified laterality              Orders Placed This Encounter   Procedures    Clostridium Difficile Toxin/Antigen     Standing Status:   Future     Standing Expiration Date:   3/6/2024    O&P SCREEN(CRYPTOSPORIDIUM/GIARDIA/E.HISTOLYTICA) #1     Standing Status:   Future     Standing Expiration Date:   3/7/2024    CBC with Auto Differential     Standing Status:   Future     Number of Occurrences:   1     Standing Expiration Date:   3/7/2024    Comprehensive Metabolic Panel     Standing Status:   Future     Number of Occurrences:   1     Standing Expiration Date:   3/7/2024    Vitamin B12 & Folate     Standing Status:   Future     Number of Occurrences:   1     Standing Expiration Date:   3/7/2024       Orders Placed This Encounter   Medications    PARoxetine (PAXIL) 10 MG tablet     Sig: Take 2 tablets by mouth daily     Dispense:  90 tablet     Refill:  4    melatonin 5 MG TABS tablet     Sig: Take 1 tablet by mouth daily     Dispense:  1 tablet     Refill:  0          Medication List            Accurate as of March 7, 2023 11:59 PM. If you have any questions, ask your nurse or doctor.                CONTINUE taking these medications      acetaminophen 325 MG tablet  Commonly known as: TYLENOL     atorvastatin 20 MG tablet  Commonly known as: LIPITOR  Take 1 tablet by mouth daily     ferrous sulfate 325 (65 Fe) MG tablet  Commonly known as: IRON 325  Take 1 tablet by mouth daily (with breakfast)     FIBER (GUAR GUM) PO     levothyroxine 75 MCG tablet  Commonly known as: SYNTHROID  Take 1 tablet by mouth daily     lidocaine 4 % cream  Commonly known as: LMX  Apply a half dollar sized  amount to intact skin topically up to twice daily as needed for pain     melatonin 5 MG Tabs tablet  Take 1 tablet by mouth daily     methocarbamol 750 MG tablet  Commonly known as: ROBAXIN     omeprazole 20 MG delayed release capsule  Commonly known as: PRILOSEC  Take 1 capsule by mouth daily     PARoxetine 10 MG tablet  Commonly known as: PAXIL  Take 2 tablets by mouth daily     SUMAtriptan 50 MG tablet  Commonly known as: Imitrex  Take 1 tablet by mouth once as needed for Migraine               Where to Get Your Medications        These medications were sent to [address] [place] [address] [address] [place] [address] [address]  [address]      Phone: [phone]   PARoxetine 10 MG tablet       Information about where to get these medications is not yet available    Ask your nurse or doctor about these medications  melatonin 5 MG Tabs tablet           Plan:   Return for with specialist.    Patient Instructions   Clearance for shoulder surgery will be pended at the moment. Laboratory are stable regarding kidney function, anemia etc.  However waiting on stool samples    Patient will come in for nurse visit when due for next vitamin B12 shot which will be approximately in the mid 25s of March. Recommend diaper cream with zinc in it for skin breakdown of the buttocks. Loose stool/diarrhea will have stool studies done. Patient's been hospitalized a few times she may be a risk for C. difficile. We will pend approval of this case for surgery until I see the result from stool cultures    We will recheck blood counts B12 folate for history of anemia. Patient will restart Paxil. We will initiate 10 mg tablets once a day for 1 week and then she can increase to 2 tablets daily thereafter. This note was partially created with the assistance of dictation. This may lead to grammatical or spelling errors. Brian Ny M.D.

## 2023-03-07 NOTE — PATIENT INSTRUCTIONS
Clearance for shoulder surgery will be pended at the moment. Laboratory are stable regarding kidney function, anemia etc.  However waiting on stool samples    Patient will come in for nurse visit when due for next vitamin B12 shot which will be approximately in the mid 25s of March. Recommend diaper cream with zinc in it for skin breakdown of the buttocks. Loose stool/diarrhea will have stool studies done. Patient's been hospitalized a few times she may be a risk for C. difficile. We will pend approval of this case for surgery until I see the result from stool cultures    We will recheck blood counts B12 folate for history of anemia. Patient will restart Paxil. We will initiate 10 mg tablets once a day for 1 week and then she can increase to 2 tablets daily thereafter.

## 2023-03-08 DIAGNOSIS — R19.5 LOOSE STOOLS: ICD-10-CM

## 2023-03-08 LAB
FOLATE: 16 NG/ML
VITAMIN B-12: 976 PG/ML (ref 232–1245)

## 2023-03-09 LAB
C DIFF TOXIN/ANTIGEN: NORMAL
CRYPTOSPORIDIUM ANTIGEN STOOL: NORMAL
GIARDIA ANTIGEN STOOL: NORMAL

## 2023-03-10 ENCOUNTER — TELEPHONE (OUTPATIENT)
Dept: FAMILY MEDICINE CLINIC | Age: 73
End: 2023-03-10

## 2023-03-10 NOTE — TELEPHONE ENCOUNTER
Pt asking if she is supposed to stop taking the Vit D, COQ10, B12 and Dicyclomine? She just wants to make sure she is taking the correct things since hospital messed up on so many things.     Pt # [phone]

## 2023-03-13 NOTE — TELEPHONE ENCOUNTER
Pt aware. States the iron tablets is making her nauseated. Pt states she has tried taking it with crackers and dry toast and making her dry heave. Not sure what to do?     Ph, [phone]

## 2023-03-13 NOTE — TELEPHONE ENCOUNTER
Vitamin D, co-Q10, B12 can all be continued as supplements. The dicyclomine is for abdominal spasm related to irritable bowel and potential diarrhea.   If she still has a symptoms she can continue to take that as needed following the instructions

## 2023-03-23 ENCOUNTER — HOSPITAL ENCOUNTER (OUTPATIENT)
Dept: PREADMISSION TESTING | Age: 73
Discharge: HOME | End: 2023-03-27
Payer: MEDICARE

## 2023-03-23 VITALS
OXYGEN SATURATION: 98 % | WEIGHT: 135.8 LBS | BODY MASS INDEX: 24.99 KG/M2 | DIASTOLIC BLOOD PRESSURE: 87 MMHG | HEIGHT: 62 IN | HEART RATE: 81 BPM | SYSTOLIC BLOOD PRESSURE: 142 MMHG | TEMPERATURE: 98.4 F | RESPIRATION RATE: 16 BRPM

## 2023-03-23 LAB
ABO + RH BLD: NORMAL
APTT PPP: 29.5 SEC (ref 24.4–36.8)
BACTERIA URNS QL MICRO: NEGATIVE /HPF
BASOPHILS # BLD: 0 K/UL (ref 0–0.2)
BASOPHILS NFR BLD: 0.7 %
BILIRUB UR QL STRIP: NEGATIVE
BLD GP AB SCN SERPL QL: NORMAL
CLARITY UR: CLEAR
COLOR UR: YELLOW
EOSINOPHIL # BLD: 0.1 K/UL (ref 0–0.7)
EOSINOPHIL NFR BLD: 2.5 %
EPI CELLS #/AREA URNS AUTO: ABNORMAL /HPF (ref 0–5)
ERYTHROCYTE [DISTWIDTH] IN BLOOD BY AUTOMATED COUNT: 15.5 % (ref 11.5–14.5)
GLUCOSE UR STRIP-MCNC: NEGATIVE MG/DL
HCT VFR BLD AUTO: 37.4 % (ref 37–47)
HGB BLD-MCNC: 12 G/DL (ref 12–16)
HGB UR QL STRIP: NEGATIVE
HYALINE CASTS #/AREA URNS AUTO: ABNORMAL /HPF (ref 0–5)
INR PPP: 1
KETONES UR STRIP-MCNC: NEGATIVE MG/DL
LEUKOCYTE ESTERASE UR QL STRIP: ABNORMAL
LYMPHOCYTES # BLD: 1.3 K/UL (ref 1–4.8)
LYMPHOCYTES NFR BLD: 24.3 %
MCH RBC QN AUTO: 28.7 PG (ref 27–31.3)
MCHC RBC AUTO-ENTMCNC: 32.1 % (ref 33–37)
MCV RBC AUTO: 89.4 FL (ref 79.4–94.8)
MONOCYTES # BLD: 0.4 K/UL (ref 0.2–0.8)
MONOCYTES NFR BLD: 8.6 %
NEUTROPHILS # BLD: 3.3 K/UL (ref 1.4–6.5)
NEUTS SEG NFR BLD: 63.9 %
NITRITE UR QL STRIP: NEGATIVE
PH UR STRIP: 6.5 [PH] (ref 5–9)
PLATELET # BLD AUTO: 214 K/UL (ref 130–400)
PROT UR STRIP-MCNC: NEGATIVE MG/DL
PROTHROMBIN TIME: 13.1 SEC (ref 12.3–14.9)
RBC # BLD AUTO: 4.18 M/UL (ref 4.2–5.4)
RBC #/AREA URNS AUTO: ABNORMAL /HPF (ref 0–5)
SP GR UR STRIP: 1.01 (ref 1–1.03)
URINE REFLEX TO CULTURE: ABNORMAL
UROBILINOGEN UR STRIP-ACNC: 0.2 E.U./DL
WBC # BLD AUTO: 5.2 K/UL (ref 4.8–10.8)
WBC #/AREA URNS AUTO: ABNORMAL /HPF (ref 0–5)

## 2023-03-23 PROCEDURE — 86901 BLOOD TYPING SEROLOGIC RH(D): CPT

## 2023-03-23 PROCEDURE — 85025 COMPLETE CBC W/AUTO DIFF WBC: CPT

## 2023-03-23 PROCEDURE — 86850 RBC ANTIBODY SCREEN: CPT

## 2023-03-23 PROCEDURE — 85610 PROTHROMBIN TIME: CPT

## 2023-03-23 PROCEDURE — 87641 MR-STAPH DNA AMP PROBE: CPT

## 2023-03-23 PROCEDURE — 81001 URINALYSIS AUTO W/SCOPE: CPT

## 2023-03-23 PROCEDURE — 86900 BLOOD TYPING SEROLOGIC ABO: CPT

## 2023-03-23 PROCEDURE — 85730 THROMBOPLASTIN TIME PARTIAL: CPT

## 2023-03-23 RX ORDER — CEFAZOLIN SODIUM IN 0.9 % NACL 2 G/100 ML
2000 PLASTIC BAG, INJECTION (ML) INTRAVENOUS
Status: CANCELLED | OUTPATIENT
Start: 2023-03-30 | End: 2023-03-30

## 2023-03-25 LAB
MRSA, DNA, NASAL: ABNORMAL
SPECIMEN DESCRIPTION: ABNORMAL

## 2023-03-28 NOTE — DISCHARGE INSTRUCTIONS
Total Shoulder Replacement  Discharge Instructions    Surgical Site Care:    Change dressing once a day and PRN (as needed). Apply 4 x 4 sponge and light tape. If glue present, leave open to air. You may leave wound open to air after initial dressing removal, if wound is clean, dry and intact  If Aquacel Ag dressing is present, do not remove dressing for 7 days, unless heavily saturated. If heavily saturated, remove dressing and start using instructions above  Staples will be removed on post-operative day 14 and steri-strips applied  Showering is permitted starting POD1 if waterproof Aquacel dressing is present or when the incision is covered with 4 x 4 and Tegaderm waterproof dressing  Until all areas of incision are healed. Physical Therapy:  Weight Bearing Status:  NWB ( none weight bearing)  No ROM of Shoulder  Active and passive range of motion of elbow, wrist, hand  Per Physical Therapy handout  Sling to be applied at all times when out of bed. Ok to remove sling  for hygiene and when awake in bed with support  Pain Medications  You were given oxycodone/acetaminophen (Percocet)  Wean off pain medications as you deem appropriate as long as pain is under control  Contact the [facility] if you notice any reactions to the medication or need a refill  Cold packs/Ice packs/Machine  May be used 3 times daily for 15-30 minutes as necessary  Be sure to have a barrier (cloth, clothing, towel) between the site and the ice pack to prevent frostbite  Contact the [facility] office if  Increased redness, swelling, drainage of any kind, and/or pain to surgery site. As well as new onset fevers and or chills. These could signify an infection. Arm tenderness to touch as well as increased swelling or redness. This could signify a clot formation. Numbness or tingling to an area around the incision site or below the incision site (fingers).   Any rash appears, increased  or new onset nausea/vomiting

## 2023-03-30 ENCOUNTER — HOSPITAL ENCOUNTER (OUTPATIENT)
Age: 73
Setting detail: OBSERVATION
Discharge: HOME HEALTH | End: 2023-03-31
Attending: STUDENT IN AN ORGANIZED HEALTH CARE EDUCATION/TRAINING PROGRAM | Admitting: STUDENT IN AN ORGANIZED HEALTH CARE EDUCATION/TRAINING PROGRAM
Payer: MEDICARE

## 2023-03-30 ENCOUNTER — ANESTHESIA (OUTPATIENT)
Dept: OPERATING ROOM | Age: 73
End: 2023-03-30
Payer: MEDICARE

## 2023-03-30 ENCOUNTER — APPOINTMENT (OUTPATIENT)
Dept: GENERAL RADIOLOGY | Age: 73
End: 2023-03-30
Attending: STUDENT IN AN ORGANIZED HEALTH CARE EDUCATION/TRAINING PROGRAM
Payer: MEDICARE

## 2023-03-30 ENCOUNTER — ANESTHESIA EVENT (OUTPATIENT)
Dept: OPERATING ROOM | Age: 73
End: 2023-03-30
Payer: MEDICARE

## 2023-03-30 DIAGNOSIS — R19.5 ELEVATED FECAL CALPROTECTIN: ICD-10-CM

## 2023-03-30 DIAGNOSIS — G89.18 ACUTE POSTOPERATIVE PAIN: Primary | ICD-10-CM

## 2023-03-30 DIAGNOSIS — Z96.611 STATUS POST REVERSE TOTAL SHOULDER REPLACEMENT, RIGHT: ICD-10-CM

## 2023-03-30 DIAGNOSIS — S82.141D CLOSED FRACTURE OF RIGHT TIBIAL PLATEAU WITH ROUTINE HEALING, SUBSEQUENT ENCOUNTER: ICD-10-CM

## 2023-03-30 PROCEDURE — 2500000003 HC RX 250 WO HCPCS: Performed by: STUDENT IN AN ORGANIZED HEALTH CARE EDUCATION/TRAINING PROGRAM

## 2023-03-30 PROCEDURE — 6360000002 HC RX W HCPCS: Performed by: STUDENT IN AN ORGANIZED HEALTH CARE EDUCATION/TRAINING PROGRAM

## 2023-03-30 PROCEDURE — 6360000002 HC RX W HCPCS: Performed by: REGISTERED NURSE

## 2023-03-30 PROCEDURE — 6370000000 HC RX 637 (ALT 250 FOR IP): Performed by: NURSE PRACTITIONER

## 2023-03-30 PROCEDURE — 2500000003 HC RX 250 WO HCPCS: Performed by: REGISTERED NURSE

## 2023-03-30 PROCEDURE — 73020 X-RAY EXAM OF SHOULDER: CPT

## 2023-03-30 PROCEDURE — 2709999900 HC NON-CHARGEABLE SUPPLY: Performed by: STUDENT IN AN ORGANIZED HEALTH CARE EDUCATION/TRAINING PROGRAM

## 2023-03-30 PROCEDURE — 2580000003 HC RX 258: Performed by: STUDENT IN AN ORGANIZED HEALTH CARE EDUCATION/TRAINING PROGRAM

## 2023-03-30 PROCEDURE — 7100000001 HC PACU RECOVERY - ADDTL 15 MIN: Performed by: STUDENT IN AN ORGANIZED HEALTH CARE EDUCATION/TRAINING PROGRAM

## 2023-03-30 PROCEDURE — 6360000002 HC RX W HCPCS: Performed by: NURSE PRACTITIONER

## 2023-03-30 PROCEDURE — 2720000010 HC SURG SUPPLY STERILE: Performed by: STUDENT IN AN ORGANIZED HEALTH CARE EDUCATION/TRAINING PROGRAM

## 2023-03-30 PROCEDURE — 7100000000 HC PACU RECOVERY - FIRST 15 MIN: Performed by: STUDENT IN AN ORGANIZED HEALTH CARE EDUCATION/TRAINING PROGRAM

## 2023-03-30 PROCEDURE — 64415 NJX AA&/STRD BRCH PLXS IMG: CPT | Performed by: STUDENT IN AN ORGANIZED HEALTH CARE EDUCATION/TRAINING PROGRAM

## 2023-03-30 PROCEDURE — 3600000014 HC SURGERY LEVEL 4 ADDTL 15MIN: Performed by: STUDENT IN AN ORGANIZED HEALTH CARE EDUCATION/TRAINING PROGRAM

## 2023-03-30 PROCEDURE — C1713 ANCHOR/SCREW BN/BN,TIS/BN: HCPCS | Performed by: STUDENT IN AN ORGANIZED HEALTH CARE EDUCATION/TRAINING PROGRAM

## 2023-03-30 PROCEDURE — 3600000004 HC SURGERY LEVEL 4 BASE: Performed by: STUDENT IN AN ORGANIZED HEALTH CARE EDUCATION/TRAINING PROGRAM

## 2023-03-30 PROCEDURE — 3700000001 HC ADD 15 MINUTES (ANESTHESIA): Performed by: STUDENT IN AN ORGANIZED HEALTH CARE EDUCATION/TRAINING PROGRAM

## 2023-03-30 PROCEDURE — 3700000000 HC ANESTHESIA ATTENDED CARE: Performed by: STUDENT IN AN ORGANIZED HEALTH CARE EDUCATION/TRAINING PROGRAM

## 2023-03-30 PROCEDURE — 2580000003 HC RX 258: Performed by: NURSE PRACTITIONER

## 2023-03-30 PROCEDURE — G0378 HOSPITAL OBSERVATION PER HR: HCPCS

## 2023-03-30 PROCEDURE — C1776 JOINT DEVICE (IMPLANTABLE): HCPCS | Performed by: STUDENT IN AN ORGANIZED HEALTH CARE EDUCATION/TRAINING PROGRAM

## 2023-03-30 DEVICE — POST GLEN PRSS FT SHT 7 MM SHLDR REVERSED AEQUALIS PERFORMA: Type: IMPLANTABLE DEVICE | Site: SHOULDER | Status: FUNCTIONAL

## 2023-03-30 DEVICE — IMPLANTABLE DEVICE: Type: IMPLANTABLE DEVICE | Site: SHOULDER | Status: FUNCTIONAL

## 2023-03-30 DEVICE — SCREW BNE PERIPH 5X38 MM SHLDR REVERSED NS AEQUALIS PERFORM: Type: IMPLANTABLE DEVICE | Site: SHOULDER | Status: FUNCTIONAL

## 2023-03-30 DEVICE — SCREW BONE L34MM DIA5MM TI ST FULL THRD PERIPH FOR GLEN: Type: IMPLANTABLE DEVICE | Site: SHOULDER | Status: FUNCTIONAL

## 2023-03-30 DEVICE — SCREW BONE L14MM DIA5MM TI ST FULL THRD PERIPH FOR GLEN: Type: IMPLANTABLE DEVICE | Site: SHOULDER | Status: FUNCTIONAL

## 2023-03-30 DEVICE — SPHERE GLEN DIA36MM STD REVERSED AEQUALIS PERFORM: Type: IMPLANTABLE DEVICE | Site: SHOULDER | Status: FUNCTIONAL

## 2023-03-30 DEVICE — CAP SCR SHLDR LCK AEQUALIS FLX REVIVE: Type: IMPLANTABLE DEVICE | Site: SHOULDER | Status: FUNCTIONAL

## 2023-03-30 DEVICE — INSERT HUM DIA36MM THK+9MM B-12.5DEG SHLDR REVERSED: Type: IMPLANTABLE DEVICE | Site: SHOULDER | Status: FUNCTIONAL

## 2023-03-30 DEVICE — BASEPLATE GLEN DIA25MM STD REVERSED AEQUALIS PERFORM: Type: IMPLANTABLE DEVICE | Site: SHOULDER | Status: FUNCTIONAL

## 2023-03-30 RX ORDER — CEFAZOLIN SODIUM IN 0.9 % NACL 2 G/100 ML
2000 PLASTIC BAG, INJECTION (ML) INTRAVENOUS EVERY 8 HOURS
Status: COMPLETED | OUTPATIENT
Start: 2023-03-30 | End: 2023-03-31

## 2023-03-30 RX ORDER — CEFAZOLIN SODIUM 1 G/3ML
INJECTION, POWDER, FOR SOLUTION INTRAMUSCULAR; INTRAVENOUS PRN
Status: DISCONTINUED | OUTPATIENT
Start: 2023-03-30 | End: 2023-03-30

## 2023-03-30 RX ORDER — SODIUM CHLORIDE, SODIUM LACTATE, POTASSIUM CHLORIDE, CALCIUM CHLORIDE 600; 310; 30; 20 MG/100ML; MG/100ML; MG/100ML; MG/100ML
INJECTION, SOLUTION INTRAVENOUS CONTINUOUS
Status: DISCONTINUED | OUTPATIENT
Start: 2023-03-30 | End: 2023-03-30 | Stop reason: HOSPADM

## 2023-03-30 RX ORDER — POVIDONE-IODINE 10 MG/G
OINTMENT TOPICAL ONCE
Status: COMPLETED | OUTPATIENT
Start: 2023-03-30 | End: 2023-03-30

## 2023-03-30 RX ORDER — SODIUM CHLORIDE 0.9 % (FLUSH) 0.9 %
5-40 SYRINGE (ML) INJECTION EVERY 12 HOURS SCHEDULED
Status: DISCONTINUED | OUTPATIENT
Start: 2023-03-30 | End: 2023-03-30 | Stop reason: HOSPADM

## 2023-03-30 RX ORDER — SENNA AND DOCUSATE SODIUM 50; 8.6 MG/1; MG/1
1 TABLET, FILM COATED ORAL 2 TIMES DAILY
Status: DISCONTINUED | OUTPATIENT
Start: 2023-03-30 | End: 2023-03-31 | Stop reason: HOSPADM

## 2023-03-30 RX ORDER — LIDOCAINE HYDROCHLORIDE 10 MG/ML
1 INJECTION, SOLUTION EPIDURAL; INFILTRATION; INTRACAUDAL; PERINEURAL
Status: DISCONTINUED | OUTPATIENT
Start: 2023-03-30 | End: 2023-03-30 | Stop reason: HOSPADM

## 2023-03-30 RX ORDER — METOCLOPRAMIDE HYDROCHLORIDE 5 MG/ML
10 INJECTION INTRAMUSCULAR; INTRAVENOUS
Status: DISCONTINUED | OUTPATIENT
Start: 2023-03-30 | End: 2023-03-30 | Stop reason: HOSPADM

## 2023-03-30 RX ORDER — POLYETHYLENE GLYCOL 3350 17 G/17G
17 POWDER, FOR SOLUTION ORAL DAILY PRN
Status: DISCONTINUED | OUTPATIENT
Start: 2023-03-30 | End: 2023-03-31 | Stop reason: HOSPADM

## 2023-03-30 RX ORDER — ONDANSETRON 4 MG/1
4 TABLET, FILM COATED ORAL EVERY 8 HOURS PRN
COMMUNITY
Start: 2023-03-24

## 2023-03-30 RX ORDER — OXYCODONE HYDROCHLORIDE 5 MG/1
10 TABLET ORAL EVERY 4 HOURS PRN
Status: DISCONTINUED | OUTPATIENT
Start: 2023-03-30 | End: 2023-03-31 | Stop reason: HOSPADM

## 2023-03-30 RX ORDER — ONDANSETRON 2 MG/ML
INJECTION INTRAMUSCULAR; INTRAVENOUS PRN
Status: DISCONTINUED | OUTPATIENT
Start: 2023-03-30 | End: 2023-03-30 | Stop reason: SDUPTHER

## 2023-03-30 RX ORDER — SODIUM CHLORIDE 0.9 % (FLUSH) 0.9 %
5-40 SYRINGE (ML) INJECTION EVERY 12 HOURS SCHEDULED
Status: DISCONTINUED | OUTPATIENT
Start: 2023-03-30 | End: 2023-03-31 | Stop reason: HOSPADM

## 2023-03-30 RX ORDER — ROCURONIUM BROMIDE 10 MG/ML
INJECTION, SOLUTION INTRAVENOUS PRN
Status: DISCONTINUED | OUTPATIENT
Start: 2023-03-30 | End: 2023-03-30 | Stop reason: SDUPTHER

## 2023-03-30 RX ORDER — OXYCODONE HYDROCHLORIDE 5 MG/1
5 TABLET ORAL
Status: DISCONTINUED | OUTPATIENT
Start: 2023-03-30 | End: 2023-03-30 | Stop reason: HOSPADM

## 2023-03-30 RX ORDER — LIDOCAINE HYDROCHLORIDE 10 MG/ML
1 INJECTION, SOLUTION EPIDURAL; INFILTRATION; INTRACAUDAL; PERINEURAL ONCE
Status: COMPLETED | OUTPATIENT
Start: 2023-03-30 | End: 2023-03-30

## 2023-03-30 RX ORDER — OXYCODONE HYDROCHLORIDE AND ACETAMINOPHEN 5; 325 MG/1; MG/1
1 TABLET ORAL EVERY 4 HOURS PRN
Status: ON HOLD | COMMUNITY
End: 2023-03-31 | Stop reason: HOSPADM

## 2023-03-30 RX ORDER — SODIUM CHLORIDE 0.9 % (FLUSH) 0.9 %
5-40 SYRINGE (ML) INJECTION PRN
Status: DISCONTINUED | OUTPATIENT
Start: 2023-03-30 | End: 2023-03-30 | Stop reason: HOSPADM

## 2023-03-30 RX ORDER — VANCOMYCIN HYDROCHLORIDE 1 G/20ML
INJECTION, POWDER, LYOPHILIZED, FOR SOLUTION INTRAVENOUS PRN
Status: DISCONTINUED | OUTPATIENT
Start: 2023-03-30 | End: 2023-03-30

## 2023-03-30 RX ORDER — ACETAMINOPHEN 500 MG
1000 TABLET ORAL ONCE
Status: COMPLETED | OUTPATIENT
Start: 2023-03-30 | End: 2023-03-30

## 2023-03-30 RX ORDER — MIDAZOLAM HYDROCHLORIDE 1 MG/ML
INJECTION INTRAMUSCULAR; INTRAVENOUS PRN
Status: DISCONTINUED | OUTPATIENT
Start: 2023-03-30 | End: 2023-03-30 | Stop reason: SDUPTHER

## 2023-03-30 RX ORDER — OXYCODONE HCL 10 MG/1
10 TABLET, FILM COATED, EXTENDED RELEASE ORAL ONCE
Status: COMPLETED | OUTPATIENT
Start: 2023-03-30 | End: 2023-03-30

## 2023-03-30 RX ORDER — SODIUM CHLORIDE 9 MG/ML
INJECTION, SOLUTION INTRAVENOUS PRN
Status: DISCONTINUED | OUTPATIENT
Start: 2023-03-30 | End: 2023-03-30 | Stop reason: HOSPADM

## 2023-03-30 RX ORDER — MEPERIDINE HYDROCHLORIDE 25 MG/ML
12.5 INJECTION INTRAMUSCULAR; INTRAVENOUS; SUBCUTANEOUS
Status: DISCONTINUED | OUTPATIENT
Start: 2023-03-30 | End: 2023-03-30 | Stop reason: HOSPADM

## 2023-03-30 RX ORDER — PROPOFOL 10 MG/ML
INJECTION, EMULSION INTRAVENOUS PRN
Status: DISCONTINUED | OUTPATIENT
Start: 2023-03-30 | End: 2023-03-30 | Stop reason: SDUPTHER

## 2023-03-30 RX ORDER — ROPIVACAINE HYDROCHLORIDE 5 MG/ML
INJECTION, SOLUTION EPIDURAL; INFILTRATION; PERINEURAL
Status: COMPLETED | OUTPATIENT
Start: 2023-03-30 | End: 2023-03-30

## 2023-03-30 RX ORDER — TRANEXAMIC ACID 650 MG/1
1950 TABLET ORAL ONCE
Status: COMPLETED | OUTPATIENT
Start: 2023-03-31 | End: 2023-03-31

## 2023-03-30 RX ORDER — SODIUM CHLORIDE 0.9 % (FLUSH) 0.9 %
5-40 SYRINGE (ML) INJECTION PRN
Status: DISCONTINUED | OUTPATIENT
Start: 2023-03-30 | End: 2023-03-31 | Stop reason: HOSPADM

## 2023-03-30 RX ORDER — SODIUM CHLORIDE 9 MG/ML
25 INJECTION, SOLUTION INTRAVENOUS PRN
Status: DISCONTINUED | OUTPATIENT
Start: 2023-03-30 | End: 2023-03-30 | Stop reason: HOSPADM

## 2023-03-30 RX ORDER — TRANEXAMIC ACID 650 MG/1
1950 TABLET ORAL ONCE
Status: COMPLETED | OUTPATIENT
Start: 2023-03-30 | End: 2023-03-30

## 2023-03-30 RX ORDER — CEFAZOLIN SODIUM IN 0.9 % NACL 2 G/100 ML
2000 PLASTIC BAG, INJECTION (ML) INTRAVENOUS
Status: COMPLETED | OUTPATIENT
Start: 2023-03-30 | End: 2023-03-30

## 2023-03-30 RX ORDER — ONDANSETRON 4 MG/1
4 TABLET, ORALLY DISINTEGRATING ORAL EVERY 8 HOURS PRN
Status: DISCONTINUED | OUTPATIENT
Start: 2023-03-30 | End: 2023-03-31 | Stop reason: HOSPADM

## 2023-03-30 RX ORDER — ACETAMINOPHEN 325 MG/1
650 TABLET ORAL EVERY 6 HOURS
Status: DISCONTINUED | OUTPATIENT
Start: 2023-03-30 | End: 2023-03-31 | Stop reason: HOSPADM

## 2023-03-30 RX ORDER — VANCOMYCIN HYDROCHLORIDE 1 G/20ML
INJECTION, POWDER, LYOPHILIZED, FOR SOLUTION INTRAVENOUS PRN
Status: DISCONTINUED | OUTPATIENT
Start: 2023-03-30 | End: 2023-03-30 | Stop reason: HOSPADM

## 2023-03-30 RX ORDER — MAGNESIUM HYDROXIDE 1200 MG/15ML
LIQUID ORAL CONTINUOUS PRN
Status: DISCONTINUED | OUTPATIENT
Start: 2023-03-30 | End: 2023-03-30 | Stop reason: HOSPADM

## 2023-03-30 RX ORDER — SODIUM CHLORIDE, SODIUM LACTATE, POTASSIUM CHLORIDE, CALCIUM CHLORIDE 600; 310; 30; 20 MG/100ML; MG/100ML; MG/100ML; MG/100ML
INJECTION, SOLUTION INTRAVENOUS CONTINUOUS
Status: DISPENSED | OUTPATIENT
Start: 2023-03-30 | End: 2023-03-31

## 2023-03-30 RX ORDER — OXYCODONE HYDROCHLORIDE 5 MG/1
5 TABLET ORAL EVERY 4 HOURS PRN
Status: DISCONTINUED | OUTPATIENT
Start: 2023-03-30 | End: 2023-03-31 | Stop reason: HOSPADM

## 2023-03-30 RX ORDER — TRANEXAMIC ACID 650 MG/1
1950 TABLET ORAL
Status: DISCONTINUED | OUTPATIENT
Start: 2023-03-30 | End: 2023-03-30 | Stop reason: HOSPADM

## 2023-03-30 RX ORDER — ONDANSETRON 2 MG/ML
4 INJECTION INTRAMUSCULAR; INTRAVENOUS EVERY 6 HOURS PRN
Status: DISCONTINUED | OUTPATIENT
Start: 2023-03-30 | End: 2023-03-31 | Stop reason: HOSPADM

## 2023-03-30 RX ORDER — SODIUM CHLORIDE 9 MG/ML
INJECTION, SOLUTION INTRAVENOUS PRN
Status: DISCONTINUED | OUTPATIENT
Start: 2023-03-30 | End: 2023-03-31 | Stop reason: HOSPADM

## 2023-03-30 RX ORDER — ASPIRIN 81 MG/1
81 TABLET ORAL 2 TIMES DAILY
Status: DISCONTINUED | OUTPATIENT
Start: 2023-03-30 | End: 2023-03-31 | Stop reason: HOSPADM

## 2023-03-30 RX ORDER — DIPHENHYDRAMINE HYDROCHLORIDE 50 MG/ML
12.5 INJECTION INTRAMUSCULAR; INTRAVENOUS
Status: DISCONTINUED | OUTPATIENT
Start: 2023-03-30 | End: 2023-03-30 | Stop reason: HOSPADM

## 2023-03-30 RX ORDER — ONDANSETRON 2 MG/ML
4 INJECTION INTRAMUSCULAR; INTRAVENOUS
Status: COMPLETED | OUTPATIENT
Start: 2023-03-30 | End: 2023-03-30

## 2023-03-30 RX ORDER — FENTANYL CITRATE 0.05 MG/ML
50 INJECTION, SOLUTION INTRAMUSCULAR; INTRAVENOUS EVERY 10 MIN PRN
Status: DISCONTINUED | OUTPATIENT
Start: 2023-03-30 | End: 2023-03-30 | Stop reason: HOSPADM

## 2023-03-30 RX ADMIN — Medication 0.1 MG: at 13:10

## 2023-03-30 RX ADMIN — TRANEXAMIC ACID 1950 MG: 650 TABLET ORAL at 10:41

## 2023-03-30 RX ADMIN — MIDAZOLAM HYDROCHLORIDE 2 MG: 1 INJECTION, SOLUTION INTRAMUSCULAR; INTRAVENOUS at 11:31

## 2023-03-30 RX ADMIN — ROCURONIUM BROMIDE 50 MG: 10 INJECTION INTRAVENOUS at 12:38

## 2023-03-30 RX ADMIN — TRANEXAMIC ACID 1950 MG: 650 TABLET ORAL at 18:35

## 2023-03-30 RX ADMIN — Medication 0.1 MG: at 13:42

## 2023-03-30 RX ADMIN — ASPIRIN 81 MG: 81 TABLET, COATED ORAL at 20:26

## 2023-03-30 RX ADMIN — SODIUM CHLORIDE, POTASSIUM CHLORIDE, SODIUM LACTATE AND CALCIUM CHLORIDE: 600; 310; 30; 20 INJECTION, SOLUTION INTRAVENOUS at 10:57

## 2023-03-30 RX ADMIN — Medication 0.1 MG: at 13:57

## 2023-03-30 RX ADMIN — VANCOMYCIN HYDROCHLORIDE 1000 MG: 1 INJECTION, POWDER, LYOPHILIZED, FOR SOLUTION INTRAVENOUS at 11:36

## 2023-03-30 RX ADMIN — ONDANSETRON 4 MG: 2 INJECTION INTRAMUSCULAR; INTRAVENOUS at 13:16

## 2023-03-30 RX ADMIN — LIDOCAINE HYDROCHLORIDE 1 ML: 10 INJECTION, SOLUTION EPIDURAL; INFILTRATION; INTRACAUDAL; PERINEURAL at 10:57

## 2023-03-30 RX ADMIN — PROPOFOL 150 MG: 10 INJECTION, EMULSION INTRAVENOUS at 12:37

## 2023-03-30 RX ADMIN — ACETAMINOPHEN 1000 MG: 500 TABLET ORAL at 10:41

## 2023-03-30 RX ADMIN — Medication 0.1 MG: at 13:18

## 2023-03-30 RX ADMIN — Medication 2000 MG: at 20:29

## 2023-03-30 RX ADMIN — ROPIVACAINE HYDROCHLORIDE 30 ML: 5 INJECTION, SOLUTION EPIDURAL; INFILTRATION; PERINEURAL at 11:31

## 2023-03-30 RX ADMIN — OXYCODONE HYDROCHLORIDE 10 MG: 10 TABLET, FILM COATED, EXTENDED RELEASE ORAL at 10:40

## 2023-03-30 RX ADMIN — ROCURONIUM BROMIDE 10 MG: 10 INJECTION INTRAVENOUS at 13:18

## 2023-03-30 RX ADMIN — ONDANSETRON 4 MG: 2 INJECTION INTRAMUSCULAR; INTRAVENOUS at 15:58

## 2023-03-30 RX ADMIN — Medication 0.1 MG: at 14:09

## 2023-03-30 RX ADMIN — POVIDONE-IODINE: 100 OINTMENT TOPICAL at 11:17

## 2023-03-30 RX ADMIN — ACETAMINOPHEN 650 MG: 325 TABLET ORAL at 18:34

## 2023-03-30 RX ADMIN — Medication 2000 MG: at 12:48

## 2023-03-30 RX ADMIN — SUGAMMADEX 200 MG: 100 INJECTION, SOLUTION INTRAVENOUS at 15:42

## 2023-03-30 RX ADMIN — SODIUM CHLORIDE, POTASSIUM CHLORIDE, SODIUM LACTATE AND CALCIUM CHLORIDE: 600; 310; 30; 20 INJECTION, SOLUTION INTRAVENOUS at 18:32

## 2023-03-30 ASSESSMENT — PAIN DESCRIPTION - LOCATION
LOCATION: SHOULDER
LOCATION: SHOULDER

## 2023-03-30 ASSESSMENT — PAIN SCALES - GENERAL
PAINLEVEL_OUTOF10: 0
PAINLEVEL_OUTOF10: 2

## 2023-03-30 ASSESSMENT — PAIN DESCRIPTION - ORIENTATION: ORIENTATION: RIGHT

## 2023-03-30 NOTE — ANESTHESIA PRE PROCEDURE
HCG (If Applicable): No results found for: PREGTESTUR, PREGSERUM, HCG, HCGQUANT     ABGs: No results found for: PHART, PO2ART, QDH7VZP, DPR7OCA, BEART, L4DNLCDD     Type & Screen (If Applicable):  No results found for: LABABO, LABRH    Drug/Infectious Status (If Applicable):  No results found for: HIV, HEPCAB    COVID-19 Screening (If Applicable):   Lab Results   Component Value Date/Time    COVID19 Not-Detected 01/27/2022 03:17 PM           Anesthesia Evaluation  Patient summary reviewed and Nursing notes reviewed no history of anesthetic complications:   Airway: Mallampati: II  TM distance: >3 FB   Neck ROM: full  Mouth opening: > = 3 FB   Dental: normal exam         Pulmonary:Negative Pulmonary ROS and normal exam                               Cardiovascular:Negative CV ROS  Exercise tolerance: good (>4 METS),         ECG reviewed               Beta Blocker:  Not on Beta Blocker      ROS comment: Normal sinus rhythm  Moderate voltage criteria for LVH, may be normal variant  Borderline ECG  When compared with ECG of 24-SEP-2021 11:53,  Inverted T waves have replaced nonspecific T wave abnormality in Inferior leads  Nonspecific T wave abnormality now evident in Anterior leads  QT has lengthened    2019  Left ventricular ejection fraction is visually estimated at 60%. Normal diastolic filling pattern for age     Neuro/Psych:   (+) neuromuscular disease:, headaches:, psychiatric history:            GI/Hepatic/Renal:   (+) hiatal hernia, GERD:, PUD,           Endo/Other:    (+) hypothyroidism: arthritis:., .          Pt had PAT visit. Abdominal:             Vascular: negative vascular ROS. Other Findings:           Anesthesia Plan      general and regional     ASA 3     (ETT  Brachial plexus)  Induction: intravenous. MIPS: Postoperative opioids intended and Prophylactic antiemetics administered. Anesthetic plan and risks discussed with patient. Plan discussed with CRNA.     Attending

## 2023-03-30 NOTE — ANESTHESIA PROCEDURE NOTES
Peripheral Block    Patient location during procedure: pre-op  Reason for block: post-op pain management and at surgeon's request  Start time: 3/30/2023 11:31 AM  End time: 3/30/2023 11:39 AM  Staffing  Performed: anesthesiologist   Anesthesiologist: Darby Baker DO  Preanesthetic Checklist  Completed: patient identified, IV checked, site marked, risks and benefits discussed, surgical/procedural consents, equipment checked, pre-op evaluation, timeout performed, anesthesia consent given, oxygen available and monitors applied/VS acknowledged  Peripheral Block   Patient position: supine  Prep: ChloraPrep  Provider prep: mask and sterile gloves (Sterile probe cover)  Patient monitoring: cardiac monitor, continuous pulse ox, frequent blood pressure checks and IV access  Block type: Brachial plexus  Supraclavicular  Laterality: right  Injection technique: single-shot  Guidance: ultrasound guided  Local infiltration: ropivacaine  Infiltration strength: 0.5 %  Local infiltration: ropivacaine  Dose: 30 mL    Needle   Needle type: combined needle/nerve stimulator   Needle gauge: 22 G  Needle localization: anatomical landmarks and ultrasound guidance  Needle length: 5 cm  Assessment   Injection assessment: negative aspiration for heme, no paresthesia on injection and local visualized surrounding nerve on ultrasound  Paresthesia pain: immediately resolved  Slow fractionated injection: yes  Hemodynamics: stable  Real-time US image taken/store: yes    Additional Notes  Ultrasound image printed and saved in patient chart.     Sterile probe cover used    Medications Administered  ropivacaine (NAROPIN) injection 0.5% - Perineural   30 mL - 3/30/2023 11:31:00 AM

## 2023-03-30 NOTE — PROGRESS NOTES
Patient ID:  Missael Heath  [id number]  67 y.o.  1950  BOVIE PAD SITE CLEAR AND INTACT PRE AND POST OP. TAKEN TO PACU,   ATTACHED TO MONITOR AND REPORT GIVEN TO RN.   VSS DRSG DRY AND INTACT, SLING ON   PARTIAL DENTURE IN LABELED DENTURE CUP, GLASSES, CELLULAR PHONE AND , AND STONE ALL IN LABELED BAG ON PATIENT BED      Electronically signed by Nathen Boswell RN on 3/30/2023

## 2023-03-30 NOTE — ANESTHESIA POSTPROCEDURE EVALUATION
Department of Anesthesiology  Postprocedure Note    Patient: Nahomy January  MRN: [id number]  YOB: 1950  Date of evaluation: 3/30/2023      Procedure Summary     Date: 03/30/23 Room / Location: [address]    Anesthesia Start: 1233 Anesthesia Stop: 6598    Procedure: RIGHT SHOULDER REVERSE  TOTAL SHOULDER  ARTHROPLASTY (Right: Shoulder) Diagnosis:       Closed fracture of proximal end of right humerus, unspecified fracture morphology, initial encounter      (RIGHT SHOULDER RIGHT PROXIMAL HUMERUS FRACTURE)    Surgeons: Erin Gandhi MD Responsible Provider: Chitra Carty DO    Anesthesia Type: general, regional ASA Status: 3          Anesthesia Type: No value filed.     [place] Phase I:      [place] Phase II:        Anesthesia Post Evaluation    Patient location during evaluation: bedside  Patient participation: complete - patient participated  Level of consciousness: awake and awake and alert  Airway patency: patent  Nausea & Vomiting: no nausea and no vomiting  Complications: no  Cardiovascular status: blood pressure returned to baseline and hemodynamically stable  Respiratory status: acceptable  Hydration status: euvolemic

## 2023-03-30 NOTE — OP NOTE
Operative Note      Patient: Agustina Rutherford  YOB: 1950  MRN: [id number]    Date of Procedure: 3/30/2023    Pre-Op Diagnosis: RIGHT SHOULDER RIGHT PROXIMAL HUMERUS FRACTURE    Post-Op Diagnosis: Same       Procedure:  Reverse shoulder arthroplasty for proximal humerus fracture  Open biceps tenodesis right shoulder    Surgeon(s):  Keeley Weinstein MD    Assistant:   Physician Assistant: PANDA Hong    Anesthesia: General    Estimated Blood Loss (mL): less than 309     Complications: None    Specimens:   * No specimens in log *    Implants:  Implant Name Type Inv.  Item Serial No.  Lot No. LRB No. Used Action   BASEPLATE IDA [id number] STD REVERSED [employer] PERFORM - [id number]  [address] [id number] STD [id number] [place] [facility]- [id number] Right 1 Implanted   POST IDA PRSS FT SHT 7 MM SHLDR REVERSED [employer] PERFORMA - [id number]  POST IDA PRSS FT SHT 7 MM SHLDR REVERSED [employer]QUAL[employer] PERFORMA [id number] [address] [employer] [id number] Right 1 Implanted   SCREW BONE L34MM DIA5MM TI ST FULL THRD PERIPH FOR IDA - [id number]  SCREW BONE L34MM DIA5MM TI ST FULL THRD PERIPH FOR IDA [id number] [employer]  Right 1 Implanted   SCREW BNE PERIPH 5X38 MM SHLDR REVERSED NS [employer]QUAL[employer] PERFORM - [id number]  SCREW BNE PERIPH 5X38 MM SHLDR REVERSED NS [employer]QUALIS PERFORM [id number] [address] [employer]  Right 1 Implanted   SCREW BONE L14MM DIA5MM TI ST FULL THRD PERIPH FOR IDA - [id number]  SCREW BONE L14MM DIA5MM TI ST FULL THRD [facility] [id number] [address] [employer]  Right 2 Implanted   SPHERE IDA [id number] STD REVERSED [employer]QUALIS PERFORM - [id number]  SPHERE IDA [id number] STD [place] PERFORM [id number] [address] [employer] [id number] Right 1 Implanted   STEM HUM DSTL 13X90 MM SHLDR PART [employer]QUALIS FLX REVIVE PTC - [id number]  STEM HUM DSTL 13X90 MM SHLDR PART AEQUALIS FLX REVIVE [facility] [id number] [address] [employer] she is cleared now she wishes to proceed with a right reverse shoulder arthroplasty    The patient understood that our plan was for reverse total shoulder replacement for proximal humerus fracture. They had the opportunity to ask questions on several occasions. We discussed the risks, benefits and alternatives to treatment. The elected to proceed with the above intervention. Particular risks this surgery include infection, need for revision surgery, stiffness, continued pain, damage to neurovascular structures, instability. OPERATIVE PROCEDURE:   I met with Odalis in the preoperative area prior to the procedure and discussed the surgical plan once again and answered all  questions related to the procedure and the expected post-operative course. The risks of surgery were discussed including but not limited to the risks of medications given for surgery, the risk of blood loss during and after surgery that can lead to the need for blood products in certain situations, infection, damage to normal structures that can lead to long term problems of pain or dysfunction, wound healing complications, the possibility of nonunion/malunion of any osteotomies and late or chronic pain as a result of the surgical intervention. In addition potentially life threatening complications that can occur at the time of surgery and after surgery were discussed including but not limited to deep vein thrombosis, pulmonary embolism, myocardial infarction, stroke and death. The patient was identified in the pre-operative holding area. The surgical site was identified and marked. Informed consent was obtained. The patient was then brought to the operating room and placed supine on the operating table. Anesthesia was administered and care of the head, neck, and airway was maintained by the anesthesia staff throughout the entire procedure.   The patient was then placed into a beachchair position with care of the head and neck at all

## 2023-03-31 ENCOUNTER — HOSPITAL ENCOUNTER (INPATIENT)
Age: 73
LOS: 5 days | Discharge: NURSING, REHAB OR LONG-TERM CARE | End: 2023-04-06
Attending: INTERNAL MEDICINE | Admitting: INTERNAL MEDICINE
Payer: MEDICARE

## 2023-03-31 VITALS
HEIGHT: 62 IN | TEMPERATURE: 98.1 F | BODY MASS INDEX: 24.84 KG/M2 | SYSTOLIC BLOOD PRESSURE: 130 MMHG | HEART RATE: 82 BPM | RESPIRATION RATE: 20 BRPM | DIASTOLIC BLOOD PRESSURE: 64 MMHG | OXYGEN SATURATION: 98 % | WEIGHT: 135 LBS

## 2023-03-31 DIAGNOSIS — M25.511 ACUTE PAIN OF RIGHT SHOULDER: ICD-10-CM

## 2023-03-31 DIAGNOSIS — Z78.9 DECREASED ACTIVITIES OF DAILY LIVING (ADL): Primary | ICD-10-CM

## 2023-03-31 LAB
ANION GAP SERPL CALCULATED.3IONS-SCNC: 8 MEQ/L (ref 9–15)
BASOPHILS # BLD: 0 K/UL (ref 0–0.2)
BASOPHILS NFR BLD: 0.6 %
BUN SERPL-MCNC: 10 MG/DL (ref 8–23)
CALCIUM SERPL-MCNC: 8.4 MG/DL (ref 8.5–9.9)
CHLORIDE SERPL-SCNC: 107 MEQ/L (ref 95–107)
CO2 SERPL-SCNC: 24 MEQ/L (ref 20–31)
CREAT SERPL-MCNC: 0.57 MG/DL (ref 0.5–0.9)
EOSINOPHIL # BLD: 0.1 K/UL (ref 0–0.7)
EOSINOPHIL NFR BLD: 1.8 %
ERYTHROCYTE [DISTWIDTH] IN BLOOD BY AUTOMATED COUNT: 14.8 % (ref 11.5–14.5)
GLUCOSE SERPL-MCNC: 127 MG/DL (ref 70–99)
HCT VFR BLD AUTO: 28.8 % (ref 37–47)
HGB BLD-MCNC: 9.4 G/DL (ref 12–16)
LYMPHOCYTES # BLD: 0.9 K/UL (ref 1–4.8)
LYMPHOCYTES NFR BLD: 18 %
MCH RBC QN AUTO: 28.8 PG (ref 27–31.3)
MCHC RBC AUTO-ENTMCNC: 32.6 % (ref 33–37)
MCV RBC AUTO: 88.4 FL (ref 79.4–94.8)
MONOCYTES # BLD: 0.5 K/UL (ref 0.2–0.8)
MONOCYTES NFR BLD: 9.4 %
NEUTROPHILS # BLD: 3.7 K/UL (ref 1.4–6.5)
NEUTS SEG NFR BLD: 70.2 %
PLATELET # BLD AUTO: 164 K/UL (ref 130–400)
POTASSIUM SERPL-SCNC: 3.7 MEQ/L (ref 3.4–4.9)
RBC # BLD AUTO: 3.26 M/UL (ref 4.2–5.4)
SODIUM SERPL-SCNC: 139 MEQ/L (ref 135–144)
WBC # BLD AUTO: 5.3 K/UL (ref 4.8–10.8)

## 2023-03-31 PROCEDURE — 36415 COLL VENOUS BLD VENIPUNCTURE: CPT

## 2023-03-31 PROCEDURE — G0378 HOSPITAL OBSERVATION PER HR: HCPCS

## 2023-03-31 PROCEDURE — 2580000003 HC RX 258: Performed by: NURSE PRACTITIONER

## 2023-03-31 PROCEDURE — 94150 VITAL CAPACITY TEST: CPT

## 2023-03-31 PROCEDURE — 97162 PT EVAL MOD COMPLEX 30 MIN: CPT

## 2023-03-31 PROCEDURE — 99285 EMERGENCY DEPT VISIT HI MDM: CPT

## 2023-03-31 PROCEDURE — 2700000000 HC OXYGEN THERAPY PER DAY

## 2023-03-31 PROCEDURE — 2580000003 HC RX 258

## 2023-03-31 PROCEDURE — 6360000002 HC RX W HCPCS: Performed by: NURSE PRACTITIONER

## 2023-03-31 PROCEDURE — 85025 COMPLETE CBC W/AUTO DIFF WBC: CPT

## 2023-03-31 PROCEDURE — 97166 OT EVAL MOD COMPLEX 45 MIN: CPT

## 2023-03-31 PROCEDURE — 80048 BASIC METABOLIC PNL TOTAL CA: CPT

## 2023-03-31 PROCEDURE — 97110 THERAPEUTIC EXERCISES: CPT

## 2023-03-31 PROCEDURE — 6370000000 HC RX 637 (ALT 250 FOR IP): Performed by: NURSE PRACTITIONER

## 2023-03-31 RX ORDER — SENNA AND DOCUSATE SODIUM 50; 8.6 MG/1; MG/1
1 TABLET, FILM COATED ORAL 2 TIMES DAILY
Qty: 20 TABLET | Refills: 0 | Status: SHIPPED | OUTPATIENT
Start: 2023-03-31 | End: 2023-04-10

## 2023-03-31 RX ORDER — OXYCODONE HYDROCHLORIDE 5 MG/1
5 TABLET ORAL EVERY 6 HOURS PRN
Qty: 28 TABLET | Refills: 0 | Status: SHIPPED | OUTPATIENT
Start: 2023-03-31 | End: 2023-04-06 | Stop reason: SDUPTHER

## 2023-03-31 RX ORDER — 0.9 % SODIUM CHLORIDE 0.9 %
500 INTRAVENOUS SOLUTION INTRAVENOUS ONCE
Status: COMPLETED | OUTPATIENT
Start: 2023-03-31 | End: 2023-03-31

## 2023-03-31 RX ORDER — ASPIRIN 81 MG/1
81 TABLET ORAL 2 TIMES DAILY
Qty: 60 TABLET | Refills: 0 | Status: SHIPPED | OUTPATIENT
Start: 2023-03-31 | End: 2023-04-30

## 2023-03-31 RX ADMIN — ASPIRIN 81 MG: 81 TABLET, COATED ORAL at 08:46

## 2023-03-31 RX ADMIN — OXYCODONE HYDROCHLORIDE 5 MG: 5 TABLET ORAL at 04:00

## 2023-03-31 RX ADMIN — SODIUM CHLORIDE, PRESERVATIVE FREE 10 ML: 5 INJECTION INTRAVENOUS at 08:47

## 2023-03-31 RX ADMIN — SENNOSIDES AND DOCUSATE SODIUM 1 TABLET: 50; 8.6 TABLET ORAL at 08:47

## 2023-03-31 RX ADMIN — OXYCODONE HYDROCHLORIDE 5 MG: 5 TABLET ORAL at 08:45

## 2023-03-31 RX ADMIN — SODIUM CHLORIDE 500 ML: 9 INJECTION, SOLUTION INTRAVENOUS at 01:06

## 2023-03-31 RX ADMIN — ACETAMINOPHEN 650 MG: 325 TABLET ORAL at 17:11

## 2023-03-31 RX ADMIN — ONDANSETRON 4 MG: 2 INJECTION INTRAMUSCULAR; INTRAVENOUS at 04:30

## 2023-03-31 RX ADMIN — HYDROMORPHONE HYDROCHLORIDE 0.25 MG: 1 INJECTION, SOLUTION INTRAMUSCULAR; INTRAVENOUS; SUBCUTANEOUS at 11:58

## 2023-03-31 RX ADMIN — ACETAMINOPHEN 650 MG: 325 TABLET ORAL at 11:35

## 2023-03-31 RX ADMIN — OXYCODONE HYDROCHLORIDE 5 MG: 5 TABLET ORAL at 13:23

## 2023-03-31 RX ADMIN — ACETAMINOPHEN 650 MG: 325 TABLET ORAL at 06:17

## 2023-03-31 RX ADMIN — TRANEXAMIC ACID 1950 MG: 650 TABLET ORAL at 06:17

## 2023-03-31 RX ADMIN — Medication 2000 MG: at 04:33

## 2023-03-31 RX ADMIN — ONDANSETRON 4 MG: 2 INJECTION INTRAMUSCULAR; INTRAVENOUS at 10:13

## 2023-03-31 RX ADMIN — OXYCODONE HYDROCHLORIDE 10 MG: 5 TABLET ORAL at 17:12

## 2023-03-31 ASSESSMENT — PAIN DESCRIPTION - DESCRIPTORS
DESCRIPTORS: THROBBING

## 2023-03-31 ASSESSMENT — PAIN DESCRIPTION - LOCATION
LOCATION: SHOULDER

## 2023-03-31 ASSESSMENT — PAIN DESCRIPTION - ORIENTATION
ORIENTATION: RIGHT

## 2023-03-31 ASSESSMENT — PAIN SCALES - GENERAL
PAINLEVEL_OUTOF10: 6
PAINLEVEL_OUTOF10: 4
PAINLEVEL_OUTOF10: 5
PAINLEVEL_OUTOF10: 7
PAINLEVEL_OUTOF10: 10
PAINLEVEL_OUTOF10: 4
PAINLEVEL_OUTOF10: 4

## 2023-03-31 ASSESSMENT — PAIN - FUNCTIONAL ASSESSMENT: PAIN_FUNCTIONAL_ASSESSMENT: 0-10

## 2023-03-31 ASSESSMENT — PAIN DESCRIPTION - ONSET: ONSET: ON-GOING

## 2023-03-31 ASSESSMENT — PAIN DESCRIPTION - FREQUENCY: FREQUENCY: CONTINUOUS

## 2023-03-31 NOTE — DISCHARGE INSTR - COC
RLE, RUE    Nutrition Therapy:  Current Nutrition Therapy:   - Oral Diet:  General    Routes of Feeding: Oral  Liquids: Thin Liquids  Daily Fluid Restriction: no  Last Modified Barium Swallow with Video (Video Swallowing Test): not done    Treatments at the Time of Hospital Discharge:   Respiratory Treatments: n/a  Oxygen Therapy:  is not on home oxygen therapy. Ventilator:    - No ventilator support    Rehab Therapies: Physical Therapy and Occupational Therapy  Weight Bearing Status/Restrictions: Non-weight bearing on right leg  Other Medical Equipment (for information only, NOT a DME order):  wheelchair  Other Treatments: ***    Patient's personal belongings (please select all that are sent with patient):  {P DME Belongings:[id number]}    RN SIGNATURE:  {Esignature:[id number]}    CASE MANAGEMENT/SOCIAL WORK SECTION    Inpatient Status Date: ***    Readmission Risk Assessment Score:  Readmission Risk              Risk of Unplanned Readmission:  0           Discharging to Facility/ Agency   Name:   Address:  Phone:  Fax:    Dialysis Facility (if applicable)   Name:  Address:  Dialysis Schedule:  Phone:  Fax:    / signature: {Esignature:[id number]}    PHYSICIAN SECTION    Prognosis: Good    Condition at Discharge: Stable    Rehab Potential (if transferring to Rehab): Good    Recommended Labs or Other Treatments After Discharge:   NWB to RUE and no rehab to this extremity until seen for follow up with surgeon. NWB to RLE for tibial plateau fracture for two more weeks. Aquacel dressing can be removed pod7 and incision left open to air  ASA 81 mg BID for 30 days for DVT prophylaxis      Physician Certification: I certify the above information and transfer of Nahomi Quinones  is necessary for the continuing treatment of the diagnosis listed and that she requires [facility] for less 30 days.      Update Admission H&P: No change in H&P    PHYSICIAN SIGNATURE:  Electronically signed by Dr. Christiano Tang MD on 3/31/23 at 7:58 AM EDT

## 2023-03-31 NOTE — PROGRESS NOTES
[facility] OCCUPATIONAL THERAPY EVALUATION - ACUTE     NAME: Ruthy Payment  : 1950 (67 y.o.)  MRN: [id number]  CODE STATUS: Full Code  Room: D998/V491-57    Date of Service: 3/31/2023    Patient Diagnosis(es): Closed fracture of proximal end of right humerus, unspecified fracture morphology, initial encounter Daisy Lam  Status post reverse total shoulder replacement, right [Z96.611]   Patient Active Problem List    Diagnosis Date Noted    Elevated fecal calprotectin 10/31/2022    Status post reverse total shoulder replacement, right 2023    Gastroesophageal reflux disease without esophagitis     Gastritis without bleeding     Polyp of colon     Focal lymphocytic colitis 2022    Intractable chronic migraine without aura and without status migrainosus 2021    Cervicalgia 2020    Small vessel disease, cerebrovascular     Lichen sclerosus et atrophicus of the vulva 2019    Heart murmur 2019    Anxiety and depression 2019    Diverticulosis of large intestine without diverticulitis     Prolapsed hemorrhoids     Paraesophageal hiatal hernia     Ulcer of esophagus without bleeding     Other dysphagia     Spondylosis of lumbar region without myelopathy or radiculopathy 2016    Sacroiliac joint dysfunction of left side 2016    Osteoporosis 2015    Mixed hyperlipidemia 2015    Hypothyroidism         Past Medical History:   Diagnosis Date    Anxiety and depression 2019    Anxiety and depression     Chest pain 2019    Diverticulosis of colon (without mention of hemorrhage) 2015    DR BURNETTE    Focal lymphocytic colitis 2022    Gastroesophageal reflux disease without esophagitis     GI problem     Hyperlipidemia 2015    Hypothyroidism     Lichen sclerosus     Migraines 2015    Renal insufficiency 2015    Spondylosis of lumbar region without myelopathy or radiculopathy 2016     Past Dressing: Minimal assistance  LE Dressing: Setup  Toileting: Minimal assistance  Additional Comments: Simulated ADLs as above. Limited d/t pain in R shoulder and decreased balance. Discussed use of night gowns if pt feels clothing is difficult to manage, techniques for bathing. Unable to use RUE for bathing. Toilet Transfers  Toilet Transfer: Unable to assess  Toilet Transfers Comments: Anticipate SBA    Functional Mobility:    Transfers  Sit to stand: Stand by assistance  Stand to sit: Stand by assistance    Patient ambulated pivot only to wheelchair with arm removed  with Bed rail at Supervision level. Verbal cue for positioning initially, no LOB noted. Bed Mobility  Bed mobility  Supine to Sit: Stand by assistance  Sit to Supine: Stand by assistance  Bed Mobility Comments: One verbal cue for NWB in use of RLE when shifting in bed    Seated and Standing Balance:  Balance  Sitting: Intact  Standing: Impaired (G awareness of NWB)  Standing - Static: Good  Standing - Dynamic: Fair    Functional Endurance:  Activity Tolerance  Activity Tolerance: Patient Tolerated treatment well    D/C Recommendations:  OT D/C RECOMMENDATIONS  REQUIRES OT FOLLOW-UP: Yes    Equipment Recommendations:  OT Equipment Recommendations  Other: Continue to assess    OT Education:   Patient Education  Education Given To: Patient  Education Provided: Role of Therapy;Plan of Care  Education Method: Verbal  Barriers to Learning: None  Education Outcome: Verbalized understanding    OT Follow Up:   OT D/C RECOMMENDATIONS  REQUIRES OT FOLLOW-UP: Yes       Assessment/Discharge Disposition:  Assessment: Pt is a 67year old woman from home who presents to [place] with the above deficits which impact her ability to perform ADLs and IADLs. Pt. limited d/t fatigue, weakness and pain. Pt would benefit from continued OT to maximize independence and safety wtih ADL tasks.   Performance deficits / Impairments: Decreased functional mobility , Decreased ADL

## 2023-03-31 NOTE — CARE COORDINATION
Abdiel [facility] ORDERS FAXED TO [facility]- AWAIT CALL. CALL PLACED TO PT CORAL ALVES PER PT REQUEST TO DISCUSS DC PLANNING. [address] UNDERSTANDING OF PLAN AND AGREEABLE WITH PLAN. DENIES FURTHER QUESTIONS/NEEDS.
REFERRAL CALLED TO [facility]. PT WAS DISCHARGED FROM SERVICES 3/22. WILL NEED FACESHEET, HHC ORDERS, H&P AND OP NOTES FAXED [phone] WHEN WE HAVE [address].  UPDATING ORTHO TEAM. AWAIT [place]
[facility] can take patient. D/C paperwork faxed and therapy notes faxed per request. Patient to d/c home today.
plan of care/goals and shares the quality data associated with the providers was provided to:     Patient Representative Name:       The Patient and/or Patient Representative Agree with the Discharge Plan?       Brandi Maher RN  Case Management Department  Ph:  Fax:

## 2023-03-31 NOTE — PROGRESS NOTES
Physical Therapy Med Surg Initial Assessment  [facility]  [facility]       NAME: Kimberly Falk  : 1950 (67 y.o.)  MRN: [id number]  CODE STATUS: Full Code    Date of Service: 3/31/2023    Patient Diagnosis(es): Closed fracture of proximal end of right humerus, unspecified fracture morphology, initial encounter [S42.201A]  Status post reverse total shoulder replacement, right [Z96.611]   No chief complaint on file.     Patient Active Problem List    Diagnosis Date Noted    Elevated fecal calprotectin 10/31/2022    Status post reverse total shoulder replacement, right 2023    Gastroesophageal reflux disease without esophagitis     Gastritis without bleeding     Polyp of colon     Focal lymphocytic colitis 2022    Intractable chronic migraine without aura and without status migrainosus 2021    Cervicalgia 2020    Small vessel disease, cerebrovascular 06/10/8011    Lichen sclerosus et atrophicus of the vulva 2019    Heart murmur 2019    Anxiety and depression 2019    Diverticulosis of large intestine without diverticulitis     Prolapsed hemorrhoids     Paraesophageal hiatal hernia     Ulcer of esophagus without bleeding     Other dysphagia     Spondylosis of lumbar region without myelopathy or radiculopathy 2016    Sacroiliac joint dysfunction of left side 2016    Osteoporosis 2015    Mixed hyperlipidemia 2015    Hypothyroidism         Past Medical History:   Diagnosis Date    Anxiety and depression 2019    Anxiety and depression     Chest pain 2019    Diverticulosis of colon (without mention of hemorrhage) 2015    DR BURNETTE    Focal lymphocytic colitis 2022    Gastroesophageal reflux disease without esophagitis     GI problem     Hyperlipidemia 2015    Hypothyroidism     Lichen sclerosus     Migraines 2015    Renal insufficiency 2015    Spondylosis of lumbar Layout: One level (1 step between living room and kitchen, has ramp for this)  Home Access: Ramped entrance  Bathroom Shower/Tub: Tub/Shower unit  Bathroom Equipment: Shower chair  Home Equipment: [address]: Independent  Homemaking Assistance: Independent  Ambulation Assistance: Independent  Transfer Assistance: Independent  Active : Yes  Additional Comments: Pt typically is support for her son who has a brain injury. Pt provides IADL assist for son.  Currently her other son is able to provide this assist.    OBJECTIVE:   Vision  Vision: Impaired  Vision Exceptions: Wears glasses for reading (\"I'm supposed to wear my glasses all the time but I really don't\")  Hearing: Within functional limits    Cognition:  Overall Orientation Status: Within Functional Limits  Follows Commands: Within Functional Limits  Overall Cognitive Status: WFL    Observation/Palpation  Observation: Pt alert and attentive, agreeable to therapy assesment, no acute distress, sling in place and pt reports comfort to R shoulder    ROM:  RLE PROM: WFL  LLE PROM: WFL    Strength:  Strength RLE  Strength RLE: WFL  Strength LLE  Strength LLE: WFL    Neuro:  Balance  Sitting - Static: Good  Sitting - Dynamic: Good  Standing - Static: Fair  Standing - Dynamic: Fair                      Tone: Normal    Sensation: Intact    Bed mobility  Supine to Sit: Stand by assistance  Sit to Supine: Stand by assistance  Bed Mobility Comments: One verbal cue for NWB in use of RLE when shifting in bed    Transfers  Sit to Stand: Stand by assistance;Contact guard assistance  Stand to Sit: Stand by assistance  Bed to Chair: Stand by assistance  Comment: Cues for set up    Ambulation  Comments: Deferred                   Activity Tolerance  Activity Tolerance: Patient tolerated treatment well    Patient Education  Education Given To: Patient  Education Provided: Role of Therapy;Plan of Care  Education Method: Verbal  Education Outcome:

## 2023-03-31 NOTE — PROGRESS NOTES
Orthopedic Surgery Progress Note  Madeline Dobson  3/31/2023    Subjective:     Post-Operative Day # 1 Status Post right TSA /ORIF proximal humerus fracture    Systemic or Specific Complaints:No Complaints    Objective:     BP (!) 99/48   Pulse 64   Temp 98.2 °F (36.8 °C) (Oral)   Resp 20   Ht 5' 2\" (1.575 m)   Wt 135 lb (61.2 kg)   LMP  (LMP Unknown)   SpO2 96%   BMI 24.69 kg/m²     Intake/Output Summary (Last 24 hours) at 3/31/2023 0750  Last data filed at 3/31/2023 1024  Gross per 24 hour   Intake 2615 ml   Output 320 ml   Net 2295 ml     DRAIN/TUBE OUTPUT:         General: alert, appears stated age, and cooperative   Wound: No Erythema, No Edema, No Drainage, and dressing CDI   Extremity: Sling on extremity. Distal NVI   DVT Exam: No evidence of DVT seen on physical exam.  Negative Sathish's sign. No significant calf/ankle edema. Data Review    Recent Labs     03/31/23  0516   WBC 5.3   RBC 3.26*   HGB 9.4*   HCT 28.8*   MCV 88.4   MCH 28.8   MCHC 32.6*   RDW 14.8*        Assessment:     Status Post right TSA /ORIF proximal humerus fracture, doing well       Acute blood loss anemia secondary to expected surgical blood loss: stable. Hgb this am 9.4. Patient asymptomatic, remains hemodynamically stable with no signs of active bleeding. Recommend daily CBC's during hospitalization and transfuse for hgb less then 7. Acute postoperative pain: patient reports moderate pain to operative extremity rating pain 5/10. Pain is described as an aching sensation that is exacerbated by movement and relieved by rest, ice and pain medication. Continue current pain regimen. Plan:      1:  Continues current post-op course   2:  Continue Pain Control  3:  PT/OT: NWB to RUE. No rehab of shoulder until follow up with surgeon in two weeks. NWB to RLE due to tibial plateau fracture. Patient has two more weeks of NWB status to RLE  4. ASA 81 mg BID for 30 days for DVT prophyalxis  5.  Discharge planning: patient is going to require SNF placement at discharge. CM and SW following for discharge planning.      Osorio Tristan Born, APRN - CNP

## 2023-03-31 NOTE — FLOWSHEET NOTE
Patients blood pressure low 86/41, pt is asymptomatic. Sunday served Rossana NP. 500cc fluid bolus ordered. This nurse will administer and continue to monitor.

## 2023-03-31 NOTE — PLAN OF CARE
See OT evaluation for all goals and OT POC.  Electronically signed by IVY Cartwright on 3/31/2023 at 11:58 AM

## 2023-03-31 NOTE — PROGRESS NOTES
region without myelopathy or radiculopathy 05/24/2016     Past Surgical History:   Procedure Laterality Date    ABDOMINAL ADHESION SURGERY  1/7/16    DR. MERAZ    APPENDECTOMY  2012    CATARACT REMOVAL Right     CHOLECYSTECTOMY  2012    COLON SURGERY  2005    13\" of colon removed-NO CA    COLONOSCOPY  2011    polyps    COLONOSCOPY  02/25/2015    DR Zena Stone - DIVERTICULOSIS    COLONOSCOPY N/A 3/4/2022    COLONOSCOPY DIAGNOSTIC performed by Irma Pleitez MD at [facility]  2013    LA COLONOSCOPY FLX DX W/COLLJ SPEC WHEN PFRMD N/A 1/26/2018    COLONOSCOPY performed by Hari Liang MD at [address] ESOPHAGOGASTRODUODENOSCOPY TRANSORAL DIAGNOSTIC N/A 1/26/2018    EGD ESOPHAGOGASTRODUODENOSCOPY WITH DILATION performed by Hari Liang MD at [address] 3/4/2022    EGD ESOPHAGOGASTRODUODENOSCOPY performed by Irma Pleitez MD at [facility]       Chart Reviewed: Yes  Patient assessed for rehabilitation services?: Yes  Additional Pertinent Hx: R tib/fib fx with repair in Feb 2023. Still NWB  Family / Caregiver Present: No  Diagnosis: Status post reverse total shoulder replacement, right  General Comment  Comments: Pt resting in bed - agreeable to PT evaluation    Restrictions:  Restrictions/Precautions: Fall Risk;Weight Bearing  Lower Extremity Weight Bearing Restrictions  Right Lower Extremity Weight Bearing: Non Weight Bearing  Upper Extremity Weight Bearing Restrictions  Right Upper Extremity Weight Bearing: Non Weight Bearing  Position Activity Restriction  Other position/activity restrictions: Sling at all times, no ROM R shoulder    SUBJECTIVE:   Subjective: \"I was up in the chair earlier, but I am just so exhausted now I don't really want to get in it again\"    Pain  Pain: 8/10 pre and post session pain R UE. RN aware.     OBJECTIVE:        Bed mobility  Supine to Sit: Stand by total physical assistance to accomplish the task

## 2023-04-01 PROBLEM — Z78.9 UNABLE TO CARE FOR SELF: Status: ACTIVE | Noted: 2023-04-01

## 2023-04-01 LAB
ALBUMIN SERPL-MCNC: 3.6 G/DL (ref 3.5–4.6)
ALP SERPL-CCNC: 73 U/L (ref 40–130)
ALT SERPL-CCNC: 6 U/L (ref 0–33)
ANION GAP SERPL CALCULATED.3IONS-SCNC: 10 MEQ/L (ref 9–15)
AST SERPL-CCNC: 13 U/L (ref 0–35)
BASOPHILS # BLD: 0.1 K/UL (ref 0–0.2)
BASOPHILS NFR BLD: 0.8 %
BILIRUB SERPL-MCNC: 0.5 MG/DL (ref 0.2–0.7)
BUN SERPL-MCNC: 8 MG/DL (ref 8–23)
CALCIUM SERPL-MCNC: 8.8 MG/DL (ref 8.5–9.9)
CHLORIDE SERPL-SCNC: 99 MEQ/L (ref 95–107)
CO2 SERPL-SCNC: 26 MEQ/L (ref 20–31)
CREAT SERPL-MCNC: 0.47 MG/DL (ref 0.5–0.9)
EOSINOPHIL # BLD: 0.2 K/UL (ref 0–0.7)
EOSINOPHIL NFR BLD: 2.4 %
ERYTHROCYTE [DISTWIDTH] IN BLOOD BY AUTOMATED COUNT: 14.9 % (ref 11.5–14.5)
GLOBULIN SER CALC-MCNC: 1.9 G/DL (ref 2.3–3.5)
GLUCOSE SERPL-MCNC: 110 MG/DL (ref 70–99)
HCT VFR BLD AUTO: 30.3 % (ref 37–47)
HGB BLD-MCNC: 10.1 G/DL (ref 12–16)
LYMPHOCYTES # BLD: 1 K/UL (ref 1–4.8)
LYMPHOCYTES NFR BLD: 14 %
MCH RBC QN AUTO: 30 PG (ref 27–31.3)
MCHC RBC AUTO-ENTMCNC: 33.4 % (ref 33–37)
MCV RBC AUTO: 89.7 FL (ref 79.4–94.8)
MONOCYTES # BLD: 0.6 K/UL (ref 0.2–0.8)
MONOCYTES NFR BLD: 8.5 %
NEUTROPHILS # BLD: 5.1 K/UL (ref 1.4–6.5)
NEUTS SEG NFR BLD: 74.3 %
PLATELET # BLD AUTO: 169 K/UL (ref 130–400)
POTASSIUM SERPL-SCNC: 4 MEQ/L (ref 3.4–4.9)
PROT SERPL-MCNC: 5.5 G/DL (ref 6.3–8)
RBC # BLD AUTO: 3.37 M/UL (ref 4.2–5.4)
SODIUM SERPL-SCNC: 135 MEQ/L (ref 135–144)
WBC # BLD AUTO: 6.9 K/UL (ref 4.8–10.8)

## 2023-04-01 PROCEDURE — 1210000000 HC MED SURG R&B

## 2023-04-01 PROCEDURE — 80053 COMPREHEN METABOLIC PANEL: CPT

## 2023-04-01 PROCEDURE — 36415 COLL VENOUS BLD VENIPUNCTURE: CPT

## 2023-04-01 PROCEDURE — 6370000000 HC RX 637 (ALT 250 FOR IP)

## 2023-04-01 PROCEDURE — 6360000002 HC RX W HCPCS

## 2023-04-01 PROCEDURE — 6370000000 HC RX 637 (ALT 250 FOR IP): Performed by: STUDENT IN AN ORGANIZED HEALTH CARE EDUCATION/TRAINING PROGRAM

## 2023-04-01 PROCEDURE — 85025 COMPLETE CBC W/AUTO DIFF WBC: CPT

## 2023-04-01 RX ORDER — OXYCODONE HYDROCHLORIDE AND ACETAMINOPHEN 5; 325 MG/1; MG/1
1 TABLET ORAL ONCE
Status: COMPLETED | OUTPATIENT
Start: 2023-04-01 | End: 2023-04-01

## 2023-04-01 RX ORDER — ACETAMINOPHEN 500 MG
500 TABLET ORAL EVERY 4 HOURS
Status: DISCONTINUED | OUTPATIENT
Start: 2023-04-01 | End: 2023-04-06 | Stop reason: HOSPADM

## 2023-04-01 RX ORDER — ONDANSETRON 2 MG/ML
4 INJECTION INTRAMUSCULAR; INTRAVENOUS EVERY 6 HOURS PRN
Status: DISCONTINUED | OUTPATIENT
Start: 2023-04-01 | End: 2023-04-06 | Stop reason: HOSPADM

## 2023-04-01 RX ORDER — ENOXAPARIN SODIUM 100 MG/ML
40 INJECTION SUBCUTANEOUS DAILY
Status: DISCONTINUED | OUTPATIENT
Start: 2023-04-01 | End: 2023-04-06 | Stop reason: HOSPADM

## 2023-04-01 RX ORDER — POLYETHYLENE GLYCOL 3350 17 G/17G
17 POWDER, FOR SOLUTION ORAL DAILY PRN
Status: DISCONTINUED | OUTPATIENT
Start: 2023-04-01 | End: 2023-04-06 | Stop reason: HOSPADM

## 2023-04-01 RX ORDER — PANTOPRAZOLE SODIUM 40 MG/1
40 TABLET, DELAYED RELEASE ORAL
Status: DISCONTINUED | OUTPATIENT
Start: 2023-04-01 | End: 2023-04-06 | Stop reason: HOSPADM

## 2023-04-01 RX ORDER — OXYCODONE HYDROCHLORIDE 5 MG/1
10 TABLET ORAL EVERY 4 HOURS PRN
Status: DISCONTINUED | OUTPATIENT
Start: 2023-04-01 | End: 2023-04-06 | Stop reason: HOSPADM

## 2023-04-01 RX ORDER — OXYCODONE HYDROCHLORIDE 5 MG/1
5 TABLET ORAL EVERY 4 HOURS PRN
Status: DISCONTINUED | OUTPATIENT
Start: 2023-04-01 | End: 2023-04-06 | Stop reason: HOSPADM

## 2023-04-01 RX ORDER — SENNA AND DOCUSATE SODIUM 50; 8.6 MG/1; MG/1
1 TABLET, FILM COATED ORAL 2 TIMES DAILY
Status: DISCONTINUED | OUTPATIENT
Start: 2023-04-01 | End: 2023-04-06 | Stop reason: HOSPADM

## 2023-04-01 RX ORDER — ACETAMINOPHEN 650 MG/1
650 SUPPOSITORY RECTAL EVERY 6 HOURS PRN
Status: DISCONTINUED | OUTPATIENT
Start: 2023-04-01 | End: 2023-04-06 | Stop reason: HOSPADM

## 2023-04-01 RX ORDER — ONDANSETRON 4 MG/1
4 TABLET, ORALLY DISINTEGRATING ORAL EVERY 8 HOURS PRN
Status: DISCONTINUED | OUTPATIENT
Start: 2023-04-01 | End: 2023-04-06 | Stop reason: HOSPADM

## 2023-04-01 RX ORDER — FERROUS SULFATE 325(65) MG
325 TABLET ORAL
Status: DISCONTINUED | OUTPATIENT
Start: 2023-04-01 | End: 2023-04-06 | Stop reason: HOSPADM

## 2023-04-01 RX ORDER — METHOCARBAMOL 750 MG/1
750 TABLET, FILM COATED ORAL NIGHTLY
Status: DISCONTINUED | OUTPATIENT
Start: 2023-04-01 | End: 2023-04-06 | Stop reason: HOSPADM

## 2023-04-01 RX ORDER — ACETAMINOPHEN 325 MG/1
650 TABLET ORAL EVERY 6 HOURS PRN
Status: DISCONTINUED | OUTPATIENT
Start: 2023-04-01 | End: 2023-04-06 | Stop reason: HOSPADM

## 2023-04-01 RX ADMIN — FERROUS SULFATE TAB 325 MG (65 MG ELEMENTAL FE) 325 MG: 325 (65 FE) TAB at 10:25

## 2023-04-01 RX ADMIN — ACETAMINOPHEN 500 MG: 500 TABLET ORAL at 23:39

## 2023-04-01 RX ADMIN — OXYCODONE HYDROCHLORIDE 5 MG: 5 TABLET ORAL at 15:25

## 2023-04-01 RX ADMIN — OXYCODONE HYDROCHLORIDE 5 MG: 5 TABLET ORAL at 23:39

## 2023-04-01 RX ADMIN — OXYCODONE HYDROCHLORIDE 5 MG: 5 TABLET ORAL at 06:20

## 2023-04-01 RX ADMIN — ACETAMINOPHEN 500 MG: 500 TABLET ORAL at 10:30

## 2023-04-01 RX ADMIN — SENNOSIDES AND DOCUSATE SODIUM 1 TABLET: 50; 8.6 TABLET ORAL at 10:26

## 2023-04-01 RX ADMIN — METHOCARBAMOL 750 MG: 750 TABLET ORAL at 19:52

## 2023-04-01 RX ADMIN — ENOXAPARIN SODIUM 40 MG: 100 INJECTION SUBCUTANEOUS at 10:26

## 2023-04-01 RX ADMIN — ACETAMINOPHEN 500 MG: 500 TABLET ORAL at 19:52

## 2023-04-01 RX ADMIN — OXYCODONE AND ACETAMINOPHEN 1 TABLET: 5; 325 TABLET ORAL at 00:26

## 2023-04-01 RX ADMIN — SENNOSIDES AND DOCUSATE SODIUM 1 TABLET: 50; 8.6 TABLET ORAL at 19:53

## 2023-04-01 RX ADMIN — PANTOPRAZOLE SODIUM 40 MG: 40 TABLET, DELAYED RELEASE ORAL at 10:26

## 2023-04-01 ASSESSMENT — PAIN SCALES - GENERAL
PAINLEVEL_OUTOF10: 4
PAINLEVEL_OUTOF10: 6
PAINLEVEL_OUTOF10: 10
PAINLEVEL_OUTOF10: 5
PAINLEVEL_OUTOF10: 3
PAINLEVEL_OUTOF10: 6
PAINLEVEL_OUTOF10: 6
PAINLEVEL_OUTOF10: 4

## 2023-04-01 ASSESSMENT — PAIN DESCRIPTION - ORIENTATION
ORIENTATION: RIGHT

## 2023-04-01 ASSESSMENT — PAIN DESCRIPTION - DESCRIPTORS
DESCRIPTORS: THROBBING
DESCRIPTORS: ACHING
DESCRIPTORS: THROBBING
DESCRIPTORS: ACHING

## 2023-04-01 ASSESSMENT — PAIN DESCRIPTION - LOCATION
LOCATION: SHOULDER
LOCATION: ARM;SHOULDER
LOCATION: SHOULDER
LOCATION: SHOULDER

## 2023-04-01 ASSESSMENT — ENCOUNTER SYMPTOMS
BACK PAIN: 0
SHORTNESS OF BREATH: 0
SORE THROAT: 0
DIARRHEA: 0
EYE PAIN: 0
CHEST TIGHTNESS: 0
NAUSEA: 0

## 2023-04-01 ASSESSMENT — PAIN DESCRIPTION - ONSET
ONSET: ON-GOING

## 2023-04-01 ASSESSMENT — PAIN DESCRIPTION - FREQUENCY
FREQUENCY: CONTINUOUS

## 2023-04-01 NOTE — FLOWSHEET NOTE
Patient sitting up in bed. Pt is alert and oriented x 4. NSR 82 on telemetry. Right arm is in a sling, dressing to right shoulder clean and dry. . Pt is non weight bearing right leg. Call light in pts reach. Bed alarm in use.

## 2023-04-01 NOTE — ED PROVIDER NOTES
regular rhythm. Heart sounds: Normal heart sounds. Pulmonary:      Effort: Pulmonary effort is normal.      Breath sounds: Normal breath sounds. No stridor. No wheezing or rhonchi. Abdominal:      Palpations: Abdomen is soft. Tenderness: There is no abdominal tenderness. Musculoskeletal:         General: Normal range of motion. Arms:       Cervical back: Normal range of motion and neck supple. No tenderness. Skin:     General: Skin is warm and dry. Neurological:      General: No focal deficit present. Mental Status: She is alert and oriented to person, place, and time. Psychiatric:         Mood and Affect: Mood normal.         Behavior: Behavior normal.         MDM  This is a 27-year-old female presenting to the ER for rehab placement that she refused 1 day earlier. Patient is afebrile and hemodynamically stable. Spoke with Dr. Vishal Garcia who agrees accept patient for admission for placement. FINAL IMPRESSION      1. Decreased activities of daily living (ADL)    2.  Acute pain of right shoulder          DISPOSITION/PLAN   DISPOSITION Decision To Admit 04/01/2023 12:10:31 AM        DISCHARGE MEDICATIONS:  [unfilled]         Jay Jay Navarrete PA-C(electronically signed)  Attending Emergency Physician           Jay Jay Navarrete PA-C  04/01/23 0101

## 2023-04-01 NOTE — H&P
(Medical): Not on file    Lack of Transportation (Non-Medical): No   Physical Activity: Not on file   Stress: Not on file   Social Connections: Not on file   Intimate Partner Violence: Not on file   Housing Stability: Unknown    Unable to Pay for Housing in the Last Year: Not on file    Number of Places Lived in the Last Year: Not on file    Unstable Housing in the Last Year: No     MEDICATIONS:   Prior to Admission medications    Medication Sig Start Date End Date Taking? Authorizing Provider   oxyCODONE (ROXICODONE) 5 MG immediate release tablet Take 1 tablet by mouth every 6 hours as needed for Pain for up to 7 days.  Max Daily Amount: 20 mg 3/31/23 4/7/23  TANNA Miller - CNP   aspirin 81 MG EC tablet Take 1 tablet by mouth in the morning and at bedtime  Patient not taking: Reported on 4/1/2023 3/31/23 4/30/23  TANNA Rubio - MAKEDA   sennosides-docusate sodium (SENOKOT-S) 8.6-50 MG tablet Take 1 tablet by mouth 2 times daily for 10 days 3/31/23 4/10/23  TANNA Rubio - MAKEDA   ondansetron [facility] 4 MG tablet Take by mouth 3/24/23   Historical Provider, MD   acetaminophen (TYLENOL) 325 MG tablet Take by mouth every 4 hours 2/15/23   Historical Provider, MD   methocarbamol (ROBAXIN) 750 MG tablet nightly Daily 2/22/23   Historical Provider, MD   ferrous sulfate (IRON 325) 325 (65 Fe) MG tablet Take 1 tablet by mouth daily (with breakfast) 3/2/23   Jareth Smith MD   omeprazole (PRILOSEC) 20 MG delayed release capsule Take 1 capsule by mouth daily 12/20/22   Jareth Smith MD   FIBER, GUAR GUM, PO Take 2 each by mouth in the morning and at bedtime    Historical Provider, MD       ALLERGIES: Bactrim [sulfamethoxazole-trimethoprim] and Ciprofloxacin    REVIEW OF SYSTEM:   Review of Systems      OBJECTIVE  PHYSICAL EXAM: /64   Pulse 82   Temp 98.1 °F (36.7 °C) (Oral)   Resp 16   Ht 5' 2\" (1.575 m)   Wt 135 lb (61.2 kg)   LMP  (LMP Unknown)   SpO2 100%   BMI 24.69

## 2023-04-01 NOTE — ED NOTES
Pt states had shoulder surgery yesterday, was supposed to go to rehab unit. Pt refused and went home. Pt unable to care for herself at home, would like to be reevaluated for recovery facility.      Ivin Factor  03/31/23 1832

## 2023-04-02 LAB
ALBUMIN SERPL-MCNC: 3.1 G/DL (ref 3.5–4.6)
ALP SERPL-CCNC: 70 U/L (ref 40–130)
ALT SERPL-CCNC: <5 U/L (ref 0–33)
ANION GAP SERPL CALCULATED.3IONS-SCNC: 10 MEQ/L (ref 9–15)
AST SERPL-CCNC: 10 U/L (ref 0–35)
BASOPHILS # BLD: 0 K/UL (ref 0–0.2)
BASOPHILS NFR BLD: 0.7 %
BILIRUB SERPL-MCNC: 0.3 MG/DL (ref 0.2–0.7)
BUN SERPL-MCNC: 6 MG/DL (ref 8–23)
CALCIUM SERPL-MCNC: 8.7 MG/DL (ref 8.5–9.9)
CHLORIDE SERPL-SCNC: 105 MEQ/L (ref 95–107)
CO2 SERPL-SCNC: 25 MEQ/L (ref 20–31)
CREAT SERPL-MCNC: 0.48 MG/DL (ref 0.5–0.9)
EOSINOPHIL # BLD: 0.4 K/UL (ref 0–0.7)
EOSINOPHIL NFR BLD: 7.1 %
ERYTHROCYTE [DISTWIDTH] IN BLOOD BY AUTOMATED COUNT: 15 % (ref 11.5–14.5)
GLOBULIN SER CALC-MCNC: 2.1 G/DL (ref 2.3–3.5)
GLUCOSE SERPL-MCNC: 100 MG/DL (ref 70–99)
HCT VFR BLD AUTO: 30 % (ref 37–47)
HGB BLD-MCNC: 9.8 G/DL (ref 12–16)
LYMPHOCYTES # BLD: 1.2 K/UL (ref 1–4.8)
LYMPHOCYTES NFR BLD: 22 %
MCH RBC QN AUTO: 29.1 PG (ref 27–31.3)
MCHC RBC AUTO-ENTMCNC: 32.5 % (ref 33–37)
MCV RBC AUTO: 89.6 FL (ref 79.4–94.8)
MONOCYTES # BLD: 0.5 K/UL (ref 0.2–0.8)
MONOCYTES NFR BLD: 9.6 %
NEUTROPHILS # BLD: 3.2 K/UL (ref 1.4–6.5)
NEUTS SEG NFR BLD: 60.6 %
PLATELET # BLD AUTO: 163 K/UL (ref 130–400)
POTASSIUM SERPL-SCNC: 4 MEQ/L (ref 3.4–4.9)
PROT SERPL-MCNC: 5.2 G/DL (ref 6.3–8)
RBC # BLD AUTO: 3.35 M/UL (ref 4.2–5.4)
SODIUM SERPL-SCNC: 140 MEQ/L (ref 135–144)
WBC # BLD AUTO: 5.4 K/UL (ref 4.8–10.8)

## 2023-04-02 PROCEDURE — 80053 COMPREHEN METABOLIC PANEL: CPT

## 2023-04-02 PROCEDURE — 6360000002 HC RX W HCPCS

## 2023-04-02 PROCEDURE — 85025 COMPLETE CBC W/AUTO DIFF WBC: CPT

## 2023-04-02 PROCEDURE — 6370000000 HC RX 637 (ALT 250 FOR IP)

## 2023-04-02 PROCEDURE — 36415 COLL VENOUS BLD VENIPUNCTURE: CPT

## 2023-04-02 PROCEDURE — 97166 OT EVAL MOD COMPLEX 45 MIN: CPT

## 2023-04-02 PROCEDURE — 97162 PT EVAL MOD COMPLEX 30 MIN: CPT

## 2023-04-02 PROCEDURE — 94150 VITAL CAPACITY TEST: CPT

## 2023-04-02 PROCEDURE — 1210000000 HC MED SURG R&B

## 2023-04-02 RX ADMIN — SENNOSIDES AND DOCUSATE SODIUM 1 TABLET: 50; 8.6 TABLET ORAL at 21:25

## 2023-04-02 RX ADMIN — ACETAMINOPHEN 500 MG: 500 TABLET ORAL at 08:02

## 2023-04-02 RX ADMIN — OXYCODONE HYDROCHLORIDE 5 MG: 5 TABLET ORAL at 21:25

## 2023-04-02 RX ADMIN — ACETAMINOPHEN 500 MG: 500 TABLET ORAL at 21:24

## 2023-04-02 RX ADMIN — ACETAMINOPHEN 500 MG: 500 TABLET ORAL at 05:07

## 2023-04-02 RX ADMIN — PANTOPRAZOLE SODIUM 40 MG: 40 TABLET, DELAYED RELEASE ORAL at 05:07

## 2023-04-02 RX ADMIN — ACETAMINOPHEN 500 MG: 500 TABLET ORAL at 16:00

## 2023-04-02 RX ADMIN — METHOCARBAMOL 750 MG: 750 TABLET ORAL at 21:26

## 2023-04-02 RX ADMIN — OXYCODONE HYDROCHLORIDE 5 MG: 5 TABLET ORAL at 10:00

## 2023-04-02 RX ADMIN — FERROUS SULFATE TAB 325 MG (65 MG ELEMENTAL FE) 325 MG: 325 (65 FE) TAB at 08:00

## 2023-04-02 RX ADMIN — POLYETHYLENE GLYCOL 3350 17 G: 17 POWDER, FOR SOLUTION ORAL at 05:07

## 2023-04-02 RX ADMIN — ACETAMINOPHEN 500 MG: 500 TABLET ORAL at 11:02

## 2023-04-02 RX ADMIN — ENOXAPARIN SODIUM 40 MG: 100 INJECTION SUBCUTANEOUS at 08:01

## 2023-04-02 RX ADMIN — OXYCODONE HYDROCHLORIDE 5 MG: 5 TABLET ORAL at 05:06

## 2023-04-02 ASSESSMENT — PAIN DESCRIPTION - ORIENTATION
ORIENTATION: RIGHT

## 2023-04-02 ASSESSMENT — PAIN DESCRIPTION - LOCATION
LOCATION: SHOULDER

## 2023-04-02 ASSESSMENT — PAIN DESCRIPTION - DESCRIPTORS
DESCRIPTORS: ACHING
DESCRIPTORS: ACHING
DESCRIPTORS: THROBBING
DESCRIPTORS: THROBBING
DESCRIPTORS: ACHING
DESCRIPTORS: THROBBING
DESCRIPTORS: THROBBING

## 2023-04-02 ASSESSMENT — PAIN SCALES - GENERAL
PAINLEVEL_OUTOF10: 4
PAINLEVEL_OUTOF10: 5
PAINLEVEL_OUTOF10: 0
PAINLEVEL_OUTOF10: 5
PAINLEVEL_OUTOF10: 0
PAINLEVEL_OUTOF10: 5
PAINLEVEL_OUTOF10: 6
PAINLEVEL_OUTOF10: 4
PAINLEVEL_OUTOF10: 4

## 2023-04-02 ASSESSMENT — PAIN DESCRIPTION - ONSET
ONSET: ON-GOING

## 2023-04-02 ASSESSMENT — PAIN DESCRIPTION - FREQUENCY
FREQUENCY: INTERMITTENT
FREQUENCY: INTERMITTENT
FREQUENCY: CONTINUOUS
FREQUENCY: INTERMITTENT

## 2023-04-02 ASSESSMENT — PAIN - FUNCTIONAL ASSESSMENT
PAIN_FUNCTIONAL_ASSESSMENT: PREVENTS OR INTERFERES SOME ACTIVE ACTIVITIES AND ADLS
PAIN_FUNCTIONAL_ASSESSMENT: ACTIVITIES ARE NOT PREVENTED

## 2023-04-02 NOTE — PROGRESS NOTES
Physical Therapy  Facility/Department: [facility] MED SURG [id number]/[id number]  Physical Therapy Discharge      NAME: Steffany Weller    : 1950 (89 y.o.)  MRN: [id number]    Account: [de-identified]  Gender: female      Patient has been discharged from acute care hospital. DC patient from current PT program.      Electronically signed by Moo Damian PT on 23 at 3:59 PM EDT

## 2023-04-02 NOTE — PLAN OF CARE
Problem: Safety - Adult  Goal: Free from fall injury  Outcome: Progressing     Problem: Discharge Planning  Goal: Discharge to home or other facility with appropriate resources  Outcome: Progressing  Flowsheets (Taken 4/1/2023 1900)  Discharge to home or other facility with appropriate resources: Identify barriers to discharge with patient and caregiver     Problem: Skin/Tissue Integrity  Goal: Absence of new skin breakdown  Description: 1. Monitor for areas of redness and/or skin breakdown  2. Assess vascular access sites hourly  3. Every 4-6 hours minimum:  Change oxygen saturation probe site  4. Every 4-6 hours:  If on nasal continuous positive airway pressure, respiratory therapy assess nares and determine need for appliance change or resting period.   Outcome: Progressing

## 2023-04-02 NOTE — CARE COORDINATION
Met with patient at bedside. She had CM assessment done on 3/31/23. Pt was dc home that day and returned a few hours later when she realized she was unable to care for herself. She is now asking to go to [facility]. [place] of choice given and she was choosing them from prior stay but then decided to go home. I called supervisor Jodi Camarillo and gave her referral. Pt did have [facility] that was going to start once she was home.

## 2023-04-03 LAB
ALBUMIN SERPL-MCNC: 3 G/DL (ref 3.5–4.6)
ALP SERPL-CCNC: 66 U/L (ref 40–130)
ALT SERPL-CCNC: <5 U/L (ref 0–33)
ANION GAP SERPL CALCULATED.3IONS-SCNC: 11 MEQ/L (ref 9–15)
AST SERPL-CCNC: 14 U/L (ref 0–35)
BASOPHILS # BLD: 0 K/UL (ref 0–0.2)
BASOPHILS NFR BLD: 0.9 %
BILIRUB SERPL-MCNC: <0.2 MG/DL (ref 0.2–0.7)
BUN SERPL-MCNC: 8 MG/DL (ref 8–23)
CALCIUM SERPL-MCNC: 8.8 MG/DL (ref 8.5–9.9)
CHLORIDE SERPL-SCNC: 104 MEQ/L (ref 95–107)
CO2 SERPL-SCNC: 24 MEQ/L (ref 20–31)
CREAT SERPL-MCNC: 0.47 MG/DL (ref 0.5–0.9)
EOSINOPHIL # BLD: 0.3 K/UL (ref 0–0.7)
EOSINOPHIL NFR BLD: 7.5 %
ERYTHROCYTE [DISTWIDTH] IN BLOOD BY AUTOMATED COUNT: 15.2 % (ref 11.5–14.5)
GLOBULIN SER CALC-MCNC: 2.2 G/DL (ref 2.3–3.5)
GLUCOSE SERPL-MCNC: 85 MG/DL (ref 70–99)
HCT VFR BLD AUTO: 31 % (ref 37–47)
HGB BLD-MCNC: 10.1 G/DL (ref 12–16)
LYMPHOCYTES # BLD: 1.2 K/UL (ref 1–4.8)
LYMPHOCYTES NFR BLD: 25.6 %
MCH RBC QN AUTO: 29.4 PG (ref 27–31.3)
MCHC RBC AUTO-ENTMCNC: 32.6 % (ref 33–37)
MCV RBC AUTO: 90.3 FL (ref 79.4–94.8)
MONOCYTES # BLD: 0.5 K/UL (ref 0.2–0.8)
MONOCYTES NFR BLD: 11.7 %
NEUTROPHILS # BLD: 2.5 K/UL (ref 1.4–6.5)
NEUTS SEG NFR BLD: 54.3 %
PLATELET # BLD AUTO: 180 K/UL (ref 130–400)
POTASSIUM SERPL-SCNC: 4.7 MEQ/L (ref 3.4–4.9)
PROT SERPL-MCNC: 5.2 G/DL (ref 6.3–8)
RBC # BLD AUTO: 3.43 M/UL (ref 4.2–5.4)
SODIUM SERPL-SCNC: 139 MEQ/L (ref 135–144)
WBC # BLD AUTO: 4.6 K/UL (ref 4.8–10.8)

## 2023-04-03 PROCEDURE — 97535 SELF CARE MNGMENT TRAINING: CPT

## 2023-04-03 PROCEDURE — 6370000000 HC RX 637 (ALT 250 FOR IP)

## 2023-04-03 PROCEDURE — 6360000002 HC RX W HCPCS

## 2023-04-03 PROCEDURE — 2700000000 HC OXYGEN THERAPY PER DAY

## 2023-04-03 PROCEDURE — 80053 COMPREHEN METABOLIC PANEL: CPT

## 2023-04-03 PROCEDURE — 36415 COLL VENOUS BLD VENIPUNCTURE: CPT

## 2023-04-03 PROCEDURE — 85025 COMPLETE CBC W/AUTO DIFF WBC: CPT

## 2023-04-03 PROCEDURE — 1210000000 HC MED SURG R&B

## 2023-04-03 RX ADMIN — ACETAMINOPHEN 500 MG: 500 TABLET ORAL at 20:33

## 2023-04-03 RX ADMIN — METHOCARBAMOL 750 MG: 750 TABLET ORAL at 20:33

## 2023-04-03 RX ADMIN — FERROUS SULFATE TAB 325 MG (65 MG ELEMENTAL FE) 325 MG: 325 (65 FE) TAB at 08:23

## 2023-04-03 RX ADMIN — ENOXAPARIN SODIUM 40 MG: 100 INJECTION SUBCUTANEOUS at 08:23

## 2023-04-03 RX ADMIN — SENNOSIDES AND DOCUSATE SODIUM 1 TABLET: 50; 8.6 TABLET ORAL at 20:33

## 2023-04-03 RX ADMIN — OXYCODONE HYDROCHLORIDE 5 MG: 5 TABLET ORAL at 05:30

## 2023-04-03 RX ADMIN — OXYCODONE HYDROCHLORIDE 5 MG: 5 TABLET ORAL at 20:33

## 2023-04-03 RX ADMIN — ACETAMINOPHEN 500 MG: 500 TABLET ORAL at 17:48

## 2023-04-03 RX ADMIN — PANTOPRAZOLE SODIUM 40 MG: 40 TABLET, DELAYED RELEASE ORAL at 05:28

## 2023-04-03 RX ADMIN — ACETAMINOPHEN 500 MG: 500 TABLET ORAL at 08:23

## 2023-04-03 RX ADMIN — ACETAMINOPHEN 500 MG: 500 TABLET ORAL at 14:01

## 2023-04-03 RX ADMIN — SENNOSIDES AND DOCUSATE SODIUM 1 TABLET: 50; 8.6 TABLET ORAL at 08:23

## 2023-04-03 ASSESSMENT — PAIN DESCRIPTION - DESCRIPTORS
DESCRIPTORS: ACHING

## 2023-04-03 ASSESSMENT — PAIN - FUNCTIONAL ASSESSMENT: PAIN_FUNCTIONAL_ASSESSMENT: PREVENTS OR INTERFERES SOME ACTIVE ACTIVITIES AND ADLS

## 2023-04-03 ASSESSMENT — PAIN DESCRIPTION - LOCATION
LOCATION: SHOULDER

## 2023-04-03 ASSESSMENT — PAIN SCALES - GENERAL
PAINLEVEL_OUTOF10: 4
PAINLEVEL_OUTOF10: 5
PAINLEVEL_OUTOF10: 4
PAINLEVEL_OUTOF10: 5
PAINLEVEL_OUTOF10: 7
PAINLEVEL_OUTOF10: 5
PAINLEVEL_OUTOF10: 5

## 2023-04-03 ASSESSMENT — PAIN DESCRIPTION - ORIENTATION
ORIENTATION: RIGHT

## 2023-04-03 NOTE — CARE COORDINATION
SAC:  Obtained benefits from [facility] [employer] faxed for review. We will notify  once we receive decision from [address] [place] [address] [facility].

## 2023-04-04 LAB
ALBUMIN SERPL-MCNC: 3.1 G/DL (ref 3.5–4.6)
ALP SERPL-CCNC: 68 U/L (ref 40–130)
ALT SERPL-CCNC: <5 U/L (ref 0–33)
ANION GAP SERPL CALCULATED.3IONS-SCNC: 10 MEQ/L (ref 9–15)
AST SERPL-CCNC: 9 U/L (ref 0–35)
BASOPHILS # BLD: 0 K/UL (ref 0–0.2)
BASOPHILS NFR BLD: 1.1 %
BILIRUB SERPL-MCNC: <0.2 MG/DL (ref 0.2–0.7)
BUN SERPL-MCNC: 11 MG/DL (ref 8–23)
CALCIUM SERPL-MCNC: 8.7 MG/DL (ref 8.5–9.9)
CHLORIDE SERPL-SCNC: 107 MEQ/L (ref 95–107)
CO2 SERPL-SCNC: 25 MEQ/L (ref 20–31)
CREAT SERPL-MCNC: 0.49 MG/DL (ref 0.5–0.9)
EOSINOPHIL # BLD: 0.3 K/UL (ref 0–0.7)
EOSINOPHIL NFR BLD: 6.5 %
ERYTHROCYTE [DISTWIDTH] IN BLOOD BY AUTOMATED COUNT: 15 % (ref 11.5–14.5)
GLOBULIN SER CALC-MCNC: 2.1 G/DL (ref 2.3–3.5)
GLUCOSE SERPL-MCNC: 85 MG/DL (ref 70–99)
HCT VFR BLD AUTO: 29.8 % (ref 37–47)
HGB BLD-MCNC: 9.7 G/DL (ref 12–16)
LYMPHOCYTES # BLD: 1.2 K/UL (ref 1–4.8)
LYMPHOCYTES NFR BLD: 29.6 %
MCH RBC QN AUTO: 28.7 PG (ref 27–31.3)
MCHC RBC AUTO-ENTMCNC: 32.4 % (ref 33–37)
MCV RBC AUTO: 88.6 FL (ref 79.4–94.8)
MONOCYTES # BLD: 0.4 K/UL (ref 0.2–0.8)
MONOCYTES NFR BLD: 10.7 %
NEUTROPHILS # BLD: 2 K/UL (ref 1.4–6.5)
NEUTS SEG NFR BLD: 52.1 %
PLATELET # BLD AUTO: 221 K/UL (ref 130–400)
POTASSIUM SERPL-SCNC: 4.2 MEQ/L (ref 3.4–4.9)
PROT SERPL-MCNC: 5.2 G/DL (ref 6.3–8)
RBC # BLD AUTO: 3.36 M/UL (ref 4.2–5.4)
SODIUM SERPL-SCNC: 142 MEQ/L (ref 135–144)
WBC # BLD AUTO: 3.9 K/UL (ref 4.8–10.8)

## 2023-04-04 PROCEDURE — 1210000000 HC MED SURG R&B

## 2023-04-04 PROCEDURE — 36415 COLL VENOUS BLD VENIPUNCTURE: CPT

## 2023-04-04 PROCEDURE — 85025 COMPLETE CBC W/AUTO DIFF WBC: CPT

## 2023-04-04 PROCEDURE — 97542 WHEELCHAIR MNGMENT TRAINING: CPT

## 2023-04-04 PROCEDURE — 6360000002 HC RX W HCPCS

## 2023-04-04 PROCEDURE — 97535 SELF CARE MNGMENT TRAINING: CPT

## 2023-04-04 PROCEDURE — 6370000000 HC RX 637 (ALT 250 FOR IP)

## 2023-04-04 PROCEDURE — 80053 COMPREHEN METABOLIC PANEL: CPT

## 2023-04-04 RX ADMIN — PANTOPRAZOLE SODIUM 40 MG: 40 TABLET, DELAYED RELEASE ORAL at 06:23

## 2023-04-04 RX ADMIN — ACETAMINOPHEN 500 MG: 500 TABLET ORAL at 00:41

## 2023-04-04 RX ADMIN — ACETAMINOPHEN 500 MG: 500 TABLET ORAL at 14:14

## 2023-04-04 RX ADMIN — ACETAMINOPHEN 500 MG: 500 TABLET ORAL at 04:31

## 2023-04-04 RX ADMIN — OXYCODONE HYDROCHLORIDE 5 MG: 5 TABLET ORAL at 21:23

## 2023-04-04 RX ADMIN — ACETAMINOPHEN 500 MG: 500 TABLET ORAL at 08:43

## 2023-04-04 RX ADMIN — ENOXAPARIN SODIUM 40 MG: 100 INJECTION SUBCUTANEOUS at 08:43

## 2023-04-04 RX ADMIN — FERROUS SULFATE TAB 325 MG (65 MG ELEMENTAL FE) 325 MG: 325 (65 FE) TAB at 08:43

## 2023-04-04 RX ADMIN — SENNOSIDES AND DOCUSATE SODIUM 1 TABLET: 50; 8.6 TABLET ORAL at 21:19

## 2023-04-04 RX ADMIN — SENNOSIDES AND DOCUSATE SODIUM 1 TABLET: 50; 8.6 TABLET ORAL at 08:43

## 2023-04-04 RX ADMIN — ACETAMINOPHEN 500 MG: 500 TABLET ORAL at 21:19

## 2023-04-04 RX ADMIN — METHOCARBAMOL 750 MG: 750 TABLET ORAL at 21:19

## 2023-04-04 ASSESSMENT — PAIN SCALES - GENERAL
PAINLEVEL_OUTOF10: 1
PAINLEVEL_OUTOF10: 5
PAINLEVEL_OUTOF10: 0
PAINLEVEL_OUTOF10: 4
PAINLEVEL_OUTOF10: 5
PAINLEVEL_OUTOF10: 3
PAINLEVEL_OUTOF10: 4
PAINLEVEL_OUTOF10: 4

## 2023-04-04 ASSESSMENT — PAIN DESCRIPTION - ORIENTATION
ORIENTATION: RIGHT

## 2023-04-04 ASSESSMENT — PAIN DESCRIPTION - LOCATION
LOCATION: SHOULDER

## 2023-04-04 ASSESSMENT — PAIN DESCRIPTION - DESCRIPTORS
DESCRIPTORS: ACHING
DESCRIPTORS: DULL
DESCRIPTORS: ACHING

## 2023-04-04 NOTE — CARE COORDINATION
DR Franck Villagran NOTIFIED OF [facility] INSURANCE DENIAL, PLAN FOR SNF. MET WITH PATIENT AND SON, EXPLAINED DENIAL. FREEDOM OF CHOICE GIVEN, PT WOULD LIKE #1 [facility], #3 [facility]. MESSAGE LEFT FOR Antonio Banks. SECOND IMM WAS REVIEWED AND COPY GIVEN TO PT.     1400  SPOKE TO [facility], PT HAS BEEN ACCEPTED AND PRECERT STARTED.

## 2023-04-04 NOTE — DISCHARGE INSTR - COC
03/08/23 03/08/23 Clostridium Difficile Toxin/Antigen (Ordered)   03/09/23 Rule-Out Test Resulted            Nurse Assessment:  Last Vital Signs: BP (!) 119/48   Pulse 76   Temp 98 °F (36.7 °C) (Oral)   Resp 18   Ht 5' 2\" (1.575 m)   Wt 145 lb 6 oz (65.9 kg)   LMP  (LMP Unknown)   SpO2 99%   BMI 26.59 kg/m²     Last documented pain score (0-10 scale): Pain Level: 4  Last Weight:   Wt Readings from Last 1 Encounters:   04/04/23 145 lb 6 oz (65.9 kg)     Mental Status:  oriented    IV Access:  - None    Nursing Mobility/ADLs:  Walking   assisted  Transfer  assisted  Bathing  Assisted  Dressing  Assisted  Toileting  Assisted  Feeding  Independent  Med Admin  Assisted  Med Delivery   whole    Wound Care Documentation and Therapy:  Incision 03/30/23 Shoulder Anterior;Right (Active)   Dressing Status Clean;Dry; Intact 04/03/23 2000   Incision Cleansed Cleansed with saline 04/03/23 2000   Dressing/Treatment Other (comment) 03/31/23 0801   Closure Other (Comment) 04/03/23 2000   Margins Other (Comment) 04/03/23 2000   Incision Assessment Other (Comment) 04/03/23 2000   Drainage Amount None 04/03/23 2000   Odor None 04/03/23 2000   Gayathri-incision Assessment Intact; Warm 04/03/23 2000   Number of days: 5        Elimination:  Continence: Bowel: Yes  Bladder: Yes  Urinary Catheter: None   Colostomy/Ileostomy/Ileal Conduit: No       Date of Last BM: 4/6/23    Intake/Output Summary (Last 24 hours) at 4/4/2023 1505  Last data filed at 4/4/2023 0834  Gross per 24 hour   Intake 480 ml   Output 1200 ml   Net -720 ml     I/O last 3 completed shifts: In: 12 [P.O.:960]  Out: 1200 [Urine:1200]    Safety Concerns:     History of Falls (last 30 days) and At Risk for Falls    Impairments/Disabilities:      None    Nutrition Therapy:  Current Nutrition Therapy:   - Oral Diet:  General    Routes of Feeding: Oral  Liquids:  Thin Liquids  Daily Fluid Restriction: no  Last Modified Barium Swallow with Video (Video Swallowing Test): not

## 2023-04-04 NOTE — CARE COORDINATION
Patient is denied for skilled admission at [facility]. P2P #[phone] by noon tomorrow 4/5/23. SS at [facility] [place] notified. CM to follow.

## 2023-04-05 LAB
ALBUMIN SERPL-MCNC: 3.4 G/DL (ref 3.5–4.6)
ALP SERPL-CCNC: 81 U/L (ref 40–130)
ALT SERPL-CCNC: 7 U/L (ref 0–33)
ANION GAP SERPL CALCULATED.3IONS-SCNC: 13 MEQ/L (ref 9–15)
AST SERPL-CCNC: 15 U/L (ref 0–35)
BASOPHILS # BLD: 0 K/UL (ref 0–0.2)
BASOPHILS NFR BLD: 0.9 %
BILIRUB SERPL-MCNC: <0.2 MG/DL (ref 0.2–0.7)
BUN SERPL-MCNC: 12 MG/DL (ref 8–23)
CALCIUM SERPL-MCNC: 8.8 MG/DL (ref 8.5–9.9)
CHLORIDE SERPL-SCNC: 103 MEQ/L (ref 95–107)
CO2 SERPL-SCNC: 23 MEQ/L (ref 20–31)
CREAT SERPL-MCNC: 0.54 MG/DL (ref 0.5–0.9)
EOSINOPHIL # BLD: 0.3 K/UL (ref 0–0.7)
EOSINOPHIL NFR BLD: 5.7 %
ERYTHROCYTE [DISTWIDTH] IN BLOOD BY AUTOMATED COUNT: 15 % (ref 11.5–14.5)
GLOBULIN SER CALC-MCNC: 2.2 G/DL (ref 2.3–3.5)
GLUCOSE SERPL-MCNC: 94 MG/DL (ref 70–99)
HCT VFR BLD AUTO: 30.9 % (ref 37–47)
HGB BLD-MCNC: 10 G/DL (ref 12–16)
LYMPHOCYTES # BLD: 1.4 K/UL (ref 1–4.8)
LYMPHOCYTES NFR BLD: 25.7 %
MCH RBC QN AUTO: 28.5 PG (ref 27–31.3)
MCHC RBC AUTO-ENTMCNC: 32.4 % (ref 33–37)
MCV RBC AUTO: 88 FL (ref 79.4–94.8)
MONOCYTES # BLD: 0.5 K/UL (ref 0.2–0.8)
MONOCYTES NFR BLD: 8.5 %
NEUTROPHILS # BLD: 3.3 K/UL (ref 1.4–6.5)
NEUTS SEG NFR BLD: 59.2 %
PLATELET # BLD AUTO: 260 K/UL (ref 130–400)
POTASSIUM SERPL-SCNC: 4.4 MEQ/L (ref 3.4–4.9)
PROT SERPL-MCNC: 5.6 G/DL (ref 6.3–8)
RBC # BLD AUTO: 3.51 M/UL (ref 4.2–5.4)
SODIUM SERPL-SCNC: 139 MEQ/L (ref 135–144)
WBC # BLD AUTO: 5.5 K/UL (ref 4.8–10.8)

## 2023-04-05 PROCEDURE — 1210000000 HC MED SURG R&B

## 2023-04-05 PROCEDURE — 85025 COMPLETE CBC W/AUTO DIFF WBC: CPT

## 2023-04-05 PROCEDURE — 97110 THERAPEUTIC EXERCISES: CPT

## 2023-04-05 PROCEDURE — 6360000002 HC RX W HCPCS

## 2023-04-05 PROCEDURE — 97116 GAIT TRAINING THERAPY: CPT

## 2023-04-05 PROCEDURE — 36415 COLL VENOUS BLD VENIPUNCTURE: CPT

## 2023-04-05 PROCEDURE — 80053 COMPREHEN METABOLIC PANEL: CPT

## 2023-04-05 PROCEDURE — 6370000000 HC RX 637 (ALT 250 FOR IP)

## 2023-04-05 RX ADMIN — FERROUS SULFATE TAB 325 MG (65 MG ELEMENTAL FE) 325 MG: 325 (65 FE) TAB at 07:40

## 2023-04-05 RX ADMIN — PANTOPRAZOLE SODIUM 40 MG: 40 TABLET, DELAYED RELEASE ORAL at 07:01

## 2023-04-05 RX ADMIN — ACETAMINOPHEN 500 MG: 500 TABLET ORAL at 21:25

## 2023-04-05 RX ADMIN — SENNOSIDES AND DOCUSATE SODIUM 1 TABLET: 50; 8.6 TABLET ORAL at 21:25

## 2023-04-05 RX ADMIN — ACETAMINOPHEN 500 MG: 500 TABLET ORAL at 15:30

## 2023-04-05 RX ADMIN — OXYCODONE HYDROCHLORIDE 5 MG: 5 TABLET ORAL at 21:25

## 2023-04-05 RX ADMIN — ACETAMINOPHEN 500 MG: 500 TABLET ORAL at 07:39

## 2023-04-05 RX ADMIN — ACETAMINOPHEN 500 MG: 500 TABLET ORAL at 12:05

## 2023-04-05 RX ADMIN — ENOXAPARIN SODIUM 40 MG: 100 INJECTION SUBCUTANEOUS at 07:40

## 2023-04-05 RX ADMIN — METHOCARBAMOL 750 MG: 750 TABLET ORAL at 21:25

## 2023-04-05 ASSESSMENT — PAIN DESCRIPTION - ORIENTATION
ORIENTATION: RIGHT

## 2023-04-05 ASSESSMENT — PAIN DESCRIPTION - PAIN TYPE
TYPE: ACUTE PAIN;CHRONIC PAIN;SURGICAL PAIN

## 2023-04-05 ASSESSMENT — PAIN DESCRIPTION - DESCRIPTORS
DESCRIPTORS: ACHING;DISCOMFORT
DESCRIPTORS: ACHING;DULL
DESCRIPTORS: THROBBING
DESCRIPTORS: ACHING;DULL
DESCRIPTORS: ACHING;DULL;DISCOMFORT

## 2023-04-05 ASSESSMENT — PAIN SCALES - GENERAL
PAINLEVEL_OUTOF10: 3
PAINLEVEL_OUTOF10: 2
PAINLEVEL_OUTOF10: 4
PAINLEVEL_OUTOF10: 4
PAINLEVEL_OUTOF10: 2
PAINLEVEL_OUTOF10: 4
PAINLEVEL_OUTOF10: 4
PAINLEVEL_OUTOF10: 5

## 2023-04-05 ASSESSMENT — PAIN - FUNCTIONAL ASSESSMENT
PAIN_FUNCTIONAL_ASSESSMENT: PREVENTS OR INTERFERES SOME ACTIVE ACTIVITIES AND ADLS
PAIN_FUNCTIONAL_ASSESSMENT: PREVENTS OR INTERFERES SOME ACTIVE ACTIVITIES AND ADLS
PAIN_FUNCTIONAL_ASSESSMENT: ACTIVITIES ARE NOT PREVENTED

## 2023-04-05 ASSESSMENT — PAIN DESCRIPTION - ONSET
ONSET: ON-GOING

## 2023-04-05 ASSESSMENT — PAIN DESCRIPTION - LOCATION
LOCATION: LEG;SHOULDER
LOCATION: LEG;SHOULDER
LOCATION: GENERALIZED
LOCATION: SHOULDER
LOCATION: LEG;SHOULDER
LOCATION: GENERALIZED;SHOULDER
LOCATION: LEG;SHOULDER
LOCATION: GENERALIZED

## 2023-04-05 ASSESSMENT — PAIN DESCRIPTION - FREQUENCY
FREQUENCY: INTERMITTENT

## 2023-04-05 NOTE — CARE COORDINATION
PER FREDERIC AT [facility], THE PRE-CERT IS INITIATED ON [employer] AND WILL LET US KNOW WHEN THAT COMES BACK. REVIEWED PT'S ACCOUNT ON [employer] AND PRECERT FOR [facility] APPEARS TO STILL BE ACTIVE. CALL TO FREDERIC AND SHE WILL CALL TO CHECK ON CASE AND WILL LET ME KNOW.

## 2023-04-05 NOTE — CARE COORDINATION
CALL FROM FREDERIC AT [facility] AND SHE WAS UNABLE TO OBTAIN PRECERT AS [facility] DID NOT PULL THEIR PRECERT. [facility] CALLED AND [facility] WILL PULL [address] FR[facility]IC HAVE THIS  PERMISSION TO START THE PRECERT ONCE ABLE.

## 2023-04-06 VITALS
TEMPERATURE: 98.6 F | DIASTOLIC BLOOD PRESSURE: 57 MMHG | BODY MASS INDEX: 27.36 KG/M2 | SYSTOLIC BLOOD PRESSURE: 106 MMHG | RESPIRATION RATE: 18 BRPM | OXYGEN SATURATION: 98 % | HEIGHT: 62 IN | HEART RATE: 71 BPM | WEIGHT: 148.7 LBS

## 2023-04-06 DIAGNOSIS — G89.18 ACUTE POSTOPERATIVE PAIN: ICD-10-CM

## 2023-04-06 LAB
ALBUMIN SERPL-MCNC: 3.3 G/DL (ref 3.5–4.6)
ALP SERPL-CCNC: 73 U/L (ref 40–130)
ALT SERPL-CCNC: <5 U/L (ref 0–33)
ANION GAP SERPL CALCULATED.3IONS-SCNC: 10 MEQ/L (ref 9–15)
ANISOCYTOSIS BLD QL SMEAR: ABNORMAL
AST SERPL-CCNC: 11 U/L (ref 0–35)
BASOPHILS # BLD: 0.1 K/UL (ref 0–0.2)
BASOPHILS NFR BLD: 1 %
BILIRUB SERPL-MCNC: <0.2 MG/DL (ref 0.2–0.7)
BUN SERPL-MCNC: 13 MG/DL (ref 8–23)
CALCIUM SERPL-MCNC: 8.7 MG/DL (ref 8.5–9.9)
CHLORIDE SERPL-SCNC: 104 MEQ/L (ref 95–107)
CO2 SERPL-SCNC: 24 MEQ/L (ref 20–31)
CREAT SERPL-MCNC: 0.49 MG/DL (ref 0.5–0.9)
DACRYOCYTES BLD QL SMEAR: ABNORMAL
EOSINOPHIL # BLD: 0.2 K/UL (ref 0–0.7)
EOSINOPHIL NFR BLD: 3 %
ERYTHROCYTE [DISTWIDTH] IN BLOOD BY AUTOMATED COUNT: 14.9 % (ref 11.5–14.5)
GLOBULIN SER CALC-MCNC: 1.8 G/DL (ref 2.3–3.5)
GLUCOSE SERPL-MCNC: 101 MG/DL (ref 70–99)
HCT VFR BLD AUTO: 28 % (ref 37–47)
HGB BLD-MCNC: 9.2 G/DL (ref 12–16)
LYMPHOCYTES # BLD: 1.5 K/UL (ref 1–4.8)
LYMPHOCYTES NFR BLD: 29 %
MCH RBC QN AUTO: 28.8 PG (ref 27–31.3)
MCHC RBC AUTO-ENTMCNC: 32.9 % (ref 33–37)
MCV RBC AUTO: 87.7 FL (ref 79.4–94.8)
MONOCYTES # BLD: 0.2 K/UL (ref 0.2–0.8)
MONOCYTES NFR BLD: 3.7 %
NEUTROPHILS # BLD: 3.2 K/UL (ref 1.4–6.5)
NEUTS SEG NFR BLD: 63 %
OVALOCYTES BLD QL SMEAR: ABNORMAL
PATH INTERP BLD-IMP: NORMAL
PATH INTERP BLD-IMP: YES
PLATELET # BLD AUTO: 250 K/UL (ref 130–400)
PLATELET BLD QL SMEAR: NORMAL
POIKILOCYTOSIS BLD QL SMEAR: ABNORMAL
POTASSIUM SERPL-SCNC: 3.8 MEQ/L (ref 3.4–4.9)
PROT SERPL-MCNC: 5.1 G/DL (ref 6.3–8)
RBC # BLD AUTO: 3.19 M/UL (ref 4.2–5.4)
SARS-COV-2 RDRP RESP QL NAA+PROBE: NOT DETECTED
SCHISTOCYTES BLD QL SMEAR: ABNORMAL
SODIUM SERPL-SCNC: 138 MEQ/L (ref 135–144)
WBC # BLD AUTO: 5.1 K/UL (ref 4.8–10.8)

## 2023-04-06 PROCEDURE — 6360000002 HC RX W HCPCS

## 2023-04-06 PROCEDURE — 87635 SARS-COV-2 COVID-19 AMP PRB: CPT

## 2023-04-06 PROCEDURE — 80053 COMPREHEN METABOLIC PANEL: CPT

## 2023-04-06 PROCEDURE — 85025 COMPLETE CBC W/AUTO DIFF WBC: CPT

## 2023-04-06 PROCEDURE — 97535 SELF CARE MNGMENT TRAINING: CPT

## 2023-04-06 PROCEDURE — 6370000000 HC RX 637 (ALT 250 FOR IP)

## 2023-04-06 RX ORDER — OXYCODONE HYDROCHLORIDE 5 MG/1
5 TABLET ORAL EVERY 6 HOURS PRN
Qty: 28 TABLET | Refills: 0 | Status: SHIPPED | OUTPATIENT
Start: 2023-04-06 | End: 2023-04-13

## 2023-04-06 RX ADMIN — SENNOSIDES AND DOCUSATE SODIUM 1 TABLET: 50; 8.6 TABLET ORAL at 07:02

## 2023-04-06 RX ADMIN — ACETAMINOPHEN 500 MG: 500 TABLET ORAL at 07:00

## 2023-04-06 RX ADMIN — PANTOPRAZOLE SODIUM 40 MG: 40 TABLET, DELAYED RELEASE ORAL at 07:01

## 2023-04-06 RX ADMIN — ACETAMINOPHEN 500 MG: 500 TABLET ORAL at 04:45

## 2023-04-06 RX ADMIN — ACETAMINOPHEN 500 MG: 500 TABLET ORAL at 12:12

## 2023-04-06 RX ADMIN — ENOXAPARIN SODIUM 40 MG: 100 INJECTION SUBCUTANEOUS at 07:02

## 2023-04-06 RX ADMIN — FERROUS SULFATE TAB 325 MG (65 MG ELEMENTAL FE) 325 MG: 325 (65 FE) TAB at 07:00

## 2023-04-06 ASSESSMENT — PAIN DESCRIPTION - LOCATION: LOCATION: GENERALIZED

## 2023-04-06 ASSESSMENT — PAIN SCALES - GENERAL
PAINLEVEL_OUTOF10: 0
PAINLEVEL_OUTOF10: 0
PAINLEVEL_OUTOF10: 4

## 2023-04-06 NOTE — CARE COORDINATION
RECEIVED CALL FROM [facility] PRECERT OBTAINED, LSW UPDATED. WILL NEED COVID TEST.     0950  DR PEREZ NOTIFIED OF PRECERT AND NEED FOR COVID TEST AND TASIA VIA [employer]. Antonio FOR [facility] VIA LSW. RN AND PT UPDATED. MESSAGE LEFT FOR [facility] WITH PICKUP TIME.

## 2023-04-06 NOTE — PROGRESS NOTES
Hospitalist Progress Note      Date of Admission: 3/31/2023  Chief Complaint:    Chief Complaint   Patient presents with    Shoulder Pain     Right shoulder pain post surgery yesterday     Subjective:  No new complaints. No nausea, vomiting, chest pain, or headache      Medications:    Infusion Medications   Scheduled Medications    enoxaparin  40 mg SubCUTAneous Daily    acetaminophen  500 mg Oral Q4H    ferrous sulfate  325 mg Oral Daily with breakfast    methocarbamol  750 mg Oral Nightly    pantoprazole  40 mg Oral QAM AC    sennosides-docusate sodium  1 tablet Oral BID     PRN Meds: ondansetron **OR** ondansetron, polyethylene glycol, acetaminophen **OR** acetaminophen, oxyCODONE **OR** oxyCODONE    Intake/Output Summary (Last 24 hours) at 4/3/2023 1558  Last data filed at 4/3/2023 0658  Gross per 24 hour   Intake 360 ml   Output 850 ml   Net -490 ml       Exam:  BP (!) 114/56   Pulse 74   Temp 98.4 °F (36.9 °C)   Resp 18   Ht 5' 2\" (1.575 m)   Wt 145 lb (65.8 kg)   LMP  (LMP Unknown)   SpO2 97%   BMI 26.52 kg/m²   Head: Normocephalic, atraumatic  Sclera clear  Neck JVD flat  Lungs: normal effort of breathing    Labs:   Recent Labs     04/01/23  0912 04/02/23  0544 04/03/23  0527   WBC 6.9 5.4 4.6*   HGB 10.1* 9.8* 10.1*   HCT 30.3* 30.0* 31.0*    163 180       Recent Labs     04/01/23 0912 04/02/23  0544 04/03/23  0527    140 139   K 4.0 4.0 4.7   CL 99 105 104   CO2 26 25 24   BUN 8 6* 8   CREATININE 0.47* 0.48* 0.47*   CALCIUM 8.8 8.7 8.8   AST 13 10 14   ALT 6 <5 <5   BILITOT 0.5 0.3 <0.2   ALKPHOS 73 70 66       No results for input(s): INR in the last 72 hours. No results for input(s): Maura Bigness in the last 72 hours. Radiology:  No orders to display     Assessment/Plan:    Status post right proximal humerus fracture and right tibial plateau fracture with internal fixation and external knee fixation. For evaluation by physical/occupational therapy for placement.
Hospitalist Progress Note      Date of Admission: 3/31/2023  Chief Complaint:    Chief Complaint   Patient presents with    Shoulder Pain     Right shoulder pain post surgery yesterday     Subjective:  No new complaints. No nausea, vomiting, chest pain, or headache      Medications:    Infusion Medications   Scheduled Medications    enoxaparin  40 mg SubCUTAneous Daily    acetaminophen  500 mg Oral Q4H    ferrous sulfate  325 mg Oral Daily with breakfast    methocarbamol  750 mg Oral Nightly    pantoprazole  40 mg Oral QAM AC    sennosides-docusate sodium  1 tablet Oral BID     PRN Meds: ondansetron **OR** ondansetron, polyethylene glycol, acetaminophen **OR** acetaminophen, oxyCODONE **OR** oxyCODONE    Intake/Output Summary (Last 24 hours) at 4/4/2023 1404  Last data filed at 4/4/2023 0834  Gross per 24 hour   Intake 720 ml   Output 1200 ml   Net -480 ml       Exam:  BP (!) 119/48   Pulse 76   Temp 98 °F (36.7 °C) (Oral)   Resp 18   Ht 5' 2\" (1.575 m)   Wt 145 lb 6 oz (65.9 kg)   LMP  (LMP Unknown)   SpO2 99%   BMI 26.59 kg/m²   Head: Normocephalic, atraumatic  Sclera clear  Neck JVD flat  Lungs: normal effort of breathing    Labs:   Recent Labs     04/02/23  0544 04/03/23  0527 04/04/23  0521   WBC 5.4 4.6* 3.9*   HGB 9.8* 10.1* 9.7*   HCT 30.0* 31.0* 29.8*    180 221       Recent Labs     04/02/23  0544 04/03/23  0527 04/04/23  0521    139 142   K 4.0 4.7 4.2    104 107   CO2 25 24 25   BUN 6* 8 11   CREATININE 0.48* 0.47* 0.49*   CALCIUM 8.7 8.8 8.7   AST 10 14 9   ALT <5 <5 <5   BILITOT 0.3 <0.2 <0.2   ALKPHOS 70 66 68       No results for input(s): INR in the last 72 hours. No results for input(s): Pamla Clines in the last 72 hours. Radiology:  No orders to display     Assessment/Plan:    Status post right proximal humerus fracture and right tibial plateau fracture with internal fixation and external knee fixation.   For evaluation by physical/occupational therapy for
Physical Therapy  Facility/Department: [facility] MED SURG [id number]  Physical Therapy Discharge      NAME: Radames Colvin    : 1950 (56 y.o.)  MRN: [id number]    Account: [de-identified]  Gender: female      Patient has been discharged from acute care hospital. DC patient from current PT program.      Electronically signed by Javier Beth PT on 23 at 4:47 PM EDT
Renal insufficiency 02/03/2015    Spondylosis of lumbar region without myelopathy or radiculopathy 05/24/2016     Past Surgical History:   Procedure Laterality Date    ABDOMINAL ADHESION SURGERY  1/7/16    DR. MERAZ    APPENDECTOMY  2012    CATARACT REMOVAL Right     CHOLECYSTECTOMY  2012    COLON SURGERY  2005    13\" of colon removed-NO CA    COLONOSCOPY  2011    polyps    COLONOSCOPY  02/25/2015    DR Bettina Leija - DIVERTICULOSIS    COLONOSCOPY N/A 3/4/2022    COLONOSCOPY DIAGNOSTIC performed by Angela Aden MD at [facility] [place] by [facility]  2013    [place] COLONOSCOPY FLX DX W/COLLJ SPEC WHEN PFRMD N/A 1/26/2018    COLONOSCOPY performed by Nilda Torres MD at [address] [place] [address] ESOPHAGOGASTRODUODENOSCOPY TRANSORAL DIAGNOSTIC N/A 1/26/2018    EGD ESOPHAGOGASTRODUODENOSCOPY WITH DILATION performed by Nilda [facility] MD at [facility] [address] 3/4/2022    EGD ESOPHAGOGASTRODUODENOSCOPY performed by Angela Aden MD at [facility]       Chart Reviewed: Yes  Additional Pertinent Hx: R tib/fib fx with repair in Feb 2023. Still NWB  Family / Caregiver Present: No  Diagnosis: Status post reverse total shoulder replacement, right    Restrictions:  Restrictions/Precautions: Fall Risk, Weight Bearing  Lower Extremity Weight Bearing Restrictions  Right Lower Extremity Weight Bearing: Non Weight Bearing  Upper Extremity Weight Bearing Restrictions  Right Upper Extremity Weight Bearing: Non Weight Bearing  Position Activity Restriction  Other position/activity restrictions: Sling at all times, no ROM R shoulder     SUBJECTIVE:   Subjective: Rene Bustamante said I need to go to [place]. \"    Pain   Pre and post tx session 4/10 R shoulder- pt declined need for pain meds at this time    Prior Level of Function:  Social/Functional History  Lives With: Son (second son has moved in to help patient and her son)  Type of Home: House  Home Layout: One level
Renal insufficiency 02/03/2015    Spondylosis of lumbar region without myelopathy or radiculopathy 05/24/2016     Past Surgical History:   Procedure Laterality Date    ABDOMINAL ADHESION SURGERY  1/7/16    DR. MERAZ    APPENDECTOMY  2012    CATARACT REMOVAL Right     CHOLECYSTECTOMY  2012    COLON SURGERY  2005    13\" of colon removed-NO CA    COLONOSCOPY  2011    polyps    COLONOSCOPY  02/25/2015    DR Yane Merida - DIVERTICULOSIS    COLONOSCOPY N/A 3/4/2022    COLONOSCOPY DIAGNOSTIC performed by Claude Mile, MD at [place] [address]  2013    ID COLONOSCOPY FLX DX W/COLLJ SPEC WHEN PFRMD N/A 1/26/2018    COLONOSCOPY performed by Denise Rivas MD at [address] ESOPHAGOGASTRODUODENOSCOPY TRANSORAL DIAGNOSTIC N/A 1/26/2018    EGD ESOPHAGOGASTRODUODENOSCOPY WITH DILATION performed by Denise Rivas MD at [address] Right 3/30/2023    RIGHT SHOULDER REVERSE  TOTAL SHOULDER  ARTHROPLASTY performed by Heidi Ingram MD at [facility] N/A 3/4/2022    EGD ESOPHAGOGASTRODUODENOSCOPY performed by Claude Mile, MD at [facility]       Chart Reviewed: Yes  Additional Pertinent Hx: R tib/fib fx with repair in Feb 2023. Still NWB  Family / Caregiver Present: No  Diagnosis: Status post reverse total shoulder replacement, right    Restrictions:       SUBJECTIVE:   Subjective: Patient resting in bed. agreeable to tx. Pain  Patient c/o 5/10 right shoulder pain. OBJECTIVE:        Bed mobility  Supine to Sit: Stand by assistance  Scooting: Stand by assistance  Bed Mobility Comments: HOB elevated. Patient maintains [address] restrictions. Transfers  Sit to Stand: Stand by assistance  Stand to Sit: Stand by assistance  Bed to Chair: Stand by assistance;Contact guard assistance  Comment: Completes STS x 3 from bed. Transfers bed <-> recliner x2. Maintains Wbing restrictions without need for
Renal insufficiency 02/03/2015    Spondylosis of lumbar region without myelopathy or radiculopathy 05/24/2016     Past Surgical History:   Procedure Laterality Date    ABDOMINAL ADHESION SURGERY  1/7/16    DR. MERAZ    APPENDECTOMY  2012    CATARACT REMOVAL Right     CHOLECYSTECTOMY  2012    COLON SURGERY  2005    13\" of colon removed-NO CA    COLONOSCOPY  2011    polyps    COLONOSCOPY  02/25/2015    DR Yuliana Townsend - DIVERTICULOSIS    COLONOSCOPY N/A 3/4/2022    COLONOSCOPY DIAGNOSTIC performed by Na Pollard MD at [place] [address]  2013    ND COLONOSCOPY FLX DX W/COLLJ SPEC WHEN PFRMD N/A 1/26/2018    COLONOSCOPY performed by Juana Winn MD at [address] ESOPHAGOGASTRODUODENOSCOPY TRANSORAL DIAGNOSTIC N/A 1/26/2018    EGD ESOPHAGOGASTRODUODENOSCOPY WITH DILATION performed by Juana Winn MD at [address] Right 3/30/2023    RIGHT SHOULDER REVERSE  TOTAL SHOULDER  ARTHROPLASTY performed by Glendy Lynn MD at [facility] N/A 3/4/2022    EGD ESOPHAGOGASTRODUODENOSCOPY performed by Na Pollard MD at [facility]       Chart Reviewed: Yes    Restrictions:  Restrictions/Precautions: Fall Risk;Weight Bearing  Lower Extremity Weight Bearing Restrictions  Right Lower Extremity Weight Bearing: Non Weight Bearing  Upper Extremity Weight Bearing Restrictions  Right Upper Extremity Weight Bearing: Non Weight Bearing  Position Activity Restriction  Other position/activity restrictions: Sling at all times, no ROM R shoulder    SUBJECTIVE:   Subjective: \"I been up and moving some already today. \"    Pain  Pain: Pt denies pain pre and post session    OBJECTIVE:        Bed mobility  Supine to Sit: Supervision;Stand by assistance  Sit to Supine:  (NT- pt left in chair post tx)  Bed Mobility Comments: HOB flat with use of hand rail. Pt maintains [facility].     Transfers  Sit to
completion    Orientation Status:  Orientation  Overall Orientation Status: Within Functional Limits    Cognition Status:  Cognition  Overall Cognitive Status: WFL    Treatment consisted of:    ADL training    Assessment/Discharge Disposition:  Pt. Demonstrated G dynamic seated balance while seated EOB to complete bag bath. Pt. Was able to complete supine <>EOB SUP-SBA and required some assistance to complete bag bath d/t pain with movement in R shlder/limited mobility in R shlder. [employer]  How much help is needed for putting on and taking off regular lower body clothing?: A Little  How much help is needed for bathing (which includes washing, rinsing, drying)?: A Little  How much help is needed for toileting (which includes using toilet, bedpan, or urinal)?: A Little  How much help is needed for putting on and taking off regular upper body clothing?: A Little  How much help is needed for taking care of personal grooming?: A Little  How much help for eating meals?: A Little  AM-PAC Inpatient Daily Activity Raw Score: 18  AM-PAC Inpatient ADL T-Scale Score : 38.66  ADL Inpatient CMS 0-100% Score: 46.65  ADL Inpatient CMS G-Code Modifier : CK    Plan:    Continue OT per POC    Patient Education:  Provided education to dress affected arm first, pt. Requires assistance to do so d/t limited mobility and increased pain.        Therapy Time:   Individual Group Co-Treat   Time In 953       Time Out 1030         Minutes 37                ADL/IADL trainin minutes    Electronically signed by:    ELVA Glez    2023, 11:47 AM
placement.       Karina Amezcua MD
placement. Recommended disposition to physical therapy: Skilled nursing facility. Case management/discharge planning team assisting  for placement, precert started 4/5 as per care coord notes.        Irina Alberto MD
assistance  Bed Mobility Comments: HOB elevated. Patient maintains [address] restrictions. Transfers  Sit to Stand: Stand by assistance  Stand to Sit: Stand by assistance  Bed to Chair: Stand by assistance;Contact guard assistance  Comment: Transfers from bed -> W/C-> chair at SBA. Demonstrates good ability to maintain Wbing restrictions. Wheelchair Activities  Wheelchair Type: Standard  Level of Assistance for pressure relief activities: Modified independent  Wheelchair Parts Management: Yes  Right Brakes Level of Assistance: Maximum assistance  Anti-tippers Level of Assistance: Maximum assistance  Propulsion: Yes  Propulsion 1  Method: LUE;LLE  Level of Assistance: Supervision  Distance: 50 feet x3    ASSESSMENT   Body Structures, Functions, Activity Limitations Requiring Skilled Therapeutic Intervention: Decreased functional mobility ; Decreased ADL status; Decreased endurance;Decreased safe awareness;Decreased strength;Decreased balance; Increased pain;Decreased coordination;Decreased ROM  Assessment: Initiated W/C mobility this date. Patient able to propel 50 feet x3 using L UE/LE at supervision. Continues to demonstrate good ability to maintain Wbing restrictions.   Therapy Prognosis: Good  Barriers to Learning: none     Discharge Recommendations:  Continue to assess pending progress, Patient would benefit from continued therapy after discharge         Goals  Long Term Goals  Long Term Goal 1: Pt to complete bed mobility with indep  Long Term Goal 2: Pt to complete transfers with indep  Long Term Goal 3: Pt to manage and propel WC indep 50ft    PLAN    General Plan: 1 time a day 3-6 times a week  Safety Devices  Type of Devices: Call light within reach, Left in chair, Chair alarm in place     [facility] (6 CLICK) [address] Mobility Raw Score : 14     Therapy Time   Individual   Time In 1440   Time Out 1503   Minutes 23     Timed Code Treatment Minutes: 23 Minutes  Tr 15  W/C mobility 8  Giuliano Rogers
Dominance  Hand Dominance: Right    ADL Status:  ADL  Feeding: Setup  Grooming: Setup  Grooming Skilled Clinical Factors: Patient is not wearing her partial at this time due to report that she bit the inside of her mouth  UE Dressing: Moderate assistance  LE Dressing Skilled Clinical Factors: Patient was able to don her left sock while in long sit on the bed but needed assistance to don her right sock  Toileting Skilled Clinical Factors: Patient is currently on a pure wic for urine and has not had a BM. Toilet Transfers  Toilet Transfers Comments: Unable to assess as patient does not have her wheelchair at the hospital    Functional Mobility:    Transfers  Sit to stand: Stand by assistance  Stand to sit: Stand by assistance    Patient ambulated NT due to ongoing NWB status of R LE        Bed Mobility  Bed mobility  Supine to Sit: Stand by assistance  Sit to Supine: Stand by assistance    Seated and Standing Balance:  Balance  Sitting: Intact  Standing: Impaired (does well with NWB of right LE)  Standing - Static: Good  Standing - Dynamic: Fair    Functional Endurance:  Activity Tolerance  Activity Tolerance: Patient limited by pain    D/C Recommendations:  OT D/C RECOMMENDATIONS  REQUIRES OT FOLLOW-UP: Yes    Equipment Recommendations:  OT Equipment Recommendations  Other: Continue to assess    OT Education:   Patient Education  Education Given To: Patient  Education Provided: Role of Therapy;Plan of Care  Education Provided Comments: Patient reported concern that therapist was going to work with her right arm despite orders from physician to not move it.  Patient was assured that the session was concerned with her ability to care for herself  Education Method: Verbal  Barriers to Learning: None  Education Outcome: Verbalized understanding    OT Follow Up:   OT D/C RECOMMENDATIONS  REQUIRES OT FOLLOW-UP: Yes       Assessment/Discharge Disposition:  Assessment: Pt is a 67year old woman from home who presents to

## 2023-04-06 NOTE — FLOWSHEET NOTE
1230 P. M. Patient transported to [facility] by physician transport. All personal belongings sent with patient. VSS, LUZ, Denies pain at time of transport.  .Electronically signed by Jessica Omer RN on 4/6/2023 at 12:31 PM

## 2023-04-06 NOTE — FLOWSHEET NOTE
1145: call placed to [facility], spoke with supervisor Dana, report given. Patient IV access removed, tolerated well, no complications, denies pain at time,  dressing intact to right shoulder, sling in place. Patient VSS, LCTA. All personal belongings packed for patient, physicians ambulance to transport .Electronically signed by Maggie Thomas RN on 4/6/2023 at 11:52 AM

## 2023-04-06 NOTE — DISCHARGE INSTR - DIET

## 2023-04-06 NOTE — PLAN OF CARE
Patient progressing towards discharge    Problem: Safety - Adult  Goal: Free from fall injury  Outcome: Progressing     Problem: Discharge Planning  Goal: Discharge to home or other facility with appropriate resources  Outcome: Progressing  Flowsheets (Taken 4/6/2023 0800)  Discharge to home or other facility with appropriate resources: Identify barriers to discharge with patient and caregiver     Problem: Skin/Tissue Integrity  Goal: Absence of new skin breakdown  Description: 1. Monitor for areas of redness and/or skin breakdown  2. Assess vascular access sites hourly  3. Every 4-6 hours minimum:  Change oxygen saturation probe site  4. Every 4-6 hours:  If on nasal continuous positive airway pressure, respiratory therapy assess nares and determine need for appliance change or resting period.   Outcome: Progressing     Problem: ABCDS Injury Assessment  Goal: Absence of physical injury  Outcome: Progressing     Problem: Pain  Goal: Verbalizes/displays adequate comfort level or baseline comfort level  Outcome: Progressing

## 2023-04-06 NOTE — FLOWSHEET NOTE
830: Patient assessment completed, patient alert and oriented times 4, denies pain when asked, LCTA, VSS, sling in place to right shoulder, appetite good. Patient able to make all needs known, continent of bowel , pure wick in place for voiding. NSR on Tele. Call light in reach.  .Electronically signed by Konrad Birch RN on 4/6/2023 at 8:33 AM

## 2023-04-06 NOTE — FLOWSHEET NOTE
1044: Rapid Covid test sent to lab. Electronically signed by Kelle Corona RN on 4/6/2023 at 10:44 AM  1130: Rapid Covid negative per Jacqueline Rucker in lab. Electronically signed by Kelle Corona RN on 4/6/2023 at 11:30 AM

## 2023-04-07 ENCOUNTER — OFFICE VISIT (OUTPATIENT)
Dept: GERIATRIC MEDICINE | Age: 73
End: 2023-04-07

## 2023-04-07 DIAGNOSIS — Z47.1 AFTERCARE FOLLOWING RIGHT HIP JOINT REPLACEMENT SURGERY: ICD-10-CM

## 2023-04-07 DIAGNOSIS — Z96.641 AFTERCARE FOLLOWING RIGHT HIP JOINT REPLACEMENT SURGERY: ICD-10-CM

## 2023-04-07 DIAGNOSIS — E03.9 HYPOTHYROIDISM, UNSPECIFIED TYPE: ICD-10-CM

## 2023-04-07 DIAGNOSIS — Z74.09 IMPAIRED MOBILITY AND ADLS: ICD-10-CM

## 2023-04-07 DIAGNOSIS — K59.00 CONSTIPATION, UNSPECIFIED CONSTIPATION TYPE: ICD-10-CM

## 2023-04-07 DIAGNOSIS — G89.18 POST-OP PAIN: Primary | ICD-10-CM

## 2023-04-07 DIAGNOSIS — Z78.9 IMPAIRED MOBILITY AND ADLS: ICD-10-CM

## 2023-04-07 DIAGNOSIS — F32.A DEPRESSION, UNSPECIFIED DEPRESSION TYPE: ICD-10-CM

## 2023-04-07 RX ORDER — POLYETHYLENE GLYCOL 3350 17 G/17G
17 POWDER, FOR SOLUTION ORAL DAILY PRN
COMMUNITY

## 2023-04-07 RX ORDER — DOCUSATE SODIUM 100 MG/1
100 CAPSULE, LIQUID FILLED ORAL DAILY PRN
COMMUNITY

## 2023-04-07 NOTE — PROGRESS NOTES
Patient is at [facility] and has signed a [address].  Please see media for the DNR form

## 2023-04-08 NOTE — DISCHARGE SUMMARY
Hospital Medicine Discharge Summary    Al Romo  :  1950  MRN:  [id number]    Admit date:  3/31/2023  Discharge date:  2023    Admitting Physician: Emily Field MD  Primary Care Physician:  Amish Chacko MD    Al Romo is a 67 y.o. female that was admitted and treated for the following medical issues:     Principal Problem:    Unable to care for self  Resolved Problems:    * No resolved hospital problems. *      Discharge Diagnoses:    Principal Problem:    Unable to care for self  Resolved Problems:    * No resolved hospital problems. *    Chief Complaint   Patient presents with    Shoulder Pain     Right shoulder pain post surgery yesterday       Hospital Course: Al Romo is a 67 y.o. female who was admitted with Status post right proximal humerus fracture and right tibial plateau fracture with internal fixation and external knee fixation. was unable to manage at home after discharge, readmitted. evaluation by physical/occupational therapy for placement. Recommended disposition to snf. Pt was discharge in a stable condition. BP (!) 106/57   Pulse 71   Temp 98.6 °F (37 °C) (Oral)   Resp 18   Ht 5' 2\" (1.575 m)   Wt 148 lb 11.2 oz (67.4 kg)   LMP  (LMP Unknown)   SpO2 98%   BMI 27.20 kg/m²     Patient was seen by the following consultants  Consults:  IP CONSULT TO CASE MANAGEMENT    Significant Diagnostic Studies:    Refer to chart if  No results found.     Discharge Medications:         Medication List        CONTINUE taking these medications      acetaminophen 500 MG tablet  Commonly known as: TYLENOL     ferrous sulfate 325 (65 Fe) MG tablet  Commonly known as: IRON 325  Take 1 tablet by mouth daily (with breakfast)     methocarbamol 750 MG tablet  Commonly known as: ROBAXIN     omeprazole 20 MG delayed release capsule  Commonly known as: PRILOSEC  Take 1 capsule by mouth daily     ondansetron 4 MG tablet  Commonly known as: Karo Urias

## 2023-04-14 ENCOUNTER — OFFICE VISIT (OUTPATIENT)
Dept: GERIATRIC MEDICINE | Age: 73
End: 2023-04-14

## 2023-04-14 DIAGNOSIS — Z47.1 AFTERCARE FOLLOWING RIGHT HIP JOINT REPLACEMENT SURGERY: ICD-10-CM

## 2023-04-14 DIAGNOSIS — M25.511 ACUTE PAIN OF RIGHT SHOULDER: ICD-10-CM

## 2023-04-14 DIAGNOSIS — M47.816 SPONDYLOSIS OF LUMBAR REGION WITHOUT MYELOPATHY OR RADICULOPATHY: ICD-10-CM

## 2023-04-14 DIAGNOSIS — E03.9 ACQUIRED HYPOTHYROIDISM: Primary | ICD-10-CM

## 2023-04-14 DIAGNOSIS — M54.2 CERVICALGIA: ICD-10-CM

## 2023-04-14 DIAGNOSIS — Z96.641 AFTERCARE FOLLOWING RIGHT HIP JOINT REPLACEMENT SURGERY: ICD-10-CM

## 2023-04-17 ENCOUNTER — TELEPHONE (OUTPATIENT)
Dept: FAMILY MEDICINE CLINIC | Age: 73
End: 2023-04-17

## 2023-04-17 NOTE — TELEPHONE ENCOUNTER
Pt is being discharged from a skilled nursing unit and now they need orders for home healthcare.       [facility]   [phone]  Verbal order needs given

## 2023-04-19 ENCOUNTER — OFFICE VISIT (OUTPATIENT)
Dept: FAMILY MEDICINE CLINIC | Age: 73
End: 2023-04-19
Payer: MEDICARE

## 2023-04-19 VITALS
OXYGEN SATURATION: 98 % | DIASTOLIC BLOOD PRESSURE: 74 MMHG | TEMPERATURE: 97.9 F | BODY MASS INDEX: 23.92 KG/M2 | SYSTOLIC BLOOD PRESSURE: 112 MMHG | WEIGHT: 130 LBS | HEART RATE: 90 BPM | HEIGHT: 62 IN

## 2023-04-19 DIAGNOSIS — R12 HEARTBURN: ICD-10-CM

## 2023-04-19 DIAGNOSIS — E53.8 B12 DEFICIENCY: ICD-10-CM

## 2023-04-19 DIAGNOSIS — K59.1 FUNCTIONAL DIARRHEA: ICD-10-CM

## 2023-04-19 DIAGNOSIS — D64.9 ANEMIA, UNSPECIFIED TYPE: ICD-10-CM

## 2023-04-19 DIAGNOSIS — K44.9 HIATAL HERNIA: ICD-10-CM

## 2023-04-19 DIAGNOSIS — E61.1 LOW IRON: Primary | ICD-10-CM

## 2023-04-19 DIAGNOSIS — Z78.9 MEDICATION INTOLERANCE: ICD-10-CM

## 2023-04-19 PROBLEM — K22.10 ULCER OF ESOPHAGUS WITHOUT BLEEDING: Status: ACTIVE | Noted: 2023-02-28

## 2023-04-19 PROBLEM — S82.409A TIBIA/FIBULA FRACTURE: Status: ACTIVE | Noted: 2023-02-22

## 2023-04-19 PROBLEM — S82.209A TIBIA/FIBULA FRACTURE: Status: ACTIVE | Noted: 2023-02-22

## 2023-04-19 PROBLEM — K63.5 POLYP OF COLON: Status: ACTIVE | Noted: 2023-02-28

## 2023-04-19 PROBLEM — S42.213A FRACTURE OF NECK OF HUMERUS: Status: ACTIVE | Noted: 2023-02-24

## 2023-04-19 PROBLEM — K29.70 GASTRITIS WITHOUT BLEEDING: Status: ACTIVE | Noted: 2023-02-28

## 2023-04-19 PROBLEM — E03.9 HYPOTHYROIDISM: Status: ACTIVE | Noted: 2023-02-28

## 2023-04-19 PROBLEM — K57.30 DIVERTICULOSIS OF LARGE INTESTINE WITHOUT DIVERTICULITIS: Status: ACTIVE | Noted: 2023-02-28

## 2023-04-19 PROBLEM — K21.9 GASTROESOPHAGEAL REFLUX DISEASE WITHOUT ESOPHAGITIS: Status: ACTIVE | Noted: 2023-02-28

## 2023-04-19 PROBLEM — K64.8 PROLAPSED HEMORRHOIDS: Status: ACTIVE | Noted: 2023-02-28

## 2023-04-19 PROCEDURE — 96372 THER/PROPH/DIAG INJ SC/IM: CPT | Performed by: FAMILY MEDICINE

## 2023-04-19 PROCEDURE — 1123F ACP DISCUSS/DSCN MKR DOCD: CPT | Performed by: FAMILY MEDICINE

## 2023-04-19 PROCEDURE — 99214 OFFICE O/P EST MOD 30 MIN: CPT | Performed by: FAMILY MEDICINE

## 2023-04-19 RX ORDER — MELATONIN/PYRIDOXINE HCL (B6) 5 MG-10 MG
TABLET,IMMED, EXTENDED RELEASE, BIPHASIC ORAL
COMMUNITY

## 2023-04-19 RX ORDER — ATORVASTATIN CALCIUM 20 MG/1
20 TABLET, FILM COATED ORAL DAILY
COMMUNITY

## 2023-04-19 RX ORDER — LEVOTHYROXINE SODIUM 0.07 MG/1
75 TABLET ORAL DAILY
COMMUNITY
Start: 2023-04-18

## 2023-04-19 RX ORDER — PAROXETINE 10 MG/1
10 TABLET, FILM COATED ORAL DAILY
COMMUNITY
Start: 2023-04-18

## 2023-04-19 RX ORDER — CYANOCOBALAMIN 1000 UG/ML
1000 INJECTION, SOLUTION INTRAMUSCULAR; SUBCUTANEOUS ONCE
Status: COMPLETED | OUTPATIENT
Start: 2023-04-19 | End: 2023-04-19

## 2023-04-19 RX ORDER — DICYCLOMINE HYDROCHLORIDE 10 MG/1
10 CAPSULE ORAL
COMMUNITY

## 2023-04-19 RX ORDER — LANOLIN ALCOHOL/MO/W.PET/CERES
1000 CREAM (GRAM) TOPICAL DAILY
COMMUNITY

## 2023-04-19 RX ORDER — ACETAMINOPHEN 160 MG
TABLET,DISINTEGRATING ORAL DAILY
COMMUNITY

## 2023-04-19 RX ADMIN — CYANOCOBALAMIN 1000 MCG: 1000 INJECTION, SOLUTION INTRAMUSCULAR; SUBCUTANEOUS at 14:21

## 2023-04-19 ASSESSMENT — ENCOUNTER SYMPTOMS
ABDOMINAL DISTENTION: 0
CHEST TIGHTNESS: 0
SHORTNESS OF BREATH: 0
PHOTOPHOBIA: 0
ABDOMINAL PAIN: 0

## 2023-04-19 NOTE — PATIENT INSTRUCTIONS
Patient's medication list has been updated to include the medication she should be on. The only exception is she will not be on oral iron and she will be sent for consultation to hematology to consider iron supplementation by IV. Continue with physical therapy for rehabilitation from injury. Due to patient's continued loose stool will be sent to GI for further evaluation.   Prior testing did not reveal infection

## 2023-04-19 NOTE — PROGRESS NOTES
After obtaining consent, and per orders of Dr. Catalino Enriquez, injection of b-12 given in Left upper quad. gluteus by Ekaterina Macias MA. Patient instructed to remain in clinic for 20 minutes afterwards, and to report any adverse reaction to me immediately.
with Auto Differential    Collection Time: 04/05/23  5:11 AM   Result Value Ref Range    WBC 5.5 4.8 - 10.8 K/uL    RBC 3.51 (L) 4.20 - 5.40 M/uL    Hemoglobin 10.0 (L) 12.0 - 16.0 g/dL    Hematocrit 30.9 (L) 37.0 - 47.0 %    MCV 88.0 79.4 - 94.8 fL    MCH 28.5 27.0 - 31.3 pg    MCHC 32.4 (L) 33.0 - 37.0 %    RDW 15.0 (H) 11.5 - 14.5 %    Platelets 083 304 - 932 K/uL    Neutrophils % 59.2 %    Lymphocytes % 25.7 %    Monocytes % 8.5 %    Eosinophils % 5.7 %    Basophils % 0.9 %    Neutrophils Absolute 3.3 1.4 - 6.5 K/uL    Lymphocytes Absolute 1.4 1.0 - 4.8 K/uL    Monocytes Absolute 0.5 0.2 - 0.8 K/uL    Eosinophils Absolute 0.3 0.0 - 0.7 K/uL    Basophils Absolute 0.0 0.0 - 0.2 K/uL   Comprehensive Metabolic Panel w/ Reflex to MG    Collection Time: 04/06/23  5:16 AM   Result Value Ref Range    Sodium 138 135 - 144 mEq/L    Potassium reflex Magnesium 3.8 3.4 - 4.9 mEq/L    Chloride 104 95 - 107 mEq/L    CO2 24 20 - 31 mEq/L    Anion Gap 10 9 - 15 mEq/L    Glucose 101 (H) 70 - 99 mg/dL    BUN 13 8 - 23 mg/dL    Creatinine 0.49 (L) 0.50 - 0.90 mg/dL    Est, Glom Filt Rate >60.0 >60    Calcium 8.7 8.5 - 9.9 mg/dL    Total Protein 5.1 (L) 6.3 - 8.0 g/dL    Albumin 3.3 (L) 3.5 - 4.6 g/dL    Total Bilirubin <0.2 0.2 - 0.7 mg/dL    Alkaline Phosphatase 73 40 - 130 U/L    ALT <5 0 - 33 U/L    AST 11 0 - 35 U/L    Globulin 1.8 (L) 2.3 - 3.5 g/dL   CBC with Auto Differential    Collection Time: 04/06/23  5:16 AM   Result Value Ref Range    WBC 5.1 4.8 - 10.8 K/uL    RBC 3.19 (L) 4.20 - 5.40 M/uL    Hemoglobin 9.2 (L) 12.0 - 16.0 g/dL    Hematocrit 28.0 (L) 37.0 - 47.0 %    MCV 87.7 79.4 - 94.8 fL    MCH 28.8 27.0 - 31.3 pg    MCHC 32.9 (L) 33.0 - 37.0 %    RDW 14.9 (H) 11.5 - 14.5 %    Platelets 770 108 - 815 K/uL    PLATELET SLIDE REVIEW Normal     Path Consult Yes     Neutrophils % 63.0 %    Lymphocytes % 29.0 %    Monocytes % 3.7 %    Eosinophils % 3.0 %    Basophils % 1.0 %    Neutrophils Absolute 3.2 1.4 - 6.5 K/uL

## 2023-04-24 ENCOUNTER — TELEPHONE (OUTPATIENT)
Dept: PRIMARY CARE | Facility: CLINIC | Age: 73
End: 2023-04-24
Payer: MEDICARE

## 2023-04-24 RX ORDER — ZINC GLUCONATE 50 MG
1 TABLET ORAL DAILY
COMMUNITY
Start: 2022-05-19

## 2023-04-24 RX ORDER — OXYCODONE AND ACETAMINOPHEN 5; 325 MG/1; MG/1
1 TABLET ORAL EVERY 6 HOURS
COMMUNITY
Start: 2023-03-24

## 2023-04-24 RX ORDER — PAROXETINE 10 MG/1
1 TABLET, FILM COATED ORAL DAILY
COMMUNITY
Start: 2023-04-18

## 2023-04-24 RX ORDER — EPINEPHRINE 0.22MG
1 AEROSOL WITH ADAPTER (ML) INHALATION DAILY
COMMUNITY
Start: 2022-05-19

## 2023-04-24 RX ORDER — FERROUS SULFATE TAB 325 MG (65 MG ELEMENTAL FE) 325 (65 FE) MG
TAB ORAL
COMMUNITY
Start: 2023-03-02

## 2023-04-24 RX ORDER — OMEPRAZOLE 20 MG/1
20 CAPSULE, DELAYED RELEASE ORAL DAILY
COMMUNITY
Start: 2023-04-18

## 2023-04-24 RX ORDER — ATORVASTATIN CALCIUM 20 MG/1
1 TABLET, FILM COATED ORAL DAILY
COMMUNITY
Start: 2022-05-19

## 2023-04-24 RX ORDER — LEVOTHYROXINE SODIUM 75 UG/1
1 TABLET ORAL DAILY
COMMUNITY
Start: 2023-04-18

## 2023-04-24 RX ORDER — ASPIRIN 81 MG/1
1 TABLET ORAL 2 TIMES DAILY
COMMUNITY
Start: 2023-03-24

## 2023-04-24 RX ORDER — ACETAMINOPHEN 500 MG
TABLET ORAL
COMMUNITY
Start: 2022-05-19

## 2023-04-24 RX ORDER — METHOCARBAMOL 750 MG/1
1 TABLET, FILM COATED ORAL DAILY
COMMUNITY
Start: 2023-04-18

## 2023-04-24 RX ORDER — ONDANSETRON 4 MG/1
4 TABLET, FILM COATED ORAL EVERY 8 HOURS PRN
COMMUNITY
Start: 2023-03-24

## 2023-04-24 NOTE — TELEPHONE ENCOUNTER
Michele is calling regarding pt's PT & OT   Says pt states she can do Flexion only   No abduction, no external rotation since her shoulder surgery and he is asking you what she is able to do.  Should he call the Surgeon who did her surgery? Or Please Advise

## 2023-04-27 ENCOUNTER — TELEPHONE (OUTPATIENT)
Dept: PAIN MANAGEMENT | Age: 73
End: 2023-04-27

## 2023-04-27 ENCOUNTER — OFFICE VISIT (OUTPATIENT)
Dept: GASTROENTEROLOGY | Age: 73
End: 2023-04-27

## 2023-04-27 ENCOUNTER — PREP FOR PROCEDURE (OUTPATIENT)
Dept: GASTROENTEROLOGY | Age: 73
End: 2023-04-27

## 2023-04-27 VITALS
HEART RATE: 76 BPM | SYSTOLIC BLOOD PRESSURE: 112 MMHG | OXYGEN SATURATION: 98 % | DIASTOLIC BLOOD PRESSURE: 70 MMHG | BODY MASS INDEX: 24.14 KG/M2 | WEIGHT: 132 LBS

## 2023-04-27 DIAGNOSIS — R11.2 NAUSEA AND VOMITING, UNSPECIFIED VOMITING TYPE: ICD-10-CM

## 2023-04-27 DIAGNOSIS — M46.1 SACROILIITIS (HCC): ICD-10-CM

## 2023-04-27 DIAGNOSIS — M99.04 SOMATIC DYSFUNCTION OF SACROILIAC JOINT: Primary | ICD-10-CM

## 2023-04-27 DIAGNOSIS — Z86.010 HX OF COLONIC POLYPS: Primary | ICD-10-CM

## 2023-04-27 PROBLEM — Z86.0100 HX OF COLONIC POLYPS: Status: ACTIVE | Noted: 2023-04-27

## 2023-04-27 RX ORDER — POLYETHYLENE GLYCOL 3350, SODIUM CHLORIDE, SODIUM BICARBONATE, POTASSIUM CHLORIDE 420; 11.2; 5.72; 1.48 G/4L; G/4L; G/4L; G/4L
4000 POWDER, FOR SOLUTION ORAL ONCE
Qty: 4000 ML | Refills: 0 | Status: SHIPPED | OUTPATIENT
Start: 2023-04-27 | End: 2023-04-27

## 2023-04-27 ASSESSMENT — ENCOUNTER SYMPTOMS
NAUSEA: 0
SHORTNESS OF BREATH: 0
EYE PAIN: 0
COLOR CHANGE: 0
ABDOMINAL PAIN: 0
ANAL BLEEDING: 0
TROUBLE SWALLOWING: 0
CHEST TIGHTNESS: 0
DIARRHEA: 0
BLOOD IN STOOL: 0
EYE REDNESS: 0
CONSTIPATION: 0
WHEEZING: 0
VOMITING: 0
RECTAL PAIN: 0
PHOTOPHOBIA: 0
ABDOMINAL DISTENTION: 0
VOICE CHANGE: 0

## 2023-04-27 NOTE — PROGRESS NOTES
Subjective:      Patient ID: Aron Phillip is a 67 y.o. female who presents today for:  Chief Complaint   Patient presents with    Abdominal Pain    Nausea       HPI  Patient came in as follow-up with new complaints of anemia, nausea and chronic complaints of left lower quadrant pain with alternating bowel habits between constipation and diarrhea, though symptoms have been present since 2019. Noted patient had a colonoscopy over 1 year ago with suboptimal prep, polyp, and recommendations for repeat in 1 to 2 years. Of note patient experienced a mechanical fall at home in February requiring hospitalization at [facility] and multiple orthopedic procedures on her right leg and shoulder, since surgery patient has been experiencing nausea & vomiting and is anemic with hemoglobin of 9.2, has received iron infusions, is followed by hematology. No overt bleeding, she does report nausea, no and bilious vomiting, no hematemesis, melena, or hematochezia, is not on anticoagulation,         OV 11/3/22  Patient came in today with left lower quadrant pain, symptoms are chronic and have been present since 2019. Symptoms are associated with alternating bowel habits between constipation and diarrhea. Noted patient had colonoscopy less than 1 year ago with suboptimal prep and 1 polyp with recommendations for repeat in 1 to 2 years. Had stool for calprotectin completed by PCP with mild elevation with no clinical significance or correlation to colitis/IBD. As recall patient has previously felt better on 2 separate occasions once after her colonoscopy prep and again after completing a colon cleanse. Symptoms are more suggestive of IBS with mixed pattern but more concerned with constipation and possible overflow diarrhea. Does not have a regular bowel regimen established. No nausea or vomiting, hematemesis, melena, or hematochezia. No fever no chills. Otherwise no new complaints or concerns.   OV 4/15/22  Patient came in

## 2023-05-02 NOTE — TELEPHONE ENCOUNTER
Can discuss treatment options in person based upon her pain severity, location, image review, and other pertinent history and physical exam findings. One option may include:    -Left SI joint inj under XR with Dr Janell Awan. 15 minute procedure.

## 2023-05-05 VITALS — WEIGHT: 148 LBS | BODY MASS INDEX: 27.07 KG/M2

## 2023-05-05 ASSESSMENT — ENCOUNTER SYMPTOMS
EYES NEGATIVE: 1
GASTROINTESTINAL NEGATIVE: 1
CONSTIPATION: 0
WHEEZING: 0
VOICE CHANGE: 0
ALLERGIC/IMMUNOLOGIC NEGATIVE: 1
SORE THROAT: 0
COLOR CHANGE: 0
SHORTNESS OF BREATH: 0
NAUSEA: 0
COUGH: 1

## 2023-05-05 NOTE — PROGRESS NOTES
Subjective:      Patient was examined at [facility]  Address: [address] [place] [address], [place], [address]  Phone: [phone]      Chief Complaint   Patient presents with    Hip Injury    Pain    Other    Follow-up     Patient ID: Ledy Stokes is a 67 y.o. female being seen today. Pt admitted to SNF post weakness and debility post fracture of Right  Humerus. Pt is on methocarbamol 750 po with decreased muscle spacisisty. On Oxy IR 5mg until 4/13. Pt was on levothyroine prior to hospital admission. Also, had paxil held with increase in depression and sadness. Past Medical History:   Diagnosis Date    Anxiety and depression 05/02/2019    Anxiety and depression     Chest pain 03/01/2019    Diverticulosis of colon (without mention of hemorrhage) 02/25/2015    DR BURNETTE    Focal lymphocytic colitis 01/27/2022    Gastroesophageal reflux disease without esophagitis     GI problem     Hyperlipidemia 02/03/2015    Hypothyroidism     Lichen sclerosus 23/0516    Migraines 02/03/2015    Renal insufficiency 02/03/2015    Spondylosis of lumbar region without myelopathy or radiculopathy 05/24/2016     Past Surgical History:   Procedure Laterality Date    ABDOMINAL ADHESION SURGERY  1/7/16    DR. MERAZ    APPENDECTOMY  2012    CATARACT REMOVAL Right     CHOLECYSTECTOMY  2012    COLON SURGERY  2005    13\" of colon removed-NO CA    COLONOSCOPY  2011    polyps    COLONOSCOPY  02/25/2015    DR Erwin Wallace - DIVERTICULOSIS    COLONOSCOPY N/A 3/4/2022    COLONOSCOPY DIAGNOSTIC performed by Rohini Adorno MD at [facility] [place] by [facility]  2013    [place] COLONOSCOPY FLX DX W/COLLJ SPEC WHEN PFRMD N/A 1/26/2018    COLONOSCOPY performed by Liliam Gonzalez MD at [facility] ESOPHAGOGASTRODUODENOSCOPY TRANSORAL DIAGNOSTIC N/A 1/26/2018    EGD ESOPHAGOGASTRODUODENOSCOPY WITH DILATION performed by Liliam Gonzalez MD at [address] Right 3/30/2023

## 2023-05-10 ENCOUNTER — OFFICE VISIT (OUTPATIENT)
Dept: PAIN MANAGEMENT | Age: 73
End: 2023-05-10
Payer: MEDICARE

## 2023-05-10 VITALS
BODY MASS INDEX: 23 KG/M2 | TEMPERATURE: 97.6 F | HEIGHT: 62 IN | WEIGHT: 125 LBS | SYSTOLIC BLOOD PRESSURE: 112 MMHG | DIASTOLIC BLOOD PRESSURE: 60 MMHG

## 2023-05-10 DIAGNOSIS — M47.817 LUMBOSACRAL SPONDYLOSIS WITHOUT MYELOPATHY: Primary | ICD-10-CM

## 2023-05-10 PROCEDURE — 99214 OFFICE O/P EST MOD 30 MIN: CPT | Performed by: NURSE PRACTITIONER

## 2023-05-10 PROCEDURE — 1123F ACP DISCUSS/DSCN MKR DOCD: CPT | Performed by: NURSE PRACTITIONER

## 2023-05-10 ASSESSMENT — ENCOUNTER SYMPTOMS
TROUBLE SWALLOWING: 0
EYES NEGATIVE: 1
BACK PAIN: 1
CONSTIPATION: 0
COUGH: 0
SHORTNESS OF BREATH: 0
GASTROINTESTINAL NEGATIVE: 1
DIARRHEA: 0

## 2023-05-10 NOTE — PROGRESS NOTES
Huong Horowitz  ([birth year])    5/10/2023    Subjective: Huong Horowitz is 67 y.o. female who complains today of:    Chief Complaint   Patient presents with    Back Pain         Allergies:  Bactrim [sulfamethoxazole-trimethoprim] and Ciprofloxacin    Past Medical History:   Diagnosis Date    Anxiety and depression 05/02/2019    Anxiety and depression     Chest pain 03/01/2019    Diverticulosis of colon (without mention of hemorrhage) 02/25/2015    DR BURNETTE    Focal lymphocytic colitis 01/27/2022    Gastroesophageal reflux disease without esophagitis     GI problem     Hyperlipidemia 02/03/2015    Hypothyroidism     Lichen sclerosus 05/0871    Migraines 02/03/2015    Renal insufficiency 02/03/2015    Spondylosis of lumbar region without myelopathy or radiculopathy 05/24/2016     Past Surgical History:   Procedure Laterality Date    ABDOMINAL ADHESION SURGERY  1/7/16    DR. MERAZ    APPENDECTOMY  2012    CATARACT REMOVAL Right     CHOLECYSTECTOMY  2012    COLON SURGERY  2005    13\" of colon removed-NO CA    COLONOSCOPY  2011    polyps    COLONOSCOPY  02/25/2015    DR Ester Og - DIVERTICULOSIS    COLONOSCOPY N/A 3/4/2022    COLONOSCOPY DIAGNOSTIC performed by Danyell Sorensen MD at [facility]  2013    [place] COLONOSCOPY FLX DX W/COLLJ SPEC WHEN PFRMD N/A 1/26/2018    COLONOSCOPY performed by Za Akers MD at [address] [place] [address] ESOPHAGOGASTRODUODENOSCOPY TRANSORAL DIAGNOSTIC N/A 1/26/2018    EGD ESOPHAGOGASTRODUODENOSCOPY WITH DILATION performed by Za Akers MD at [address] Right 3/30/2023    RIGHT SHOULDER REVERSE  TOTAL SHOULDER  ARTHROPLASTY performed by Raquel Liu MD at [facility] ENDOSCOPY N/A 3/4/2022    EGD ESOPHAGOGASTRODUODENOSCOPY performed by Danyell Sorensen MD at [facility]     Family History   Problem Relation Age of Onset    Other Mother         Bowel

## 2023-05-11 ENCOUNTER — TELEPHONE (OUTPATIENT)
Dept: FAMILY MEDICINE CLINIC | Age: 73
End: 2023-05-11

## 2023-05-11 ENCOUNTER — TELEPHONE (OUTPATIENT)
Dept: PAIN MANAGEMENT | Age: 73
End: 2023-05-11

## 2023-05-11 NOTE — TELEPHONE ENCOUNTER
1st attempt- Voicemail box full unable to leave message. Unable to send mychart message- not set up.

## 2023-05-11 NOTE — TELEPHONE ENCOUNTER
Ivan Carvajal from [facility] calling .  They are moving the pt's discharge out for one more week they were going to discharge this week but pt see's ortho next week        FYI

## 2023-05-11 NOTE — TELEPHONE ENCOUNTER
REFERRAL NUMBER [id number]     LEFT L3,4,5 RFA    AUTH FROM 5/15/23-5/26/23    OK to schedule procedure approved as above. Please note sides/levels approved and date range. (If applicable, sides/levels approved may differ from those ordered)    TO BE SCHEDULED WITH DR. Petey Lopez

## 2023-05-12 VITALS — WEIGHT: 133 LBS | BODY MASS INDEX: 24.33 KG/M2

## 2023-05-12 NOTE — PROGRESS NOTES
SHOULDER  ARTHROPLASTY performed by Phuc Bajwa MD at [facility] ENDOSCOPY N/A 3/4/2022    EGD ESOPHAGOGASTRODUODENOSCOPY performed by Jeb Fink MD at [facility]     Family History   Problem Relation Age of Onset    Other Mother         Bowel Obstruction    High Blood Pressure Mother     High Blood Pressure Father     Cancer Father     Arthritis Father     Coronary Art Dis Father     Heart Disease Father     Stroke Father     Diabetes Father     Stomach Cancer Father     Colon Cancer Neg Hx      Social History     Socioeconomic History    Marital status: Single     Spouse name: Not on file    Number of children: Not on file    Years of education: Not on file    Highest education level: Not on file   Occupational History    Not on file   Tobacco Use    Smoking status: Never    Smokeless tobacco: Never   Vaping Use    Vaping Use: Never used   Substance and Sexual Activity    Alcohol use: Not Currently    Drug use: No    Sexual activity: Not Currently   Other Topics Concern    Not on file   Social History Narrative    Not on file     Social Determinants of Health     Financial Resource Strain: Low Risk     Difficulty of Paying Living Expenses: Not hard at all   Food Insecurity: No Food Insecurity    Worried About Running Out of Food in the Last Year: Never true    [address] in the Last Year: Never true   Transportation Needs: Unknown    Lack of Transportation (Medical): Not on file    Lack of Transportation (Non-Medical):  No   Physical Activity: Not on file   Stress: Not on file   Social Connections: Not on file   Intimate Partner Violence: Not on file   Housing Stability: Unknown    Unable to Pay for Housing in the Last Year: Not on file    Number of Places Lived in the Last Year: Not on file    Unstable Housing in the Last Year: No       Allergies: Bactrim [sulfamethoxazole-trimethoprim] and Ciprofloxacin  NF MEDICATIONS REVIEWED AND ALLERGIES

## 2023-05-18 ENCOUNTER — PREP FOR PROCEDURE (OUTPATIENT)
Dept: GASTROENTEROLOGY | Age: 73
End: 2023-05-18

## 2023-05-18 ENCOUNTER — OFFICE VISIT (OUTPATIENT)
Dept: PAIN MANAGEMENT | Age: 73
End: 2023-05-18

## 2023-05-18 DIAGNOSIS — M47.817 LUMBOSACRAL SPONDYLOSIS WITHOUT MYELOPATHY: Primary | ICD-10-CM

## 2023-05-18 RX ORDER — BETAMETHASONE SODIUM PHOSPHATE AND BETAMETHASONE ACETATE 3; 3 MG/ML; MG/ML
3 INJECTION, SUSPENSION INTRA-ARTICULAR; INTRALESIONAL; INTRAMUSCULAR; SOFT TISSUE ONCE
Status: COMPLETED | OUTPATIENT
Start: 2023-05-18 | End: 2023-05-18

## 2023-05-18 RX ORDER — LIDOCAINE HYDROCHLORIDE 10 MG/ML
10 INJECTION, SOLUTION INFILTRATION; PERINEURAL ONCE
Status: COMPLETED | OUTPATIENT
Start: 2023-05-18 | End: 2023-05-18

## 2023-05-18 RX ADMIN — Medication 0.5 MEQ: at 17:10

## 2023-05-18 RX ADMIN — BETAMETHASONE SODIUM PHOSPHATE AND BETAMETHASONE ACETATE 3 MG: 3; 3 INJECTION, SUSPENSION INTRA-ARTICULAR; INTRALESIONAL; INTRAMUSCULAR; SOFT TISSUE at 17:09

## 2023-05-18 RX ADMIN — LIDOCAINE HYDROCHLORIDE 10 ML: 10 INJECTION, SOLUTION INFILTRATION; PERINEURAL at 17:10

## 2023-05-18 NOTE — PROGRESS NOTES
Lumbar Radiofrequency Ablation/Neurotomy          Patient Name: Samantha Polanco   : 1950  Date: 2023     Provider: Evelyn King MD        Samantha Polanco is here today for interventional pain management. Preoperatively, the patient presents with symptoms and physical exam findings consistent with lumbar facet zygapophyseal joint mediated pain. She has had persistent pain that limits her function and activities of daily living. The pain is persistent despite conservative measures. She has significant functional and psychological impairment due to this condition. She has undergone diagnostic medial branch blocks with a positive diagnostic response. She has had greater than 50% pain relief for over six months from prior radiofrequency ablation procedures, the pain has returned in a similar character, distribution, and intensity necessitating repeat treatment. Given her symptoms, physical exam findings, impairment in activities of daily living, lack of response to conservative measures, and positive diagnostic response to medial branch blocks, consideration for lumbar facet/medial branch radiofrequency ablation/neurotomy was given. Discussed the risks of the procedure including, but not limited to, bleeding, infection, worsened pain, damage to surrounding structures, post-ablation neuritis, worsened paresthesias, side effects, toxicity, allergic reactions to medications used, immune and stress-response dysfunction, fat necrosis, avascular necrosis, skin pigmentation changes, blood sugar elevation, headache, vision changes, need for surgery, as well as catastrophic injury such as vision loss, hip and/or leg weakness, paralysis, stroke, spinal cord infarction or injury, spinal cord puncture, arachnoiditis, bowel injury, bowel or bladder incontinence, sexual dysfunction, loss of use of the legs, ventilator dependence, and death.  Discussed the risks, benefits, alternative procedures, and alternatives to the

## 2023-05-19 RX ORDER — SODIUM CHLORIDE 9 MG/ML
INJECTION, SOLUTION INTRAVENOUS CONTINUOUS
Status: CANCELLED | OUTPATIENT
Start: 2023-05-19

## 2023-05-19 RX ORDER — SODIUM CHLORIDE 0.9 % (FLUSH) 0.9 %
5-40 SYRINGE (ML) INJECTION PRN
Status: CANCELLED | OUTPATIENT
Start: 2023-05-19

## 2023-05-19 RX ORDER — SODIUM CHLORIDE 0.9 % (FLUSH) 0.9 %
5-40 SYRINGE (ML) INJECTION EVERY 12 HOURS SCHEDULED
Status: CANCELLED | OUTPATIENT
Start: 2023-05-19

## 2023-05-19 RX ORDER — SODIUM CHLORIDE 9 MG/ML
25 INJECTION, SOLUTION INTRAVENOUS PRN
Status: CANCELLED | OUTPATIENT
Start: 2023-05-19

## 2023-05-23 ENCOUNTER — ANESTHESIA EVENT (OUTPATIENT)
Dept: ENDOSCOPY | Age: 73
End: 2023-05-23
Payer: MEDICARE

## 2023-05-23 ENCOUNTER — HOSPITAL ENCOUNTER (OUTPATIENT)
Age: 73
Setting detail: OUTPATIENT SURGERY
Discharge: HOME | End: 2023-05-23
Attending: INTERNAL MEDICINE | Admitting: INTERNAL MEDICINE
Payer: MEDICARE

## 2023-05-23 ENCOUNTER — ANESTHESIA (OUTPATIENT)
Dept: ENDOSCOPY | Age: 73
End: 2023-05-23
Payer: MEDICARE

## 2023-05-23 VITALS
HEART RATE: 65 BPM | SYSTOLIC BLOOD PRESSURE: 166 MMHG | OXYGEN SATURATION: 99 % | HEIGHT: 62 IN | DIASTOLIC BLOOD PRESSURE: 70 MMHG | BODY MASS INDEX: 23.92 KG/M2 | RESPIRATION RATE: 16 BRPM | WEIGHT: 130 LBS | TEMPERATURE: 98 F

## 2023-05-23 DIAGNOSIS — Z86.010 HX OF COLONIC POLYPS: ICD-10-CM

## 2023-05-23 DIAGNOSIS — R11.2 NAUSEA AND VOMITING, UNSPECIFIED VOMITING TYPE: ICD-10-CM

## 2023-05-23 PROBLEM — K22.9 IRREGULAR Z LINE OF ESOPHAGUS: Status: ACTIVE | Noted: 2023-05-23

## 2023-05-23 PROBLEM — K44.9 HIATAL HERNIA: Status: ACTIVE | Noted: 2023-05-23

## 2023-05-23 PROCEDURE — 6360000002 HC RX W HCPCS: Performed by: REGISTERED NURSE

## 2023-05-23 PROCEDURE — 6370000000 HC RX 637 (ALT 250 FOR IP): Performed by: INTERNAL MEDICINE

## 2023-05-23 PROCEDURE — 88305 TISSUE EXAM BY PATHOLOGIST: CPT

## 2023-05-23 PROCEDURE — 3700000000 HC ANESTHESIA ATTENDED CARE: Performed by: INTERNAL MEDICINE

## 2023-05-23 PROCEDURE — 2500000003 HC RX 250 WO HCPCS: Performed by: REGISTERED NURSE

## 2023-05-23 PROCEDURE — 43239 EGD BIOPSY SINGLE/MULTIPLE: CPT | Performed by: INTERNAL MEDICINE

## 2023-05-23 PROCEDURE — 3609017100 HC EGD: Performed by: INTERNAL MEDICINE

## 2023-05-23 PROCEDURE — 7100000010 HC PHASE II RECOVERY - FIRST 15 MIN: Performed by: INTERNAL MEDICINE

## 2023-05-23 PROCEDURE — 88342 IMHCHEM/IMCYTCHM 1ST ANTB: CPT

## 2023-05-23 PROCEDURE — 2580000003 HC RX 258: Performed by: INTERNAL MEDICINE

## 2023-05-23 PROCEDURE — 3609027000 HC COLONOSCOPY: Performed by: INTERNAL MEDICINE

## 2023-05-23 PROCEDURE — 2709999900 HC NON-CHARGEABLE SUPPLY: Performed by: INTERNAL MEDICINE

## 2023-05-23 PROCEDURE — 3700000001 HC ADD 15 MINUTES (ANESTHESIA): Performed by: INTERNAL MEDICINE

## 2023-05-23 PROCEDURE — 7100000011 HC PHASE II RECOVERY - ADDTL 15 MIN: Performed by: INTERNAL MEDICINE

## 2023-05-23 PROCEDURE — 45380 COLONOSCOPY AND BIOPSY: CPT | Performed by: INTERNAL MEDICINE

## 2023-05-23 PROCEDURE — 2580000003 HC RX 258

## 2023-05-23 RX ORDER — SODIUM CHLORIDE 0.9 % (FLUSH) 0.9 %
5-40 SYRINGE (ML) INJECTION EVERY 12 HOURS SCHEDULED
Status: DISCONTINUED | OUTPATIENT
Start: 2023-05-23 | End: 2023-05-23 | Stop reason: HOSPADM

## 2023-05-23 RX ORDER — SODIUM CHLORIDE 0.9 % (FLUSH) 0.9 %
5-40 SYRINGE (ML) INJECTION PRN
Status: DISCONTINUED | OUTPATIENT
Start: 2023-05-23 | End: 2023-05-23 | Stop reason: HOSPADM

## 2023-05-23 RX ORDER — SODIUM CHLORIDE 9 MG/ML
INJECTION, SOLUTION INTRAVENOUS
Status: COMPLETED
Start: 2023-05-23 | End: 2023-05-23

## 2023-05-23 RX ORDER — PROPOFOL 10 MG/ML
INJECTION, EMULSION INTRAVENOUS PRN
Status: DISCONTINUED | OUTPATIENT
Start: 2023-05-23 | End: 2023-05-23 | Stop reason: SDUPTHER

## 2023-05-23 RX ORDER — SODIUM CHLORIDE 9 MG/ML
INJECTION, SOLUTION INTRAVENOUS CONTINUOUS
Status: DISCONTINUED | OUTPATIENT
Start: 2023-05-23 | End: 2023-05-23 | Stop reason: HOSPADM

## 2023-05-23 RX ORDER — SIMETHICONE 20 MG/.3ML
EMULSION ORAL PRN
Status: DISCONTINUED | OUTPATIENT
Start: 2023-05-23 | End: 2023-05-23 | Stop reason: ALTCHOICE

## 2023-05-23 RX ORDER — MAGNESIUM HYDROXIDE 1200 MG/15ML
LIQUID ORAL PRN
Status: DISCONTINUED | OUTPATIENT
Start: 2023-05-23 | End: 2023-05-23 | Stop reason: ALTCHOICE

## 2023-05-23 RX ORDER — FERUMOXYTOL 510 MG/17ML
510 INJECTION INTRAVENOUS ONCE
COMMUNITY

## 2023-05-23 RX ORDER — LIDOCAINE HYDROCHLORIDE 20 MG/ML
INJECTION, SOLUTION EPIDURAL; INFILTRATION; INTRACAUDAL; PERINEURAL PRN
Status: DISCONTINUED | OUTPATIENT
Start: 2023-05-23 | End: 2023-05-23 | Stop reason: SDUPTHER

## 2023-05-23 RX ORDER — SODIUM CHLORIDE 9 MG/ML
25 INJECTION, SOLUTION INTRAVENOUS PRN
Status: DISCONTINUED | OUTPATIENT
Start: 2023-05-23 | End: 2023-05-23 | Stop reason: HOSPADM

## 2023-05-23 RX ADMIN — SODIUM CHLORIDE: 9 INJECTION, SOLUTION INTRAVENOUS at 07:40

## 2023-05-23 RX ADMIN — LIDOCAINE HYDROCHLORIDE 60 MG: 20 INJECTION, SOLUTION EPIDURAL; INFILTRATION; INTRACAUDAL; PERINEURAL at 08:14

## 2023-05-23 RX ADMIN — PROPOFOL 50 MG: 10 INJECTION, EMULSION INTRAVENOUS at 08:19

## 2023-05-23 RX ADMIN — PROPOFOL 100 MG: 10 INJECTION, EMULSION INTRAVENOUS at 08:14

## 2023-05-23 RX ADMIN — PROPOFOL 30 MG: 10 INJECTION, EMULSION INTRAVENOUS at 08:29

## 2023-05-23 RX ADMIN — PROPOFOL 50 MG: 10 INJECTION, EMULSION INTRAVENOUS at 08:25

## 2023-05-23 ASSESSMENT — PAIN SCALES - GENERAL
PAINLEVEL_OUTOF10: 0

## 2023-05-23 ASSESSMENT — PAIN - FUNCTIONAL ASSESSMENT: PAIN_FUNCTIONAL_ASSESSMENT: 0-10

## 2023-05-23 NOTE — ANESTHESIA POSTPROCEDURE EVALUATION
Department of Anesthesiology  Postprocedure Note    Patient: Rola Coats  MRN: [id number]  [place]: 1950  Date of evaluation: 5/23/2023      Procedure Summary     Date: 05/23/23 [facility] [address] [place] [place] [address]    Anesthesia Start: 3173 Anesthesia Stop: 0840    Procedures:       EGD w/ biopies and polypectomy      COLONOSCOPY w/ polypectomy Diagnosis:       Nausea and vomiting, unspecified vomiting type      Hx of colonic polyps      (Nausea and vomiting, unspecified vomiting type [R11.2])      (Hx of colonic polyps [Z86.010])    Surgeons: Jean Loco MD Responsible Provider: TANNA Zapata CRNA    Anesthesia Type: MAC ASA Status: 3          Anesthesia Type: No value filed.     [place] Phase I:      [place] Phase II: [place] Score: 9      Anesthesia Post Evaluation    Patient location during evaluation: bedside  Patient participation: complete - patient participated  Level of consciousness: awake and alert  Pain score: 0  Airway patency: patent  Nausea & Vomiting: no nausea and no vomiting  Complications: no  Cardiovascular status: blood pressure returned to baseline and hemodynamically stable  Respiratory status: acceptable and room air  Hydration status: stable

## 2023-05-23 NOTE — H&P
Patient Name: Edith De Guzman  : 1950  MRN: [id number]  DATE: 23      ENDOSCOPY  History and Physical    Procedure:    [x] Diagnostic Colonoscopy       [] Screening Colonoscopy  [x] EGD      [] ERCP      [] EUS       [] Other    [x] Previous office notes/History and Physical reviewed from the patients chart. Please see EMR for further details of HPI. I have examined the patient's status immediately prior to the procedure and:      Indications/HPI:    []Abdominal Pain   []Cancer- GI/Lung  []Fhx of colon CA/polyps  []History of Polyps   []Bells   []Melena  []Abnormal Imaging   []Dysphagia    []Persistent Pneumonia  [x]Anemia   []Food Impaction  []History of Polyps  []GI Bleed   []Pulmonary nodule/Mass  []Change in bowel habits  []Heartburn/Reflux  []Rectal Bleed (BRBPR)  []Chest Pain - Non Cardiac  []Heme (+) Stool  []Ulcers  []Constipation   []Hemoptysis   []Varices  []Diarrhea   []Hypoxemia  []Nausea/Vomiting   []Screening   []Crohns/Colitis  []Other:    Anesthesia:   [x] MAC [] Moderate Sedation   [] General   [] None     ROS: 12 pt Review of Symptoms was negative unless mentioned above    Medications:   Prior to Admission medications    Medication Sig Start Date End Date Taking?  Authorizing Provider   levothyroxine (SYNTHROID) 75 MCG tablet Take 1 tablet by mouth daily 23   Historical Provider, MD   PARoxetine (PAXIL) 10 MG tablet Take 1 tablet by mouth daily 23   Historical Provider, MD   dicyclomine (BENTYL) 10 MG capsule Take 1 capsule by mouth 4 times daily (before meals and nightly)    Historical Provider, MD   atorvastatin (LIPITOR) 20 MG tablet Take 1 tablet by mouth daily    Historical Provider, MD   vitamin B-12 (CYANOCOBALAMIN) 1000 MCG tablet Take 1 tablet by mouth daily    Historical Provider, MD   Cholecalciferol (VITAMIN D3) 50 MCG (2000) CAPS Take by mouth daily    Historical Provider, MD   Coenzyme Q10 (COQ-10) 100 MG CPCR Take by mouth    Historical Provider, MD

## 2023-05-23 NOTE — ANESTHESIA PRE PROCEDURE
Department of Anesthesiology  Preprocedure Note       Name:  Coral Navarrete   Age:  67 y.o.  :  1950                                          MRN:  [id number]         Date:  2023      Surgeon: Trey Lawrence):  Denise Barroso MD    Procedure: Procedure(s):  EGD DIAGNOSTIC ONLY  COLONOSCOPY DIAGNOSTIC    Medications prior to admission:   Prior to Admission medications    Medication Sig Start Date End Date Taking?  Authorizing Provider   ferumoxytol (FERAHEME) 510 MG/17ML SOLN Infuse 17 mLs intravenously once    Historical Provider, MD   levothyroxine (SYNTHROID) 75 MCG tablet Take 1 tablet by mouth daily 23   Historical Provider, MD   PARoxetine (PAXIL) 10 MG tablet Take 1 tablet by mouth daily 23   Historical Provider, MD   dicyclomine (BENTYL) 10 MG capsule Take 1 capsule by mouth 4 times daily (before meals and nightly)    Historical Provider, MD   atorvastatin (LIPITOR) 20 MG tablet Take 1 tablet by mouth daily    Historical Provider, MD   vitamin B-12 (CYANOCOBALAMIN) 1000 MCG tablet Take 1 tablet by mouth daily    Historical Provider, MD   Cholecalciferol (VITAMIN D3) 50 MCG (2000) CAPS Take by mouth daily    Historical Provider, MD   Coenzyme Q10 (COQ-10) 100 MG CPCR Take by mouth    Historical Provider, MD   aspirin 81 MG EC tablet Take 1 tablet by mouth in the morning and at bedtime  Patient taking differently: Take 1 tablet by mouth in the morning and at bedtime Indications: Disorder of the Blood Vessels of the Brain 3/31/23 4/30/23  Kevin Crooks Born, APRN - CNP   ondansetron (ZOFRAN) 4 MG tablet Take 1 tablet by mouth every 8 hours as needed for Nausea or Vomiting 3/24/23   Historical Provider, MD   methocarbamol (ROBAXIN) 750 MG tablet Take 1 tablet by mouth nightly Indications: Muscle Spasm Daily 23   Historical Provider, MD   omeprazole (PRILOSEC) 20 MG delayed release capsule Take 1 capsule by mouth daily  Patient taking differently: Take 1 capsule by mouth daily

## 2023-06-27 ENCOUNTER — OFFICE VISIT (OUTPATIENT)
Dept: FAMILY MEDICINE CLINIC | Age: 73
End: 2023-06-27
Payer: MEDICARE

## 2023-06-27 VITALS
HEIGHT: 62 IN | TEMPERATURE: 98.1 F | SYSTOLIC BLOOD PRESSURE: 144 MMHG | HEART RATE: 63 BPM | WEIGHT: 128 LBS | BODY MASS INDEX: 23.55 KG/M2 | OXYGEN SATURATION: 98 % | DIASTOLIC BLOOD PRESSURE: 68 MMHG

## 2023-06-27 DIAGNOSIS — K44.9 HIATAL HERNIA: ICD-10-CM

## 2023-06-27 DIAGNOSIS — R12 HEARTBURN: ICD-10-CM

## 2023-06-27 DIAGNOSIS — E53.8 B12 DEFICIENCY: ICD-10-CM

## 2023-06-27 DIAGNOSIS — R10.12 LUQ PAIN: ICD-10-CM

## 2023-06-27 DIAGNOSIS — D64.9 ANEMIA, UNSPECIFIED TYPE: ICD-10-CM

## 2023-06-27 DIAGNOSIS — I67.9 SMALL VESSEL DISEASE, CEREBROVASCULAR: Chronic | ICD-10-CM

## 2023-06-27 DIAGNOSIS — R11.11 VOMITING WITHOUT NAUSEA, UNSPECIFIED VOMITING TYPE: Primary | ICD-10-CM

## 2023-06-27 PROCEDURE — 96372 THER/PROPH/DIAG INJ SC/IM: CPT | Performed by: FAMILY MEDICINE

## 2023-06-27 PROCEDURE — 1123F ACP DISCUSS/DSCN MKR DOCD: CPT | Performed by: FAMILY MEDICINE

## 2023-06-27 PROCEDURE — 99214 OFFICE O/P EST MOD 30 MIN: CPT | Performed by: FAMILY MEDICINE

## 2023-06-27 RX ORDER — SYRINGE AND NEEDLE,INSULIN,1ML 28GX1/2"
1 SYRINGE, EMPTY DISPOSABLE MISCELLANEOUS
Qty: 1 EACH | Refills: 12 | Status: SHIPPED | OUTPATIENT
Start: 2023-06-27

## 2023-06-27 RX ORDER — CYANOCOBALAMIN 1000 UG/ML
1000 INJECTION, SOLUTION INTRAMUSCULAR; SUBCUTANEOUS
Qty: 1 ML | Refills: 12 | Status: SHIPPED | OUTPATIENT
Start: 2023-06-27

## 2023-06-27 RX ORDER — OMEPRAZOLE 20 MG/1
20 CAPSULE, DELAYED RELEASE ORAL DAILY
Qty: 1 CAPSULE | Refills: 0 | Status: SHIPPED | OUTPATIENT
Start: 2023-06-27

## 2023-06-27 RX ORDER — ASPIRIN 81 MG/1
81 TABLET ORAL 2 TIMES DAILY
Qty: 60 TABLET | Refills: 0 | Status: SHIPPED | OUTPATIENT
Start: 2023-06-27 | End: 2023-07-27

## 2023-06-27 RX ORDER — CYANOCOBALAMIN 1000 UG/ML
1000 INJECTION, SOLUTION INTRAMUSCULAR; SUBCUTANEOUS ONCE
Status: COMPLETED | OUTPATIENT
Start: 2023-06-27 | End: 2023-06-27

## 2023-06-27 RX ADMIN — CYANOCOBALAMIN 1000 MCG: 1000 INJECTION, SOLUTION INTRAMUSCULAR; SUBCUTANEOUS at 14:03

## 2023-07-03 ENCOUNTER — OFFICE VISIT (OUTPATIENT)
Dept: GASTROENTEROLOGY | Age: 73
End: 2023-07-03
Payer: MEDICARE

## 2023-07-03 VITALS
BODY MASS INDEX: 23.41 KG/M2 | WEIGHT: 128 LBS | HEART RATE: 76 BPM | OXYGEN SATURATION: 98 % | DIASTOLIC BLOOD PRESSURE: 70 MMHG | SYSTOLIC BLOOD PRESSURE: 138 MMHG

## 2023-07-03 DIAGNOSIS — K58.2 IRRITABLE BOWEL SYNDROME WITH BOTH CONSTIPATION AND DIARRHEA: Primary | ICD-10-CM

## 2023-07-03 DIAGNOSIS — R10.32 LLQ ABDOMINAL PAIN: ICD-10-CM

## 2023-07-03 PROCEDURE — 99213 OFFICE O/P EST LOW 20 MIN: CPT | Performed by: NURSE PRACTITIONER

## 2023-07-03 PROCEDURE — 1123F ACP DISCUSS/DSCN MKR DOCD: CPT | Performed by: NURSE PRACTITIONER

## 2023-07-03 RX ORDER — DICYCLOMINE HCL 20 MG
20 TABLET ORAL 4 TIMES DAILY
Qty: 120 TABLET | Refills: 0 | Status: SHIPPED | OUTPATIENT
Start: 2023-07-03 | End: 2023-08-02

## 2023-07-03 ASSESSMENT — ENCOUNTER SYMPTOMS
CONSTIPATION: 1
COLOR CHANGE: 0
ANAL BLEEDING: 0
PHOTOPHOBIA: 0
EYE REDNESS: 0
EYE PAIN: 0
RECTAL PAIN: 0
BLOOD IN STOOL: 0
CHEST TIGHTNESS: 0
DIARRHEA: 1
VOMITING: 0
ABDOMINAL PAIN: 0
WHEEZING: 0
SHORTNESS OF BREATH: 0
TROUBLE SWALLOWING: 0
ABDOMINAL DISTENTION: 0
VOICE CHANGE: 0
NAUSEA: 1

## 2023-07-03 NOTE — PROGRESS NOTES
Last Year: Never true    Ran Out of Food in the Last Year: Never true   Transportation Needs: Unknown    Lack of Transportation (Medical): Not on file    Lack of Transportation (Non-Medical): No   Physical Activity: Not on file   Stress: Not on file   Social Connections: Not on file   Intimate Partner Violence: Not on file   Housing Stability: Unknown    Unable to Pay for Housing in the Last Year: Not on file    Number of Places Lived in the Last Year: Not on file    Unstable Housing in the Last Year: No     Family History   Problem Relation Age of Onset    Other Mother         Bowel Obstruction    High Blood Pressure Mother     High Blood Pressure Father     Cancer Father     Arthritis Father     Coronary Art Dis Father     Heart Disease Father     Stroke Father     Diabetes Father     Stomach Cancer Father     Colon Cancer Neg Hx      Allergies   Allergen Reactions    Bactrim [Sulfamethoxazole-Trimethoprim]      hallucinations    Ciprofloxacin Other (See Comments)     Pt not 100% sure, but thinks cipro is the antibiotic she is allergic to  Anxiety, confusion         Review of Systems   Constitutional:  Negative for appetite change, chills, fever and unexpected weight change. HENT:  Negative for nosebleeds, tinnitus, trouble swallowing and voice change. Eyes:  Negative for photophobia, pain and redness. Respiratory:  Negative for chest tightness, shortness of breath and wheezing. Cardiovascular:  Negative for chest pain, palpitations and leg swelling. Gastrointestinal:  Positive for constipation, diarrhea and nausea. Negative for abdominal distention, abdominal pain, anal bleeding, blood in stool, rectal pain and vomiting. Endocrine: Negative for polydipsia, polyphagia and polyuria. Genitourinary:  Negative for difficulty urinating and hematuria. Skin:  Negative for color change, pallor and rash. Neurological:  Negative for dizziness, speech difficulty and headaches.    Psychiatric/Behavioral:

## 2023-07-05 NOTE — TELEPHONE ENCOUNTER
Problem: Falls - Risk of  Goal: *Absence of Falls  Description: Document Dayami Medina Fall Risk and appropriate interventions in the flowsheet.   Outcome: Resolved/Met  Note: Fall Risk Interventions:                                Problem: Patient Education: Go to Patient Education Activity  Goal: Patient/Family Education  Outcome: Resolved/Met     Problem: Patient Education: Go to Patient Education Activity  Goal: Patient/Family Education  Outcome: Resolved/Met     Problem: TIA/CVA Stroke: 0-24 hours  Goal: Off Pathway (Use only if patient is Off Pathway)  Outcome: Resolved/Met  Goal: Activity/Safety  Outcome: Resolved/Met  Goal: Consults, if ordered  Outcome: Resolved/Met  Goal: Diagnostic Test/Procedures  Outcome: Resolved/Met  Goal: Nutrition/Diet  Outcome: Resolved/Met  Goal: Discharge Planning  Outcome: Resolved/Met  Goal: Medications  Outcome: Resolved/Met  Goal: Respiratory  Outcome: Resolved/Met  Goal: Treatments/Interventions/Procedures  Outcome: Resolved/Met  Goal: Minimize risk of bleeding post-thrombolytic infusion  Outcome: Resolved/Met  Goal: Monitor for complications post-thrombolytic infusion  Outcome: Resolved/Met  Goal: Psychosocial  Outcome: Resolved/Met  Goal: *Hemodynamically stable  Outcome: Resolved/Met  Goal: *Neurologically stable  Description: Absence of additional neurological deficits    Outcome: Resolved/Met  Goal: *Verbalizes anxiety and depression are reduced or absent  Outcome: Resolved/Met  Goal: *Absence of Signs of Aspiration on Current Diet  Outcome: Resolved/Met  Goal: *Ability to perform ADLs and demonstrates progressive mobility and function  Outcome: Resolved/Met  Goal: *Stroke education started(Stroke Metric)  Outcome: Resolved/Met  Goal: *Dysphagia screen performed(Stroke Metric)  Outcome: Resolved/Met  Goal: *Rehab consulted(Stroke Metric)  Outcome: Resolved/Met     Problem: TIA/CVA Stroke: Day 2 Until Discharge  Goal: Off Pathway (Use only if patient is Off Pathway)  Outcome: Samples given   Lot [id number]  Exp 10/23/ Resolved/Met  Goal: Activity/Safety  Outcome: Resolved/Met  Goal: Diagnostic Test/Procedures  Outcome: Resolved/Met  Goal: Nutrition/Diet  Outcome: Resolved/Met  Goal: Discharge Planning  Outcome: Resolved/Met  Goal: Medications  Outcome: Resolved/Met  Goal: Respiratory  Outcome: Resolved/Met  Goal: Treatments/Interventions/Procedures  Outcome: Resolved/Met  Goal: Psychosocial  Outcome: Resolved/Met  Goal: *Verbalizes anxiety and depression are reduced or absent  Outcome: Resolved/Met  Goal: *Absence of aspiration  Outcome: Resolved/Met  Goal: *Absence of deep venous thrombosis signs and symptoms(Stroke Metric)  Outcome: Resolved/Met  Goal: *Optimal pain control at patient's stated goal  Outcome: Resolved/Met  Goal: *Tolerating diet  Outcome: Resolved/Met  Goal: *Ability to perform ADLs and demonstrates progressive mobility and function  Outcome: Resolved/Met  Goal: *Stroke education continued(Stroke Metric)  Outcome: Resolved/Met     Problem: Ischemic Stroke: Discharge Outcomes  Goal: *Verbalizes anxiety and depression are reduced or absent  Outcome: Resolved/Met  Goal: *Verbalize understanding of risk factor modification(Stroke Metric)  Outcome: Resolved/Met  Goal: *Hemodynamically stable  Outcome: Resolved/Met  Goal: *Absence of aspiration pneumonia  Outcome: Resolved/Met  Goal: *Aware of needed dietary changes  Outcome: Resolved/Met  Goal: *Verbalize understanding of prescribed medications including anti-coagulants, anti-lipid, and/or anti-platelets(Stroke Metric)  Outcome: Resolved/Met  Goal: *Tolerating diet  Outcome: Resolved/Met  Goal: *Aware of follow-up diagnostics related to anticoagulants  Outcome: Resolved/Met  Goal: *Ability to perform ADLs and demonstrates progressive mobility and function  Outcome: Resolved/Met  Goal: *Absence of DVT(Stroke Metric)  Outcome: Resolved/Met  Goal: *Absence of aspiration  Outcome: Resolved/Met  Goal: *Optimal pain control at patient's stated goal  Outcome: Resolved/Met  Goal: *Home safety concerns addressed  Outcome: Resolved/Met  Goal: *Describes available resources and support systems  Outcome: Resolved/Met  Goal: *Verbalizes understanding of activation of EMS(911) for stroke symptoms(Stroke Metric)  Outcome: Resolved/Met  Goal: *Understands and describes signs and symptoms to report to providers(Stroke Metric)  Outcome: Resolved/Met  Goal: *Neurolgocially stable (absence of additional neurological deficits)  Outcome: Resolved/Met  Goal: *Verbalizes importance of follow-up with primary care physician(Stroke Metric)  Outcome: Resolved/Met  Goal: *Smoking cessation discussed,if applicable(Stroke Metric)  Outcome: Resolved/Met  Goal: *Depression screening completed(Stroke Metric)  Outcome: Resolved/Met

## 2023-07-14 ENCOUNTER — OFFICE VISIT (OUTPATIENT)
Dept: PAIN MANAGEMENT | Age: 73
End: 2023-07-14
Payer: MEDICARE

## 2023-07-14 VITALS
TEMPERATURE: 97.4 F | SYSTOLIC BLOOD PRESSURE: 130 MMHG | WEIGHT: 130 LBS | DIASTOLIC BLOOD PRESSURE: 62 MMHG | HEIGHT: 62 IN | BODY MASS INDEX: 23.92 KG/M2

## 2023-07-14 DIAGNOSIS — M46.1 SACROILIITIS (HCC): ICD-10-CM

## 2023-07-14 DIAGNOSIS — M99.04 SOMATIC DYSFUNCTION OF SACROILIAC JOINT: ICD-10-CM

## 2023-07-14 DIAGNOSIS — M47.817 LUMBOSACRAL SPONDYLOSIS WITHOUT MYELOPATHY: Primary | ICD-10-CM

## 2023-07-14 PROCEDURE — 1123F ACP DISCUSS/DSCN MKR DOCD: CPT | Performed by: PHYSICAL MEDICINE & REHABILITATION

## 2023-07-14 PROCEDURE — 99214 OFFICE O/P EST MOD 30 MIN: CPT | Performed by: PHYSICAL MEDICINE & REHABILITATION

## 2023-07-14 RX ORDER — LIDOCAINE 50 MG/G
1 PATCH TOPICAL DAILY
Qty: 30 PATCH | Refills: 0 | Status: SHIPPED | OUTPATIENT
Start: 2023-07-14 | End: 2023-08-13

## 2023-07-14 RX ORDER — LIDOCAINE 40 MG/G
CREAM TOPICAL
Qty: 120 G | Refills: 1 | Status: SHIPPED | OUTPATIENT
Start: 2023-07-14

## 2023-07-14 ASSESSMENT — ENCOUNTER SYMPTOMS
NAUSEA: 0
SHORTNESS OF BREATH: 0
DIARRHEA: 0
BACK PAIN: 1
CONSTIPATION: 0

## 2023-07-14 NOTE — PROGRESS NOTES
develop an exercise program. All recommendations for medications are meant to help decrease pain, improve function with activities of daily living, maintain compliance with home exercise program, and improve quality of life. All recommendations for diagnostic injections are meant to help assess the hypothesis that the targeted structure is a significant pain generator that limits the patient's function, causes pain, and reduces her quality of life. Encouraged compliance with her home exercise program. Recommended compliance with physical therapy program as outlined above. Informed her to wear bracing as appropriate, and that bracing is not a replacement for core strengthening or muscle stabilization. Discussed the elevated risks of excessive sedation while on pain medications. Advised her against driving or operating heavy machinery or performing any activities where she may harm herself or others while on pain medications. Particular caution was emphasized especially during dose adjustments and medication changes. Discussed the elevated risks of respiratory depression and death while on opioid medications, especially when combined with other sedative substances. Discussed the risks of temporary disability, permanent disability, morbidity, and mortality with poorly-managed or undiagnosed medical conditions and comorbidities. Emphasized the importance of timely medical evaluation and treatment as previously recommended by us or other medical professionals. Risks of not pursing these recommendations were emphasized. The patient was offered a treatment at our facility. The physician and patient have discussed in detail the risk of exposure to and/or potential harm posed by the COVID-19 virus with having office visits and procedures at this time versus the risk of delaying the visits and procedures.  It is not possible to know either the risk of delaying the visits or procedure or chance of getting an infection

## 2023-07-17 ENCOUNTER — TELEPHONE (OUTPATIENT)
Dept: PAIN MANAGEMENT | Age: 73
End: 2023-07-17

## 2023-07-17 NOTE — TELEPHONE ENCOUNTER
REFERRAL NUMBER [id number]    LEFT SI JOINT INJ    AUTH FROM 7/25/23-8/4/23    OK to schedule procedure approved as above. Please note sides/levels approved and date range.    (If applicable, sides/levels approved may differ from those ordered)    TO BE SCHEDULED WITH DR Iker Swain

## 2023-07-19 ENCOUNTER — HOSPITAL ENCOUNTER (OUTPATIENT)
Dept: CT IMAGING | Age: 73
Discharge: HOME | End: 2023-07-21
Payer: MEDICARE

## 2023-07-19 DIAGNOSIS — R10.32 LLQ ABDOMINAL PAIN: ICD-10-CM

## 2023-07-19 LAB
PERFORMED ON: NORMAL
POC CREATININE: 0.6 MG/DL (ref 0.6–1.2)
POC SAMPLE TYPE: NORMAL

## 2023-07-19 PROCEDURE — 6360000004 HC RX CONTRAST MEDICATION: Performed by: NURSE PRACTITIONER

## 2023-07-19 PROCEDURE — 74177 CT ABD & PELVIS W/CONTRAST: CPT

## 2023-07-19 RX ADMIN — IOPAMIDOL 50 ML: 612 INJECTION, SOLUTION INTRAVENOUS at 13:11

## 2023-07-19 RX ADMIN — IOPAMIDOL 20 ML: 612 INJECTION, SOLUTION INTRAVENOUS at 13:10

## 2023-07-24 ENCOUNTER — HOSPITAL ENCOUNTER (OUTPATIENT)
Dept: MRI IMAGING | Age: 73
Discharge: HOME | End: 2023-07-26
Attending: PHYSICAL MEDICINE & REHABILITATION
Payer: MEDICARE

## 2023-07-24 DIAGNOSIS — M47.817 LUMBOSACRAL SPONDYLOSIS WITHOUT MYELOPATHY: ICD-10-CM

## 2023-07-24 PROCEDURE — 72148 MRI LUMBAR SPINE W/O DYE: CPT

## 2023-07-25 ENCOUNTER — TELEPHONE (OUTPATIENT)
Dept: GASTROENTEROLOGY | Age: 73
End: 2023-07-25

## 2023-07-25 NOTE — TELEPHONE ENCOUNTER
Denzel Foreman is calling for results of her  CT ABD PELVIS . Performed on: 6/26/2023    Contact info: Denzel Foreman can be reached at [phone].

## 2023-07-26 NOTE — TELEPHONE ENCOUNTER
Comments:     Last Office Visit (last PCP visit):   6/27/2023    Next Visit Date:  Future Appointments   Date Time Provider [address]   7/27/2023 11:00 AM Nisha Frias MD [facility] EMERGENCY [facility] AT [place]   8/8/2023  4:45 PM TANNA Maldonado - CNP [facility] EMERGENCY [facility] AT [place]   8/9/2023 11:30 AM Toya Titus, [address] [place] [address]   8/21/2023  1:15 PM Alexey Alan MD [facility] Neurology -   1/2/2024  1:00 PM Len Hamman, MD [facility] EMERGENCY [facility] [place]       **If hasn't been seen in over a year OR hasn't followed up according to last diabetes/ADHD visit, make appointment for patient before sending refill to provider.     Rx requested:  Requested Prescriptions     Pending Prescriptions Disp Refills    levothyroxine (SYNTHROID) 75 MCG tablet 30 tablet      Sig: Take 1 tablet by mouth daily

## 2023-07-26 NOTE — TELEPHONE ENCOUNTER
Patient called back returning phone. Patient states she will be by her phone all day. Patient is calling about the results of her CT scan.

## 2023-07-27 ENCOUNTER — PROCEDURE VISIT (OUTPATIENT)
Dept: PAIN MANAGEMENT | Age: 73
End: 2023-07-27

## 2023-07-27 DIAGNOSIS — M46.1 SACROILIITIS (HCC): ICD-10-CM

## 2023-07-27 DIAGNOSIS — M99.04 SOMATIC DYSFUNCTION OF SACROILIAC JOINT: Primary | ICD-10-CM

## 2023-07-27 RX ORDER — LIDOCAINE HYDROCHLORIDE 10 MG/ML
5 INJECTION, SOLUTION INFILTRATION; PERINEURAL ONCE
Status: COMPLETED | OUTPATIENT
Start: 2023-07-27 | End: 2023-07-27

## 2023-07-27 RX ORDER — BETAMETHASONE SODIUM PHOSPHATE AND BETAMETHASONE ACETATE 3; 3 MG/ML; MG/ML
3 INJECTION, SUSPENSION INTRA-ARTICULAR; INTRALESIONAL; INTRAMUSCULAR; SOFT TISSUE ONCE
Status: COMPLETED | OUTPATIENT
Start: 2023-07-27 | End: 2023-07-27

## 2023-07-27 RX ORDER — LEVOTHYROXINE SODIUM 0.07 MG/1
75 TABLET ORAL DAILY
Qty: 30 TABLET | Refills: 0 | Status: SHIPPED | OUTPATIENT
Start: 2023-07-27

## 2023-07-27 RX ADMIN — LIDOCAINE HYDROCHLORIDE 5 ML: 10 INJECTION, SOLUTION INFILTRATION; PERINEURAL at 14:25

## 2023-07-27 RX ADMIN — BETAMETHASONE SODIUM PHOSPHATE AND BETAMETHASONE ACETATE 3 MG: 3; 3 INJECTION, SUSPENSION INTRA-ARTICULAR; INTRALESIONAL; INTRAMUSCULAR; SOFT TISSUE at 14:25

## 2023-07-27 RX ADMIN — Medication 0.5 MEQ: at 14:29

## 2023-07-27 NOTE — PROGRESS NOTES
SACROILIAC (SI) JOINT INJECTION      Patient Name: Cristy Vila  : 1950     Date:  2023      Physician: Watson Bloch, MD     Cristy Vila is here today for interventional pain management. Preoperatively, the patient presents with symptoms and physical exam findings consistent with sacroiliac (SI) joint-mediated pain. She has had persistent pain that limits her function and activities of daily living. The pain is persistent despite conservative measures. She has significant functional and psychological impairment due to this condition. Given her symptoms, physical exam findings, impairment in activities of daily living, and lack of response to conservative measures, consideration for SI joint corticosteroid injections was given. Discussed the risks of the procedure including, but not limited to, bleeding, infection, worsened pain, damage to surrounding structures, side effects, toxicity, allergic reactions to medications used, immune and stress-response dysfunction, fat necrosis, avascular necrosis, skin pigmentation changes, blood sugar elevation, headache, vision changes, need for surgery, as well as catastrophic injury such as vision loss, paralysis, stroke, bowel or bladder puncture, bowel or bladder incontinence, incontinence, loss of use of the legs, ventilator dependence, and death. Discussed the risks, benefits, alternative procedures, and alternatives to the procedure including no procedure at all. Discussed that we cannot undo any permanent neurologic damage or change the course of any underlying disease. After thorough discussion, patient expressed understanding and willingness to proceed. Written consent was obtained and is in the chart. Verbal consent to proceed was obtained. Standard ASIPP guidelines were followed and sterile technique used. Area was cleaned with Betadine three times. Informed consent was obtained. Fluoroscopic guidance was used for this procedure.     S.I.

## 2023-08-02 DIAGNOSIS — E03.9 ACQUIRED HYPOTHYROIDISM: Primary | ICD-10-CM

## 2023-08-08 ENCOUNTER — OFFICE VISIT (OUTPATIENT)
Dept: PAIN MANAGEMENT | Age: 73
End: 2023-08-08
Payer: MEDICARE

## 2023-08-08 VITALS
TEMPERATURE: 97.4 F | DIASTOLIC BLOOD PRESSURE: 88 MMHG | WEIGHT: 125.8 LBS | HEIGHT: 62 IN | SYSTOLIC BLOOD PRESSURE: 136 MMHG | BODY MASS INDEX: 23.15 KG/M2

## 2023-08-08 DIAGNOSIS — M46.1 SACROILIITIS (HCC): Primary | ICD-10-CM

## 2023-08-08 DIAGNOSIS — M47.817 LUMBOSACRAL SPONDYLOSIS WITHOUT MYELOPATHY: ICD-10-CM

## 2023-08-08 PROCEDURE — 99213 OFFICE O/P EST LOW 20 MIN: CPT | Performed by: NURSE PRACTITIONER

## 2023-08-08 PROCEDURE — 1123F ACP DISCUSS/DSCN MKR DOCD: CPT | Performed by: NURSE PRACTITIONER

## 2023-08-08 ASSESSMENT — ENCOUNTER SYMPTOMS
ABDOMINAL PAIN: 0
BACK PAIN: 1
SHORTNESS OF BREATH: 0
SORE THROAT: 0

## 2023-08-09 ENCOUNTER — OFFICE VISIT (OUTPATIENT)
Dept: GASTROENTEROLOGY | Age: 73
End: 2023-08-09
Payer: MEDICARE

## 2023-08-09 VITALS
OXYGEN SATURATION: 98 % | BODY MASS INDEX: 23.41 KG/M2 | WEIGHT: 128 LBS | DIASTOLIC BLOOD PRESSURE: 76 MMHG | SYSTOLIC BLOOD PRESSURE: 134 MMHG | HEART RATE: 72 BPM

## 2023-08-09 DIAGNOSIS — D64.9 ANEMIA, UNSPECIFIED TYPE: ICD-10-CM

## 2023-08-09 DIAGNOSIS — D12.6 ADENOMATOUS POLYP OF COLON, UNSPECIFIED PART OF COLON: Primary | ICD-10-CM

## 2023-08-09 DIAGNOSIS — E03.9 ACQUIRED HYPOTHYROIDISM: ICD-10-CM

## 2023-08-09 LAB
BASOPHILS # BLD: 0.1 K/UL (ref 0–0.2)
BASOPHILS NFR BLD: 0.9 %
EOSINOPHIL # BLD: 0.2 K/UL (ref 0–0.7)
EOSINOPHIL NFR BLD: 2.9 %
ERYTHROCYTE [DISTWIDTH] IN BLOOD BY AUTOMATED COUNT: 18.3 % (ref 11.5–14.5)
HCT VFR BLD AUTO: 39.8 % (ref 37–47)
HGB BLD-MCNC: 13.1 G/DL (ref 12–16)
LYMPHOCYTES # BLD: 2 K/UL (ref 1–4.8)
LYMPHOCYTES NFR BLD: 32.7 %
MCH RBC QN AUTO: 29.7 PG (ref 27–31.3)
MCHC RBC AUTO-ENTMCNC: 32.9 % (ref 33–37)
MCV RBC AUTO: 90.1 FL (ref 79.4–94.8)
MONOCYTES # BLD: 0.4 K/UL (ref 0.2–0.8)
MONOCYTES NFR BLD: 7.1 %
NEUTROPHILS # BLD: 3.4 K/UL (ref 1.4–6.5)
NEUTS SEG NFR BLD: 56.4 %
PLATELET # BLD AUTO: 173 K/UL (ref 130–400)
RBC # BLD AUTO: 4.42 M/UL (ref 4.2–5.4)
T4 FREE SERPL-MCNC: 0.92 NG/DL (ref 0.84–1.68)
TSH REFLEX: 63.73 UIU/ML (ref 0.44–3.86)
WBC # BLD AUTO: 6 K/UL (ref 4.8–10.8)

## 2023-08-09 PROCEDURE — 99213 OFFICE O/P EST LOW 20 MIN: CPT | Performed by: NURSE PRACTITIONER

## 2023-08-09 PROCEDURE — 1123F ACP DISCUSS/DSCN MKR DOCD: CPT | Performed by: NURSE PRACTITIONER

## 2023-08-09 ASSESSMENT — ENCOUNTER SYMPTOMS
BLOOD IN STOOL: 0
TROUBLE SWALLOWING: 0
VOICE CHANGE: 0
CONSTIPATION: 0
WHEEZING: 0
ABDOMINAL DISTENTION: 0
ABDOMINAL PAIN: 0
DIARRHEA: 0
VOMITING: 0
NAUSEA: 0
ANAL BLEEDING: 0
PHOTOPHOBIA: 0
CHEST TIGHTNESS: 0
EYE REDNESS: 0
SHORTNESS OF BREATH: 0
RECTAL PAIN: 0
EYE PAIN: 0
COLOR CHANGE: 0

## 2023-08-09 NOTE — PROGRESS NOTES
seconds. Coloration: Skin is not jaundiced. Findings: No erythema or rash. Neurological:      General: No focal deficit present. Mental Status: She is alert and oriented to person, place, and time.    Psychiatric:         Mood and Affect: Mood normal.         Behavior: Behavior normal.       Laboratory, Pathology, Radiology reviewed in detail with relevantimportant investigations summarized below:  Lab Results   Component Value Date/Time    WBC 6.0 08/09/2023 11:36 AM    WBC 5.1 04/06/2023 05:16 AM    WBC 5.5 04/05/2023 05:11 AM    WBC 3.9 04/04/2023 05:21 AM    WBC 4.6 04/03/2023 05:27 AM    HGB 13.1 08/09/2023 11:36 AM    HGB 9.2 04/06/2023 05:16 AM    HGB 10.0 04/05/2023 05:11 AM    HGB 9.7 04/04/2023 05:21 AM    HGB 10.1 04/03/2023 05:27 AM    HCT 39.8 08/09/2023 11:36 AM    HCT 28.0 04/06/2023 05:16 AM    HCT 30.9 04/05/2023 05:11 AM    HCT 29.8 04/04/2023 05:21 AM    HCT 31.0 04/03/2023 05:27 AM    MCV 90.1 08/09/2023 11:36 AM    MCV 87.7 04/06/2023 05:16 AM    MCV 88.0 04/05/2023 05:11 AM    MCV 88.6 04/04/2023 05:21 AM    MCV 90.3 04/03/2023 05:27 AM     08/09/2023 11:36 AM     04/06/2023 05:16 AM     04/05/2023 05:11 AM     04/04/2023 05:21 AM     04/03/2023 05:27 AM    .  Lab Results   Component Value Date/Time    ALT <5 04/06/2023 05:16 AM    ALT 7 04/05/2023 05:11 AM    ALT <5 04/04/2023 05:21 AM    AST 11 04/06/2023 05:16 AM    AST 15 04/05/2023 05:11 AM    AST 9 04/04/2023 05:21 AM    ALKPHOS 73 04/06/2023 05:16 AM    ALKPHOS 81 04/05/2023 05:11 AM    ALKPHOS 68 04/04/2023 05:21 AM    BILITOT <0.2 04/06/2023 05:16 AM    BILITOT <0.2 04/05/2023 05:11 AM    BILITOT <0.2 04/04/2023 05:21 AM       MRI LUMBAR SPINE WO CONTRAST    Result Date: 7/24/2023  EXAMINATION: MRI OF THE LUMBAR SPINE WITHOUT CONTRAST, 7/24/2023 7:54 am TECHNIQUE: Multiplanar multisequence MRI of the lumbar spine was performed without the administration of intravenous contrast.

## 2023-08-10 LAB
FOLATE: 12.6 NG/ML
IRON SATURATION: 34 % (ref 20–55)
IRON: 99 UG/DL (ref 37–145)
TOTAL IRON BINDING CAPACITY: 288 UG/DL (ref 250–450)
UNSATURATED IRON BINDING CAPACITY: 189 UG/DL (ref 112–347)
VITAMIN B-12: 566 PG/ML (ref 232–1245)

## 2023-08-14 NOTE — RESULT ENCOUNTER NOTE
Anemia resolved but thyroid lab is way off. Has she been taking her thyroid medication? On an empty stomach?

## 2023-08-17 DIAGNOSIS — E03.9 ACQUIRED HYPOTHYROIDISM: Primary | ICD-10-CM

## 2023-08-28 DIAGNOSIS — E03.9 ACQUIRED HYPOTHYROIDISM: ICD-10-CM

## 2023-08-28 LAB
T4 FREE SERPL-MCNC: 1.17 NG/DL (ref 0.84–1.68)
TSH REFLEX: 24.33 UIU/ML (ref 0.44–3.86)

## 2023-08-29 NOTE — TELEPHONE ENCOUNTER
Pt calling stating she has done her lab work and is asking to stay on the 75 mcg of the synthroid,  for one more month. Please advise. Pt phone number is [phone].

## 2023-08-30 RX ORDER — LEVOTHYROXINE SODIUM 0.07 MG/1
75 TABLET ORAL DAILY
Qty: 30 TABLET | Refills: 0 | Status: SHIPPED | OUTPATIENT
Start: 2023-08-30 | End: 2023-09-06

## 2023-09-06 DIAGNOSIS — D64.9 ANEMIA, UNSPECIFIED TYPE: ICD-10-CM

## 2023-09-06 DIAGNOSIS — E03.9 ACQUIRED HYPOTHYROIDISM: Primary | ICD-10-CM

## 2023-09-06 RX ORDER — LEVOTHYROXINE SODIUM 0.1 MG/1
100 TABLET ORAL DAILY
Qty: 30 TABLET | Refills: 1 | Status: SHIPPED | OUTPATIENT
Start: 2023-09-06

## 2023-09-14 RX ORDER — PAROXETINE 10 MG/1
10 TABLET, FILM COATED ORAL DAILY
Qty: 30 TABLET | Refills: 2 | Status: SHIPPED | OUTPATIENT
Start: 2023-09-14

## 2023-10-19 NOTE — PATIENT INSTRUCTIONS
Personalized Preventive Plan for Wilmar Burger - 8/25/2020  Medicare offers a range of preventive health benefits. Some of the tests and screenings are paid in full while other may be subject to a deductible, co-insurance, and/or copay. Some of these benefits include a comprehensive review of your medical history including lifestyle, illnesses that may run in your family, and various assessments and screenings as appropriate. After reviewing your medical record and screening and assessments performed today your provider may have ordered immunizations, labs, imaging, and/or referrals for you. A list of these orders (if applicable) as well as your Preventive Care list are included within your After Visit Summary for your review. Other Preventive Recommendations:    · A preventive eye exam performed by an eye specialist is recommended every 1-2 years to screen for glaucoma; cataracts, macular degeneration, and other eye disorders. · A preventive dental visit is recommended every 6 months. · Try to get at least 150 minutes of exercise per week or 10,000 steps per day on a pedometer . · Order or download the FREE \"Exercise & Physical Activity: Your Everyday Guide\" from [employer]. Call 1-278.415.6813 or search [employer] online. · You need 5492-2298 mg of calcium and 7108-7384 IU of vitamin D per day. It is possible to meet your calcium requirement with diet alone, but a vitamin D supplement is usually necessary to meet this goal.  · When exposed to the sun, use a sunscreen that protects against both UVA and UVB radiation with an SPF of 30 or greater. Reapply every 2 to 3 hours or after sweating, drying off with a towel, or swimming. · Always wear a seat belt when traveling in a car. Always wear a helmet when riding a bicycle or motorcycle. Heart-Healthy Diet   Sodium, Fat, and Cholesterol Controlled Diet       What Is a Heart Healthy Diet?    A heart-healthy diet is one that limits sodium , certain types of fat , and cholesterol . This type of diet is recommended for:   People with any form of cardiovascular disease (eg, coronary heart disease , peripheral vascular disease , previous heart attack , previous stroke )   People with risk factors for cardiovascular disease, such as high blood pressure , high cholesterol , or diabetes   Anyone who wants to lower their risk of developing cardiovascular disease   Sodium    Sodium is a mineral found in many foods. In general, most people consume much more sodium than they need. Diets high in sodium can increase blood pressure and lead to edema (water retention). On a heart-healthy diet, you should consume no more than 2,300 mg (milligrams) of sodium per dayabout the amount in one teaspoon of table salt. The foods highest in sodium include table salt (about 50% sodium), processed foods, convenience foods, and preserved foods. Cholesterol    Cholesterol is a fat-like, waxy substance in your blood. Our bodies make some cholesterol. It is also found in animal products, with the highest amounts in fatty meat, egg yolks, whole milk, cheese, shellfish, and organ meats. On a heart-healthy diet, you should limit your cholesterol intake to less than 200 mg per day. It is normal and important to have some cholesterol in your bloodstream. But too much cholesterol can cause plaque to build up within your arteries, which can eventually lead to a heart attack or stroke. The two types of cholesterol that are most commonly referred to are:   Low-density lipoprotein (LDL) cholesterol  Also known as bad cholesterol, this is the cholesterol that tends to build up along your arteries. Bad cholesterol levels are increased by eating fats that are saturated or hydrogenated. Optimal level of this cholesterol is less than 100. Over 130 starts to get risky for heart disease.    High-density lipoprotein (HDL) cholesterol  Also known as example, this would mean 60 grams of fat or less per day. Saturated fat and trans fat in your diet raises your blood cholesterol the most, much more than dietary cholesterol does. For this reason, on a heart-healthy diet, less than 7% of your calories should come from saturated fat and ideally 0% from trans fat. On an 1800-calorie diet, this translates into less than 14 grams of saturated fat per day, leaving 46 grams of fat to come from mono- and polyunsaturated fats.    Food Choices on a Heart Healthy Diet   Food Category   Foods Recommended   Foods to Avoid   Grains   Breads and rolls without salted tops Most dry and cooked cereals Unsalted crackers and breadsticks Low-sodium or homemade breadcrumbs or stuffing All rice and pastas   Breads, rolls, and crackers with salted tops High-fat baked goods (eg, muffins, donuts, pastries) Quick breads, self-rising flour, and biscuit mixes Regular bread crumbs Instant hot cereals Commercially prepared rice, pasta, or stuffing mixes   Vegetables   Most fresh, frozen, and low-sodium canned vegetables Low-sodium and salt-free vegetable juices Canned vegetables if unsalted or rinsed   Regular canned vegetables and juices, including sauerkraut and pickled vegetables Frozen vegetables with sauces Commercially prepared potato and vegetable mixes   Fruits   Most fresh, frozen, and canned fruits All fruit juices   Fruits processed with salt or sodium   Milk   Nonfat or low-fat (1%) milk Nonfat or low-fat yogurt Cottage cheese, low-fat ricotta, cheeses labeled as low-fat and low-sodium   Whole milk Reduced-fat (2%) milk Malted and chocolate milk Full fat yogurt Most cheeses (unless low-fat and low salt) Buttermilk (no more than 1 cup per week)   Meats and Beans   Lean cuts of fresh or frozen beef, veal, lamb, or pork (look for the word loin) Fresh or frozen poultry without the skin Fresh or frozen fish and some shellfish Egg whites and egg substitutes (Limit whole eggs to three per week) Tofu Nuts or seeds (unsalted, dry-roasted), low-sodium peanut butter Dried peas, beans, and lentils   Any smoked, cured, salted, or canned meat, fish, or poultry (including sharpe, chipped beef, cold cuts, hot dogs, sausages, sardines, and anchovies) Poultry skins Breaded and/or fried fish or meats Canned peas, beans, and lentils Salted nuts   Fats and Oils   Olive oil and canola oil Low-sodium, low-fat salad dressings and mayonnaise   Butter, margarine, coconut and palm oils, sharpe fat   Snacks, Sweets, and Condiments   Low-sodium or unsalted versions of broths, soups, soy sauce, and condiments Pepper, herbs, and spices; vinegar, lemon, or lime juice Low-fat frozen desserts (yogurt, sherbet, fruit bars) Sugar, cocoa powder, honey, syrup, jam, and preserves Low-fat, trans-fat free cookies, cakes, and pies Ramu and animal crackers, fig bars, travis snaps   High-fat desserts Broth, soups, gravies, and sauces, made from instant mixes or other high-sodium ingredients Salted snack foods Canned olives Meat tenderizers, seasoning salt, and most flavored vinegars   Beverages   Low-sodium carbonated beverages Tea and coffee in moderation Soy milk   Commercially softened water   Suggestions   Make whole grains, fruits, and vegetables the base of your diet. Choose heart-healthy fats such as canola, olive, and flaxseed oil, and foods high in heart-healthy fats, such as nuts, seeds, soybeans, tofu, and fish. Eat fish at least twice per week; the fish highest in omega-3 fatty acids and lowest in mercury include salmon, herring, mackerel, sardines, and canned chunk light tuna. If you eat fish less than twice per week or have high triglycerides, talk to your doctor about taking fish oil supplements. Read food labels.    For products low in fat and cholesterol, look for fat free, low-fat, cholesterol free, saturated fat free, and trans fat freeAlso scan the Nutrition Facts Label, which lists saturated fat, trans fat, and cholesterol amounts. For products low in sodium, look for sodium free, very low sodium, low sodium, no added salt, and unsalted   Skip the salt when cooking or at the table; if food needs more flavor, get creative and try out different herbs and spices. Garlic and onion also add substantial flavor to foods. Trim any visible fat off meat and poultry before cooking, and drain the fat off after nogueira. Use cooking methods that require little or no added fat, such as grilling, boiling, baking, poaching, broiling, roasting, steaming, stir-frying, and sauting. Avoid fast food and convenience food. They tend to be high in saturated and trans fat and have a lot of added salt. Talk to a registered dietitian for individualized diet advice. Last Reviewed: March 2011 Mayte Hunter MS, MPH, RD   Updated: 3/29/2011   ·     Heart-Healthy Diet   Sodium, Fat, and Cholesterol Controlled Diet       What Is a Heart Healthy Diet? A heart-healthy diet is one that limits sodium , certain types of fat , and cholesterol . This type of diet is recommended for:   People with any form of cardiovascular disease (eg, coronary heart disease , peripheral vascular disease , previous heart attack , previous stroke )   People with risk factors for cardiovascular disease, such as high blood pressure , high cholesterol , or diabetes   Anyone who wants to lower their risk of developing cardiovascular disease   Sodium    Sodium is a mineral found in many foods. In general, most people consume much more sodium than they need. Diets high in sodium can increase blood pressure and lead to edema (water retention). On a heart-healthy diet, you should consume no more than 2,300 mg (milligrams) of sodium per dayabout the amount in one teaspoon of table salt. The foods highest in sodium include table salt (about 50% sodium), processed foods, convenience foods, and preserved foods.    Cholesterol    Cholesterol is a fat-like, waxy substance in your blood. Our bodies make some cholesterol. It is also found in animal products, with the highest amounts in fatty meat, egg yolks, whole milk, cheese, shellfish, and organ meats. On a heart-healthy diet, you should limit your cholesterol intake to less than 200 mg per day. It is normal and important to have some cholesterol in your bloodstream. But too much cholesterol can cause plaque to build up within your arteries, which can eventually lead to a heart attack or stroke. The two types of cholesterol that are most commonly referred to are:   Low-density lipoprotein (LDL) cholesterol  Also known as bad cholesterol, this is the cholesterol that tends to build up along your arteries. Bad cholesterol levels are increased by eating fats that are saturated or hydrogenated. Optimal level of this cholesterol is less than 100. Over 130 starts to get risky for heart disease. High-density lipoprotein (HDL) cholesterol  Also known as good cholesterol, this type of cholesterol actually carries cholesterol away from your arteries and may, therefore, help lower your risk of having a heart attack. You want this level to be high (ideally greater than 60). It is a risk to have a level less than 40. You can raise this good cholesterol by eating olive oil, canola oil, avocados, or nuts. Exercise raises this level, too. Fat    Fat is calorie dense and packs a lot of calories into a small amount of food. Even though fats should be limited due to their high calorie content, not all fats are bad. In fact, some fats are quite healthful. Fat can be broken down into four main types.    The good-for-you fats are:   Monounsaturated fat  found in oils such as olive and canola, avocados, and nuts and natural nut butters; can decrease cholesterol levels, while keeping levels of HDL cholesterol high   Polyunsaturated fat  found in oils such as safflower, sunflower, soybean, corn, and sesame; can decrease total cholesterol and LDL cholesterol   Omega-3 fatty acids  particularly those found in fatty fish (such as salmon, trout, tuna, mackerel, herring, and sardines); can decrease risk of arrhythmias, decrease triglyceride levels, and slightly lower blood pressure   The fats that you want to limit are:   Saturated fat  found in animal products, many fast foods, and a few vegetables; increases total blood cholesterol, including LDL levels   Animal fats that are saturated include: butter, lard, whole-milk dairy products, meat fat, and poultry skin   Vegetable fats that are saturated include: hydrogenated shortening, palm oil, coconut oil, cocoa butter   Hydrogenated or trans fat  found in margarine and vegetable shortening, most shelf stable snack foods, and fried foods; increases LDL and decreases HDL     It is generally recommended that you limit your total fat for the day to less than 30% of your total calories. If you follow an 1800-calorie heart healthy diet, for example, this would mean 60 grams of fat or less per day. Saturated fat and trans fat in your diet raises your blood cholesterol the most, much more than dietary cholesterol does. For this reason, on a heart-healthy diet, less than 7% of your calories should come from saturated fat and ideally 0% from trans fat. On an 1800-calorie diet, this translates into less than 14 grams of saturated fat per day, leaving 46 grams of fat to come from mono- and polyunsaturated fats.    Food Choices on a Heart Healthy Diet   Food Category   Foods Recommended   Foods to Avoid   Grains   Breads and rolls without salted tops Most dry and cooked cereals Unsalted crackers and breadsticks Low-sodium or homemade breadcrumbs or stuffing All rice and pastas   Breads, rolls, and crackers with salted tops High-fat baked goods (eg, muffins, donuts, pastries) Quick breads, self-rising flour, and biscuit mixes Regular bread crumbs Instant hot cereals Commercially prepared rice, pasta, or stuffing mixes Vegetables   Most fresh, frozen, and low-sodium canned vegetables Low-sodium and salt-free vegetable juices Canned vegetables if unsalted or rinsed   Regular canned vegetables and juices, including sauerkraut and pickled vegetables Frozen vegetables with sauces Commercially prepared potato and vegetable mixes   Fruits   Most fresh, frozen, and canned fruits All fruit juices   Fruits processed with salt or sodium   Milk   Nonfat or low-fat (1%) milk Nonfat or low-fat yogurt Cottage cheese, low-fat ricotta, cheeses labeled as low-fat and low-sodium   Whole milk Reduced-fat (2%) milk Malted and chocolate milk Full fat yogurt Most cheeses (unless low-fat and low salt) Buttermilk (no more than 1 cup per week)   Meats and Beans   Lean cuts of fresh or frozen beef, veal, lamb, or pork (look for the word loin) Fresh or frozen poultry without the skin Fresh or frozen fish and some shellfish Egg whites and egg substitutes (Limit whole eggs to three per week) Tofu Nuts or seeds (unsalted, dry-roasted), low-sodium peanut butter Dried peas, beans, and lentils   Any smoked, cured, salted, or canned meat, fish, or poultry (including sharpe, chipped beef, cold cuts, hot dogs, sausages, sardines, and anchovies) Poultry skins Breaded and/or fried fish or meats Canned peas, beans, and lentils Salted nuts   Fats and Oils   Olive oil and canola oil Low-sodium, low-fat salad dressings and mayonnaise   Butter, margarine, coconut and palm oils, sharpe fat   Snacks, Sweets, and Condiments   Low-sodium or unsalted versions of broths, soups, soy sauce, and condiments Pepper, herbs, and spices; vinegar, lemon, or lime juice Low-fat frozen desserts (yogurt, sherbet, fruit bars) Sugar, cocoa powder, honey, syrup, jam, and preserves Low-fat, trans-fat free cookies, cakes, and pies Ramu and animal crackers, fig bars, travis snaps   High-fat desserts Broth, soups, gravies, and sauces, made from instant mixes or other high-sodium ingredients Salted snack foods Canned olives Meat tenderizers, seasoning salt, and most flavored vinegars   Beverages   Low-sodium carbonated beverages Tea and coffee in moderation Soy milk   Commercially softened water   Suggestions   Make whole grains, fruits, and vegetables the base of your diet. Choose heart-healthy fats such as canola, olive, and flaxseed oil, and foods high in heart-healthy fats, such as nuts, seeds, soybeans, tofu, and fish. Eat fish at least twice per week; the fish highest in omega-3 fatty acids and lowest in mercury include salmon, herring, mackerel, sardines, and canned chunk light tuna. If you eat fish less than twice per week or have high triglycerides, talk to your doctor about taking fish oil supplements. Read food labels. For products low in fat and cholesterol, look for fat free, low-fat, cholesterol free, saturated fat free, and trans fat freeAlso scan the Nutrition Facts Label, which lists saturated fat, trans fat, and cholesterol amounts. For products low in sodium, look for sodium free, very low sodium, low sodium, no added salt, and unsalted   Skip the salt when cooking or at the table; if food needs more flavor, get creative and try out different herbs and spices. Garlic and onion also add substantial flavor to foods. Trim any visible fat off meat and poultry before cooking, and drain the fat off after nogueira. Use cooking methods that require little or no added fat, such as grilling, boiling, baking, poaching, broiling, roasting, steaming, stir-frying, and sauting. Avoid fast food and convenience food. They tend to be high in saturated and trans fat and have a lot of added salt. Talk to a registered dietitian for individualized diet advice.       Last Reviewed: March 2011 Solitario Pemberton MS, MPH, RD   Updated: 3/29/2011   ·     Preventing Osteoporosis: After Your Visit  Your Care Instructions  Osteoporosis means the bones are weak and thin enough that they can break easily. The older you are, the more likely you are to get osteoporosis. But with plenty of calcium, vitamin D, and exercise, you can help prevent osteoporosis. The preteen and teen years are a key time for bone building. With the help of calcium, vitamin D, and exercise in those early years and beyond, the bones reach their peak density and strength by age 27. After age 27, your bones naturally start to thin and weaken. The stronger your bones are at around age 27, the lower your risk for osteoporosis. But no matter what your age and risk are, your bones still need calcium, vitamin D, and exercise to stay strong. Also avoid smoking, and limit alcohol. Smoking and heavy alcohol use can make your bones thinner. Talk to your doctor about any special risks you might have, such as having a close relative with osteoporosis or taking a medicine that can weaken bones. Your doctor can tell you the best ways to protect your bones from thinning. Follow-up care is a key part of your treatment and safety. Be sure to make and go to all appointments, and call your doctor if you are having problems. It's also a good idea to know your test results and keep a list of the medicines you take. How can you care for yourself at home? Get enough calcium and vitamin D. The [place] [employer] recommends adults younger than age 46 need 1,000 mg of calcium and 600 IU of vitamin D each day. Women ages 46 to 79 need 1,200 mg of calcium and 600 IU of vitamin D each day. Men ages 46 to 79 need 1,000 mg of calcium and 600 IU of vitamin D each day. Adults 71 and older need 1,200 mg of calcium and 800 IU of vitamin D each day. Eat foods rich in calcium, like yogurt, cheese, milk, and dark green vegetables. Eat foods rich in vitamin D, like eggs, fatty fish, cereal, and fortified milk. Get some sunshine. Your body uses sunshine to make its own vitamin D. The safest time to be out in the sun is before 10 a.m. or after 3 p.m.  Avoid getting sunburned. Sunburn can increase your risk of skin cancer. Talk to your doctor about taking a calcium plus vitamin D supplement. Ask about what type of calcium is right for you, and how much to take at a time. Adults ages 23 to 48 should not get more than 2,500 mg of calcium and 4,000 IU of vitamin D each day, whether it is from supplements and/or food. Adults ages 46 and older should not get more than 2,000 mg of calcium and 4,000 IU of vitamin D each day from supplements and/or food. Get regular bone-building exercise. Weight-bearing and resistance exercises keep bones healthy by working the muscles and bones against gravity. Start out at an exercise level that feels right for you. Add a little at a time until you can do the following:  Do 30 minutes of weight-bearing exercise on most days of the week. Walking, jogging, stair climbing, and dancing are good choices. Do resistance exercises with weights or elastic bands 2 to 3 days a week. Limit alcohol. Drink no more than 1 alcohol drink a day if you are a woman. Drink no more than 2 alcohol drinks a day if you are a man. Do not smoke. Smoking can make bones thin faster. If you need help quitting, talk to your doctor about stop-smoking programs and medicines. These can increase your chances of quitting for good. When should you call for help? Watch closely for changes in your health, and be sure to contact your doctor if:  You need help with a healthy eating plan. You need help with an exercise plan    © 9421-5903 [employer]. Care instructions adapted under license by [facility]. This care instruction is for use with your licensed healthcare professional. If you have questions about a medical condition or this instruction, always ask your healthcare professional. [place] [address] any warranty or liability for your use of this information. Content Version: 9.4.34584;  Last Revised: June 20, 2011              ·     High-Fiber Diet     What Is Fiber? Dietary fiber is a form of carbohydrate found in plants that cannot be digested by humans. All plants contain fiber, including fruits, vegetables, grains, and legumes. Fiber is often classified into two categories: soluble and insoluble. Soluble fiber draws water into the bowel and can help slow digestion. Examples of foods that are high in soluble fiber include oatmeal, oat bran, barley, legumes (eg, beans and peas), apples, and strawberries. Insoluble fiber speeds digestion and can add bulk to the stool. Examples of foods that are high in insoluble fiber include whole-wheat products, wheat bran, cauliflower, green beans, and potatoes. Why Follow a High-Fiber Diet? A high-fiber diet is often recommended to prevent and treat constipation , hemorrhoids , diverticulitis , and irritable bowel syndrome . Eating a high-fiber diet can also help improve your cholesterol levels, lower your risk of coronary heart disease , reduce your risk of type 2 diabetes , and lower your weight. For people with type 1 or 2 diabetes, a high-fiber diet can also help stabilize blood sugar levels. How Much Fiber Should I Eat? A high-fiber diet should contain  20-35 grams  of fiber a day. This is actually the amount recommended for the general adult population; however, most Americans eat only 15 grams of fiber per day. Digestion of Fiber   Eating a higher fiber diet than usual can take some getting used to by your body's digestive system. To avoid the side effects of sudden increases in dietary fiber (eg, gas, cramping, bloating, and diarrhea), increase fiber gradually and be sure to drink plenty of fluids every day. Tips for Increasing Fiber Intake   Whenever possible, choose whole grains over refined grains (eg, brown rice instead of white rice, whole-wheat bread instead of white bread).     Include a variety of grains in your diet, such as wheat, rye, barley, oats, quinoa, and bulgur. Eat more vegetarian-based meals. Here are some ideas: black bean burgers, eggplant lasagna, and veggie tofu stir-gee. Choose high-fiber snacks, such as fruits, popcorn, whole-grain crackers, and nuts. Make whole-grain cereal or whole-grain toast part of your daily breakfast regime. When eating out, whether ordering a sandwich or dinner, ask for extra vegetables. When baking, replace part of the white flour with whole-wheat flour. Whole-wheat flour is particularly easy to incorporate into a recipe. High-Fiber Diet Eating Guide   Food Category   Foods Recommended   Notes   Grains   Whole-grain breads, muffins, bagels, or elisa bread Rye bread Whole-wheat crackers or crisp breads Whole-grain or bran cereals Oatmeal, oat bran, or grits Wheat germ Whole-wheat pasta and brown rice   Read the ingredients list on food labels. Look for products that list \"whole\" as the first ingredient (eg, whole-wheat, whole oats). Choose cereals with at least 2 grams of fiber per serving. Vegetables   All vegetables, especially asparagus, bean sprouts, broccoli, Carmichael sprouts, cabbage, carrots, cauliflower, celery, corn, greens, green beans, green pepper, onions, peas, potatoes (with skin), snow peas, spinach, squash, sweet potatoes, tomatoes, zucchini   For maximum fiber intake, eat the peels of fruits and vegetablesjust be sure to wash them well first.   Fruits   All fruits, especially apples, berries, grapefruits, mangoes, nectarines, oranges, peaches, pears, dried fruits (figs, dates, prunes, raisins)   Choose raw fruits and vegetables over juice, cooked, or cannedraw fruit has more fiber. Dried fruit is also a good source of fiber. Milk   With the exception of yogurt containing inulin (a type of fiber), dairy foods provide little fiber. Add more fiber by topping your yogurt or cottage cheese with fresh fruit, whole grain or bran cereals, nuts, or seeds.    Meats and Beans   All beans need to remember every detail on your own. These memory aids can help:   Calendars and day planners   Electronic organizers to store all sorts of helpful informationThese devices can \"beep\" to remind you of appointments. A book of days to record birthdays, anniversaries, and other occasions that occur on the same date every year   Detailed \"to-do\" lists and strategically placed sticky notes   Quick \"study\" sessionsBefore a gathering, review who will be there so their names will be fresh in your mind. Establish routinesFor example, keep your keys, wallet, and umbrella in the same place all the time or take medicine with your 8:00 AM glass of juice   Live a Healthy Life   Many actions that will keep your body strong will do the same for your mind. For example:   Talk to Your Doctor About Herbs and Supplements    Malnutrition and vitamin deficiencies can impair your mental function. For example, vitamin B12 deficiency can cause a range of symptoms, including confusion. But, what if your nutritional needs are being met? Can herbs and supplements still offer a benefit? Researchers have investigated a range of natural remedies, such as ginkgo , ginseng , and the supplement phosphatidylserine (PS). So far, though, the evidence is inconsistent as to whether these products can improve memory or thinking. If you are interested in taking herbs and supplements, talk to your doctor first because they may interact with other medicines that you are taking. Exercise Regularly    Among the many benefits of regular exercise are increased blood flow to the brain and decreased risk of certain diseases that can interfere with memory function. One study found that even moderate exercise has a beneficial effect.  Examples of \"moderate\" exercise include:   Playing 18 holes of golf once a week, without a cart   Playing tennis twice a week   Walking one mile per day   Manage Stress    It can be tough to remember what is important when your mind is cluttered. Make time for relaxation. Choose activities that calm you down, and make it routine. Manage Chronic Conditions    Side effects of high blood pressure , diabetes, and heart disease can interfere with mental function. Many of the lifestyle steps discussed here can help manage these conditions. Strive to eat a healthy diet, exercise regularly, get stress under control, and follow your doctor's advice for your condition. Minimize Medications    Talk to your doctor about the medicines that you take. Some may be unnecessary. Also, healthy lifestyle habits may lower the need for certain drugs. Last Reviewed: April 2010 Rayshawn Gorman MD   Updated: 4/13/2010   ·     [address]       As we get older, changes in balance, gait, strength, vision, hearing, and cognition make even the most youthful senior more prone to accidents. Falls are one of the leading health risks for older people. This increased risk of falling is related to:   Aging process (eg, decreased muscle strength, slowed reflexes)   Higher incidence of chronic health problems (eg, arthritis, diabetes) that may limit mobility, agility or sensory awareness   Side effects of medicine (eg, dizziness, blurred vision)especially medicines like prescription pain medicines and drugs used to treat mental health conditions   Depending on the brittleness of your bones, the consequences of a fall can be serious and long lasting. Home Life   Research by the Association of Aging [facility]) shows that some home accidents among older adults can be prevented by making simple lifestyle changes and basic modifications and repairs to the home environment. Here are some lifestyle changes that experts recommend:   Have your hearing and vision checked regularly. Be sure to wear prescription glasses that are right for you. Speak to your doctor or pharmacist about the possible side effects of your medicines.  A number of medicines can cause dizziness. If you have problems with sleep, talk to your doctor. Limit your intake of alcohol. If necessary, use a cane or walker to help maintain your balance. Wear supportive, rubber-soled shoes, even at home. If you live in a region that gets wintry weather, you may want to put special cleats on your shoes to prevent you from slipping on the snow and ice. Exercise regularly to help maintain muscle tone, agility, and balance. Always hold the banister when going up or down stairs. Also, use  bars when getting in or out of the bath or shower, or using the toilet. To avoid dizziness, get up slowly from a lying down position. Sit up first, dangling your legs for a minute or two before rising to a standing position. Overall Home Safety Check   According to the Consumer Product Safety Commision's \"Older Consumer Home Safety Checklist,\" it is important to check for potential hazards in each room. And remember, proper lighting is an essential factor in home safety. If you cannot see clearly, you are more likely to fall. Important questions to ask yourself include:   Are lamp, electric, extension, and telephone cords placed out of the flow of traffic and maintained in good condition? Have frayed cords been replaced? Are all small rugs and runners slip resistant? If not, you can secure them to the floor with a special double-sided carpet tape. Are smoke detectors properly locatedone on every floor of your home and one outside of every sleeping area? Are they in good working order? Are batteries replaced at least once a year? Do you have a well-maintained carbon monoxide detector outside every sleeping are in your home? Does your furniture layout leave plenty of space to maneuver between and around chairs, tables, beds, and sofas? Are hallways, stairs and passages between rooms well lit? Can you reach a lamp without getting out of bed? Are floor surfaces well maintained?  Shag rugs, high-pile carpeting, tile floors, and polished wood floors can be particularly slippery. Stairs should always have handrails and be carpeted or fitted with a non-skid tread. Is your telephone easily reachable. Is the cord safely tucked away? Room by Room   According to the Association of Aging, bathrooms and hilary are the two most potentially hazardous rooms in your home. In the Kitchen    Be sure your stove is in proper working order and always make sure burners and the oven are off before you go out or go to sleep. Keep pots on the back burners, turn handles away from the front of the stove, and keep stove clean and free of grease build-up. Kitchen ventilation systems and range exhausts should be working properly. Keep flammable objects such as towels and pot holders away from the cooking area except when in use. Make sure kitchen curtains are tied back. Move cords and appliances away from the sink and hot surfaces. If extension cords are needed, install wiring guides so they do not hang over the sink, range, or working areas. Look for coffee pots, kettles and toaster ovens with automatic shut-offs. Keep a mop handy in the kitchen so you can wipe up spills instantly. You should also have a small fire extinguisher. Arrange your kitchen with frequently used items on lower shelves to avoid the need to stand on a stepstool to reach them. Make sure countertops are well-lit to avoid injuries while cutting and preparing food. In the Bathroom    Use a non-slip mat or decals in the tub and shower, since wet, soapy tile or porcelain surfaces are extremely slippery. Make sure bathroom rugs are non-skid or tape them firmly to the floor. Bathtubs should have at least one, preferably two, grab bars, firmly attached to structural supports in the wall.  (Do not use built-in soap holders or glass shower doors as grab bars.)    Tub seats fitted with non-slip material on the legs allow you to wash sitting Universal Safety Interventions

## 2023-12-01 ENCOUNTER — OFFICE VISIT (OUTPATIENT)
Dept: ORTHOPEDIC SURGERY | Facility: CLINIC | Age: 73
End: 2023-12-01
Payer: MEDICARE

## 2023-12-01 ENCOUNTER — ANCILLARY PROCEDURE (OUTPATIENT)
Dept: RADIOLOGY | Facility: CLINIC | Age: 73
End: 2023-12-01
Payer: MEDICARE

## 2023-12-01 DIAGNOSIS — M25.511 ACUTE PAIN OF RIGHT SHOULDER: ICD-10-CM

## 2023-12-01 PROCEDURE — 99213 OFFICE O/P EST LOW 20 MIN: CPT | Performed by: STUDENT IN AN ORGANIZED HEALTH CARE EDUCATION/TRAINING PROGRAM

## 2023-12-01 PROCEDURE — 73030 X-RAY EXAM OF SHOULDER: CPT | Mod: RT,FY

## 2023-12-01 PROCEDURE — 73030 X-RAY EXAM OF SHOULDER: CPT | Mod: RIGHT SIDE | Performed by: STUDENT IN AN ORGANIZED HEALTH CARE EDUCATION/TRAINING PROGRAM

## 2023-12-01 NOTE — PROGRESS NOTES
Chief Complaint   Patient presents with    Right Shoulder - Follow-up     1. Right shoulder: TSA- Rev arthroplasty   DOI- 2/7/23   DOS- 3/30/23 (8 months ,1 days)  X-Rays today        HPI  Patient presents today for follow up of right shoulder.  Patient well-known to me status post right reverse shoulder arthroplasty for fracture date of surgery 3/30/2023.  Patient doing very well no issues with her shoulder.  Very happy with her outcome.  Range of motion much improved in comparison to what it was prior      Physical exam  General: Alert and oriented to place, person, and time.  No acute distress and breathing comfortably; pleasant and cooperative with the examination.  Extremity:  Focused examination right shoulder: Active forward elevation 0 to 1 50 degrees.  Prior incision well-healed no surrounding erythema no active discharge.  Internal rotation to low lumbar spine.  Decreased in comparison to contralateral side.  Neurovascular intact.    Diagnostics:  XR shoulder right 2+ views    Result Date: 12/1/2023  Interpreted By:  Cheyanne Dueñas III, STUDY: XR SHOULDER RIGHT 2+ VIEWS; ;  12/1/2023 1:21 pm   INDICATION: Signs/Symptoms:PAIN.   COMPARISON: None.   ACCESSION NUMBER(S): [id number]   ORDERING CLINICIAN: CHEYANNE DUEÑAS   FINDINGS: Three views right shoulder: Status post reverse shoulder arthroplasty for fracture with revision type components. No signs of hardware failure or loosening no periprosthetic lucency cyst assess loosening. Well placed reverse shoulder arthroplasty       S/p reverse shoulder arthroplasty     MACRO: None   Signed by: Cheyanne Dueñas III 12/1/2023 1:32 PM Dictation workstation:   [id number]       Procedures  Procedures     Assessment:  73-year-old female status post right reverse shoulder arthroplasty for fracture    Treatment plan:  Doing very well  Follow-up as needed  Discussed activities to avoid  Very happy with progress  All of the patient's questions concerns answered

## 2024-01-05 DIAGNOSIS — D64.9 ANEMIA, UNSPECIFIED TYPE: ICD-10-CM

## 2024-01-05 DIAGNOSIS — E03.9 ACQUIRED HYPOTHYROIDISM: ICD-10-CM

## 2024-01-05 LAB
BASOPHILS # BLD: 0.1 K/UL (ref 0–0.2)
BASOPHILS NFR BLD: 1 %
EOSINOPHIL # BLD: 0.2 K/UL (ref 0–0.7)
EOSINOPHIL NFR BLD: 3.6 %
ERYTHROCYTE [DISTWIDTH] IN BLOOD BY AUTOMATED COUNT: 12.2 % (ref 11.5–14.5)
HCT VFR BLD AUTO: 43.2 % (ref 37–47)
HGB BLD-MCNC: 13.9 G/DL (ref 12–16)
LYMPHOCYTES # BLD: 1.6 K/UL (ref 1–4.8)
LYMPHOCYTES NFR BLD: 31.3 %
MCH RBC QN AUTO: 29.5 PG (ref 27–31.3)
MCHC RBC AUTO-ENTMCNC: 32.2 % (ref 33–37)
MCV RBC AUTO: 91.7 FL (ref 79.4–94.8)
MONOCYTES # BLD: 0.5 K/UL (ref 0.2–0.8)
MONOCYTES NFR BLD: 9.4 %
NEUTROPHILS # BLD: 2.8 K/UL (ref 1.4–6.5)
NEUTS SEG NFR BLD: 54.3 %
PLATELET # BLD AUTO: 175 K/UL (ref 130–400)
RBC # BLD AUTO: 4.71 M/UL (ref 4.2–5.4)
T4 FREE SERPL-MCNC: 1.8 NG/DL (ref 0.84–1.68)
TSH REFLEX: 4.38 UIU/ML (ref 0.44–3.86)
WBC # BLD AUTO: 5.2 K/UL (ref 4.8–10.8)

## 2024-01-08 NOTE — RESULT ENCOUNTER NOTE
Lab measurements in acceptable range.  No changes in dosing.  If patient needs refill pend refill for the next 6 months.

## 2024-01-09 ENCOUNTER — OFFICE VISIT (OUTPATIENT)
Dept: FAMILY MEDICINE CLINIC | Age: 74
End: 2024-01-09
Payer: MEDICARE

## 2024-01-09 VITALS
TEMPERATURE: 97.8 F | DIASTOLIC BLOOD PRESSURE: 78 MMHG | OXYGEN SATURATION: 98 % | BODY MASS INDEX: 28.63 KG/M2 | HEART RATE: 74 BPM | SYSTOLIC BLOOD PRESSURE: 128 MMHG | HEIGHT: 59 IN | WEIGHT: 142 LBS

## 2024-01-09 DIAGNOSIS — E53.8 B12 DEFICIENCY: ICD-10-CM

## 2024-01-09 DIAGNOSIS — F41.9 ANXIETY AND DEPRESSION: ICD-10-CM

## 2024-01-09 DIAGNOSIS — Z00.00 MEDICARE ANNUAL WELLNESS VISIT, SUBSEQUENT: Primary | ICD-10-CM

## 2024-01-09 DIAGNOSIS — E03.9 ACQUIRED HYPOTHYROIDISM: ICD-10-CM

## 2024-01-09 DIAGNOSIS — F32.A ANXIETY AND DEPRESSION: ICD-10-CM

## 2024-01-09 DIAGNOSIS — D64.9 ANEMIA, UNSPECIFIED TYPE: ICD-10-CM

## 2024-01-09 DIAGNOSIS — Z12.31 BREAST CANCER SCREENING BY MAMMOGRAM: ICD-10-CM

## 2024-01-09 PROCEDURE — 1123F ACP DISCUSS/DSCN MKR DOCD: CPT | Performed by: FAMILY MEDICINE

## 2024-01-09 PROCEDURE — 99497 ADVNCD CARE PLAN 30 MIN: CPT | Performed by: FAMILY MEDICINE

## 2024-01-09 PROCEDURE — 96372 THER/PROPH/DIAG INJ SC/IM: CPT | Performed by: FAMILY MEDICINE

## 2024-01-09 PROCEDURE — G0439 PPPS, SUBSEQ VISIT: HCPCS | Performed by: FAMILY MEDICINE

## 2024-01-09 RX ORDER — LEVOTHYROXINE SODIUM 0.1 MG/1
100 TABLET ORAL DAILY
Qty: 30 TABLET | Refills: 12 | Status: SHIPPED | OUTPATIENT
Start: 2024-01-09

## 2024-01-09 RX ORDER — PAROXETINE HYDROCHLORIDE 20 MG/1
20 TABLET, FILM COATED ORAL DAILY
Qty: 30 TABLET | Refills: 12 | Status: SHIPPED | OUTPATIENT
Start: 2024-01-09

## 2024-01-09 RX ORDER — CYANOCOBALAMIN 1000 UG/ML
1000 INJECTION, SOLUTION INTRAMUSCULAR; SUBCUTANEOUS ONCE
Status: COMPLETED | OUTPATIENT
Start: 2024-01-09 | End: 2024-01-09

## 2024-01-09 RX ADMIN — CYANOCOBALAMIN 1000 MCG: 1000 INJECTION, SOLUTION INTRAMUSCULAR; SUBCUTANEOUS at 12:37

## 2024-01-09 ASSESSMENT — PATIENT HEALTH QUESTIONNAIRE - PHQ9
7. TROUBLE CONCENTRATING ON THINGS, SUCH AS READING THE NEWSPAPER OR WATCHING TELEVISION: 0
SUM OF ALL RESPONSES TO PHQ QUESTIONS 1-9: 0
8. MOVING OR SPEAKING SO SLOWLY THAT OTHER PEOPLE COULD HAVE NOTICED. OR THE OPPOSITE, BEING SO FIGETY OR RESTLESS THAT YOU HAVE BEEN MOVING AROUND A LOT MORE THAN USUAL: 0
SUM OF ALL RESPONSES TO PHQ QUESTIONS 1-9: 0
5. POOR APPETITE OR OVEREATING: 0
SUM OF ALL RESPONSES TO PHQ9 QUESTIONS 1 & 2: 0
SUM OF ALL RESPONSES TO PHQ QUESTIONS 1-9: 0
4. FEELING TIRED OR HAVING LITTLE ENERGY: 0
SUM OF ALL RESPONSES TO PHQ QUESTIONS 1-9: 0
10. IF YOU CHECKED OFF ANY PROBLEMS, HOW DIFFICULT HAVE THESE PROBLEMS MADE IT FOR YOU TO DO YOUR WORK, TAKE CARE OF THINGS AT HOME, OR GET ALONG WITH OTHER PEOPLE: 0
6. FEELING BAD ABOUT YOURSELF - OR THAT YOU ARE A FAILURE OR HAVE LET YOURSELF OR YOUR FAMILY DOWN: 0
1. LITTLE INTEREST OR PLEASURE IN DOING THINGS: 0
9. THOUGHTS THAT YOU WOULD BE BETTER OFF DEAD, OR OF HURTING YOURSELF: 0
2. FEELING DOWN, DEPRESSED OR HOPELESS: 0
3. TROUBLE FALLING OR STAYING ASLEEP: 0

## 2024-01-09 ASSESSMENT — LIFESTYLE VARIABLES
HOW OFTEN DO YOU HAVE A DRINK CONTAINING ALCOHOL: NEVER
HOW MANY STANDARD DRINKS CONTAINING ALCOHOL DO YOU HAVE ON A TYPICAL DAY: PATIENT DOES NOT DRINK

## 2024-01-09 NOTE — PROGRESS NOTES
we documented Decision Maker(s) consistent with Legal Next of Kin hierarchy.    Care Preferences:    Hospitalization:  \"If your health worsens and it becomes clear that your chance of recovery is unlikely, what would be your preference regarding hospitalization?\"  The patient would prefer comfort-focused treatment without hospitalization.    Ventilation:  \"If you were unable to breath on your own and your chance of recovery was unlikely, what would be your preference about the use of a ventilator (breathing machine) if it was available to you?\"  The patient would NOT desire the use of a ventilator.    Resuscitation:  \"In the event your heart stopped as a result of an underlying serious health condition, would you want attempts made to restart your heart, or would you prefer a natural death?\"  No, do NOT attempt to resuscitate.    benefit/burden of treatment options    Conversation Outcomes / Follow-Up Plan:  ACP incomplete - refer to ACP Clinical Specialist  Reviewed DNR/DNI and patient elects DNR order - referred to ACP Clinical Specialist & placed order    Length of Voluntary ACP Conversation in minutes:  16 minutes    Brian Reyes MD                 Positive Risk Factor Screenings with Interventions:               General HRA Questions:  Select all that apply: (!) New or Increased Fatigue, Stress, Anger    Fatigue Interventions:  See above    Stress Interventions:  See above    Anger Interventions:  See above      Activity, Diet, and Weight:  On average, how many days per week do you engage in moderate to strenuous exercise (like a brisk walk)?: 7 days  On average, how many minutes do you engage in exercise at this level?: 30 min    Do you eat balanced/healthy meals regularly?: (!) No    Body mass index is 28.68 kg/m².    Do you eat balanced/healthy meals regularly Interventions:  Patient declines any further evaluation or treatment         Hearing Screen:  Do you or your family notice any trouble with your

## 2024-01-10 ENCOUNTER — CLINICAL DOCUMENTATION (OUTPATIENT)
Dept: SPIRITUAL SERVICES | Age: 74
End: 2024-01-10

## 2024-01-10 NOTE — ACP (ADVANCE CARE PLANNING)
Advance Care Planning   Ambulatory ACP Specialist Patient Outreach    Date:  1/10/2024    ACP Specialist:  Jolanta Frey    Outreach call to patient in follow-up to ACP Specialist referral from:Brian Reyes MD    [x] PCP  [] Provider   [] Ambulatory Care Management [] Other     For:                  [] Advance Directive Assistance              [x] Complete Portable DNR order              [] Complete POST/POLST/MOST              [] Code Status Discussion             [] Discuss Goals of Care             [] Early ACP Decision-Making              [] Other (Specify)    Date Referral Received: 1/9/2024    Next Step:   [] ACP scheduled conversation  [] Outreach again in one week               [] Email / Mail ACP Info Sheets  [] Email / Mail Advance Directive   [] Closing referral.  Routing closure to referring provider/staff and to ACP Specialist .    [] Closure letter mailed to patient with invitation to contact ACP Specialist if / when ready.   [x] Other (Specify here):  Referred to ACP Specialist to verify code status       [x] At this time, Healthcare Decision Maker Is:    Advance Care Planning   Healthcare Decision Maker:    Primary Decision Maker: Kodi Mcdowell - Child - [phone]      [] Primary agent named in scanned advance directive.    [x] Legal Next of Kin.     [] Unable to determine legal decision maker at this time.       Outreaches:       [x] 1st -  Date:  1/10/2024               Intervention:  [x] Spoke with Patient   [] Left Voice mail [] Email / Mail    [] [facility]  [] Other (Specify) :     Outcomes:  Writer attempted ACP outreach to the one number listed for both, patient's home and mobile - spoke to patient.  Patient declined assistance from an ACP Specialist to complete Living Will and Health Care Power of  documents reporting she previously completed these documents through a .  Patient to provide copies of ACP documents to physician's office for scanning into

## 2024-01-25 NOTE — TELEPHONE ENCOUNTER
Addended by: MARRY NAIK on: 1/24/2024 06:32 PM     Modules accepted: Orders     Returned call.  No answer/no vm

## 2024-01-26 ENCOUNTER — HOSPITAL ENCOUNTER (OUTPATIENT)
Dept: WOMENS IMAGING | Age: 74
End: 2024-01-26
Payer: MEDICARE

## 2024-01-26 DIAGNOSIS — Z12.31 BREAST CANCER SCREENING BY MAMMOGRAM: ICD-10-CM

## 2024-01-26 PROCEDURE — 77063 BREAST TOMOSYNTHESIS BI: CPT

## 2024-02-14 ENCOUNTER — PREP FOR PROCEDURE (OUTPATIENT)
Dept: GASTROENTEROLOGY | Age: 74
End: 2024-02-14

## 2024-02-14 DIAGNOSIS — Z86.010 HISTORY OF COLON POLYPS: ICD-10-CM

## 2024-02-14 DIAGNOSIS — K62.82 DYSPLASIA OF ANUS: ICD-10-CM

## 2024-02-14 PROBLEM — Z86.0100 HISTORY OF COLON POLYPS: Status: ACTIVE | Noted: 2024-02-14

## 2024-02-19 ENCOUNTER — CLINICAL DOCUMENTATION (OUTPATIENT)
Dept: SPIRITUAL SERVICES | Age: 74
End: 2024-02-19

## 2024-02-19 NOTE — ACP (ADVANCE CARE PLANNING)
Advance Care Planning     General Advance Care Planning (ACP) Conversation    Date of Conversation: 2/19/2024  Conducted with: Patient with Decision Making Capacity    Healthcare Decision Maker:    Primary Decision Maker: Kodi Mcdowell - [phone]  Click here to complete Healthcare Decision Makers including selection of the Healthcare Decision Maker Relationship (ie \"Primary\").  Today we documented Decision Maker(s) consistent with Legal Next of Kin hierarchy.    Content/Action Overview:  Has ACP document(s) NOT on file - requested patient to provide  Reviewed DNR/DNI and patient     Summary:  Placed call to pt to discuss ACP Specialist order for assistance with durable DNR form. Pt answered and was agreeable to discuss. Pt shared she has a DNR form and that she needs to get this into her record. Pt shared she completed this form last year (April 2023) when she was at SNF for therapy. Reviewed the DNR-CCA form that is currently on record and pt confirms that is indeed her wishes. This durable DNR is not dated and physician name is not printed; pt could not remember the name of this physician. Pt did wish to have updated DNR-CCA form completed with her PCP signature. Will mail pt this form and she will take this to Dr. Reyes office for completion. Pt has this SW contact and will reach out with any questions or concerns. Thanked pt for her time today. Referral is closed.            ANAIS Chandra

## 2024-02-26 RX ORDER — SODIUM CHLORIDE 0.9 % (FLUSH) 0.9 %
5-40 SYRINGE (ML) INJECTION EVERY 12 HOURS SCHEDULED
Status: CANCELLED | OUTPATIENT
Start: 2024-02-26

## 2024-02-26 RX ORDER — SODIUM CHLORIDE 9 MG/ML
INJECTION, SOLUTION INTRAVENOUS CONTINUOUS
Status: CANCELLED | OUTPATIENT
Start: 2024-02-26

## 2024-02-26 RX ORDER — SODIUM CHLORIDE 9 MG/ML
INJECTION, SOLUTION INTRAVENOUS PRN
Status: CANCELLED | OUTPATIENT
Start: 2024-02-26

## 2024-02-26 RX ORDER — SODIUM CHLORIDE 0.9 % (FLUSH) 0.9 %
5-40 SYRINGE (ML) INJECTION PRN
Status: CANCELLED | OUTPATIENT
Start: 2024-02-26

## 2024-03-25 ENCOUNTER — ANESTHESIA EVENT (OUTPATIENT)
Dept: ENDOSCOPY | Age: 74
End: 2024-03-25
Payer: MEDICARE

## 2024-03-26 ENCOUNTER — HOSPITAL ENCOUNTER (OUTPATIENT)
Age: 74
Setting detail: OUTPATIENT SURGERY
Discharge: HOME | End: 2024-03-26
Attending: INTERNAL MEDICINE | Admitting: INTERNAL MEDICINE
Payer: MEDICARE

## 2024-03-26 ENCOUNTER — ANESTHESIA (OUTPATIENT)
Dept: ENDOSCOPY | Age: 74
End: 2024-03-26
Payer: MEDICARE

## 2024-03-26 VITALS
TEMPERATURE: 98.8 F | DIASTOLIC BLOOD PRESSURE: 62 MMHG | BODY MASS INDEX: 27.01 KG/M2 | OXYGEN SATURATION: 95 % | HEART RATE: 66 BPM | SYSTOLIC BLOOD PRESSURE: 125 MMHG | HEIGHT: 59 IN | WEIGHT: 134 LBS | RESPIRATION RATE: 18 BRPM

## 2024-03-26 PROBLEM — K57.90 DIVERTICULOSIS: Status: ACTIVE | Noted: 2024-03-26

## 2024-03-26 PROCEDURE — 6360000002 HC RX W HCPCS

## 2024-03-26 PROCEDURE — G0105 COLORECTAL SCRN; HI RISK IND: HCPCS | Performed by: INTERNAL MEDICINE

## 2024-03-26 PROCEDURE — 2500000003 HC RX 250 WO HCPCS

## 2024-03-26 PROCEDURE — 7100000011 HC PHASE II RECOVERY - ADDTL 15 MIN: Performed by: INTERNAL MEDICINE

## 2024-03-26 PROCEDURE — 2580000003 HC RX 258: Performed by: INTERNAL MEDICINE

## 2024-03-26 PROCEDURE — 2709999900 HC NON-CHARGEABLE SUPPLY: Performed by: INTERNAL MEDICINE

## 2024-03-26 PROCEDURE — 7100000010 HC PHASE II RECOVERY - FIRST 15 MIN: Performed by: INTERNAL MEDICINE

## 2024-03-26 PROCEDURE — 3609027000 HC COLONOSCOPY: Performed by: INTERNAL MEDICINE

## 2024-03-26 PROCEDURE — 3700000001 HC ADD 15 MINUTES (ANESTHESIA): Performed by: INTERNAL MEDICINE

## 2024-03-26 PROCEDURE — 6370000000 HC RX 637 (ALT 250 FOR IP): Performed by: INTERNAL MEDICINE

## 2024-03-26 PROCEDURE — 3700000000 HC ANESTHESIA ATTENDED CARE: Performed by: INTERNAL MEDICINE

## 2024-03-26 RX ORDER — LIDOCAINE HYDROCHLORIDE 20 MG/ML
INJECTION, SOLUTION INFILTRATION; PERINEURAL PRN
Status: DISCONTINUED | OUTPATIENT
Start: 2024-03-26 | End: 2024-03-26 | Stop reason: SDUPTHER

## 2024-03-26 RX ORDER — LANOLIN ALCOHOL/MO/W.PET/CERES
1000 CREAM (GRAM) TOPICAL DAILY
COMMUNITY

## 2024-03-26 RX ORDER — PROPOFOL 10 MG/ML
INJECTION, EMULSION INTRAVENOUS PRN
Status: DISCONTINUED | OUTPATIENT
Start: 2024-03-26 | End: 2024-03-26 | Stop reason: SDUPTHER

## 2024-03-26 RX ORDER — ASPIRIN 81 MG/1
81 TABLET, CHEWABLE ORAL DAILY
COMMUNITY

## 2024-03-26 RX ORDER — SODIUM CHLORIDE 9 MG/ML
INJECTION, SOLUTION INTRAVENOUS CONTINUOUS
Status: DISCONTINUED | OUTPATIENT
Start: 2024-03-26 | End: 2024-03-26 | Stop reason: HOSPADM

## 2024-03-26 RX ORDER — SODIUM CHLORIDE 0.9 % (FLUSH) 0.9 %
5-40 SYRINGE (ML) INJECTION EVERY 12 HOURS SCHEDULED
Status: DISCONTINUED | OUTPATIENT
Start: 2024-03-26 | End: 2024-03-26 | Stop reason: HOSPADM

## 2024-03-26 RX ORDER — ACETAMINOPHEN 160 MG
2000 TABLET,DISINTEGRATING ORAL DAILY
COMMUNITY

## 2024-03-26 RX ORDER — SIMETHICONE 40MG/0.6ML
SUSPENSION, DROPS(FINAL DOSAGE FORM)(ML) ORAL PRN
Status: DISCONTINUED | OUTPATIENT
Start: 2024-03-26 | End: 2024-03-26 | Stop reason: ALTCHOICE

## 2024-03-26 RX ORDER — MECOBALAMIN 5000 MCG
5 TABLET,DISINTEGRATING ORAL NIGHTLY
COMMUNITY

## 2024-03-26 RX ORDER — SUMATRIPTAN 50 MG/1
50 TABLET, FILM COATED ORAL
COMMUNITY

## 2024-03-26 RX ORDER — SODIUM CHLORIDE 9 MG/ML
INJECTION, SOLUTION INTRAVENOUS PRN
Status: DISCONTINUED | OUTPATIENT
Start: 2024-03-26 | End: 2024-03-26 | Stop reason: HOSPADM

## 2024-03-26 RX ORDER — VITAMIN B COMPLEX
TABLET ORAL
COMMUNITY

## 2024-03-26 RX ORDER — SODIUM CHLORIDE 0.9 % (FLUSH) 0.9 %
5-40 SYRINGE (ML) INJECTION PRN
Status: DISCONTINUED | OUTPATIENT
Start: 2024-03-26 | End: 2024-03-26 | Stop reason: HOSPADM

## 2024-03-26 RX ORDER — MAGNESIUM HYDROXIDE 1200 MG/15ML
LIQUID ORAL PRN
Status: DISCONTINUED | OUTPATIENT
Start: 2024-03-26 | End: 2024-03-26 | Stop reason: ALTCHOICE

## 2024-03-26 RX ADMIN — PROPOFOL 20 MG: 10 INJECTION, EMULSION INTRAVENOUS at 11:02

## 2024-03-26 RX ADMIN — PROPOFOL 50 MG: 10 INJECTION, EMULSION INTRAVENOUS at 11:00

## 2024-03-26 RX ADMIN — PROPOFOL 100 MG: 10 INJECTION, EMULSION INTRAVENOUS at 10:51

## 2024-03-26 RX ADMIN — LIDOCAINE HYDROCHLORIDE 3 ML: 20 INJECTION, SOLUTION INFILTRATION; PERINEURAL at 10:51

## 2024-03-26 RX ADMIN — SODIUM CHLORIDE: 9 INJECTION, SOLUTION INTRAVENOUS at 10:43

## 2024-03-26 RX ADMIN — PROPOFOL 50 MG: 10 INJECTION, EMULSION INTRAVENOUS at 10:55

## 2024-03-26 ASSESSMENT — PAIN - FUNCTIONAL ASSESSMENT
PAIN_FUNCTIONAL_ASSESSMENT: 0-10
PAIN_FUNCTIONAL_ASSESSMENT: NONE - DENIES PAIN
PAIN_FUNCTIONAL_ASSESSMENT: NONE - DENIES PAIN

## 2024-03-26 NOTE — H&P
and at bedtime Indications: Disorder of the Blood Vessels of the Brain 6/27/23 1/9/24  Brian Reyes MD   omeprazole (PRILOSEC) 20 MG delayed release capsule Take 1 capsule by mouth daily Indications: Gastroesophageal Reflux Disease 6/27/23   Brian Reyes MD   cyanocobalamin 1000 MCG/ML injection Inject 1 mL into the muscle every 30 days 6/27/23   Brian Reyes MD   INS SYRINGE/NEEDLE 1CC/28G (AIMSCO INSULIN SYR MAXI-COMFRT) 28G X 1/2\" 1 ML MISC 1 application by Does not apply route every 30 days 6/27/23   Brian Reyes MD   ferumoxytol (FERAHEME) 510 MG/17ML SOLN Infuse 17 mLs intravenously once    Mandi Cabrera MD   atorvastatin (LIPITOR) 20 MG tablet Take 1 tablet by mouth daily    ProviderMandi MD   ondansetron (ZOFRAN) 4 MG tablet Take 1 tablet by mouth every 8 hours as needed for Nausea or Vomiting 3/24/23   Mandi Cabrera MD   methocarbamol (ROBAXIN) 750 MG tablet Take 1 tablet by mouth nightly Indications: Muscle Spasm Daily 2/22/23   Mandi Cabrera MD       Allergies:   Allergies   Allergen Reactions    Bactrim [Sulfamethoxazole-Trimethoprim]      hallucinations    Ciprofloxacin Other (See Comments)     Pt not 100% sure, but thinks cipro is the antibiotic she is allergic to  Anxiety, confusion        History of allergic reaction to anesthesia:  No    Past Medical History:  Past Medical History:   Diagnosis Date    Anxiety and depression 05/02/2019    Anxiety and depression     Chest pain 03/01/2019    Diverticulosis of colon (without mention of hemorrhage) 02/25/2015    DR BURNETTE    Focal lymphocytic colitis 01/27/2022    Gastroesophageal reflux disease without esophagitis     GI problem     Hyperlipidemia 02/03/2015    Hypothyroidism     Irritable bowel syndrome with both constipation and diarrhea 07/03/2023    Lichen sclerosus 10/2014    Migraines 02/03/2015    Renal insufficiency 02/03/2015    Small vessel disease, cerebrovascular 03/09/2020

## 2024-03-26 NOTE — ANESTHESIA POSTPROCEDURE EVALUATION
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    [place] Phase I: [place] Score: 10    [place] Phase II:      Anesthesia Post Evaluation    Patient location during evaluation: bedside  Patient participation: complete - patient participated  Level of consciousness: awake and awake and alert  Airway patency: patent  Nausea & Vomiting: no nausea and no vomiting  Cardiovascular status: blood pressure returned to baseline and hemodynamically stable  Respiratory status: acceptable  Hydration status: euvolemic  Pain management: adequate        No notable events documented.

## 2024-03-26 NOTE — ANESTHESIA PRE PROCEDURE
12/21/23 127/71   08/09/23 134/76       NPO Status:                                                                                 BMI:   Wt Readings from Last 3 Encounters:   01/09/24 64.4 kg (142 lb)   12/21/23 61.2 kg (135 lb)   08/09/23 58.1 kg (128 lb)     There is no height or weight on file to calculate BMI.    CBC:   Lab Results   Component Value Date/Time    WBC 5.2 01/05/2024 01:09 PM    RBC 4.71 01/05/2024 01:09 PM    HGB 13.9 01/05/2024 01:09 PM    HCT 43.2 01/05/2024 01:09 PM    MCV 91.7 01/05/2024 01:09 PM    RDW 12.2 01/05/2024 01:09 PM     01/05/2024 01:09 PM       CMP:   Lab Results   Component Value Date/Time     04/06/2023 05:16 AM    K 3.8 04/06/2023 05:16 AM     04/06/2023 05:16 AM    CO2 24 04/06/2023 05:16 AM    BUN 13 04/06/2023 05:16 AM    CREATININE 0.6 07/19/2023 01:06 PM    CREATININE 0.49 04/06/2023 05:16 AM    GFRAA >60.0 10/11/2022 02:34 PM    LABGLOM >60 07/19/2023 01:06 PM    GLUCOSE 101 04/06/2023 05:16 AM    PROT 5.1 04/06/2023 05:16 AM    CALCIUM 8.7 04/06/2023 05:16 AM    BILITOT <0.2 04/06/2023 05:16 AM    ALKPHOS 73 04/06/2023 05:16 AM    AST 11 04/06/2023 05:16 AM    ALT <5 04/06/2023 05:16 AM       POC Tests: No results for input(s): \"POCGLU\", \"POCNA\", \"POCK\", \"POCCL\", \"POCBUN\", \"POCHEMO\", \"POCHCT\" in the last 72 hours.    Coags:   Lab Results   Component Value Date/Time    PROTIME 13.1 03/23/2023 03:23 PM    INR 1.0 03/23/2023 03:23 PM    APTT 29.5 03/23/2023 03:23 PM       HCG (If Applicable): No results found for: \"PREGTESTUR\", \"PREGSERUM\", \"HCG\", \"HCGQUANT\"     ABGs: No results found for: \"PHART\", \"PO2ART\", \"GNM7ETF\", \"IRO6QUZ\", \"BEART\", \"H1JGBXTQ\"     Type & Screen (If Applicable):  No results found for: \"LABABO\", \"LABRH\"    Drug/Infectious Status (If Applicable):  No results found for: \"HIV\", \"HEPCAB\"    COVID-19 Screening (If Applicable):   Lab Results   Component Value Date/Time    COVID19 Not Detected 04/06/2023 10:49 AM           Anesthesia

## 2024-04-03 NOTE — TELEPHONE ENCOUNTER
Comments:      Last Office Visit (last PCP visit):   1/9/2024     Next Visit Date:    Future Appointments   Date Time Provider Department Center   12/17/2025  1:00 PM Rafael Louie MD [facility] -        **If hasn't been seen in over a year OR hasn't followed up according to last diabetes/ADHD visit, make appointment for patient before sending refill to provider.     Rx requested:    Requested Prescriptions      No prescriptions requested or ordered in this encounter

## 2024-04-04 RX ORDER — ATORVASTATIN CALCIUM 20 MG/1
20 TABLET, FILM COATED ORAL DAILY
Qty: 30 TABLET | Refills: 2 | Status: SHIPPED | OUTPATIENT
Start: 2024-04-04

## 2024-04-24 ENCOUNTER — NURSE ONLY (OUTPATIENT)
Dept: FAMILY MEDICINE CLINIC | Age: 74
End: 2024-04-24
Payer: MEDICARE

## 2024-04-24 ENCOUNTER — TELEPHONE (OUTPATIENT)
Dept: FAMILY MEDICINE CLINIC | Age: 74
End: 2024-04-24

## 2024-04-24 DIAGNOSIS — E53.8 B12 DEFICIENCY: Primary | ICD-10-CM

## 2024-04-24 PROCEDURE — 96372 THER/PROPH/DIAG INJ SC/IM: CPT | Performed by: FAMILY MEDICINE

## 2024-04-24 RX ORDER — CYANOCOBALAMIN 1000 UG/ML
1000 INJECTION, SOLUTION INTRAMUSCULAR; SUBCUTANEOUS ONCE
Status: COMPLETED | OUTPATIENT
Start: 2024-04-24 | End: 2024-04-24

## 2024-04-24 RX ADMIN — CYANOCOBALAMIN 1000 MCG: 1000 INJECTION, SOLUTION INTRAMUSCULAR; SUBCUTANEOUS at 13:23

## 2024-04-24 NOTE — TELEPHONE ENCOUNTER
Appointment.  May add in tomorrow between 11 AM and 3 PM as a double book.  I see no evidence of the appointment she mentioned or the previous x-ray she mentioned.  Need evaluation.

## 2024-04-24 NOTE — PROGRESS NOTES
After obtaining consent, and per orders of Dr. Donavon ASHFORD, injection of B12 given in Left upper quad. gluteus by Aleja Hernandez MA. Patient instructed to remain in clinic for 20 minutes afterwards, and to report any adverse reaction to me immediately.

## 2024-04-24 NOTE — TELEPHONE ENCOUNTER
Patient is asking for a x-ray order for her rt foot. To have it rechecked from an injury 1 week a ago

## 2024-04-25 NOTE — TELEPHONE ENCOUNTER
Pt states that its feeling better today and is able to walk on it. Will call back if she needs further assistance - declines xray and or appointment at this time.

## 2024-07-04 DIAGNOSIS — D64.9 ANEMIA, UNSPECIFIED: ICD-10-CM

## 2024-07-05 RX ORDER — SYRINGE WITH NEEDLE, 1 ML 25GX5/8"
SYRINGE, EMPTY DISPOSABLE MISCELLANEOUS
Qty: 1 EACH | Refills: 12 | Status: SHIPPED | OUTPATIENT
Start: 2024-07-05

## 2024-07-29 ENCOUNTER — NURSE ONLY (OUTPATIENT)
Dept: FAMILY MEDICINE CLINIC | Age: 74
End: 2024-07-29
Payer: MEDICARE

## 2024-07-29 DIAGNOSIS — E53.8 B12 DEFICIENCY: Primary | ICD-10-CM

## 2024-07-29 PROCEDURE — 96372 THER/PROPH/DIAG INJ SC/IM: CPT | Performed by: NURSE PRACTITIONER

## 2024-07-29 RX ORDER — CYANOCOBALAMIN 1000 UG/ML
1000 INJECTION, SOLUTION INTRAMUSCULAR; SUBCUTANEOUS ONCE
Status: COMPLETED | OUTPATIENT
Start: 2024-07-29 | End: 2024-07-29

## 2024-07-29 RX ADMIN — CYANOCOBALAMIN 1000 MCG: 1000 INJECTION, SOLUTION INTRAMUSCULAR; SUBCUTANEOUS at 10:45

## 2024-07-30 NOTE — TELEPHONE ENCOUNTER
Comments: Please review, thanks    Last Office Visit (last PCP visit):   1/9/2024    Next Visit Date:  Future Appointments   Date Time Provider Department Center   12/17/2025  1:00 PM Rafael Louie MD [place] [facility] -       **If hasn't been seen in over a year OR hasn't followed up according to last diabetes/ADHD visit, make appointment for patient before sending refill to provider.    Rx requested:  Requested Prescriptions     Pending Prescriptions Disp Refills    atorvastatin (LIPITOR) 20 MG tablet 30 tablet 2     Sig: Take 1 tablet by mouth daily

## 2024-07-31 DIAGNOSIS — E78.2 MIXED HYPERLIPIDEMIA: ICD-10-CM

## 2024-07-31 DIAGNOSIS — E03.9 HYPOTHYROIDISM, UNSPECIFIED TYPE: ICD-10-CM

## 2024-07-31 DIAGNOSIS — M81.0 OSTEOPOROSIS, UNSPECIFIED OSTEOPOROSIS TYPE, UNSPECIFIED PATHOLOGICAL FRACTURE PRESENCE: ICD-10-CM

## 2024-07-31 DIAGNOSIS — K29.70 GASTRITIS WITHOUT BLEEDING, UNSPECIFIED CHRONICITY, UNSPECIFIED GASTRITIS TYPE: Primary | ICD-10-CM

## 2024-07-31 DIAGNOSIS — K58.2 IRRITABLE BOWEL SYNDROME WITH BOTH CONSTIPATION AND DIARRHEA: ICD-10-CM

## 2024-07-31 DIAGNOSIS — R10.32 LLQ ABDOMINAL PAIN: ICD-10-CM

## 2024-07-31 DIAGNOSIS — E55.9 VITAMIN D DEFICIENCY: ICD-10-CM

## 2024-07-31 DIAGNOSIS — K66.0 INTRA-ABDOMINAL ADHESIONS: ICD-10-CM

## 2024-07-31 RX ORDER — ATORVASTATIN CALCIUM 20 MG/1
20 TABLET, FILM COATED ORAL DAILY
Qty: 30 TABLET | Refills: 0 | Status: SHIPPED | OUTPATIENT
Start: 2024-07-31

## 2024-07-31 RX ORDER — DICYCLOMINE HYDROCHLORIDE 10 MG/1
10 CAPSULE ORAL
Qty: 120 CAPSULE | Refills: 3 | OUTPATIENT
Start: 2024-07-31

## 2024-08-29 DIAGNOSIS — K58.2 IRRITABLE BOWEL SYNDROME WITH BOTH CONSTIPATION AND DIARRHEA: ICD-10-CM

## 2024-08-29 DIAGNOSIS — R10.32 LLQ ABDOMINAL PAIN: ICD-10-CM

## 2024-08-29 DIAGNOSIS — K66.0 INTRA-ABDOMINAL ADHESIONS: ICD-10-CM

## 2024-08-29 DIAGNOSIS — E55.9 VITAMIN D DEFICIENCY: ICD-10-CM

## 2024-08-29 DIAGNOSIS — E03.9 HYPOTHYROIDISM, UNSPECIFIED TYPE: ICD-10-CM

## 2024-08-29 DIAGNOSIS — M81.0 OSTEOPOROSIS, UNSPECIFIED OSTEOPOROSIS TYPE, UNSPECIFIED PATHOLOGICAL FRACTURE PRESENCE: ICD-10-CM

## 2024-08-29 DIAGNOSIS — K29.70 GASTRITIS WITHOUT BLEEDING, UNSPECIFIED CHRONICITY, UNSPECIFIED GASTRITIS TYPE: ICD-10-CM

## 2024-08-29 DIAGNOSIS — E78.2 MIXED HYPERLIPIDEMIA: ICD-10-CM

## 2024-08-29 LAB
ALBUMIN SERPL-MCNC: 4.1 G/DL (ref 3.5–4.6)
ALP SERPL-CCNC: 87 U/L (ref 40–130)
ALT SERPL-CCNC: 11 U/L (ref 0–33)
ANION GAP SERPL CALCULATED.3IONS-SCNC: 8 MEQ/L (ref 9–15)
AST SERPL-CCNC: 15 U/L (ref 0–35)
BASOPHILS # BLD: 0.1 K/UL (ref 0–0.2)
BASOPHILS NFR BLD: 1.1 %
BILIRUB SERPL-MCNC: 0.6 MG/DL (ref 0.2–0.7)
BUN SERPL-MCNC: 14 MG/DL (ref 8–23)
CALCIUM SERPL-MCNC: 9.2 MG/DL (ref 8.5–9.9)
CHLORIDE SERPL-SCNC: 107 MEQ/L (ref 95–107)
CHOLEST SERPL-MCNC: 127 MG/DL (ref 0–199)
CO2 SERPL-SCNC: 28 MEQ/L (ref 20–31)
CREAT SERPL-MCNC: 0.6 MG/DL (ref 0.5–0.9)
EOSINOPHIL # BLD: 0.2 K/UL (ref 0–0.7)
EOSINOPHIL NFR BLD: 4.6 %
ERYTHROCYTE [DISTWIDTH] IN BLOOD BY AUTOMATED COUNT: 12.7 % (ref 11.5–14.5)
GLOBULIN SER CALC-MCNC: 2.2 G/DL (ref 2.3–3.5)
GLUCOSE SERPL-MCNC: 90 MG/DL (ref 70–99)
HCT VFR BLD AUTO: 39.2 % (ref 37–47)
HDLC SERPL-MCNC: 57 MG/DL (ref 40–59)
HGB BLD-MCNC: 12.7 G/DL (ref 12–16)
LDL CHOLESTEROL: 53 MG/DL (ref 0–129)
LYMPHOCYTES # BLD: 1.4 K/UL (ref 1–4.8)
LYMPHOCYTES NFR BLD: 30.5 %
MCH RBC QN AUTO: 29.6 PG (ref 27–31.3)
MCHC RBC AUTO-ENTMCNC: 32.4 % (ref 33–37)
MCV RBC AUTO: 91.4 FL (ref 79.4–94.8)
MONOCYTES # BLD: 0.5 K/UL (ref 0.2–0.8)
MONOCYTES NFR BLD: 9.9 %
NEUTROPHILS # BLD: 2.4 K/UL (ref 1.4–6.5)
NEUTS SEG NFR BLD: 53.5 %
PLATELET # BLD AUTO: 163 K/UL (ref 130–400)
POTASSIUM SERPL-SCNC: 4.7 MEQ/L (ref 3.4–4.9)
PROT SERPL-MCNC: 6.3 G/DL (ref 6.3–8)
RBC # BLD AUTO: 4.29 M/UL (ref 4.2–5.4)
SODIUM SERPL-SCNC: 143 MEQ/L (ref 135–144)
TRIGLYCERIDE, FASTING: 83 MG/DL (ref 0–150)
TSH REFLEX: 2.64 UIU/ML (ref 0.44–3.86)
VITAMIN D 25-HYDROXY: 25.9 NG/ML (ref 30–100)
WBC # BLD AUTO: 4.5 K/UL (ref 4.8–10.8)

## 2024-08-29 NOTE — RESULT ENCOUNTER NOTE
Notify patient current lab values are stable.  Vitamin D lab is still not complete yet .no changes to be made in medical management at this time.

## 2024-08-30 RX ORDER — DICYCLOMINE HYDROCHLORIDE 10 MG/1
10 CAPSULE ORAL
Qty: 120 CAPSULE | Refills: 3 | OUTPATIENT
Start: 2024-08-30

## 2024-09-03 ENCOUNTER — TELEPHONE (OUTPATIENT)
Dept: FAMILY MEDICINE CLINIC | Age: 74
End: 2024-09-03

## 2024-09-03 NOTE — RESULT ENCOUNTER NOTE
Notify patient vitamin D is low.  She should start 5000 international units of vitamin D daily and we will recheck vitamin D level in 3 months.  Create order.

## 2024-09-09 DIAGNOSIS — D64.9 ANEMIA, UNSPECIFIED TYPE: ICD-10-CM

## 2024-09-10 NOTE — TELEPHONE ENCOUNTER
Comments: mailbox full, does pt need script or does she come here? Appt due, sending letter    Last Office Visit (last PCP visit):   1/9/2024    Next Visit Date:  Future Appointments   Date Time Provider Department Center   12/17/2025  1:00 PM Rafael Louie MD [place] -       **If hasn't been seen in over a year OR hasn't followed up according to last diabetes/ADHD visit, make appointment for patient before sending refill to provider.    Rx requested:  Requested Prescriptions     Pending Prescriptions Disp Refills    cyanocobalamin 1000 MCG/ML injection [Pharmacy Med Name: cyanocobalamin (vit B-12) 1,000 mcg/mL injection solution] 1 mL 12     Sig: Inject 1 mL into the muscle every 30 days

## 2024-09-16 RX ORDER — CYANOCOBALAMIN 1000 UG/ML
1000 INJECTION, SOLUTION INTRAMUSCULAR; SUBCUTANEOUS
Qty: 1 ML | Refills: 12 | OUTPATIENT
Start: 2024-09-16

## 2024-09-18 RX ORDER — ATORVASTATIN CALCIUM 20 MG/1
20 TABLET, FILM COATED ORAL DAILY
Qty: 30 TABLET | Refills: 0 | Status: SHIPPED | OUTPATIENT
Start: 2024-09-18

## 2024-09-24 ENCOUNTER — OFFICE VISIT (OUTPATIENT)
Dept: FAMILY MEDICINE CLINIC | Age: 74
End: 2024-09-24
Payer: MEDICARE

## 2024-09-24 VITALS
OXYGEN SATURATION: 96 % | WEIGHT: 147 LBS | DIASTOLIC BLOOD PRESSURE: 70 MMHG | HEIGHT: 59 IN | SYSTOLIC BLOOD PRESSURE: 130 MMHG | BODY MASS INDEX: 29.64 KG/M2 | HEART RATE: 66 BPM

## 2024-09-24 DIAGNOSIS — G43.719 INTRACTABLE CHRONIC MIGRAINE WITHOUT AURA AND WITHOUT STATUS MIGRAINOSUS: ICD-10-CM

## 2024-09-24 DIAGNOSIS — D64.9 ANEMIA, UNSPECIFIED TYPE: ICD-10-CM

## 2024-09-24 DIAGNOSIS — E03.9 HYPOTHYROIDISM, UNSPECIFIED TYPE: ICD-10-CM

## 2024-09-24 DIAGNOSIS — E78.2 MIXED HYPERLIPIDEMIA: ICD-10-CM

## 2024-09-24 DIAGNOSIS — K21.9 GASTROESOPHAGEAL REFLUX DISEASE WITHOUT ESOPHAGITIS: ICD-10-CM

## 2024-09-24 DIAGNOSIS — N90.4 LICHEN SCLEROSUS ET ATROPHICUS OF THE VULVA: Primary | ICD-10-CM

## 2024-09-24 DIAGNOSIS — K58.2 IRRITABLE BOWEL SYNDROME WITH BOTH CONSTIPATION AND DIARRHEA: ICD-10-CM

## 2024-09-24 DIAGNOSIS — E53.8 VITAMIN B12 DEFICIENCY: ICD-10-CM

## 2024-09-24 PROBLEM — Z86.0100 HX OF COLONIC POLYPS: Status: RESOLVED | Noted: 2023-04-27 | Resolved: 2024-09-24

## 2024-09-24 PROBLEM — K29.70 GASTRITIS WITHOUT BLEEDING: Status: RESOLVED | Noted: 2023-02-28 | Resolved: 2024-09-24

## 2024-09-24 PROBLEM — R10.32 LLQ ABDOMINAL PAIN: Status: RESOLVED | Noted: 2022-09-02 | Resolved: 2024-09-24

## 2024-09-24 PROBLEM — K22.10 ULCER OF ESOPHAGUS WITHOUT BLEEDING: Status: RESOLVED | Noted: 2023-02-28 | Resolved: 2024-09-24

## 2024-09-24 PROBLEM — Z86.010 HX OF COLONIC POLYPS: Status: RESOLVED | Noted: 2023-04-27 | Resolved: 2024-09-24

## 2024-09-24 PROBLEM — K44.9 HIATAL HERNIA: Status: RESOLVED | Noted: 2023-05-23 | Resolved: 2024-09-24

## 2024-09-24 PROBLEM — K63.5 POLYP OF COLON: Status: RESOLVED | Noted: 2023-02-28 | Resolved: 2024-09-24

## 2024-09-24 PROCEDURE — 1123F ACP DISCUSS/DSCN MKR DOCD: CPT | Performed by: FAMILY MEDICINE

## 2024-09-24 PROCEDURE — 99215 OFFICE O/P EST HI 40 MIN: CPT | Performed by: FAMILY MEDICINE

## 2024-09-24 RX ORDER — SUMATRIPTAN 50 MG/1
50 TABLET, FILM COATED ORAL AS NEEDED
Qty: 9 TABLET | Refills: 1 | Status: SHIPPED | OUTPATIENT
Start: 2024-09-24

## 2024-09-24 RX ORDER — ATORVASTATIN CALCIUM 20 MG/1
20 TABLET, FILM COATED ORAL DAILY
Qty: 30 TABLET | Refills: 12 | Status: SHIPPED | OUTPATIENT
Start: 2024-09-24

## 2024-09-24 RX ORDER — TACROLIMUS 1 MG/G
OINTMENT TOPICAL
Qty: 100 G | Refills: 1 | Status: SHIPPED | OUTPATIENT
Start: 2024-09-24

## 2024-09-25 RX ORDER — CYANOCOBALAMIN 1000 UG/ML
1000 INJECTION, SOLUTION INTRAMUSCULAR; SUBCUTANEOUS
Qty: 1 ML | Refills: 12 | Status: SHIPPED | OUTPATIENT
Start: 2024-09-25

## 2024-10-24 ENCOUNTER — OFFICE VISIT (OUTPATIENT)
Dept: FAMILY MEDICINE CLINIC | Age: 74
End: 2024-10-24

## 2024-10-24 VITALS — WEIGHT: 147 LBS | BODY MASS INDEX: 29.64 KG/M2 | HEIGHT: 59 IN | TEMPERATURE: 97.3 F

## 2024-10-24 DIAGNOSIS — N90.4 LICHEN SCLEROSUS ET ATROPHICUS OF THE VULVA: Primary | ICD-10-CM

## 2024-10-24 RX ORDER — CLOBETASOL PROPIONATE 0.5 MG/G
OINTMENT TOPICAL 2 TIMES DAILY
Qty: 60 G | Refills: 2 | Status: SHIPPED | OUTPATIENT
Start: 2024-10-24

## 2024-10-24 SDOH — ECONOMIC STABILITY: INCOME INSECURITY: HOW HARD IS IT FOR YOU TO PAY FOR THE VERY BASICS LIKE FOOD, HOUSING, MEDICAL CARE, AND HEATING?: NOT HARD AT ALL

## 2024-10-24 SDOH — ECONOMIC STABILITY: FOOD INSECURITY: WITHIN THE PAST 12 MONTHS, THE FOOD YOU BOUGHT JUST DIDN'T LAST AND YOU DIDN'T HAVE MONEY TO GET MORE.: NEVER TRUE

## 2024-10-24 SDOH — ECONOMIC STABILITY: FOOD INSECURITY: WITHIN THE PAST 12 MONTHS, YOU WORRIED THAT YOUR FOOD WOULD RUN OUT BEFORE YOU GOT MONEY TO BUY MORE.: NEVER TRUE

## 2024-10-24 NOTE — PATIENT INSTRUCTIONS
Due to the burning symptoms side effect from tacrolimus it will be discontinued and return to clobetasol therapy

## 2024-10-24 NOTE — PROGRESS NOTES
Diagnosis Orders   1. Lichen sclerosus et atrophicus of the vulva  clobetasol (TEMOVATE) 0.05 % ointment        Return in about 6 months (around 4/24/2025) for recheck LS.  Patient Instructions   Due to the burning symptoms side effect from tacrolimus it will be discontinued and return to clobetasol therapy    Subjective:      Patient ID: Ita Vallejo is a 74 y.o. female who presents for:  Chief Complaint   Patient presents with    Skin Exam     Recheck lichen sclerosus. Protopic was expensive and burns so bad that she can't stand it. She said the clobetasol was not as bad.     Other     Pt needs b12 labs. Not done since 8/2023       HPI  Burning with tacrolimus was intolerable.  Prefers to go back to clobetasol        Current Outpatient Medications on File Prior to Visit   Medication Sig Dispense Refill    cyanocobalamin 1000 MCG/ML injection Inject 1 mL into the muscle every 30 days 1 mL 12    Cyanocobalamin 1000 MCG/ML KIT Inject 1,000 mcg as directed every 30 days 1 kit 11    atorvastatin (LIPITOR) 20 MG tablet Take 1 tablet by mouth daily 30 tablet 12    SUMAtriptan (IMITREX) 50 MG tablet Take 1 tablet by mouth as needed for Migraine 9 tablet 1    B-D 3CC LUER-CRISTINO SYR 25GX1\" 25G X 1\" 3 ML MISC USE AS DIRECTED 1 each 12    aspirin 81 MG chewable tablet Take 1 tablet by mouth daily      melatonin 5 MG TBDP disintegrating tablet Take 1 tablet by mouth nightly      vitamin D (VITAMIN D3) 50 MCG (2000 UT) CAPS capsule Take 1 capsule by mouth daily      Coenzyme Q10 (COQ10) 100 MG CAPS Take by mouth      PARoxetine (PAXIL) 20 MG tablet Take 1 tablet by mouth daily 30 tablet 12    levothyroxine (SYNTHROID) 100 MCG tablet Take 1 tablet by mouth daily 30 tablet 12    lidocaine (LMX) 4 % cream Apply a half dollar sized amount to intact skin topically up to twice daily as needed for pain 120 g 1    dicyclomine (BENTYL) 20 MG tablet Take 1 tablet by mouth 4 times daily 120 tablet 0    omeprazole (PRILOSEC) 20 MG

## 2024-10-30 DIAGNOSIS — K66.0 INTRA-ABDOMINAL ADHESIONS: ICD-10-CM

## 2024-10-30 DIAGNOSIS — R10.32 LLQ ABDOMINAL PAIN: ICD-10-CM

## 2024-10-30 RX ORDER — ALBUTEROL SULFATE 90 UG/1
2 INHALANT RESPIRATORY (INHALATION) EVERY 6 HOURS PRN
Qty: 1 EACH | Refills: 3 | Status: SHIPPED | OUTPATIENT
Start: 2024-10-30

## 2024-10-30 NOTE — TELEPHONE ENCOUNTER
Comments:     Last Office Visit (last PCP visit):   10/24/2024    Next Visit Date:  Future Appointments   Date Time Provider Department Center   4/24/2025  2:15 PM Brian Reyes MD [facility]   12/17/2025  1:00 PM Rafael Louie MD [place] [facility] -       **If hasn't been seen in over a year OR hasn't followed up according to last diabetes/ADHD visit, make appointment for patient before sending refill to provider.    Rx requested:  Requested Prescriptions     Pending Prescriptions Disp Refills    albuterol sulfate HFA (PROVENTIL;VENTOLIN;PROAIR) 108 (90 Base) MCG/ACT inhaler 1 each 3     Sig: Inhale 2 puffs into the lungs every 6 hours as needed for Wheezing

## 2024-10-31 RX ORDER — DICYCLOMINE HYDROCHLORIDE 10 MG/1
10 CAPSULE ORAL
Qty: 120 CAPSULE | Refills: 3 | Status: SHIPPED | OUTPATIENT
Start: 2024-10-31

## 2024-12-30 ENCOUNTER — OFFICE VISIT (OUTPATIENT)
Dept: FAMILY MEDICINE CLINIC | Age: 74
End: 2024-12-30
Payer: MEDICARE

## 2024-12-30 VITALS
BODY MASS INDEX: 29.69 KG/M2 | OXYGEN SATURATION: 94 % | HEIGHT: 59 IN | TEMPERATURE: 97.4 F | DIASTOLIC BLOOD PRESSURE: 72 MMHG | SYSTOLIC BLOOD PRESSURE: 120 MMHG | HEART RATE: 63 BPM

## 2024-12-30 DIAGNOSIS — J20.9 ACUTE BRONCHITIS, UNSPECIFIED ORGANISM: ICD-10-CM

## 2024-12-30 DIAGNOSIS — J02.9 SORE THROAT: Primary | ICD-10-CM

## 2024-12-30 DIAGNOSIS — R50.81 FEVER IN OTHER DISEASES: ICD-10-CM

## 2024-12-30 LAB
INFLUENZA A ANTIBODY: NORMAL
INFLUENZA B ANTIBODY: NORMAL
Lab: NORMAL
PERFORMING INSTRUMENT: NORMAL
QC PASS/FAIL: NORMAL
S PYO AG THROAT QL: NORMAL
SARS-COV-2, POC: NORMAL

## 2024-12-30 PROCEDURE — 87804 INFLUENZA ASSAY W/OPTIC: CPT | Performed by: PHYSICIAN ASSISTANT

## 2024-12-30 PROCEDURE — 1123F ACP DISCUSS/DSCN MKR DOCD: CPT | Performed by: PHYSICIAN ASSISTANT

## 2024-12-30 PROCEDURE — 87880 STREP A ASSAY W/OPTIC: CPT | Performed by: PHYSICIAN ASSISTANT

## 2024-12-30 PROCEDURE — 1159F MED LIST DOCD IN RCRD: CPT | Performed by: PHYSICIAN ASSISTANT

## 2024-12-30 PROCEDURE — 87426 SARSCOV CORONAVIRUS AG IA: CPT | Performed by: PHYSICIAN ASSISTANT

## 2024-12-30 PROCEDURE — 99213 OFFICE O/P EST LOW 20 MIN: CPT | Performed by: PHYSICIAN ASSISTANT

## 2024-12-30 PROCEDURE — 1160F RVW MEDS BY RX/DR IN RCRD: CPT | Performed by: PHYSICIAN ASSISTANT

## 2024-12-30 RX ORDER — BENZONATATE 200 MG/1
200 CAPSULE ORAL 3 TIMES DAILY
Qty: 21 CAPSULE | Refills: 0 | Status: SHIPPED | OUTPATIENT
Start: 2024-12-30 | End: 2025-01-06

## 2024-12-30 RX ORDER — AZITHROMYCIN 250 MG/1
TABLET, FILM COATED ORAL
Qty: 6 TABLET | Refills: 0 | Status: SHIPPED | OUTPATIENT
Start: 2024-12-30 | End: 2025-01-09

## 2024-12-30 RX ORDER — DEXAMETHASONE 6 MG/1
6 TABLET ORAL
Qty: 7 TABLET | Refills: 0 | Status: SHIPPED | OUTPATIENT
Start: 2024-12-30 | End: 2025-01-06

## 2024-12-30 ASSESSMENT — ENCOUNTER SYMPTOMS
COUGH: 1
GASTROINTESTINAL NEGATIVE: 1
EYES NEGATIVE: 1
SORE THROAT: 1

## 2024-12-30 NOTE — PROGRESS NOTES
[facility] [place] [facility], [facility]  [address], [address]  [facility] [address]  Dept: [phone]  Loc: [phone]     Pt Name: Ita Vallejo  MRN: [id number]  Birthdate 1950      HISTORY OF PRESENT ILLNESS    Ita Vallejo is a 74 y.o. female who presents to the [facility] with chief complaint of cough and congestion. She says her symptoms started Sunday, 12/22. She says she has been ill for 8 days and it's progressing. She tried eat yesterday and had an episode of vomiting. She has been drinking gatorade and water for hydration. Her  was also ill with the same symptoms, but he got better.  She now has a productive cough and just overall feels very unwell.        REVIEW OF SYSTEMS       Review of Systems   Constitutional:  Positive for fever.        Has resolved     HENT:  Positive for congestion and sore throat.         Ear fullness     Eyes: Negative.    Respiratory:  Positive for cough.    Cardiovascular: Negative.    Gastrointestinal: Negative.    Endocrine: Negative.    Genitourinary: Negative.    Musculoskeletal: Negative.    Skin: Negative.    Neurological: Negative.    Psychiatric/Behavioral: Negative.           PAST MEDICAL HISTORY     Past Medical History:   Diagnosis Date    Anxiety and depression 05/02/2019    Anxiety and depression     Chest pain 03/01/2019    Diverticulosis of colon (without mention of hemorrhage) 02/25/2015    DR BURNETTE    Focal lymphocytic colitis 01/27/2022    Gastroesophageal reflux disease without esophagitis     GI problem     Hyperlipidemia 02/03/2015    Hypothyroidism     Irritable bowel syndrome with both constipation and diarrhea 07/03/2023    Lichen sclerosus 10/2014    Migraines 02/03/2015    Renal insufficiency 02/03/2015    Small vessel disease, cerebrovascular 03/09/2020    Spondylosis of lumbar region without myelopathy or radiculopathy 05/24/2016         SURGICAL HISTORY       Past

## 2024-12-30 NOTE — PATIENT INSTRUCTIONS
Green tea with honey for anti-inflammatory support  Rest and plenty of fluids for hydration to include water  Vitamin C rich foods for immune support  Close follow-up with primary care provider for reevaluation and treatment

## 2025-01-03 DIAGNOSIS — K66.0 INTRA-ABDOMINAL ADHESIONS: ICD-10-CM

## 2025-01-03 DIAGNOSIS — R10.32 LLQ ABDOMINAL PAIN: ICD-10-CM

## 2025-01-06 RX ORDER — DICYCLOMINE HYDROCHLORIDE 10 MG/1
10 CAPSULE ORAL
Qty: 120 CAPSULE | Refills: 3 | Status: SHIPPED | OUTPATIENT
Start: 2025-01-06

## 2025-01-14 DIAGNOSIS — E03.9 ACQUIRED HYPOTHYROIDISM: ICD-10-CM

## 2025-01-14 DIAGNOSIS — F41.9 ANXIETY AND DEPRESSION: ICD-10-CM

## 2025-01-14 DIAGNOSIS — F32.A ANXIETY AND DEPRESSION: ICD-10-CM

## 2025-01-14 NOTE — TELEPHONE ENCOUNTER
Comments:     Last Office Visit (last PCP visit):   10/24/2024    Next Visit Date:  Future Appointments   Date Time Provider Department Center   4/24/2025  2:15 PM Brian Reyes MD [facility]   12/17/2025  1:00 PM Rafael Louie MD [place] [facility] -       **If hasn't been seen in over a year OR hasn't followed up according to last diabetes/ADHD visit, make appointment for patient before sending refill to provider.    Rx requested:  Requested Prescriptions     Pending Prescriptions Disp Refills    levothyroxine (SYNTHROID) 100 MCG tablet [Pharmacy Med Name: levothyroxine 100 mcg tablet] 30 tablet 12     Sig: Take 1 tablet by mouth daily    PARoxetine (PAXIL) 20 MG tablet [Pharmacy Med Name: paroxetine 20 mg tablet] 30 tablet 12     Sig: Take 1 tablet by mouth daily

## 2025-01-15 DIAGNOSIS — E55.9 VITAMIN D DEFICIENCY: ICD-10-CM

## 2025-01-15 DIAGNOSIS — E03.9 HYPOTHYROIDISM, UNSPECIFIED TYPE: Primary | ICD-10-CM

## 2025-01-15 RX ORDER — LEVOTHYROXINE SODIUM 100 UG/1
100 TABLET ORAL DAILY
Qty: 30 TABLET | Refills: 0 | Status: SHIPPED | OUTPATIENT
Start: 2025-01-15

## 2025-01-15 RX ORDER — PAROXETINE 20 MG/1
20 TABLET, FILM COATED ORAL DAILY
Qty: 30 TABLET | Refills: 12 | Status: SHIPPED | OUTPATIENT
Start: 2025-01-15

## 2025-01-15 NOTE — TELEPHONE ENCOUNTER
Look a little further in the chart.  I have been getting communication from specialty nursing regarding this patient and they have been communicating with her so there must be an active number

## 2025-02-04 DIAGNOSIS — E03.9 ACQUIRED HYPOTHYROIDISM: ICD-10-CM

## 2025-02-04 NOTE — TELEPHONE ENCOUNTER
Comments:     Last Office Visit (last PCP visit):   10/24/2024    Next Visit Date:  Future Appointments   Date Time Provider Department Center   4/24/2025  2:15 PM Brian Reyes MD [facility]   12/17/2025  1:00 PM Rafael Louie MD [place] [facility] -       **If hasn't been seen in over a year OR hasn't followed up according to last diabetes/ADHD visit, make appointment for patient before sending refill to provider.    Rx requested:  Requested Prescriptions     Pending Prescriptions Disp Refills    levothyroxine (SYNTHROID) 100 MCG tablet [Pharmacy Med Name: levothyroxine 100 mcg tablet] 30 tablet 0     Sig: Take 1 tablet by mouth daily

## 2025-02-05 DIAGNOSIS — E03.9 HYPOTHYROIDISM, UNSPECIFIED TYPE: ICD-10-CM

## 2025-02-05 DIAGNOSIS — E55.9 VITAMIN D DEFICIENCY: ICD-10-CM

## 2025-02-05 LAB — TSH REFLEX: 0.7 UIU/ML (ref 0.44–3.86)

## 2025-02-05 RX ORDER — LEVOTHYROXINE SODIUM 100 UG/1
100 TABLET ORAL DAILY
Qty: 30 TABLET | Refills: 0 | Status: SHIPPED | OUTPATIENT
Start: 2025-02-05

## 2025-02-06 LAB — VITAMIN D 25-HYDROXY: 31.3 NG/ML (ref 30–100)

## 2025-02-27 ENCOUNTER — TELEPHONE (OUTPATIENT)
Dept: FAMILY MEDICINE CLINIC | Age: 75
End: 2025-02-27

## 2025-03-03 DIAGNOSIS — E03.9 ACQUIRED HYPOTHYROIDISM: ICD-10-CM

## 2025-03-03 RX ORDER — LEVOTHYROXINE SODIUM 100 UG/1
100 TABLET ORAL DAILY
Qty: 30 TABLET | Refills: 11 | Status: SHIPPED | OUTPATIENT
Start: 2025-03-03

## 2025-03-03 NOTE — TELEPHONE ENCOUNTER
Comments:     Last Office Visit (last PCP visit):   10/24/2024    Next Visit Date:  Future Appointments   Date Time Provider Department Center   4/24/2025  2:15 PM Brian Reyes MD [facility]   12/17/2025  1:00 PM Rafael Louie MD [place] [facility] -       **If hasn't been seen in over a year OR hasn't followed up according to last diabetes/ADHD visit, make appointment for patient before sending refill to provider.    Rx requested:  Requested Prescriptions     Pending Prescriptions Disp Refills    levothyroxine (SYNTHROID) 100 MCG tablet [Pharmacy Med Name: levothyroxine 100 mcg tablet] 30 tablet 0     Sig: Take 1 tablet by mouth daily

## 2025-05-07 ENCOUNTER — OFFICE VISIT (OUTPATIENT)
Dept: FAMILY MEDICINE CLINIC | Age: 75
End: 2025-05-07
Payer: MEDICARE

## 2025-05-07 VITALS
OXYGEN SATURATION: 97 % | HEIGHT: 59 IN | WEIGHT: 148 LBS | TEMPERATURE: 97.7 F | BODY MASS INDEX: 29.84 KG/M2 | HEART RATE: 74 BPM

## 2025-05-07 DIAGNOSIS — Z78.0 POSTMENOPAUSAL: Primary | ICD-10-CM

## 2025-05-07 DIAGNOSIS — Z00.00 MEDICARE ANNUAL WELLNESS VISIT, SUBSEQUENT: ICD-10-CM

## 2025-05-07 PROCEDURE — 1126F AMNT PAIN NOTED NONE PRSNT: CPT | Performed by: FAMILY MEDICINE

## 2025-05-07 PROCEDURE — G0439 PPPS, SUBSEQ VISIT: HCPCS | Performed by: FAMILY MEDICINE

## 2025-05-07 PROCEDURE — 1159F MED LIST DOCD IN RCRD: CPT | Performed by: FAMILY MEDICINE

## 2025-05-07 PROCEDURE — 1123F ACP DISCUSS/DSCN MKR DOCD: CPT | Performed by: FAMILY MEDICINE

## 2025-05-07 SDOH — ECONOMIC STABILITY: FOOD INSECURITY: WITHIN THE PAST 12 MONTHS, YOU WORRIED THAT YOUR FOOD WOULD RUN OUT BEFORE YOU GOT MONEY TO BUY MORE.: NEVER TRUE

## 2025-05-07 SDOH — ECONOMIC STABILITY: FOOD INSECURITY: WITHIN THE PAST 12 MONTHS, THE FOOD YOU BOUGHT JUST DIDN'T LAST AND YOU DIDN'T HAVE MONEY TO GET MORE.: NEVER TRUE

## 2025-05-07 ASSESSMENT — PATIENT HEALTH QUESTIONNAIRE - PHQ9
SUM OF ALL RESPONSES TO PHQ QUESTIONS 1-9: 2
SUM OF ALL RESPONSES TO PHQ QUESTIONS 1-9: 2
3. TROUBLE FALLING OR STAYING ASLEEP: NOT AT ALL
SUM OF ALL RESPONSES TO PHQ QUESTIONS 1-9: 2
1. LITTLE INTEREST OR PLEASURE IN DOING THINGS: NOT AT ALL
4. FEELING TIRED OR HAVING LITTLE ENERGY: MORE THAN HALF THE DAYS
10. IF YOU CHECKED OFF ANY PROBLEMS, HOW DIFFICULT HAVE THESE PROBLEMS MADE IT FOR YOU TO DO YOUR WORK, TAKE CARE OF THINGS AT HOME, OR GET ALONG WITH OTHER PEOPLE: SOMEWHAT DIFFICULT
6. FEELING BAD ABOUT YOURSELF - OR THAT YOU ARE A FAILURE OR HAVE LET YOURSELF OR YOUR FAMILY DOWN: NOT AT ALL
8. MOVING OR SPEAKING SO SLOWLY THAT OTHER PEOPLE COULD HAVE NOTICED. OR THE OPPOSITE, BEING SO FIGETY OR RESTLESS THAT YOU HAVE BEEN MOVING AROUND A LOT MORE THAN USUAL: NOT AT ALL
9. THOUGHTS THAT YOU WOULD BE BETTER OFF DEAD, OR OF HURTING YOURSELF: NOT AT ALL
2. FEELING DOWN, DEPRESSED OR HOPELESS: NOT AT ALL
SUM OF ALL RESPONSES TO PHQ QUESTIONS 1-9: 2
5. POOR APPETITE OR OVEREATING: NOT AT ALL
7. TROUBLE CONCENTRATING ON THINGS, SUCH AS READING THE NEWSPAPER OR WATCHING TELEVISION: NOT AT ALL

## 2025-05-07 ASSESSMENT — LIFESTYLE VARIABLES
HOW MANY STANDARD DRINKS CONTAINING ALCOHOL DO YOU HAVE ON A TYPICAL DAY: PATIENT DOES NOT DRINK
HOW OFTEN DO YOU HAVE A DRINK CONTAINING ALCOHOL: NEVER

## 2025-05-07 NOTE — PROGRESS NOTES
Medicare Annual Wellness Visit    Ita Vallejo is here for Skin Problem (6mo f/u) and Medicare AWV    Assessment & Plan   Postmenopausal  -     DEXA BONE DENSITY AXIAL SKELETON; Future  -     Basic Metabolic Panel; Future  Medicare annual wellness visit, subsequent       Return in about 1 year (around 5/7/2026) for MAW exam due.     Subjective       Patient's complete Health Risk Assessment and screening values have been reviewed and are found in Flowsheets. The following problems were reviewed today and where indicated follow up appointments were made and/or referrals ordered.    Positive Risk Factor Screenings with Interventions:     Cognitive:   Clock Drawing Test (CDT): (!) Abnormal  Words recalled: 3 Words Recalled  Total Score: 3  Total Score Interpretation: Normal Mini-Cog  Interventions:  Patient comments: little hand is hour and big hand is minutes.                Dentist Screen:  Have you seen the dentist within the past year?: (!) No    Intervention:  Advised to schedule with their dentist     Vision Screen:  Do you have difficulty driving, watching TV, or doing any of your daily activities because of your eyesight?: (!) Yes  Have you had an eye exam within the past year?: Yes  Interventions:   Patient comments: just wend in [place] and got new glasses                    Objective   Vitals:    05/07/25 1743   Pulse: 74   Temp: 97.7 °F (36.5 °C)   TempSrc: Infrared   SpO2: 97%   Weight: 67.1 kg (148 lb)   Height: 1.499 m (4' 11\")      Body mass index is 29.89 kg/m².      Physical Exam  Constitutional:       General: She is not in acute distress.     Appearance: She is well-developed. She is not diaphoretic.   HENT:      Head: Normocephalic and atraumatic.      Right Ear: External ear normal.      Left Ear: External ear normal.      Nose: Nose normal.   Eyes:      General:         Right eye: No discharge.         Left eye: No discharge.      Conjunctiva/sclera: Conjunctivae normal.      Pupils: Pupils

## 2025-05-07 NOTE — PATIENT INSTRUCTIONS
disease. These signs include gums that bleed after brushing or after eating hard foods, such as apples.  See a dentist regularly. Many experts recommend checkups every 6 months.  Keep the dentist up to date on any new medications the person is taking.  Encourage a balanced diet that includes whole grains, vegetables, and fruits, and that is low in saturated fat and sodium.  Encourage the person you're caring for not to use tobacco products. They can affect dental and general health.  Many older adults have a fixed income and feel that they can't afford dental care. But most [facility] and [facility] have programs in which dentists help older adults by lowering fees. Contact your area's public health offices or  for information about dental care in your area.  Using a toothbrush  Older adults with arthritis sometimes have trouble brushing their teeth because they can't easily hold the toothbrush. Their hands and fingers may be stiff, painful, or weak. If this is the case, you can:  Offer an electric toothbrush.  Enlarge the handle of a non-electric toothbrush by wrapping a sponge, an elastic bandage, or adhesive tape around it.  Push the toothbrush handle through a ball made of rubber or soft foam.  Make the handle longer and thicker by taping Popsicle sticks or tongue depressors to it.  You may also be able to buy special toothbrushes, toothpaste dispensers, and floss holders.  Your doctor may recommend a soft-bristle toothbrush if the person you care for bleeds easily. Bleeding can happen because of a health problem or from certain medicines.  A toothpaste for sensitive teeth may help if the person you care for has sensitive teeth.  How do you brush and floss someone's teeth?  If the person you are caring for has a hard time cleaning their teeth on their own, you may need to brush and floss their teeth for them. It may be easiest to have the person sit and face away from you, and to sit or stand behind them.

## 2025-06-02 ENCOUNTER — TRANSCRIBE ORDERS (OUTPATIENT)
Dept: WOMENS IMAGING | Age: 75
End: 2025-06-02

## 2025-06-02 ENCOUNTER — RESULTS FOLLOW-UP (OUTPATIENT)
Dept: FAMILY MEDICINE CLINIC | Age: 75
End: 2025-06-02

## 2025-06-02 ENCOUNTER — HOSPITAL ENCOUNTER (OUTPATIENT)
Dept: WOMENS IMAGING | Age: 75
Discharge: HOME | End: 2025-06-04
Payer: MEDICARE

## 2025-06-02 DIAGNOSIS — Z12.31 SCREENING MAMMOGRAM FOR BREAST CANCER: Primary | ICD-10-CM

## 2025-06-02 DIAGNOSIS — Z78.0 POSTMENOPAUSAL: ICD-10-CM

## 2025-06-02 DIAGNOSIS — Z12.31 SCREENING MAMMOGRAM FOR BREAST CANCER: ICD-10-CM

## 2025-06-02 LAB
ANION GAP SERPL CALCULATED.3IONS-SCNC: 8 MEQ/L (ref 9–15)
BUN SERPL-MCNC: 20 MG/DL (ref 8–23)
CALCIUM SERPL-MCNC: 9.4 MG/DL (ref 8.5–9.9)
CHLORIDE SERPL-SCNC: 104 MEQ/L (ref 95–107)
CO2 SERPL-SCNC: 26 MEQ/L (ref 20–31)
CREAT SERPL-MCNC: 0.63 MG/DL (ref 0.5–0.9)
GLUCOSE SERPL-MCNC: 84 MG/DL (ref 70–99)
POTASSIUM SERPL-SCNC: 3.9 MEQ/L (ref 3.4–4.9)
SODIUM SERPL-SCNC: 138 MEQ/L (ref 135–144)

## 2025-06-02 PROCEDURE — 77080 DXA BONE DENSITY AXIAL: CPT

## 2025-06-02 PROCEDURE — 77063 BREAST TOMOSYNTHESIS BI: CPT

## 2025-06-09 ENCOUNTER — OFFICE VISIT (OUTPATIENT)
Dept: FAMILY MEDICINE CLINIC | Age: 75
End: 2025-06-09
Payer: MEDICARE

## 2025-06-09 VITALS
HEIGHT: 59 IN | OXYGEN SATURATION: 96 % | SYSTOLIC BLOOD PRESSURE: 120 MMHG | HEART RATE: 70 BPM | TEMPERATURE: 96.6 F | DIASTOLIC BLOOD PRESSURE: 70 MMHG | WEIGHT: 150.4 LBS | BODY MASS INDEX: 30.32 KG/M2

## 2025-06-09 DIAGNOSIS — E03.9 HYPOTHYROIDISM, UNSPECIFIED TYPE: ICD-10-CM

## 2025-06-09 DIAGNOSIS — G43.719 INTRACTABLE CHRONIC MIGRAINE WITHOUT AURA AND WITHOUT STATUS MIGRAINOSUS: ICD-10-CM

## 2025-06-09 DIAGNOSIS — M81.0 AGE-RELATED OSTEOPOROSIS WITHOUT CURRENT PATHOLOGICAL FRACTURE: Primary | ICD-10-CM

## 2025-06-09 PROCEDURE — 1159F MED LIST DOCD IN RCRD: CPT | Performed by: FAMILY MEDICINE

## 2025-06-09 PROCEDURE — 1123F ACP DISCUSS/DSCN MKR DOCD: CPT | Performed by: FAMILY MEDICINE

## 2025-06-09 PROCEDURE — 1160F RVW MEDS BY RX/DR IN RCRD: CPT | Performed by: FAMILY MEDICINE

## 2025-06-09 PROCEDURE — 99214 OFFICE O/P EST MOD 30 MIN: CPT | Performed by: FAMILY MEDICINE

## 2025-06-09 RX ORDER — MULTIVITAMIN WITH IRON
500 TABLET ORAL DAILY
COMMUNITY

## 2025-06-09 RX ORDER — ALENDRONATE SODIUM 70 MG/1
70 TABLET ORAL
Qty: 4 TABLET | Refills: 12 | Status: SHIPPED | OUTPATIENT
Start: 2025-06-09

## 2025-06-09 NOTE — PATIENT INSTRUCTIONS
Educated patient on the nature of Fosamax and the need to take medication for the next 5 years and then have a 2-year holiday.  Will continue to monitor DEXA scan as indicated.    Thyroid lab levels were normal no change in dosing.    Migraines are currently not active.  Sumatriptan available if needed.

## 2025-06-09 NOTE — PROGRESS NOTES
Diagnosis Orders   1. Age-related osteoporosis without current pathological fracture  alendronate (FOSAMAX) 70 MG tablet      2. Hypothyroidism, unspecified type        3. Intractable chronic migraine without aura and without status migrainosus            See patient instructions for further assessment/plan specifics    Return for keep next planned appointment.  Patient Instructions   Educated patient on the nature of Fosamax and the need to take medication for the next 5 years and then have a 2-year holiday.  Will continue to monitor DEXA scan as indicated.    Thyroid lab levels were normal no change in dosing.    Migraines are currently not active.  Sumatriptan available if needed.    Subjective:      Patient ID: Ita Vallejo is a 75 y.o. female who presents for:  Chief Complaint   Patient presents with    Follow-up     Bone density testing, mammo, and lab work. States no concerns or questions.       HPI  Reviewed labs, mammogram, DEXA scan.  T-score as low as -3.1.  Reviewed medication options.    Patient denies activity of migraines.  States she actually can go years without them before they act back up and she has not had one in quite a while but sumatriptan works for her when she needs it        Current Outpatient Medications on File Prior to Visit   Medication Sig Dispense Refill    vitamin B-12 (CYANOCOBALAMIN) 500 MCG tablet Take 1 tablet by mouth daily      levothyroxine (SYNTHROID) 100 MCG tablet Take 1 tablet by mouth daily 30 tablet 11    PARoxetine (PAXIL) 20 MG tablet Take 1 tablet by mouth daily 30 tablet 12    cyanocobalamin 1000 MCG/ML injection Inject 1 mL into the muscle every 30 days 1 mL 12    atorvastatin (LIPITOR) 20 MG tablet Take 1 tablet by mouth daily 30 tablet 12    SUMAtriptan (IMITREX) 50 MG tablet Take 1 tablet by mouth as needed for Migraine 9 tablet 1    B-D 3CC LUER-CRISTINO SYR 25GX1\" 25G X 1\" 3 ML MISC USE AS DIRECTED 1 each 12    aspirin 81 MG chewable tablet Take 1 tablet by

## 2025-08-06 DIAGNOSIS — D64.9 ANEMIA, UNSPECIFIED: ICD-10-CM

## 2025-08-06 RX ORDER — SYRINGE WITH NEEDLE, 1 ML 25GX5/8"
SYRINGE, EMPTY DISPOSABLE MISCELLANEOUS
Qty: 1 EACH | Refills: 12 | Status: SHIPPED | OUTPATIENT
Start: 2025-08-06

## 2025-08-30 ENCOUNTER — HOSPITAL ENCOUNTER (EMERGENCY)
Age: 75
Discharge: HOME | End: 2025-08-30
Payer: MEDICARE

## 2025-08-30 VITALS
HEIGHT: 59 IN | WEIGHT: 146.6 LBS | RESPIRATION RATE: 18 BRPM | TEMPERATURE: 98.4 F | DIASTOLIC BLOOD PRESSURE: 111 MMHG | HEART RATE: 85 BPM | BODY MASS INDEX: 29.56 KG/M2 | SYSTOLIC BLOOD PRESSURE: 174 MMHG | OXYGEN SATURATION: 96 %

## 2025-08-30 DIAGNOSIS — S09.90XA INJURY OF HEAD, INITIAL ENCOUNTER: ICD-10-CM

## 2025-08-30 DIAGNOSIS — S01.81XA FACIAL LACERATION, INITIAL ENCOUNTER: Primary | ICD-10-CM

## 2025-08-30 PROCEDURE — 6360000002 HC RX W HCPCS

## 2025-08-30 PROCEDURE — 90714 TD VACC NO PRESV 7 YRS+ IM: CPT

## 2025-08-30 PROCEDURE — 99284 EMERGENCY DEPT VISIT MOD MDM: CPT

## 2025-08-30 PROCEDURE — 12013 RPR F/E/E/N/L/M 2.6-5.0 CM: CPT

## 2025-08-30 PROCEDURE — 90471 IMMUNIZATION ADMIN: CPT

## 2025-08-30 RX ADMIN — CLOSTRIDIUM TETANI TOXOID ANTIGEN (FORMALDEHYDE INACTIVATED) AND CORYNEBACTERIUM DIPHTHERIAE TOXOID ANTIGEN (FORMALDEHYDE INACTIVATED) 0.5 ML: 5; 2 INJECTION, SUSPENSION INTRAMUSCULAR at 22:23

## 2025-08-30 ASSESSMENT — ENCOUNTER SYMPTOMS
NAUSEA: 0
DIARRHEA: 0
COUGH: 0
VOMITING: 0
ABDOMINAL PAIN: 0
PHOTOPHOBIA: 0
SHORTNESS OF BREATH: 0

## 2025-08-30 ASSESSMENT — PAIN SCALES - GENERAL: PAINLEVEL_OUTOF10: 0

## 2025-08-30 ASSESSMENT — PAIN - FUNCTIONAL ASSESSMENT: PAIN_FUNCTIONAL_ASSESSMENT: 0-10

## (undated) DEVICE — IMPL INSERT REVERSED AEQUALIS ASCEND 36X6MM 7.5DEG
Type: IMPLANTABLE DEVICE | Site: SHOULDER | Status: NON-FUNCTIONAL
Removed: 2023-03-30

## (undated) DEVICE — SINGLE PORT MANIFOLD: Brand: NEPTUNE 2

## (undated) DEVICE — COVER LT HNDL BLU PLAS

## (undated) DEVICE — SNARE ENDOSCP AD L240CM LOOP W10MM SHTH DIA2.4MM RND INSUL

## (undated) DEVICE — SUTURE MCRYL SZ 3-0 L27IN ABSRB UD L19MM PS-2 3/8 CIR PRIM Y427H

## (undated) DEVICE — LABEL MED MINI W/ MARKER

## (undated) DEVICE — COUNTER NDL 40 COUNT HLD 70 FOAM BLK ADH W/ MAG

## (undated) DEVICE — SUTURE VCRL SZ 3-0 L36IN ABSRB UD L36MM CT-1 1/2 CIR J944H

## (undated) DEVICE — YANKAUER,SMOOTH HANDLE,HIGH CAPACITY: Brand: MEDLINE INDUSTRIES, INC.

## (undated) DEVICE — SHEET, DRAPE, SPLIT, STERILE: Brand: MEDLINE

## (undated) DEVICE — TUBE SET 96 MM 64 MM H2O PERISTALTIC STD AUX CHANNEL

## (undated) DEVICE — SPONGE GZ W4XL4IN COT 12 PLY TYP VII WVN C FLD DSGN STERILE

## (undated) DEVICE — ENDO CARRY-ON PROCEDURE KIT: Brand: ENDO CARRY-ON PROCEDURE KIT

## (undated) DEVICE — ELECTRODE ES L275IN 275IN BLDE TIP COAT PTFE TEF W EVAC

## (undated) DEVICE — Device: Brand: ENDO SMARTCAP

## (undated) DEVICE — 1010 S-DRAPE TOWEL DRAPE 10/BX: Brand: STERI-DRAPE™

## (undated) DEVICE — FAN SPRAY KIT: Brand: PULSAVAC®

## (undated) DEVICE — BANDAGE COBAN 6 IN WND 6INX5YD FOAM

## (undated) DEVICE — SUTURE FIBERWIRE SZ 2 W/ TAPERED NEEDLE BLUE L38IN NONABSORB BLU L26.5MM 1/2 CIRCLE AR7200

## (undated) DEVICE — GUIDEPIN ORTH 2.5X220 MM SHLDR AEQUALIS PERFORM+
Type: IMPLANTABLE DEVICE | Site: SHOULDER | Status: NON-FUNCTIONAL
Removed: 2023-03-30

## (undated) DEVICE — BLADE SAW W071XL354IN THK0047IN CUT THK0053IN REPL SAG

## (undated) DEVICE — TUBING, SUCTION, 1/4" X 10', STRAIGHT: Brand: MEDLINE

## (undated) DEVICE — SPONGE,LAP,18"X18",DLX,XR,ST,5/PK,40/PK: Brand: MEDLINE

## (undated) DEVICE — ALCOHOL RUBBING ISO 16OZ 70%

## (undated) DEVICE — 3M™ STERI-DRAPE™ INSTRUMENT POUCH 1018: Brand: STERI-DRAPE™

## (undated) DEVICE — BIT DRL SCREW 3.2 MM PERIPH STRL

## (undated) DEVICE — BRUSH ENDO CLN L90.5IN SHTH DIA1.7MM BRIST DIA5-7MM 2-6MM

## (undated) DEVICE — COVER,TABLE,44X90,STERILE: Brand: MEDLINE

## (undated) DEVICE — 450 ML BOTTLE OF 0.05% CHLORHEXIDINE GLUCONATE IN 99.95% STERILE WATER FOR IRRIGATION, USP AND APPLICATOR.: Brand: IRRISEPT ANTIMICROBIAL WOUND LAVAGE

## (undated) DEVICE — SUTURE N ABSRB BRAIDED 5-0 24 IN 49 MM GRN NICELOOP SMSL50101

## (undated) DEVICE — STOCKINETTE,IMPERVIOUS,12X48,STERILE: Brand: MEDLINE

## (undated) DEVICE — ANTISEPTIC 16OZ H PEROX 1ST AID ORAL DEBRIDING AGNT

## (undated) DEVICE — 4-PORT MANIFOLD: Brand: NEPTUNE 2

## (undated) DEVICE — SUTURE NICELOOP SZ 5 L24IN NONABSORBABLE WHT KAC-25 L49MM SMSL50201

## (undated) DEVICE — DRESSING HYDROFIBER AQUACEL AG ADVANTAGE 3.5X10 IN

## (undated) DEVICE — ADHESIVE SKIN CLSR 0.7ML TOP DERMBND ADV

## (undated) DEVICE — DRAPE,U/ SHT,SPLIT,PLAS,STERIL: Brand: MEDLINE

## (undated) DEVICE — SUTURE ETHBND EXCEL SZ 0 L30IN NONABSORBABLE GRN CT1 L36MM X424H

## (undated) DEVICE — PACK,ORTHOPEDIC I: Brand: MEDLINE

## (undated) DEVICE — BANDAGE COBAN 4 IN COMPR W4INXL5YD FOAM COHESIVE QUIK STK SELF ADH SFT

## (undated) DEVICE — 3M™ IOBAN™ 2 ANTIMICROBIAL INCISE DRAPE 6650EZ: Brand: IOBAN™ 2

## (undated) DEVICE — CONMED SCOPE SAVER BITE BLOCK, 20X27 MM: Brand: SCOPE SAVER

## (undated) DEVICE — GLOVE ORANGE PI 8   MSG9080

## (undated) DEVICE — BLADE,CARBON-STEEL,10,STRL,DISPOSABLE,TB: Brand: MEDLINE

## (undated) DEVICE — GOWN,SIRUS,POLYRNF,BRTHSLV,XLN/XL,20/CS: Brand: MEDLINE

## (undated) DEVICE — GLOVE ORANGE PI 7   MSG9070

## (undated) DEVICE — GLOVE ORANGE PI 7 1/2   MSG9075

## (undated) DEVICE — NEPTUNE E-SEP SMOKE EVACUATION PENCIL, COATED, 70MM BLADE, PUSH BUTTON SWITCH: Brand: NEPTUNE E-SEP

## (undated) DEVICE — TUBING, SUCTION, 9/32" X 12', STRAIGHT: Brand: MEDLINE INDUSTRIES, INC.

## (undated) DEVICE — 3M™ STERI-STRIP™ REINFORCED ADHESIVE SKIN CLOSURES, R1547, 1/2 IN X 4 IN (12 MM X 100 MM), 6 STRIPS/ENVELOPE: Brand: 3M™ STERI-STRIP™

## (undated) DEVICE — GLOVE ORANGE PI 8 1/2   MSG9085

## (undated) DEVICE — SHEET,DRAPE,53X77,STERILE: Brand: MEDLINE

## (undated) DEVICE — GOWN,AURORA,NONREINFORCED,LARGE: Brand: MEDLINE

## (undated) DEVICE — POSITIONER PT UNIV HD RESTRN DISP

## (undated) DEVICE — FORCEPS BX L240CM JAW DIA2.8MM L CAP W/ NDL MIC MESH TOOTH

## (undated) DEVICE — PATIENT RETURN ELECTRODE, SINGLE-USE, NON CONTACT QUALITY MONITORING, ADULT, WITH 9 FT (2.7 M) CORD, FOR PATIENTS WEIGHING OVER 33LBS. (15KG): Brand: MEGADYNE

## (undated) DEVICE — PRECISOR HOT DISPOSABLE HOT BIOPSY FORCEPS, 2.8 MM X 230 CM, OLYMPUS CORD: Brand: PRECISOR

## (undated) DEVICE — HOOD: Brand: T7PLUS

## (undated) DEVICE — TOWEL,OR,DSP,ST,BLUE,STD,4/PK,20PK/CS: Brand: MEDLINE

## (undated) DEVICE — MAT FLR ABSRB ECODRI-SAFE

## (undated) DEVICE — ELECTRODE PT RET AD L9FT HI MOIST COND ADH HYDRGEL CORDED

## (undated) DEVICE — APPLICATOR MEDICATED 26 CC SOLUTION HI LT ORNG CHLORAPREP

## (undated) DEVICE — MARKER SURG SKIN GENTIAN VLT REG TIP W/ 6IN RUL

## (undated) DEVICE — 3M™ STERI-DRAPE™ U-DRAPE 1015: Brand: STERI-DRAPE™